# Patient Record
Sex: FEMALE | Race: WHITE | Employment: UNEMPLOYED | URBAN - METROPOLITAN AREA
[De-identification: names, ages, dates, MRNs, and addresses within clinical notes are randomized per-mention and may not be internally consistent; named-entity substitution may affect disease eponyms.]

---

## 2017-02-11 DIAGNOSIS — G47.00 INSOMNIA, UNSPECIFIED TYPE: ICD-10-CM

## 2017-02-13 RX ORDER — METOCLOPRAMIDE 10 MG/1
TABLET ORAL
Qty: 120 TAB | Refills: 3 | Status: SHIPPED | OUTPATIENT
Start: 2017-02-13 | End: 2017-03-28 | Stop reason: ALTCHOICE

## 2017-02-13 RX ORDER — BUPROPION HYDROCHLORIDE 150 MG/1
TABLET ORAL
Qty: 30 TAB | Refills: 0 | OUTPATIENT
Start: 2017-02-13

## 2017-02-13 RX ORDER — TRAZODONE HYDROCHLORIDE 100 MG/1
TABLET ORAL
Qty: 30 TAB | Refills: 3 | Status: SHIPPED | OUTPATIENT
Start: 2017-02-13 | End: 2017-06-30 | Stop reason: SDUPTHER

## 2017-03-02 ENCOUNTER — TELEPHONE (OUTPATIENT)
Dept: FAMILY MEDICINE CLINIC | Age: 32
End: 2017-03-02

## 2017-03-02 NOTE — TELEPHONE ENCOUNTER
Spoke with Liborio Rogers NP, [facility]. Was seeing patient for elevated prolactin level. States they repeated in office and level was high at 78. MRI negative. Ainsley Marcus believes elevated prolactin is a result of Reglan use. Recommend d/c Reglan, refer back to GI for symptom management, repeat Prolactin 4 weeks after discontinuing reglan. Call Ainsley Marcus if prolactin remains elevated.      Liborio Rogers NP ([facility]) office: [phone]  Work cell: [phone]

## 2017-03-02 NOTE — TELEPHONE ENCOUNTER
Teodora Martinez calling from [facility] needing to speak with Andreia Lewis regarding the pt.     Shasha# [phone]

## 2017-03-03 NOTE — TELEPHONE ENCOUNTER
Please call patient and have her to stop taking Reglan, that [facility] endocrinology believes the medication is causing her elevated prolactin levels. She will need to follow up with GI to see if any alternative medication she can take. Call pharmacy and let them know medication has been discontinued and they should not authorize any additional refills from patient.

## 2017-03-08 RX ORDER — BUPROPION HYDROCHLORIDE 150 MG/1
TABLET ORAL
Qty: 30 TAB | Refills: 0 | OUTPATIENT
Start: 2017-03-08

## 2017-03-09 RX ORDER — DULOXETIN HYDROCHLORIDE 60 MG/1
CAPSULE, DELAYED RELEASE ORAL
Qty: 30 CAP | Refills: 3 | Status: SHIPPED | OUTPATIENT
Start: 2017-03-09 | End: 2017-08-29 | Stop reason: SDUPTHER

## 2017-03-28 ENCOUNTER — OFFICE VISIT (OUTPATIENT)
Dept: FAMILY MEDICINE CLINIC | Age: 32
End: 2017-03-28

## 2017-03-28 VITALS
BODY MASS INDEX: 42.35 KG/M2 | DIASTOLIC BLOOD PRESSURE: 86 MMHG | SYSTOLIC BLOOD PRESSURE: 120 MMHG | HEART RATE: 82 BPM | HEIGHT: 63 IN | WEIGHT: 239 LBS | OXYGEN SATURATION: 98 % | RESPIRATION RATE: 20 BRPM | TEMPERATURE: 98.4 F

## 2017-03-28 DIAGNOSIS — E55.9 VITAMIN D DEFICIENCY: ICD-10-CM

## 2017-03-28 DIAGNOSIS — R79.89 ELEVATED PROLACTIN LEVEL: ICD-10-CM

## 2017-03-28 DIAGNOSIS — S29.019A THORACIC MYOFASCIAL STRAIN, INITIAL ENCOUNTER: Primary | ICD-10-CM

## 2017-03-28 RX ORDER — CYCLOBENZAPRINE HCL 10 MG
10 TABLET ORAL
Qty: 30 TAB | Refills: 0 | Status: SHIPPED | OUTPATIENT
Start: 2017-03-28 | End: 2017-05-09 | Stop reason: SDUPTHER

## 2017-03-28 RX ORDER — NAPROXEN 500 MG/1
500 TABLET ORAL
Qty: 30 TAB | Refills: 1 | Status: SHIPPED | OUTPATIENT
Start: 2017-03-28 | End: 2018-06-08 | Stop reason: ALTCHOICE

## 2017-03-28 NOTE — PATIENT INSTRUCTIONS
Healthy Upper Back: Exercises  Your Care Instructions  Here are some examples of exercises for your upper back. Start each exercise slowly. Ease off the exercise if you start to have pain. Your doctor or physical therapist will tell you when you can start these exercises and which ones will work best for you. How to do the exercises  Lower neck and upper back stretch    1. Stretch your arms out in front of your body. Clasp one hand on top of your other hand. 2. Gently reach out so that you feel your shoulder blades stretching away from each other. 3. Gently bend your head forward. 4. Hold for 15 to 30 seconds. 5. Repeat 2 to 4 times. Midback stretch    Note: If you have knee pain, do not do this exercise. 1. Kneel on the floor, and sit back on your ankles. 2. Lean forward, place your hands on the floor, and stretch your arms out in front of you. Rest your head between your arms. 3. Gently push your chest toward the floor, reaching as far in front of you as possible. 4. Hold for 15 to 30 seconds. 5. Repeat 2 to 4 times. Shoulder rolls    1. Sit comfortably with your feet shoulder-width apart. You can also do this exercise while standing. 2. Roll your shoulders up, then back, and then down in a smooth, circular motion. 3. Repeat 2 to 4 times. Wall push-up    1. Stand against a wall with your feet about 12 to 24 inches back from the wall. If you feel any pain when you do this exercise, stand closer to the wall. 2. Place your hands on the wall slightly wider apart than your shoulders, and lean forward. 3. Gently lean your body toward the wall. Then push back to your starting position. Keep the motion smooth and controlled. 4. Repeat 8 to 12 times. Resisted shoulder blade squeeze    Note: For this exercise, you will need elastic exercise material, such as surgical tubing or Thera-Band. 1. Sit or stand, holding the band in both hands in front of you.  Keep your elbows close to your sides, bent at a 90-degree angle. Your palms should face up. 2. Squeeze your shoulder blades together, and move your arms to the outside, stretching the band. Be sure to keep your elbows at your sides while you do this. 3. Relax. 4. Repeat 8 to 12 times. Resisted rows    Note: For this exercise, you will need elastic exercise material, such as surgical tubing or Thera-Band. 1. Put the band around a solid object, such as a bedpost, at about waist level. Hold one end of the band in each hand. 2. With your elbows at your sides and bent to 90 degrees, pull the band back to move your shoulder blades toward each other. Return to the starting position. 3. Repeat 8 to 12 times. Follow-up care is a key part of your treatment and safety. Be sure to make and go to all appointments, and call your doctor if you are having problems. It's also a good idea to know your test results and keep a list of the medicines you take. Where can you learn more? Go to http://orin-cris.info/. Enter U352 in the search box to learn more about \"Healthy Upper Back: Exercises. \"  Current as of: May 23, 2016  Content Version: 11.1  © 3441-0737 [employer]. Care instructions adapted under license by [employer] (which disclaims liability or warranty for this information). If you have questions about a medical condition or this instruction, always ask your healthcare professional. [place] [address] any warranty or liability for your use of this information.

## 2017-03-28 NOTE — PROGRESS NOTES
HISTORY OF PRESENT ILLNESS  Oneida Angel is a 29 y.o. female. HPI  Patient comes in today for back pain. Mid-upper back pain for 2 days. States she had something pop this morning with relief in pain. Does not recall injury. Denies numbness/tingling or weakness in extremities. Denies fever, chills, cough, dyspnea, palpitations, N/V. Has been off Reglan per endocrinology for elevated Prolactin levels. States she has been having more bloating. Allergies   Allergen Reactions    Latex Rash, Itching and Swelling    Pcn [Penicillins] Other (comments)     Pt states as a child she had a reaction but does not remember specific reaction. Pt states\"mother say I stopped breathing\". Past Medical History:   Diagnosis Date    Anxiety     Arthritis     unsure - knees, lower back, fingers and toes    Asthma     well controlled    Chronic pain     right abdomen,shooting pain groins    Depression     Diabetes ([address]     pre diabetes     GERD (gastroesophageal reflux disease)     Headache     Ill-defined condition     prolapsed cervix,rectum,bladder    Prolapse of anterior lip of cervix     Psychiatric disorder     bipolar, anxiety, depression    Rectal prolapse     Stool color black     White stool       Past Surgical History:   Procedure Laterality Date    HX GYN      vaginal birth x2    HX HEENT      bilat tubes in ears age 3    HX HERNIA REPAIR  07/09/2015    Laparoscopic Incisional hernia repair with mesh.  HX LAP CHOLECYSTECTOMY  7/7/2014    HX OTHER SURGICAL      colonoscopy       Social History     Social History    Marital status: SINGLE     Spouse name: N/A    Number of children: 2    Years of education: N/A     Occupational History     Not Employed     work at home      Social History Main Topics    Smoking status: Current Every Day Smoker     Packs/day: 0.50     Years: 10.00     Start date: 3/1/2004    Smokeless tobacco: Never Used    Alcohol use No    Drug use:  Yes Special: Marijuana      Comment: few months ago    Sexual activity: Yes     Partners: Male     Birth control/ protection: None      Comment: single      Other Topics Concern    Not on file     Social History Narrative    Unemployed currently       Family History   Problem Relation Age of Onset    Other Mother      fibromyalgia, IBS    Diabetes Mother      Pre-DM    High Cholesterol Mother     Hypertension Mother     Liver Disease Mother      fatty liver    Cancer Mother      uterine     Heart Disease Mother      CHF    Arthritis-osteo Mother     Diabetes Father     Hypertension Father     Asthma Brother     Diabetes Paternal Aunt     Hypertension Paternal Aunt     Diabetes Paternal Uncle     Hypertension Paternal Uncle     Cancer Maternal Grandfather      esoph    Hypertension Maternal Grandfather     Stroke Maternal Grandfather     Heart Disease Paternal Grandmother     Diabetes Paternal Grandfather     Heart Attack Paternal Grandfather     Stroke Paternal Grandfather     Hypertension Paternal Grandfather     Heart Disease Maternal Grandmother     Other Sister      MTHFR (clotting D/O)       Current Outpatient Prescriptions   Medication Sig    DULoxetine (CYMBALTA) 60 mg capsule take 1 capsule by mouth once daily    traZODone (DESYREL) 100 mg tablet take 1 tablet by mouth NIGHTLY if needed for sleep    metoclopramide HCl (REGLAN) 10 mg tablet take 1 tablet by mouth BEFORE BREAKFAST, LUNCH AND DINNER AND AT BEDTIME    omeprazole (PRILOSEC) 20 mg capsule take 1 capsule by mouth once daily    ergocalciferol (ERGOCALCIFEROL) 50,000 unit capsule take 1 capsule by mouth every 7 days    buPROPion XL (WELLBUTRIN XL) 150 mg tablet take 1 tablet by mouth once daily    clindamycin (CLEOCIN T) 1 % lotion Apply  to affected area two (2) times a day.  use thin film on affected area    traMADol (ULTRAM) 50 mg tablet take 1 tablet by mouth every 8 hours if needed for severe pain maximum daily dose of 3 tablets    cetirizine (ZYRTEC) 10 mg tablet take 1 tablet by mouth NIGHTLY     No current facility-administered medications for this visit. Review of Systems   Constitutional: Negative for chills and fever. Respiratory: Negative for cough, sputum production, shortness of breath and wheezing. Cardiovascular: Negative for chest pain and palpitations. Gastrointestinal: Negative for abdominal pain (abdominal bloating), blood in stool, constipation, melena, nausea and vomiting. Genitourinary: Negative for dysuria, flank pain, frequency, hematuria and urgency. Musculoskeletal: Positive for back pain (mid-upper back). Neurological: Negative for dizziness, tingling, sensory change and focal weakness. Vitals:    03/28/17 1205   BP: 120/86   Pulse: 82   Resp: 20   Temp: 98.4 °F (36.9 °C)   TempSrc: Oral   SpO2: 98%   Weight: 239 lb (108.4 kg)   Height: 5' 3\" (1.6 m)     Physical Exam   Constitutional: She is oriented to person, place, and time. Vital signs are normal. She appears well-developed and well-nourished. She is cooperative. Cardiovascular: Normal rate, regular rhythm and normal heart sounds. Pulses:       Radial pulses are 2+ on the right side, and 2+ on the left side. Trace BLE edema   Pulmonary/Chest: Effort normal and breath sounds normal.   Musculoskeletal:        Thoracic back: She exhibits tenderness (right paraspinal), bony tenderness, pain and spasm. She exhibits normal range of motion, no swelling, no edema, no deformity (no stepoffs), no laceration and normal pulse. Neurological: She is alert and oriented to person, place, and time. Skin: Skin is warm and dry. Psychiatric: She has a normal mood and affect. Her behavior is normal. Judgment and thought content normal.   Vitals reviewed. ASSESSMENT and PLAN    ICD-10-CM ICD-9-CM    1.  Thoracic myofascial strain, initial encounter S29.019A 847.1 naproxen (NAPROSYN) 500 mg tablet      cyclobenzaprine (FLEXERIL) 10 mg tablet   2. Elevated prolactin level (HCC) E22.9 253.1 PROLACTIN   3. Vitamin D deficiency E55.9 268.9 VITAMIN D, 25 HYDROXY     Encounter Diagnoses   Name Primary?  Thoracic myofascial strain, initial encounter Yes    Elevated prolactin level (HCC)     Vitamin D deficiency      Orders Placed This Encounter    PROLACTIN    VITAMIN D, 25 HYDROXY    naproxen (NAPROSYN) 500 mg tablet    cyclobenzaprine (FLEXERIL) 10 mg tablet     Erin was seen today for spasms. Diagnoses and all orders for this visit:    Thoracic myofascial strain, initial encounter - NSAIDs, flexeril, HEP, heat. RTC if no improvement in 1-2 weeks. -     naproxen (NAPROSYN) 500 mg tablet; Take 1 Tab by mouth two (2) times daily as needed. For back pain. Take with food  -     cyclobenzaprine (FLEXERIL) 10 mg tablet; Take 1 Tab by mouth three (3) times daily as needed for Muscle Spasm(s). Elevated prolactin level ([facility] [address] - patient has been off Reglan for ~1 month, will recheck Prolactin level. If still elevated, will notify [place]ocr[place]ology at [facility]  -     PROLACTIN    Vitamin D deficiency  -     VITAMIN D, 25 HYDROXY      Follow-up Disposition:  Return if symptoms worsen or fail to improve.  lab results and schedule of future lab studies reviewed with patient    I have reviewed the patient's allergies and made any necessary changes. Medical, procedural, social and family histories have been reviewed and updated as medically indicated. I have reconciled and/or revised patient medications in the EMR. I have discussed each diagnosis listed in this note with Anne Luis and/or their family. I have discussed treatment options and the risk/benefit analysis of those options, including safe use of medications and possible medication side effects. Through the use of shared decision making we have agreed to the above plan. The patient has received an after-visit summary and questions were answered concerning future plans.      Carito Abel Jeneane Cowden, LAWANDA-C    This note will not be viewable in MyChart.

## 2017-03-28 NOTE — MR AVS SNAPSHOT
Visit Information Date & Time Provider Department Dept. Phone Encounter #  
 3/28/2017 11:30 AM Kike Galeas [facility] [phone] [id number] Follow-up Instructions Return if symptoms worsen or fail to improve. Upcoming Health Maintenance Date Due  
 PAP AKA CERVICAL CYTOLOGY 8/5/2019 DTaP/Tdap/Td series (2 - Td) 1/26/2022 Allergies as of 3/28/2017  Review Complete On: 3/28/2017 By: Zhane Allen LPN Severity Noted Reaction Type Reactions Latex Medium 03/14/2014   Side Effect Rash, Itching, Swelling Pcn [Penicillins] High 12/12/2011   Systemic Other (comments) Pt states as a child she had a reaction but does not remember specific reaction. Pt states\"mother say I stopped breathing\". Current Immunizations  Reviewed on 7/9/2015 Name Date Influenza Vaccine 2/5/2013 Influenza Vaccine Split 1/26/2012 TDAP Vaccine 1/26/2012 Not reviewed this visit You Were Diagnosed With   
  
 Codes Comments Thoracic myofascial strain, initial encounter    -  Primary ICD-10-CM: S29.019A 
ICD-9-CM: 847.1 Vitamin D deficiency     ICD-10-CM: E55.9 ICD-9-CM: 268.9 Elevated prolactin level (HCC)     ICD-10-CM: E22.9 ICD-9-CM: 253.1 Vitals BP Pulse Temp Resp Height(growth percentile) Weight(growth percentile) 120/86 82 98.4 °F (36.9 °C) (Oral) 20 5' 3\" (1.6 m) 239 lb (108.4 kg) LMP SpO2 BMI OB Status Smoking Status 02/28/2017 98% 42.34 kg/m2 Having regular periods Current Every Day Smoker Vitals History BMI and BSA Data Body Mass Index Body Surface Area  
 42.34 kg/m 2 2.2 m 2 Preferred Pharmacy Pharmacy Name Phone [id number] [address] Tomas[address] Zapata [phone] Your Updated Medication List  
  
   
This list is accurate as of: 3/28/17 12:46 PM.  Always use your most recent med list.  
  
  
  
  
 cetirizine 10 mg tablet Commonly known as:  ZYRTEC  
take 1 tablet by mouth NIGHTLY  
  
 clindamycin 1 % lotion Commonly known as:  CLEOCIN T Apply  to affected area two (2) times a day. use thin film on affected area  
  
 cyclobenzaprine 10 mg tablet Commonly known as:  FLEXERIL Take 1 Tab by mouth three (3) times daily as needed for Muscle Spasm(s). DULoxetine 60 mg capsule Commonly known as:  CYMBALTA  
take 1 capsule by mouth once daily  
  
 ergocalciferol 50,000 unit capsule Commonly known as:  ERGOCALCIFEROL  
take 1 capsule by mouth every 7 days  
  
 naproxen 500 mg tablet Commonly known as:  NAPROSYN Take 1 Tab by mouth two (2) times daily as needed. For back pain. Take with food  
  
 omeprazole 20 mg capsule Commonly known as:  PRILOSEC  
take 1 capsule by mouth once daily  
  
 traZODone 100 mg tablet Commonly known as:  DESYREL  
take 1 tablet by mouth NIGHTLY if needed for sleep Prescriptions Sent to Pharmacy Refills  
 naproxen (NAPROSYN) 500 mg tablet 1 Sig: Take 1 Tab by mouth two (2) times daily as needed. For back pain. Take with food Class: Normal  
 Pharmacy: [id number] Ph #: [phone] Route: Oral  
 cyclobenzaprine (FLEXERIL) 10 mg tablet 0 Sig: Take 1 Tab by mouth three (3) times daily as needed for Muscle Spasm(s). Class: Normal  
 Pharmacy: [id number] [address] Ph #: [phone] Route: Oral  
  
We Performed the Following PROLACTIN [07539 CPT(R)] VITAMIN D, 25 HYDROXY [id number] CPT(R)] Follow-up Instructions Return if symptoms worsen or fail to improve. Patient Instructions Healthy Upper Back: Exercises Your Care Instructions Here are some examples of exercises for your upper back. Start each exercise slowly. Ease off the exercise if you start to have pain. Your doctor or physical therapist will tell you when you can start these exercises and which ones will work best for you. How to do the exercises Lower neck and upper back stretch 1. Stretch your arms out in front of your body. Clasp one hand on top of your other hand. 2. Gently reach out so that you feel your shoulder blades stretching away from each other. 3. Gently bend your head forward. 4. Hold for 15 to 30 seconds. 5. Repeat 2 to 4 times. Midback stretch Note: If you have knee pain, do not do this exercise. 1. Kneel on the floor, and sit back on your ankles. 2. Lean forward, place your hands on the floor, and stretch your arms out in front of you. Rest your head between your arms. 3. Gently push your chest toward the floor, reaching as far in front of you as possible. 4. Hold for 15 to 30 seconds. 5. Repeat 2 to 4 times. Shoulder rolls 1. Sit comfortably with your feet shoulder-width apart. You can also do this exercise while standing. 2. Roll your shoulders up, then back, and then down in a smooth, circular motion. 3. Repeat 2 to 4 times. Wall push-up 1. Stand against a wall with your feet about 12 to 24 inches back from the wall. If you feel any pain when you do this exercise, stand closer to the wall. 2. Place your hands on the wall slightly wider apart than your shoulders, and lean forward. 3. Gently lean your body toward the wall. Then push back to your starting position. Keep the motion smooth and controlled. 4. Repeat 8 to 12 times. Resisted shoulder blade squeeze Note: For this exercise, you will need elastic exercise material, such as surgical tubing or Thera-Band. 1. Sit or stand, holding the band in both hands in front of you. Keep your elbows close to your sides, bent at a 90-degree angle. Your palms should face up. 2. Squeeze your shoulder blades together, and move your arms to the outside, stretching the band.  Be sure to keep your elbows at your sides while you do this. 3. Relax. 4. Repeat 8 to 12 times. Resisted rows Note: For this exercise, you will need elastic exercise material, such as surgical tubing or Thera-Band. 1. Put the band around a solid object, such as a bedpost, at about waist level. Hold one end of the band in each hand. 2. With your elbows at your sides and bent to 90 degrees, pull the band back to move your shoulder blades toward each other. Return to the starting position. 3. Repeat 8 to 12 times. Follow-up care is a key part of your treatment and safety. Be sure to make and go to all appointments, and call your doctor if you are having problems. It's also a good idea to know your test results and keep a list of the medicines you take. Where can you learn more? Go to http://orin-cris.info/. Enter S611 in the search box to learn more about \"Healthy Upper Back: Exercises. \" Current as of: May 23, 2016 Content Version: 11.1 © 3992-2587 [employer] Care instructions adapted under license by [employer] (which disclaims liability or warranty for this information). If you have questions about a medical condition or this instruction, always ask your healthcare professional. [address] any warranty or liability for your use of this information. Introducing [facility]! Yonatan Flores introduces IntoOutdoors patient portal. Now you can access parts of your medical record, email your doctor's office, and request medication refills online. 1. In your internet browser, go to [email or web address] 2. Click on the First Time User? Click Here link in the Sign In box. You will see the New Member Sign Up page. 3. Enter your IntoOutdoors Access Code exactly as it appears below. You will not need to use this code after youve completed the sign-up process. If you do not sign up before the expiration date, you must request a new code. · weartolook Access Code: [email or web address] Expires: 6/26/2017 12:46 PM 
 
4. Enter the last four digits of your Social Security Number (xxxx) and Date of Birth (mm/dd/yyyy) as indicated and click Submit. You will be taken to the next sign-up page. 5. Create a weartolook ID. This will be your weartolook login ID and cannot be changed, so think of one that is secure and easy to remember. 6. Create a weartolook password. You can change your password at any time. 7. Enter your Password Reset Question and Answer. This can be used at a later time if you forget your password. 8. Enter your e-mail address. You will receive e-mail notification when new information is available in [address] 9. Click Sign Up. You can now view and download portions of your medical record. 10. Click the Download Summary menu link to download a portable copy of your medical information. If you have questions, please visit the Frequently Asked Questions section of the weartolook website. Remember, weartolook is NOT to be used for urgent needs. For medical emergencies, dial 911. Now available from your iPhone and Android! Please provide this summary of care documentation to your next provider. Your primary care clinician is listed as [address]. If you have any questions after today's visit, please call [phone].

## 2017-03-29 LAB
25(OH)D3+25(OH)D2 SERPL-MCNC: 29.2 NG/ML (ref 30–100)
PROLACTIN SERPL-MCNC: 5.6 NG/ML (ref 4.8–23.3)

## 2017-05-09 DIAGNOSIS — S29.019A THORACIC MYOFASCIAL STRAIN, INITIAL ENCOUNTER: ICD-10-CM

## 2017-05-09 RX ORDER — ERGOCALCIFEROL 1.25 MG/1
CAPSULE ORAL
Qty: 4 CAP | Refills: 3 | Status: SHIPPED | OUTPATIENT
Start: 2017-05-09 | End: 2018-06-08 | Stop reason: ALTCHOICE

## 2017-05-09 RX ORDER — CYCLOBENZAPRINE HCL 10 MG
TABLET ORAL
Qty: 30 TAB | Refills: 0 | Status: SHIPPED | OUTPATIENT
Start: 2017-05-09 | End: 2018-06-08 | Stop reason: ALTCHOICE

## 2017-06-04 RX ORDER — OMEPRAZOLE 20 MG/1
CAPSULE, DELAYED RELEASE ORAL
Qty: 30 CAP | Refills: 3 | Status: SHIPPED | OUTPATIENT
Start: 2017-06-04 | End: 2017-10-01 | Stop reason: SDUPTHER

## 2017-06-30 DIAGNOSIS — G47.00 INSOMNIA, UNSPECIFIED TYPE: ICD-10-CM

## 2017-06-30 RX ORDER — CETIRIZINE HCL 10 MG
TABLET ORAL
Qty: 30 TAB | Refills: 11 | Status: SHIPPED | OUTPATIENT
Start: 2017-06-30 | End: 2018-06-15

## 2017-06-30 RX ORDER — TRAZODONE HYDROCHLORIDE 100 MG/1
TABLET ORAL
Qty: 30 TAB | Refills: 3 | Status: SHIPPED | OUTPATIENT
Start: 2017-06-30 | End: 2017-10-29 | Stop reason: SDUPTHER

## 2017-10-01 RX ORDER — OMEPRAZOLE 20 MG/1
CAPSULE, DELAYED RELEASE ORAL
Qty: 30 CAP | Refills: 3 | Status: SHIPPED | OUTPATIENT
Start: 2017-10-01 | End: 2018-06-08 | Stop reason: ALTCHOICE

## 2018-06-08 ENCOUNTER — OFFICE VISIT (OUTPATIENT)
Dept: FAMILY MEDICINE CLINIC | Age: 33
End: 2018-06-08

## 2018-06-08 VITALS
SYSTOLIC BLOOD PRESSURE: 131 MMHG | RESPIRATION RATE: 18 BRPM | DIASTOLIC BLOOD PRESSURE: 90 MMHG | HEIGHT: 63 IN | OXYGEN SATURATION: 97 % | BODY MASS INDEX: 34.05 KG/M2 | TEMPERATURE: 97.9 F | WEIGHT: 192.2 LBS | HEART RATE: 63 BPM

## 2018-06-08 DIAGNOSIS — R06.00 DYSPNEA, UNSPECIFIED TYPE: ICD-10-CM

## 2018-06-08 DIAGNOSIS — E55.9 VITAMIN D DEFICIENCY: ICD-10-CM

## 2018-06-08 DIAGNOSIS — F43.10 PTSD (POST-TRAUMATIC STRESS DISORDER): ICD-10-CM

## 2018-06-08 DIAGNOSIS — F33.2 SEVERE EPISODE OF RECURRENT MAJOR DEPRESSIVE DISORDER, WITHOUT PSYCHOTIC FEATURES (HCC): Primary | ICD-10-CM

## 2018-06-08 DIAGNOSIS — N64.4 BREAST PAIN, RIGHT: ICD-10-CM

## 2018-06-08 DIAGNOSIS — N89.8 VAGINAL DISCHARGE: ICD-10-CM

## 2018-06-08 DIAGNOSIS — N63.10 LUMP OF BREAST, RIGHT: ICD-10-CM

## 2018-06-08 DIAGNOSIS — L60.5 YELLOW NAILS: ICD-10-CM

## 2018-06-08 DIAGNOSIS — R22.31 LUMP OF AXILLA, RIGHT: ICD-10-CM

## 2018-06-08 DIAGNOSIS — R07.89 OTHER CHEST PAIN: ICD-10-CM

## 2018-06-08 DIAGNOSIS — Z72.51 UNPROTECTED SEXUAL INTERCOURSE: ICD-10-CM

## 2018-06-08 DIAGNOSIS — R73.02 IGT (IMPAIRED GLUCOSE TOLERANCE): ICD-10-CM

## 2018-06-08 LAB
BILIRUB UR QL STRIP: NEGATIVE
GLUCOSE UR-MCNC: NEGATIVE MG/DL
HBA1C MFR BLD HPLC: 5.4 %
HCG URINE, QL. (POC): NEGATIVE
KETONES P FAST UR STRIP-MCNC: NEGATIVE MG/DL
PH UR STRIP: 6.5 [PH] (ref 4.6–8)
PROT UR QL STRIP: NEGATIVE
SP GR UR STRIP: 1.01 (ref 1–1.03)
UA UROBILINOGEN AMB POC: NORMAL (ref 0.2–1)
URINALYSIS CLARITY POC: CLEAR
URINALYSIS COLOR POC: YELLOW
URINE BLOOD POC: NEGATIVE
URINE LEUKOCYTES POC: NEGATIVE
URINE NITRITES POC: NEGATIVE
VALID INTERNAL CONTROL?: YES

## 2018-06-08 RX ORDER — DULOXETIN HYDROCHLORIDE 60 MG/1
CAPSULE, DELAYED RELEASE ORAL
Qty: 30 CAP | Refills: 2 | Status: SHIPPED | OUTPATIENT
Start: 2018-06-08 | End: 2018-07-01

## 2018-06-08 RX ORDER — BUPROPION HYDROCHLORIDE 150 MG/1
150 TABLET ORAL
Qty: 30 TAB | Refills: 2 | Status: SHIPPED | OUTPATIENT
Start: 2018-06-08 | End: 2018-07-01
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
## 2018-06-08 NOTE — PROGRESS NOTES
Chief Complaint   Patient presents with    Chest Pain     Pt stated that this has been ongoing for months.  Nausea    Joint Pain    Generalized Body Aches    Cough    Nail Problem     yellow discoloration on nails and toenails     Pt's mother stated that pt is having memory issues.

## 2018-06-08 NOTE — PATIENT INSTRUCTIONS
Breast Pain: Care Instructions  Your Care Instructions    Breast tenderness and pain may come and go with your monthly periods (cyclic), or it may not follow any pattern (noncyclic). Breast pain is rarely caused by a serious health problem. You may need tests to find the cause. Follow-up care is a key part of your treatment and safety. Be sure to make and go to all appointments, and call your doctor if you are having problems. It's also a good idea to know your test results and keep a list of the medicines you take. How can you care for yourself at home? · If your doctor gave you medicine, take it exactly as prescribed. Call your doctor if you think you are having a problem with your medicine. · Take an over-the-counter pain medicine, such as acetaminophen (Tylenol), ibuprofen (Advil, Motrin), or naproxen (Aleve), to relieve pain and swelling. Read and follow all instructions on the label. · Do not take two or more pain medicines at the same time unless the doctor told you to. Many pain medicines have acetaminophen, which is Tylenol. Too much acetaminophen (Tylenol) can be harmful. · Wear a supportive bra, such as a sports bra or a jog bra. · Cut down on the amount of fat in your diet. If you need help planning healthy meals, see a dietitian. · Get at least 30 minutes of exercise on most days of the week. Walking is a good choice. You also may want to do other activities, such as running, swimming, cycling, or playing tennis or team sports. · Keep a healthy sleep pattern. Go to bed at the same time every night, and get up at the same time every day. When should you call for help? Call your doctor now or seek immediate medical care if:  ? · You have new changes in a breast, such as:  ¨ A lump or thickening in your breast or armpit. ¨ A change in the breast's size or shape. ¨ Skin changes, such as dimples or puckers. ¨ Nipple discharge.   ¨ A change in the color or feel of the skin of your breast or the darker area around the nipple (areola). ¨ A change in the shape of the nipple (it may look like it's being pulled into the breast). ? · You have symptoms of a breast infection, such as:  ¨ Increased pain, swelling, redness, or warmth around a breast.  ¨ Red streaks extending from the breast.  ¨ Pus draining from a breast.  ¨ A fever. ? Watch closely for changes in your health, and be sure to contact your doctor if:  ? · Your breast pain does not get better after 1 week. ? · You have a lump or thickening in your breast or armpit. Where can you learn more? Go to http://orin-cris.info/. Enter N864 in the search box to learn more about \"Breast Pain: Care Instructions. \"  Current as of: March 20, 2017  Content Version: 11.4  © 2784-8635 [employer]. Care instructions adapted under license by [employer] (which disclaims liability or warranty for this information). If you have questions about a medical condition or this instruction, always ask your healthcare professional. [address] any warranty or liability for your use of this information. Chest Pain: Care Instructions  Your Care Instructions    There are many things that can cause chest pain. Some are not serious and will get better on their own in a few days. But some kinds of chest pain need more testing and treatment. Your doctor may have recommended a follow-up visit in the next 8 to 12 hours. If you are not getting better, you may need more tests or treatment. Even though your doctor has released you, you still need to watch for any problems. The doctor carefully checked you, but sometimes problems can develop later. If you have new symptoms or if your symptoms do not get better, get medical care right away.   If you have worse or different chest pain or pressure that lasts more than 5 minutes or you passed out (lost consciousness), call 911 or seek other emergency help right away.   A medical visit is only one step in your treatment. Even if you feel better, you still need to do what your doctor recommends, such as going to all suggested follow-up appointments and taking medicines exactly as directed. This will help you recover and help prevent future problems. How can you care for yourself at home? · Rest until you feel better. · Take your medicine exactly as prescribed. Call your doctor if you think you are having a problem with your medicine. · Do not drive after taking a prescription pain medicine. When should you call for help? Call 911 if:  ? · You passed out (lost consciousness). ? · You have severe difficulty breathing. ? · You have symptoms of a heart attack. These may include:  ¨ Chest pain or pressure, or a strange feeling in your chest.  ¨ Sweating. ¨ Shortness of breath. ¨ Nausea or vomiting. ¨ Pain, pressure, or a strange feeling in your back, neck, jaw, or upper belly or in one or both shoulders or arms. ¨ Lightheadedness or sudden weakness. ¨ A fast or irregular heartbeat. After you call 911, the  may tell you to chew 1 adult-strength or 2 to 4 low-dose aspirin. Wait for an ambulance. Do not try to drive yourself. ?Call your doctor today if:  ? · You have any trouble breathing. ? · Your chest pain gets worse. ? · You are dizzy or lightheaded, or you feel like you may faint. ? · You are not getting better as expected. ? · You are having new or different chest pain. Where can you learn more? Go to http://orin-cris.info/. Enter A120 in the search box to learn more about \"Chest Pain: Care Instructions. \"  Current as of: March 20, 2017  Content Version: 11.4  © 6745-0158 [employer]. Care instructions adapted under license by [employer] (which disclaims liability or warranty for this information).  If you have questions about a medical condition or this instruction, always ask your healthcare professional. [address] any warranty or liability for your use of this information.

## 2018-06-08 NOTE — MR AVS SNAPSHOT
[address] [facility] 
 
 
 [address] [facility] [address] [address] 
[phone] Patient: Eduard Doty MRN: [id number] TMS:9[birth year]48/[birth year] Visit Information Date & Time Provider Department Dept. Phone Encounter #  
 6/8/2018 10:00 AM Kike Gaines [phone] [id number] Follow-up Instructions Return in about 4 weeks (around 7/6/2018). Upcoming Health Maintenance Date Due Influenza Age 5 to Adult 8/1/2018 PAP AKA CERVICAL CYTOLOGY 8/5/2019 DTaP/Tdap/Td series (2 - Td) 1/26/2022 Allergies as of 6/8/2018  Review Complete On: 6/8/2018 By: Betzaida Paulino LPN Severity Noted Reaction Type Reactions Latex Medium 03/14/2014   Side Effect Rash, Itching, Swelling Pcn [Penicillins] High 12/12/2011   Systemic Other (comments) Pt states as a child she had a reaction but does not remember specific reaction. Pt states\"mother say I stopped breathing\". Current Immunizations  Reviewed on 7/9/2015 Name Date Influenza Vaccine 2/5/2013 Influenza Vaccine Split 1/26/2012 TDAP Vaccine 1/26/2012 Not reviewed this visit You Were Diagnosed With   
  
 Codes Comments Other chest pain    -  Primary ICD-10-CM: R07.89 ICD-9-CM: 786.59 Dyspnea, unspecified type     ICD-10-CM: R06.00 
ICD-9-CM: 786.09 Severe episode of recurrent major depressive disorder, without psychotic features ([facility]     ICD-10-CM: F33.2 ICD-9-CM: 296.33 IGT (impaired glucose tolerance)     ICD-10-CM: R73.02 
ICD-9-CM: 790.22 PTSD (post-traumatic stress disorder)     ICD-10-CM: F43.10 ICD-9-CM: 309.81 Vitamin D deficiency     ICD-10-CM: E55.9 ICD-9-CM: 268.9 Yellow nails     ICD-10-CM: L60.5 ICD-9-CM: 703.8 Vaginal discharge     ICD-10-CM: N89.8 ICD-9-CM: 623.5 Unprotected sexual intercourse     ICD-10-CM: Z72.51 
ICD-9-CM: V69.2 Breast pain, right     ICD-10-CM: N64.4 ICD-9-CM: 611.71 Lump of breast, right     ICD-10-CM: N63.10 ICD-9-CM: 611.72 Lump of axilla, right     ICD-10-CM: R22.31 
ICD-9-CM: 637. 2 Vitals BP Pulse Temp Resp Height(growth percentile) Weight(growth percentile) 131/90 (BP 1 Location: Right arm, BP Patient Position: Sitting) 63 97.9 °F (36.6 °C) (Oral) 18 5' 3\" (1.6 m) 192 lb 3.2 oz (87.2 kg) SpO2 BMI OB Status Smoking Status 97% 34.05 kg/m2 Having regular periods Current Every Day Smoker Vitals History BMI and BSA Data Body Mass Index Body Surface Area 34.05 kg/m 2 1.97 m 2 Your Updated Medication List  
  
   
This list is accurate as of 6/8/18 11:05 AM.  Always use your most recent med list.  
  
  
  
  
 buPROPion  mg tablet Commonly known as:  Mary Marchi Take 1 Tab by mouth every morning. cetirizine 10 mg tablet Commonly known as:  ZYRTEC  
take 1 tablet by mouth at bedtime DULoxetine 60 mg capsule Commonly known as:  CYMBALTA  
take 1 capsule by mouth once daily Prescriptions Printed Refills DULoxetine (CYMBALTA) 60 mg capsule 2 Sig: take 1 capsule by mouth once daily Class: Print buPROPion XL (WELLBUTRIN XL) 150 mg tablet 2 Sig: Take 1 Tab by mouth every morning. Class: Print Route: Oral  
  
We Performed the Following AMB POC EKG ROUTINE W/ 12 LEADS, INTER & REP [96800 CPT(R)] AMB POC HEMOGLOBIN A1C [30897 CPT(R)] AMB POC URINALYSIS DIP STICK AUTO W/O MICRO [06799 CPT(R)] AMB POC URINE PREGNANCY TEST, VISUAL COLOR COMPARISON [75915 CPT(R)] CBC WITH AUTOMATED DIFF [66530 CPT(R)] IRON PROFILE [id number] CPT(R)] METABOLIC PANEL, COMPREHENSIVE [99147 CPT(R)] [address] [id number] Custom] REFERRAL TO CARDIOLOGY [FBO74 Custom] Comments:  
 Chest pains, dyspnea TSH RFX ON ABNORMAL TO FREE T4 [[id number] Custom] VITAMIN B12 & FOLATE [27380 CPT(R)] VITAMIN D, 25 HYDROXY [id number] CPT(R)] Follow-up Instructions Return in about 4 weeks (around 7/6/2018). To-Do List   
 06/08/2018 Imaging:  US BREAST AXILLA RT   
  
 06/08/2018 Imaging:  US BREAST RT COMPLETE 4 QUAD   
  
 06/08/2018 Imaging:  XR CHEST PA LAT Referral Information Referral ID Referred By Referred To  
  
 [id number] Obie SHERWOOD Not Available Visits Status Start Date End Date 1 New Request 6/8/18 6/8/19 If your referral has a status of pending review or denied, additional information will be sent to support the outcome of this decision. Patient Instructions Breast Pain: Care Instructions Your Care Instructions Breast tenderness and pain may come and go with your monthly periods (cyclic), or it may not follow any pattern (noncyclic). Breast pain is rarely caused by a serious health problem. You may need tests to find the cause. Follow-up care is a key part of your treatment and safety. Be sure to make and go to all appointments, and call your doctor if you are having problems. It's also a good idea to know your test results and keep a list of the medicines you take. How can you care for yourself at home? · If your doctor gave you medicine, take it exactly as prescribed. Call your doctor if you think you are having a problem with your medicine. · Take an over-the-counter pain medicine, such as acetaminophen (Tylenol), ibuprofen (Advil, Motrin), or naproxen (Aleve), to relieve pain and swelling. Read and follow all instructions on the label. · Do not take two or more pain medicines at the same time unless the doctor told you to. Many pain medicines have acetaminophen, which is Tylenol. Too much acetaminophen (Tylenol) can be harmful. · Wear a supportive bra, such as a sports bra or a jog bra. · Cut down on the amount of fat in your diet. If you need help planning healthy meals, see a dietitian. · Get at least 30 minutes of exercise on most days of the week. Walking is a good choice. You also may want to do other activities, such as running, swimming, cycling, or playing tennis or team sports. · Keep a healthy sleep pattern. Go to bed at the same time every night, and get up at the same time every day. When should you call for help? Call your doctor now or seek immediate medical care if: 
? · You have new changes in a breast, such as: ¨ A lump or thickening in your breast or armpit. ¨ A change in the breast's size or shape. ¨ Skin changes, such as dimples or puckers. ¨ Nipple discharge. ¨ A change in the color or feel of the skin of your breast or the darker area around the nipple (areola). ¨ A change in the shape of the nipple (it may look like it's being pulled into the breast). ? · You have symptoms of a breast infection, such as: 
¨ Increased pain, swelling, redness, or warmth around a breast. 
¨ Red streaks extending from the breast. 
¨ Pus draining from a breast. 
¨ A fever. ? Watch closely for changes in your health, and be sure to contact your doctor if: 
? · Your breast pain does not get better after 1 week. ? · You have a lump or thickening in your breast or armpit. Where can you learn more? Go to http://orin-cris.info/. Enter Q374 in the search box to learn more about \"Breast Pain: Care Instructions. \" Current as of: March 20, 2017 Content Version: 11.4 © 2317-4596 [employer]. Care instructions adapted under license by [employer] (which disclaims liability or warranty for this information). If you have questions about a medical condition or this instruction, always ask your healthcare professional. [address] any warranty or liability for your use of this information. Chest Pain: Care Instructions Your Care Instructions There are many things that can cause chest pain.  Some are not serious and will get better on their own in a few days. But some kinds of chest pain need more testing and treatment. Your doctor may have recommended a follow-up visit in the next 8 to 12 hours. If you are not getting better, you may need more tests or treatment. Even though your doctor has released you, you still need to watch for any problems. The doctor carefully checked you, but sometimes problems can develop later. If you have new symptoms or if your symptoms do not get better, get medical care right away. If you have worse or different chest pain or pressure that lasts more than 5 minutes or you passed out (lost consciousness), call 911 or seek other emergency help right away. A medical visit is only one step in your treatment. Even if you feel better, you still need to do what your doctor recommends, such as going to all suggested follow-up appointments and taking medicines exactly as directed. This will help you recover and help prevent future problems. How can you care for yourself at home? · Rest until you feel better. · Take your medicine exactly as prescribed. Call your doctor if you think you are having a problem with your medicine. · Do not drive after taking a prescription pain medicine. When should you call for help? Call 911 if: 
? · You passed out (lost consciousness). ? · You have severe difficulty breathing. ? · You have symptoms of a heart attack. These may include: ¨ Chest pain or pressure, or a strange feeling in your chest. 
¨ Sweating. ¨ Shortness of breath. ¨ Nausea or vomiting. ¨ Pain, pressure, or a strange feeling in your back, neck, jaw, or upper belly or in one or both shoulders or arms. ¨ Lightheadedness or sudden weakness. ¨ A fast or irregular heartbeat. After you call 911, the  may tell you to chew 1 adult-strength or 2 to 4 low-dose aspirin. Wait for an ambulance. Do not try to drive yourself. ?Call your doctor today if: 
? · You have any trouble breathing. ? · Your chest pain gets worse. ? · You are dizzy or lightheaded, or you feel like you may faint. ? · You are not getting better as expected. ? · You are having new or different chest pain. Where can you learn more? Go to http://orin-cris.info/. Enter A120 in the search box to learn more about \"Chest Pain: Care Instructions. \" Current as of: March 20, 2017 Content Version: 11.4 © 8886-5351 [email or web address].com. Care instructions adapted under license by [employer] (which disclaims liability or warranty for this information). If you have questions about a medical condition or this instruction, always ask your healthcare professional. [address] any warranty or liability for your use of this information. Introducing \[facility] [place] [facility]\"" & HEALTH SERVICES! Cirilo Holt introduces [employer] patient portal. Now you can access parts of your medical record, email your doctor's office, and request medication refills online. 1. In your internet browser, go to [email or web address] 2. Click on the First Time User? Click Here link in the Sign In box. You will see the New Member Sign Up page. 3. Enter your [employer] Access Code exactly as it appears below. You will not need to use this code after youve completed the sign-up process. If you do not sign up before the expiration date, you must request a new code. · [employer] Access Code: [email or web address] Expires: 9/6/2018 11:04 AM 
 
4. Enter the last four digits of your Social Security Number (xxxx) and Date of Birth (mm/dd/yyyy) as indicated and click Submit. You will be taken to the next sign-up page. 5. Create a [employer] ID. This will be your [employer] login ID and cannot be changed, so think of one that is secure and easy to remember. 6. Create a [employer] password. You can change your password at any time. 7. Enter your Password Reset Question and Answer.  This can be used at a later time if you forget your password. 8. Enter your e-mail address. You will receive e-mail notification when new information is available in [address] 9. Click Sign Up. You can now view and download portions of your medical record. 10. Click the Download Summary menu link to download a portable copy of your medical information. If you have questions, please visit the Frequently Asked Questions section of the [employer] website. Remember, [employer] is NOT to be used for urgent needs. For medical emergencies, dial 911. Now available from your iPhone and Android! Please provide this summary of care documentation to your next provider. Your primary care clinician is listed as [address] And[facility] Tobias. If you have any questions after today's visit, please call [phone].

## 2018-06-09 LAB
25(OH)D3+25(OH)D2 SERPL-MCNC: 22.7 NG/ML (ref 30–100)
ALBUMIN SERPL-MCNC: 4.6 G/DL (ref 3.5–5.5)
ALBUMIN/GLOB SERPL: 1.8 {RATIO} (ref 1.2–2.2)
ALP SERPL-CCNC: 87 IU/L (ref 39–117)
ALT SERPL-CCNC: 9 IU/L (ref 0–32)
AST SERPL-CCNC: 11 IU/L (ref 0–40)
BASOPHILS # BLD AUTO: 0 X10E3/UL (ref 0–0.2)
BASOPHILS NFR BLD AUTO: 0 %
BILIRUB SERPL-MCNC: 0.3 MG/DL (ref 0–1.2)
BUN SERPL-MCNC: 6 MG/DL (ref 6–20)
BUN/CREAT SERPL: 8 (ref 9–23)
CALCIUM SERPL-MCNC: 9.7 MG/DL (ref 8.7–10.2)
CHLORIDE SERPL-SCNC: 104 MMOL/L (ref 96–106)
CO2 SERPL-SCNC: 20 MMOL/L (ref 18–29)
CREAT SERPL-MCNC: 0.71 MG/DL (ref 0.57–1)
EOSINOPHIL # BLD AUTO: 0.1 X10E3/UL (ref 0–0.4)
EOSINOPHIL NFR BLD AUTO: 2 %
ERYTHROCYTE [DISTWIDTH] IN BLOOD BY AUTOMATED COUNT: 14.9 % (ref 12.3–15.4)
FOLATE SERPL-MCNC: 5.3 NG/ML
GFR SERPLBLD CREATININE-BSD FMLA CKD-EPI: 114 ML/MIN/1.73
GFR SERPLBLD CREATININE-BSD FMLA CKD-EPI: 131 ML/MIN/1.73
GLOBULIN SER CALC-MCNC: 2.5 G/DL (ref 1.5–4.5)
GLUCOSE SERPL-MCNC: 90 MG/DL (ref 65–99)
HCT VFR BLD AUTO: 47 % (ref 34–46.6)
HGB BLD-MCNC: 15.9 G/DL (ref 11.1–15.9)
IMM GRANULOCYTES # BLD: 0 X10E3/UL (ref 0–0.1)
IMM GRANULOCYTES NFR BLD: 0 %
IRON SATN MFR SERPL: 16 % (ref 15–55)
IRON SERPL-MCNC: 58 UG/DL (ref 27–159)
LYMPHOCYTES # BLD AUTO: 3 X10E3/UL (ref 0.7–3.1)
LYMPHOCYTES NFR BLD AUTO: 32 %
MCH RBC QN AUTO: 29.4 PG (ref 26.6–33)
MCHC RBC AUTO-ENTMCNC: 33.8 G/DL (ref 31.5–35.7)
MCV RBC AUTO: 87 FL (ref 79–97)
MONOCYTES # BLD AUTO: 0.5 X10E3/UL (ref 0.1–0.9)
MONOCYTES NFR BLD AUTO: 6 %
NEUTROPHILS # BLD AUTO: 5.6 X10E3/UL (ref 1.4–7)
NEUTROPHILS NFR BLD AUTO: 60 %
PLATELET # BLD AUTO: 282 X10E3/UL (ref 150–379)
POTASSIUM SERPL-SCNC: 4.3 MMOL/L (ref 3.5–5.2)
PROT SERPL-MCNC: 7.1 G/DL (ref 6–8.5)
RBC # BLD AUTO: 5.41 X10E6/UL (ref 3.77–5.28)
SODIUM SERPL-SCNC: 142 MMOL/L (ref 134–144)
TIBC SERPL-MCNC: 353 UG/DL (ref 250–450)
TSH SERPL DL<=0.005 MIU/L-ACNC: 1.84 UIU/ML (ref 0.45–4.5)
UIBC SERPL-MCNC: 295 UG/DL (ref 131–425)
VIT B12 SERPL-MCNC: 262 PG/ML (ref 232–1245)
WBC # BLD AUTO: 9.3 X10E3/UL (ref 3.4–10.8)

## 2018-06-13 LAB
A VAGINAE DNA VAG QL NAA+PROBE: NORMAL SCORE
BVAB2 DNA VAG QL NAA+PROBE: NORMAL SCORE
C ALBICANS DNA VAG QL NAA+PROBE: NEGATIVE
C GLABRATA DNA VAG QL NAA+PROBE: NEGATIVE
C TRACH RRNA SPEC QL NAA+PROBE: NEGATIVE
MEGA1 DNA VAG QL NAA+PROBE: NORMAL SCORE
N GONORRHOEA RRNA SPEC QL NAA+PROBE: NEGATIVE
T VAGINALIS RRNA SPEC QL NAA+PROBE: NEGATIVE

## 2018-06-14 ENCOUNTER — DOCUMENTATION ONLY (OUTPATIENT)
Dept: FAMILY MEDICINE CLINIC | Age: 33
End: 2018-06-14

## 2018-06-15 ENCOUNTER — HOSPITAL ENCOUNTER (OUTPATIENT)
Age: 33
Discharge: HOME | End: 2018-06-16
Attending: EMERGENCY MEDICINE | Admitting: EMERGENCY MEDICINE
Payer: MEDICAID

## 2018-06-15 ENCOUNTER — APPOINTMENT (OUTPATIENT)
Dept: CT IMAGING | Age: 33
End: 2018-06-15
Attending: EMERGENCY MEDICINE
Payer: MEDICAID

## 2018-06-15 ENCOUNTER — APPOINTMENT (OUTPATIENT)
Dept: ULTRASOUND IMAGING | Age: 33
End: 2018-06-15
Attending: EMERGENCY MEDICINE
Payer: MEDICAID

## 2018-06-15 DIAGNOSIS — N83.201 RIGHT OVARIAN CYST: Primary | ICD-10-CM

## 2018-06-15 DIAGNOSIS — N17.9 AKI (ACUTE KIDNEY INJURY) (HCC): ICD-10-CM

## 2018-06-15 DIAGNOSIS — R11.2 INTRACTABLE VOMITING WITH NAUSEA, UNSPECIFIED VOMITING TYPE: ICD-10-CM

## 2018-06-15 DIAGNOSIS — R10.9 ACUTE ABDOMINAL PAIN: ICD-10-CM

## 2018-06-15 LAB
ALBUMIN SERPL-MCNC: 4.5 G/DL (ref 3.5–5)
ALBUMIN/GLOB SERPL: 1 {RATIO} (ref 1.1–2.2)
ALP SERPL-CCNC: 109 U/L (ref 45–117)
ALT SERPL-CCNC: 18 U/L (ref 12–78)
ANION GAP BLD CALC-SCNC: 20 MMOL/L (ref 10–20)
ANION GAP SERPL CALC-SCNC: 13 MMOL/L (ref 5–15)
APPEARANCE UR: ABNORMAL
AST SERPL-CCNC: 5 U/L (ref 15–37)
BACTERIA URNS QL MICRO: ABNORMAL /HPF
BASOPHILS # BLD: 0.1 K/UL (ref 0–0.1)
BASOPHILS NFR BLD: 0 % (ref 0–1)
BILIRUB SERPL-MCNC: 0.9 MG/DL (ref 0.2–1)
BILIRUB UR QL CFM: ABNORMAL
BUN BLD-MCNC: 16 MG/DL (ref 9–20)
BUN SERPL-MCNC: 16 MG/DL (ref 6–20)
BUN/CREAT SERPL: 16 (ref 12–20)
CA-I BLD-MCNC: 1.04 MMOL/L (ref 1.12–1.32)
CALCIUM SERPL-MCNC: 9.2 MG/DL (ref 8.5–10.1)
CHLORIDE BLD-SCNC: 107 MMOL/L (ref 98–107)
CHLORIDE SERPL-SCNC: 103 MMOL/L (ref 97–108)
CLUE CELLS VAG QL WET PREP: NORMAL
CO2 BLD-SCNC: 16 MMOL/L (ref 21–32)
CO2 SERPL-SCNC: 18 MMOL/L (ref 21–32)
COLOR UR: ABNORMAL
CREAT BLD-MCNC: 0.6 MG/DL (ref 0.6–1.3)
CREAT SERPL-MCNC: 1 MG/DL (ref 0.55–1.02)
DIFFERENTIAL METHOD BLD: ABNORMAL
EOSINOPHIL # BLD: 0 K/UL (ref 0–0.4)
EOSINOPHIL NFR BLD: 0 % (ref 0–7)
EPITH CASTS URNS QL MICRO: ABNORMAL /LPF
ERYTHROCYTE [DISTWIDTH] IN BLOOD BY AUTOMATED COUNT: 14.2 % (ref 11.5–14.5)
GLOBULIN SER CALC-MCNC: 4.3 G/DL (ref 2–4)
GLUCOSE BLD-MCNC: 152 MG/DL (ref 65–100)
GLUCOSE SERPL-MCNC: 149 MG/DL (ref 65–100)
GLUCOSE UR STRIP.AUTO-MCNC: NEGATIVE MG/DL
HCG SERPL-ACNC: <1 MIU/ML (ref 0–6)
HCT VFR BLD AUTO: 47.5 % (ref 35–47)
HCT VFR BLD CALC: 46 % (ref 35–47)
HGB BLD-MCNC: 16.4 G/DL (ref 11.5–16)
HGB UR QL STRIP: ABNORMAL
HYALINE CASTS URNS QL MICRO: ABNORMAL /LPF (ref 0–5)
IMM GRANULOCYTES # BLD: 0.1 K/UL (ref 0–0.04)
IMM GRANULOCYTES NFR BLD AUTO: 0 % (ref 0–0.5)
KETONES UR QL STRIP.AUTO: >80 MG/DL
KOH PREP SPEC: NORMAL
LACTATE SERPL-SCNC: 1.3 MMOL/L (ref 0.4–2)
LEUKOCYTE ESTERASE UR QL STRIP.AUTO: NEGATIVE
LIPASE SERPL-CCNC: 93 U/L (ref 73–393)
LYMPHOCYTES # BLD: 1.7 K/UL (ref 0.8–3.5)
LYMPHOCYTES NFR BLD: 11 % (ref 12–49)
MCH RBC QN AUTO: 29.4 PG (ref 26–34)
MCHC RBC AUTO-ENTMCNC: 34.5 G/DL (ref 30–36.5)
MCV RBC AUTO: 85.1 FL (ref 80–99)
MONOCYTES # BLD: 0.4 K/UL (ref 0–1)
MONOCYTES NFR BLD: 2 % (ref 5–13)
MUCOUS THREADS URNS QL MICRO: ABNORMAL /LPF
NEUTS SEG # BLD: 13.1 K/UL (ref 1.8–8)
NEUTS SEG NFR BLD: 86 % (ref 32–75)
NITRITE UR QL STRIP.AUTO: NEGATIVE
NRBC # BLD: 0 K/UL (ref 0–0.01)
NRBC BLD-RTO: 0 PER 100 WBC
PH UR STRIP: 5.5 [PH] (ref 5–8)
PLATELET # BLD AUTO: 301 K/UL (ref 150–400)
PMV BLD AUTO: 10.6 FL (ref 8.9–12.9)
POTASSIUM BLD-SCNC: 3.8 MMOL/L (ref 3.5–5.1)
POTASSIUM SERPL-SCNC: 3.9 MMOL/L (ref 3.5–5.1)
PROT SERPL-MCNC: 8.8 G/DL (ref 6.4–8.2)
PROT UR STRIP-MCNC: 300 MG/DL
RBC # BLD AUTO: 5.58 M/UL (ref 3.8–5.2)
RBC #/AREA URNS HPF: ABNORMAL /HPF (ref 0–5)
SERVICE CMNT-IMP: ABNORMAL
SERVICE CMNT-IMP: NORMAL
SODIUM BLD-SCNC: 137 MMOL/L (ref 136–145)
SODIUM SERPL-SCNC: 134 MMOL/L (ref 136–145)
SP GR UR REFRACTOMETRY: 1.03 (ref 1–1.03)
T VAGINALIS VAG QL WET PREP: NORMAL
UA: UC IF INDICATED,UAUC: ABNORMAL
UROBILINOGEN UR QL STRIP.AUTO: 0.2 EU/DL (ref 0.2–1)
WBC # BLD AUTO: 15.3 K/UL (ref 3.6–11)
WBC URNS QL MICRO: ABNORMAL /HPF (ref 0–4)

## 2018-06-15 PROCEDURE — 80047 BASIC METABLC PNL IONIZED CA: CPT

## 2018-06-15 PROCEDURE — 83605 ASSAY OF LACTIC ACID: CPT | Performed by: EMERGENCY MEDICINE

## 2018-06-15 PROCEDURE — 76830 TRANSVAGINAL US NON-OB: CPT

## 2018-06-15 PROCEDURE — 87086 URINE CULTURE/COLONY COUNT: CPT | Performed by: EMERGENCY MEDICINE

## 2018-06-15 PROCEDURE — 84702 CHORIONIC GONADOTROPIN TEST: CPT | Performed by: EMERGENCY MEDICINE

## 2018-06-15 PROCEDURE — 83690 ASSAY OF LIPASE: CPT | Performed by: EMERGENCY MEDICINE

## 2018-06-15 PROCEDURE — 96375 TX/PRO/DX INJ NEW DRUG ADDON: CPT

## 2018-06-15 PROCEDURE — 87210 SMEAR WET MOUNT SALINE/INK: CPT | Performed by: EMERGENCY MEDICINE

## 2018-06-15 PROCEDURE — 96361 HYDRATE IV INFUSION ADD-ON: CPT

## 2018-06-15 PROCEDURE — 85025 COMPLETE CBC W/AUTO DIFF WBC: CPT | Performed by: EMERGENCY MEDICINE

## 2018-06-15 PROCEDURE — 74177 CT ABD & PELVIS W/CONTRAST: CPT

## 2018-06-15 PROCEDURE — 96376 TX/PRO/DX INJ SAME DRUG ADON: CPT

## 2018-06-15 PROCEDURE — 74011250636 HC RX REV CODE- 250/636: Performed by: EMERGENCY MEDICINE

## 2018-06-15 PROCEDURE — 74011000258 HC RX REV CODE- 258: Performed by: OBSTETRICS & GYNECOLOGY

## 2018-06-15 PROCEDURE — 74011250636 HC RX REV CODE- 250/636: Performed by: OBSTETRICS & GYNECOLOGY

## 2018-06-15 PROCEDURE — 87040 BLOOD CULTURE FOR BACTERIA: CPT | Performed by: OBSTETRICS & GYNECOLOGY

## 2018-06-15 PROCEDURE — 36415 COLL VENOUS BLD VENIPUNCTURE: CPT | Performed by: EMERGENCY MEDICINE

## 2018-06-15 PROCEDURE — 80053 COMPREHEN METABOLIC PANEL: CPT | Performed by: EMERGENCY MEDICINE

## 2018-06-15 PROCEDURE — 81001 URINALYSIS AUTO W/SCOPE: CPT | Performed by: EMERGENCY MEDICINE

## 2018-06-15 PROCEDURE — 96374 THER/PROPH/DIAG INJ IV PUSH: CPT

## 2018-06-15 PROCEDURE — 87491 CHLMYD TRACH DNA AMP PROBE: CPT | Performed by: EMERGENCY MEDICINE

## 2018-06-15 PROCEDURE — 99285 EMERGENCY DEPT VISIT HI MDM: CPT

## 2018-06-15 PROCEDURE — 74011636320 HC RX REV CODE- 636/320: Performed by: EMERGENCY MEDICINE

## 2018-06-15 PROCEDURE — 74011000250 HC RX REV CODE- 250: Performed by: EMERGENCY MEDICINE

## 2018-06-15 PROCEDURE — 74011250636 HC RX REV CODE- 250/636

## 2018-06-15 RX ORDER — ONDANSETRON 2 MG/ML
4 INJECTION INTRAMUSCULAR; INTRAVENOUS
Status: DISCONTINUED | OUTPATIENT
Start: 2018-06-15 | End: 2018-06-16 | Stop reason: HOSPADM

## 2018-06-15 RX ORDER — ACETAMINOPHEN 500 MG
1000 TABLET ORAL
COMMUNITY
End: 2018-10-01

## 2018-06-15 RX ORDER — SODIUM CHLORIDE 9 MG/ML
50 INJECTION, SOLUTION INTRAVENOUS
Status: COMPLETED | OUTPATIENT
Start: 2018-06-15 | End: 2018-06-15

## 2018-06-15 RX ORDER — SODIUM CHLORIDE 0.9 % (FLUSH) 0.9 %
10 SYRINGE (ML) INJECTION
Status: COMPLETED | OUTPATIENT
Start: 2018-06-15 | End: 2018-06-15

## 2018-06-15 RX ORDER — MORPHINE SULFATE 2 MG/ML
4 INJECTION, SOLUTION INTRAMUSCULAR; INTRAVENOUS
Status: DISCONTINUED | OUTPATIENT
Start: 2018-06-15 | End: 2018-06-15

## 2018-06-15 RX ORDER — PROCHLORPERAZINE EDISYLATE 5 MG/ML
10 INJECTION INTRAMUSCULAR; INTRAVENOUS
Status: DISCONTINUED | OUTPATIENT
Start: 2018-06-15 | End: 2018-06-15

## 2018-06-15 RX ORDER — MORPHINE SULFATE 4 MG/ML
4 INJECTION INTRAVENOUS
Status: COMPLETED | OUTPATIENT
Start: 2018-06-15 | End: 2018-06-15

## 2018-06-15 RX ORDER — SODIUM CHLORIDE 9 MG/ML
125 INJECTION, SOLUTION INTRAVENOUS CONTINUOUS
Status: DISCONTINUED | OUTPATIENT
Start: 2018-06-15 | End: 2018-06-16 | Stop reason: HOSPADM

## 2018-06-15 RX ORDER — PROCHLORPERAZINE EDISYLATE 5 MG/ML
10 INJECTION INTRAMUSCULAR; INTRAVENOUS
Status: DISCONTINUED | OUTPATIENT
Start: 2018-06-15 | End: 2018-06-16 | Stop reason: HOSPADM

## 2018-06-15 RX ORDER — MORPHINE SULFATE 4 MG/ML
4 INJECTION INTRAVENOUS ONCE
Status: COMPLETED | OUTPATIENT
Start: 2018-06-15 | End: 2018-06-15

## 2018-06-15 RX ORDER — ONDANSETRON 2 MG/ML
4 INJECTION INTRAMUSCULAR; INTRAVENOUS ONCE
Status: COMPLETED | OUTPATIENT
Start: 2018-06-15 | End: 2018-06-15

## 2018-06-15 RX ORDER — ONDANSETRON 2 MG/ML
4 INJECTION INTRAMUSCULAR; INTRAVENOUS
Status: DISCONTINUED | OUTPATIENT
Start: 2018-06-15 | End: 2018-06-15

## 2018-06-15 RX ORDER — ONDANSETRON 2 MG/ML
INJECTION INTRAMUSCULAR; INTRAVENOUS
Status: COMPLETED
Start: 2018-06-15 | End: 2018-06-15

## 2018-06-15 RX ORDER — ONDANSETRON 2 MG/ML
4 INJECTION INTRAMUSCULAR; INTRAVENOUS
Status: COMPLETED | OUTPATIENT
Start: 2018-06-15 | End: 2018-06-15

## 2018-06-15 RX ADMIN — ONDANSETRON 4 MG: 2 INJECTION, SOLUTION INTRAMUSCULAR; INTRAVENOUS at 11:38

## 2018-06-15 RX ADMIN — MORPHINE SULFATE 4 MG: 4 INJECTION INTRAVENOUS at 10:36

## 2018-06-15 RX ADMIN — IOPAMIDOL 100 ML: 755 INJECTION, SOLUTION INTRAVENOUS at 11:54

## 2018-06-15 RX ADMIN — SODIUM CHLORIDE 1000 ML: 900 INJECTION, SOLUTION INTRAVENOUS at 10:36

## 2018-06-15 RX ADMIN — CEFTRIAXONE SODIUM 2 G: 2 INJECTION, POWDER, FOR SOLUTION INTRAMUSCULAR; INTRAVENOUS at 16:49

## 2018-06-15 RX ADMIN — ONDANSETRON 4 MG: 2 INJECTION INTRAMUSCULAR; INTRAVENOUS at 14:33

## 2018-06-15 RX ADMIN — SODIUM CHLORIDE 10 MG: 9 INJECTION INTRAMUSCULAR; INTRAVENOUS; SUBCUTANEOUS at 12:30

## 2018-06-15 RX ADMIN — ONDANSETRON 4 MG: 2 INJECTION, SOLUTION INTRAMUSCULAR; INTRAVENOUS at 10:36

## 2018-06-15 RX ADMIN — SODIUM CHLORIDE 125 ML/HR: 900 INJECTION, SOLUTION INTRAVENOUS at 15:45

## 2018-06-15 RX ADMIN — SODIUM CHLORIDE 50 ML/HR: 900 INJECTION, SOLUTION INTRAVENOUS at 11:54

## 2018-06-15 RX ADMIN — PROMETHAZINE HYDROCHLORIDE 25 MG: 25 INJECTION INTRAMUSCULAR; INTRAVENOUS at 21:05

## 2018-06-15 RX ADMIN — SODIUM CHLORIDE 125 ML/HR: 900 INJECTION, SOLUTION INTRAVENOUS at 23:18

## 2018-06-15 RX ADMIN — Medication 10 ML: at 11:54

## 2018-06-15 RX ADMIN — MORPHINE SULFATE 4 MG: 4 INJECTION INTRAVENOUS at 12:51

## 2018-06-15 RX ADMIN — PROCHLORPERAZINE EDISYLATE 5 MG: 5 INJECTION INTRAMUSCULAR; INTRAVENOUS at 15:42

## 2018-06-15 RX ADMIN — ONDANSETRON 4 MG: 2 INJECTION INTRAMUSCULAR; INTRAVENOUS at 18:08

## 2018-06-15 NOTE — ED NOTES
Pt reporting that pain is better but still has significant nausea, will medicate for nausea prior to CT.

## 2018-06-15 NOTE — ED PROVIDER NOTES
EMERGENCY DEPARTMENT HISTORY AND PHYSICAL EXAM      Date: 6/15/2018  Patient Name: Darrell Casas    History of Presenting Illness     Chief Complaint   Patient presents with    Abdominal Pain     Ambulatory into the ED with c/o vomiting, lower abdominal pain and vaginal pain x last night. Prolapsed uterus, bladder and rectum.  Vomiting    Voiding Dysfunction     Difficulty urinating this morning. Frequent urination, but she is unable to empty her bladder. History Provided By: Patient    HPI: Darrell Casas, 34 y.o. female with PMHx significant for DM, GERD, and anxiety, presents ambulatory to the ED with cc of nausea, vomiting, lower ABD pain, and vaginal pain since x ~2 days. Pt states her vomit has been bilious, and reports she has been unable to tolerate PO since onset. She also reports diarrhea since yesterday (6/14/18), and also notes mild vaginal discharge since onset. Pt reports hx uterine, bladder, and rectal prolapse following most recent vaginal delivery ~2007; she notes hx of vaginal delivery x2. She denies hx of bowel obstruction. Pt states she is sexually active, and reports chance of pregnancy; she notes LMP ended (5/28/18). She denies concern for STD. Pt specifically denies fever, CP, or diarrhea. There are no other complaints, changes, or physical findings at this time.     PCP: Dean Garcia NP    Current Facility-Administered Medications   Medication Dose Route Frequency Provider Last Rate Last Dose    0.9% sodium chloride infusion  125 mL/hr IntraVENous CONTINUOUS Abbe Ugalde  mL/hr at 06/15/18 1545 125 mL/hr at 06/15/18 1545    ondansetron (ZOFRAN) injection 4 mg  4 mg IntraVENous Q6H PRN Cuca Wang MD   4 mg at 06/15/18 1808    cefTRIAXone (ROCEPHIN) 2 g in 0.9% sodium chloride (MBP/ADV) 50 mL  2 g IntraVENous Q24H Cuca Wang  mL/hr at 06/15/18 1649 2 g at 06/15/18 1649    prochlorperazine (COMPAZINE) injection 10 mg  10 mg IntraVENous Q6H PRN Quoc Gaston MD        promethazine (PHENERGAN) 25 mg in NS IVPB  25 mg IntraVENous Q6H PRN Quoc Gaston  mL/hr at 06/15/18 2105 25 mg at 06/15/18 2105       Past History     Past Medical History:  Past Medical History:   Diagnosis Date    Anxiety     Arthritis     unsure - knees, lower back, fingers and toes    Asthma     well controlled    Chronic pain     right abdomen,shooting pain groins    Depression     Diabetes ([address]     pre diabetes     GERD (gastroesophageal reflux disease)     Headache     Ill-defined condition     prolapsed cervix,rectum,bladder    Intractable nausea and vomiting 6/15/2018    Prolapse of anterior lip of cervix     Psychiatric disorder     bipolar, anxiety, depression    Rectal prolapse     Stool color black     White stool       Past Surgical History:  Past Surgical History:   Procedure Laterality Date    HX GYN      vaginal birth x2    HX HEENT      bilat tubes in ears age 3    HX HERNIA REPAIR  07/09/2015    Laparoscopic Incisional hernia repair with mesh.     HX LAP CHOLECYSTECTOMY  7/7/2014    HX OTHER SURGICAL      colonoscopy       Family History:  Family History   Problem Relation Age of Onset    Other Mother      fibromyalgia, IBS    Diabetes Mother      Pre-DM    High Cholesterol Mother     Hypertension Mother     Liver Disease Mother      fatty liver    Cancer Mother      uterine     Heart Disease Mother      CHF    Arthritis-osteo Mother     Diabetes Father     Hypertension Father     Asthma Brother     Diabetes Paternal Aunt     Hypertension Paternal Aunt     Diabetes Paternal Uncle     Hypertension Paternal Uncle     Cancer Maternal Grandfather      esoph    Hypertension Maternal Grandfather     Stroke Maternal Grandfather     Heart Disease Paternal Grandmother     Diabetes Paternal Grandfather     Heart Attack Paternal Grandfather     Stroke Paternal Grandfather     Hypertension Paternal Grandfather     Heart Disease Maternal Grandmother     Other Sister      MTHFR (clotting D/O)       Social History:  Social History   Substance Use Topics    Smoking status: Current Every Day Smoker     Packs/day: 0.50     Years: 10.00     Start date: 3/1/2004    Smokeless tobacco: Never Used    Alcohol use No       Allergies: Allergies   Allergen Reactions    Latex Rash, Itching and Swelling    Pcn [Penicillins] Other (comments)     Pt states as a child she had a reaction but does not remember specific reaction. Pt states\"mother say I stopped breathing\". Review of Systems   Review of Systems   Constitutional: Negative. Negative for chills and fever. HENT: Negative. Negative for congestion, facial swelling, rhinorrhea, sore throat, trouble swallowing and voice change. Eyes: Negative. Respiratory: Negative. Negative for apnea, cough, chest tightness, shortness of breath and wheezing. Cardiovascular: Negative. Negative for chest pain, palpitations and leg swelling. Gastrointestinal: Positive for abdominal pain (lower), diarrhea, nausea and vomiting. Negative for abdominal distention, blood in stool and constipation. Endocrine: Negative. Negative for cold intolerance, heat intolerance and polyuria. Genitourinary: Positive for vaginal discharge and vaginal pain. Negative for difficulty urinating, dysuria, flank pain, frequency, hematuria, urgency and vaginal bleeding. Musculoskeletal: Negative. Negative for arthralgias, back pain, myalgias, neck pain and neck stiffness. Skin: Negative. Negative for color change and rash. Neurological: Negative. Negative for dizziness, syncope, facial asymmetry, speech difficulty, weakness, light-headedness, numbness and headaches. Hematological: Negative. Does not bruise/bleed easily. Psychiatric/Behavioral: Negative. Negative for confusion and self-injury. The patient is not nervous/anxious.         Physical Exam   Physical Exam   Constitutional: She is oriented to person, place, and time. She appears distressed (mild). Ill-appearing  Pale, diaphoretic, distressed 2/2 pain   HENT:   Head: Normocephalic and atraumatic. Mouth/Throat: Oropharynx is clear and moist. Mucous membranes are dry. No oropharyngeal exudate. Eyes: Conjunctivae and EOM are normal. Pupils are equal, round, and reactive to light. Neck: Normal range of motion. Cardiovascular: Regular rhythm and normal heart sounds. Tachycardia present. Exam reveals no gallop and no friction rub. No murmur heard. Pulmonary/Chest: Effort normal and breath sounds normal. No respiratory distress. She has no wheezes. She has no rales. She exhibits no tenderness. Abdominal: Soft. Bowel sounds are normal. She exhibits no distension and no mass. There is tenderness (lower). There is no rebound and no guarding. Genitourinary:   Genitourinary Comments: Scant clear discharge, cervical os closed; no evidence of prolapsed uterus   Musculoskeletal: Normal range of motion. She exhibits no edema, tenderness or deformity. Neurological: She is alert and oriented to person, place, and time. She displays normal reflexes. No cranial nerve deficit. She exhibits normal muscle tone. Coordination normal.   Skin: Skin is warm. No rash noted. She is diaphoretic. There is pallor. Psychiatric: She has a normal mood and affect. Nursing note and vitals reviewed.       Diagnostic Study Results     Labs -     Recent Results (from the past 12 hour(s))   CBC WITH AUTOMATED DIFF    Collection Time: 06/15/18 10:30 AM   Result Value Ref Range    WBC 15.3 (H) 3.6 - 11.0 K/uL    RBC 5.58 (H) 3.80 - 5.20 M/uL    HGB 16.4 (H) 11.5 - 16.0 g/dL    HCT 47.5 (H) 35.0 - 47.0 %    MCV 85.1 80.0 - 99.0 FL    MCH 29.4 26.0 - 34.0 PG    MCHC 34.5 30.0 - 36.5 g/dL    RDW 14.2 11.5 - 14.5 %    PLATELET 500 607 - 649 K/uL    MPV 10.6 8.9 - 12.9 FL    NRBC 0.0 0  WBC    ABSOLUTE NRBC 0.00 0.00 - 0.01 K/uL    NEUTROPHILS 86 (H) 32 - 75 % LYMPHOCYTES 11 (L) 12 - 49 %    MONOCYTES 2 (L) 5 - 13 %    EOSINOPHILS 0 0 - 7 %    BASOPHILS 0 0 - 1 %    IMMATURE GRANULOCYTES 0 0.0 - 0.5 %    ABS. NEUTROPHILS 13.1 (H) 1.8 - 8.0 K/UL    ABS. LYMPHOCYTES 1.7 0.8 - 3.5 K/UL    ABS. MONOCYTES 0.4 0.0 - 1.0 K/UL    ABS. EOSINOPHILS 0.0 0.0 - 0.4 K/UL    ABS. BASOPHILS 0.1 0.0 - 0.1 K/UL    ABS. IMM. GRANS. 0.1 (H) 0.00 - 0.04 K/UL    DF AUTOMATED     METABOLIC PANEL, COMPREHENSIVE    Collection Time: 06/15/18 10:30 AM   Result Value Ref Range    Sodium 134 (L) 136 - 145 mmol/L    Potassium 3.9 3.5 - 5.1 mmol/L    Chloride 103 97 - 108 mmol/L    CO2 18 (L) 21 - 32 mmol/L    Anion gap 13 5 - 15 mmol/L    Glucose 149 (H) 65 - 100 mg/dL    BUN 16 6 - 20 MG/DL    Creatinine 1.00 0.55 - 1.02 MG/DL    BUN/Creatinine ratio 16 12 - 20      GFR est AA >60 >60 ml/min/1.73m2    GFR est non-AA >60 >60 ml/min/1.73m2    Calcium 9.2 8.5 - 10.1 MG/DL    Bilirubin, total 0.9 0.2 - 1.0 MG/DL    ALT (SGPT) 18 12 - 78 U/L    AST (SGOT) 5 (L) 15 - 37 U/L    Alk. phosphatase 109 45 - 117 U/L    Protein, total 8.8 (H) 6.4 - 8.2 g/dL    Albumin 4.5 3.5 - 5.0 g/dL    Globulin 4.3 (H) 2.0 - 4.0 g/dL    A-G Ratio 1.0 (L) 1.1 - 2.2     LIPASE    Collection Time: 06/15/18 10:30 AM   Result Value Ref Range    Lipase 93 73 - 393 U/L   TOTAL HCG, QT.     Collection Time: 06/15/18 10:30 AM   Result Value Ref Range    Beta HCG, QT <1 0 - 6 MIU/ML   URINALYSIS W/ REFLEX CULTURE    Collection Time: 06/15/18 10:38 AM   Result Value Ref Range    Color DARK YELLOW      Appearance CLOUDY (A) CLEAR      Specific gravity 1.030 1.003 - 1.030      pH (UA) 5.5 5.0 - 8.0      Protein 300 (A) NEG mg/dL    Glucose NEGATIVE  NEG mg/dL    Ketone >80 (A) NEG mg/dL    Blood SMALL (A) NEG      Urobilinogen 0.2 0.2 - 1.0 EU/dL    Nitrites NEGATIVE  NEG      Leukocyte Esterase NEGATIVE  NEG      WBC 0-4 0 - 4 /hpf    RBC 0-5 0 - 5 /hpf    Epithelial cells MODERATE (A) FEW /lpf    Bacteria 1+ (A) NEG /hpf    UA:UC IF INDICATED URINE CULTURE ORDERED (A) CNI      Mucus 2+ (A) NEG /lpf    Hyaline cast 2-5 0 - 5 /lpf   SHAKEEL, OTHER SOURCES    Collection Time: 06/15/18 10:38 AM   Result Value Ref Range    Special Requests: NO SPECIAL REQUESTS      KOH NO YEAST SEEN     WET PREP    Collection Time: 06/15/18 10:38 AM   Result Value Ref Range    Clue cells CLUE CELLS ABSENT      Wet prep NO TRICHOMONAS SEEN     LACTIC ACID    Collection Time: 06/15/18 10:38 AM   Result Value Ref Range    Lactic acid 1.3 0.4 - 2.0 MMOL/L   BILIRUBIN, CONFIRM    Collection Time: 06/15/18 10:38 AM   Result Value Ref Range    Bilirubin UA, confirm QUANTITY NOT SUFFICIENT TO CONFIRM (A) NEG     POC CHEM8    Collection Time: 06/15/18 10:54 AM   Result Value Ref Range    Calcium, ionized (POC) 1.04 (L) 1.12 - 1.32 mmol/L    Sodium (POC) 137 136 - 145 mmol/L    Potassium (POC) 3.8 3.5 - 5.1 mmol/L    Chloride (POC) 107 98 - 107 mmol/L    CO2 (POC) 16 (L) 21 - 32 mmol/L    Anion gap (POC) 20 10 - 20 mmol/L    Glucose (POC) 152 (H) 65 - 100 mg/dL    BUN (POC) 16 9 - 20 mg/dL    Creatinine (POC) 0.6 0.6 - 1.3 mg/dL    GFRAA, POC >60 >60 ml/min/1.73m2    GFRNA, POC >60 >60 ml/min/1.73m2    Hematocrit (POC) 46 35.0 - 47.0 %    Comment Notified RN or MD immediately by          Radiologic Studies -   US TRANSVAGINAL   Final Result      CT ABD PELV W CONT   Final Result        CT Results  (Last 48 hours)               06/15/18 1154  CT ABD PELV W CONT Final result    Impression:  IMPRESSION:   4.6 cm right ovarian cyst. No acute abnormality. Narrative:  EXAM:  CT ABD PELV W CONT       INDICATION: Lower abdominal pain and vomiting since last night       COMPARISON: 5/15/2015       CONTRAST:  100 mL of Isovue-370. TECHNIQUE:    Following the uneventful intravenous administration of contrast, thin axial   images were obtained through the abdomen and pelvis. Coronal and sagittal   reconstructions were generated. Oral contrast was not administered.  CT dose   reduction was achieved through use of a standardized protocol tailored for this   examination and automatic exposure control for dose modulation. FINDINGS:    LUNG BASES: Clear. INCIDENTALLY IMAGED HEART AND MEDIASTINUM: Unremarkable. LIVER: No mass or biliary dilatation. GALLBLADDER: Status post cholecystectomy. SPLEEN: No mass. PANCREAS: No mass or ductal dilatation. ADRENALS: Unremarkable. KIDNEYS: No mass, calculus, or hydronephrosis. STOMACH: There is a small hiatal hernia. SMALL BOWEL: No dilatation or wall thickening. COLON: No dilatation or wall thickening. APPENDIX: Unremarkable. PERITONEUM: No ascites or pneumoperitoneum. RETROPERITONEUM: No lymphadenopathy or aortic aneurysm. REPRODUCTIVE ORGANS: There is a 4.6 cm right ovarian cyst. Trace free fluid is   noted in the cul-de-sac. URINARY BLADDER: No mass or calculus. BONES: No destructive bone lesion. ADDITIONAL COMMENTS: N/A               CXR Results  (Last 48 hours)    None            Medical Decision Making   I am the first provider for this patient. I reviewed the vital signs, available nursing notes, past medical history, past surgical history, family history and social history. Vital Signs-Reviewed the patient's vital signs.   Patient Vitals for the past 12 hrs:   Temp Pulse Resp BP SpO2   06/15/18 2026 98.6 °F (37 °C) 88 18 145/87 100 %   06/15/18 1727 97.9 °F (36.6 °C) 84 15 145/86 99 %   06/15/18 1630 - - - (!) 108/94 95 %   06/15/18 1600 - - - 112/61 95 %   06/15/18 1530 - - - 123/83 97 %   06/15/18 1430 - 79 16 (!) 145/91 97 %   06/15/18 1400 - 65 16 118/70 96 %   06/15/18 1300 - 94 16 119/83 96 %   06/15/18 1230 - 85 16 112/72 96 %   06/15/18 1215 - 76 16 119/73 -   06/15/18 1157 97.7 °F (36.5 °C) 87 18 137/85 100 %   06/15/18 1130 - 92 20 (!) 161/110 100 %   06/15/18 1100 - 76 20 (!) 146/108 98 %   06/15/18 1022 97.7 °F (36.5 °C) 83 20 (!) 153/118 97 %   06/15/18 1010 - 87 18 (!) 165/112 99 %       Pulse Oximetry Analysis - 97% on RA    Cardiac Monitor:   Rate: 88 bpm  Rhythm: Normal Sinus Rhythm      Records Reviewed: Nursing Notes, Old Medical Records, Previous Radiology Studies and Previous Laboratory Studies    Provider Notes (Medical Decision Making):     DDx: UTI, obstruction, pregnancy, ovarian torsion, ovarian cyst    33 yo Female presenting with nausea, vomiting, acute lower ABD pain, in mild distress. Exam non-peritoneal; will give IVF, pain control, antiemetic. Plan to check labs, perform pelvic exam. Will likely need CT. ED Course:   Initial assessment performed. The patients presenting problems have been discussed, and they are in agreement with the care plan formulated and outlined with them. I have encouraged them to ask questions as they arise throughout their visit. TOBACCO COUNSELING:  Upon evaluation, pt expressed that they are a current tobacco user. For approximately 10 minutes, pt has been counseled on the dangers of smoking and was encouraged to quit as soon as possible in order to decrease further risks to their health. Pt has conveyed their understanding of the risks involved should they continue to use tobacco products.       Medications   0.9% sodium chloride infusion (125 mL/hr IntraVENous New Bag 6/15/18 1545)   ondansetron (ZOFRAN) injection 4 mg (4 mg IntraVENous Given 6/15/18 1808)   cefTRIAXone (ROCEPHIN) 2 g in 0.9% sodium chloride (MBP/ADV) 50 mL (2 g IntraVENous New Bag 6/15/18 1649)   prochlorperazine (COMPAZINE) injection 10 mg (not administered)   promethazine (PHENERGAN) 25 mg in NS IVPB (25 mg IntraVENous New Bag 6/15/18 2105)   ondansetron (ZOFRAN) injection 4 mg (4 mg IntraVENous Given 6/15/18 1138)   morphine injection 4 mg (4 mg IntraVENous Given 6/15/18 1036)   sodium chloride 0.9 % bolus infusion 1,000 mL (0 mL IntraVENous IV Completed 6/15/18 1139)   0.9% sodium chloride infusion (0 mL/hr IntraVENous IV Completed 6/15/18 1201)   iopamidol (ISOVUE-370) 76 % injection 100 mL (100 mL IntraVENous Given 6/15/18 1154)   sodium chloride (NS) flush 10 mL (10 mL IntraVENous Given 6/15/18 1154)   prochlorperazine (COMPAZINE) with saline injection 10 mg (10 mg IntraVENous Given 6/15/18 1230)   morphine injection 4 mg (4 mg IntraVENous Given 6/15/18 1251)   ondansetron (ZOFRAN) injection 4 mg (4 mg IntraVENous Given 6/15/18 1433)   prochlorperazine (COMPAZINE) with saline injection 5 mg (5 mg IntraVENous Given 6/15/18 1542)       Procedure Note - Pelvic Exam:    10:39 AM  Performed by: Kyle Amin MD  Chaperoned by: Mason Carmichael  Pelvic exam was performed using bimanual and speculum. Further findings noted in physical exam.   The procedure took 1-15 minutes, and pt tolerated well. Progress Note:  Ct shows large R ovarian cyst, concerned for torsion, will update U/S, give additional pain and antiemetic control, and reassess. 1:07 PM  Pt re-evaluated, feeling much better. Updated on imaging results and plan of care. 2:50 PM  Pt re-evaluated, still nauseous, having continuing pain. Will order additional medication, consult OB for admission. Consult Note:  2:58 PM  Elvie Michael MD spoke with Janki Henriquez MD  Specialty: OB/Gyn  Discussed pts hx, disposition, and available diagnostic and imaging results. Reviewed care plans. Consultant agrees with plans as outlined. He will see pt in ED. Disposition:  ADMIT NOTE:  2:58 PM  The patient is being admitted to the hospital.  The results of their tests and reasons for their admission have been discussed with the patient and/or available family. They convey agreement and understanding for the need to be admitted and for their admission diagnosis. PLAN:  1. Admit to OB/Gyn    Diagnosis     Clinical Impression:   1. Right ovarian cyst    2. Acute abdominal pain    3. Intractable vomiting with nausea, unspecified vomiting type    4. NAA (acute kidney injury) ([facility] [address]        Attestations:     This note is prepared by Eileen Allan, acting as Scribe for MD Tish Becerra MD: The scribe's documentation has been prepared under my direction and personally reviewed by me in its entirety. I confirm that the note above accurately reflects all work, treatment, procedures, and medical decision making performed by me. This note will not be viewable in [address]

## 2018-06-15 NOTE — CONSULTS
H&P to follow, briefly multiple long term somatic complaints, occasional N./V, abdominal and chest pain. Recently saw PCP for same. Also hx of pelvic organ prolapse with urinary incontinence but has avoided surgery; seen a number of times by Dr. Praveen Bowers. Remore hx of gastroparesis which responded to lap talon and to reglan, but she had to stop the reglan because it was interfering with her pituitary (by her report) She does not recall her LMP  Last night, began having \"constant\" nausea and vomiting along with diarrhea. States this followed a day of moving, where she did not keep up with her hydration. She developed worsening abdominal pain, and came to the ED for management. After a liter bolus of IVF, zofran, and compazine, she is still unable to tolerate po. Imaging in the ED was negative except for a 4.5 cm simple ovarian cyst on the right. She has a WBC of 15k with a left shift. HCG is negative. UA is remarkable for small bklod and 1+ bacteria. GYN was consulted because of the cyst's possible contribution to her symptoms. On exam the cyst is nontender, and perfusion is preserved on US. Suspect the cyst is incidental to her other symptoms, but will go ahead and admit for hydration and antiemetics and have a low threshold for medicine consult if she fails to improve. Differential includes cyst-related, viral vs. bacterial gastoenteritis, UTI, recurrence of gastroparesis, dehydration related emesis. Will check blood and stool cultures along with the urine culture already sent in light of her elevated WBCs. Will begin ceftriaxone--her PCN allergy was as a child and she thinks she has taken cephalosporins as an adult.

## 2018-06-15 NOTE — ED NOTES
Attempted to straight cath patient. This caused increased discomfort and only obtained 75 ml of urine.

## 2018-06-15 NOTE — H&P
Referring Provider:  Yassine Abad NP  Primary Provider:  Jed Chrisitan MD      History of Present Illness:  Last night, began having \"constant\" nausea and vomiting along with diarrhea. States this followed a day of moving, where she did not keep up with her hydration. She developed worsening abdominal pain, and came to the ED for management. After a liter bolus of IVF, zofran, and compazine, she is still unable to tolerate po. Imaging in the ED was negative except for a 4.5 cm simple ovarian cyst on the right. She has a WBC of 15k with a left shift. HCG is negative. UA is remarkable for small blood and 1+ bacteria. GYN was consulted because of the cyst's possible contribution to her symptoms. multiple long term somatic complaints, occasional N./V, abdominal and chest pain. Recently saw PCP for same. Also hx of pelvic organ prolapse with urinary incontinence but has avoided surgery; seen a number of times by Dr. Esperanza Navarrete. Remote hx of gastroparesis which responded to lap talon and to reglan, but she had to stop the reglan because it was interfering with her pituitary (by her report) She does not recall her LMP      Vital Signs   34Years Old Female  BP:       108/94    Past Pregnancy History   : 2  Para:     2  Aborta:  0  Term: 2, Premature: 0, Living Children: 2, Vaginal Deliveries: 2, C-Sections: 0, Elect. Ab: 0, Ectopics: 0    Gynecologic History   History of abnormal pap: yes  Gardasil Injection History: Not Applicable  Pt currently sexually active: yes  Current Contraception: none  History of STD: yes   STD Type: Chlamydia.         Allergies    PCN (Critical)  * LATEX (Moderate)      Medications Removed from Medication List          Past Medical History:     Reviewed history from 2014 and no changes required:        Bipolar Disease        Depression        Asthma        Diabetes        Gastropoesis        Abnormal Pap Smear    Past Surgical History:     Reviewed history from 2015 and no changes required:        Tubes in ears-as infant        Vaginal Delivery x 2- F 9lbs 2.2oz, 10/07 9lbs 10.oz        Hernia repair 7/9/15        Lap talon        Previous Tobacco Use: Signed On - 2017  Smoked Tobacco Use:  Current every day smoker     Cigarettes:  Yes -- 1/2 pack(s) per day,   Counseled to quit/cut down:  yes  Passive smoke exposure:  yes  Drug use:  no  HIV high-risk behavior:  No  Caffeine use:  1-2 drinks per day    Previous Alcohol Use: Signed On - 2017  Alcohol use:  yes     Type:  rare  Exercise:  no  Seatbelt use:  50 %  Sun Exposure:  occasionally     PAP Smear History:     Date of Last PAP Smear:  2017      Past Pregnancy History      :  2     Term Births:  2     Premature Births: 0     Living Children: 2     Para:   2     Mult. Births:  0     Prev : 0     Prev.  attempt? 0     Aborta:  0     Elect. Ab:  0     Spont. Ab:  0     Ectopics:  0    Pregnancy # 1     Delivery date:   2005     Weeks Gestation: 41wks      labor:   no     Delivery type:        Hours of labor:   17hrs     Anesthesia type:   epidural     Delivery location:   [facility]     Infant Sex:  Female     Birth weight:  9-2     Name:  Matt Raman    Pregnancy # 2     Delivery date:   10/24/2007     Delivery type:        Delivery location:   [facility]     Infant Sex:  Male     Birth weight:  9lbs 4ozs     Name:  Curt Ketan \"TJ\"     Comments:  Del by Radha Angel, Dr. Daisy Baumann on call. Review of Systems        See HPI      General Medical Physical Exam:     General Appearance:       well developed, well nourished, in no acute distress    Head:        Inspection:  normocephalic without obvious abnormalities    Eyes:        External:  EOM intact    Ears, Nose, Throat:        External:  normocephalic and atraumatic       Hearing:  grossly intact    Neck:        Neck:  supple; no masses; trachea midline       Thyroid:  no nodules, masses, tenderness, or enlargement    Respiratory:        Resp. effort:  no use of accessory muscles       Auscultation:   no rales, rhonchi, or wheezes    Cardiovascular: Auscultation:   normal S1 and  S2; no murmur, rub, or gallop       Peripheral circ: no cyanosis, clubbing, or edema    Gastrointestinal:        Abdomen:  soft, ND, no masses. Diffuse mild TTP. no fluid wave or peritoneal signs. Liver/spleen:   no enlargement or nodularity       Hernia:  no hernias    Genitourinary:        Ext. genitalia: normal appearance; no lesions or discharge       Urethra:  no discharge       Bladder:  no cystocele       Uterus:  normal size and position; no masses       Adnexa:  right adnexal mass palpated, smooth, mobile, nontender       Other:  spec exam not performed, BME only    Musculoskeletal:        Gait/station:  normal gait    Lymphatic:        Inguinal:  no inguinal adenopathy    Skin:        Inspection:  warm and dry without rash or pallor noted    Neurological:        Other:  pleasant and oriented    Psychiatric:       Orientation:  oriented to time, place, and person              Impression & Recommendations:    Problem # 1:  Nausea with vomiting (ICD-787.02) (ZWH59-L20.2)  Differential includes viral or bacterial GI syndrome (WBC 15k with left shift), gastroparesis, dehydration related, cyst related, UTI related. WIll admit for IV hydration and antiemetics  Blood/urine/stool cultures in light of elevated WBC  If persists >24 hours would have low threshold for involving medicine; do not suspect primary GYN cause. Orders:  Patient Sent to Hospital (CPT-HOSPGYN)  ER Outpatient Obs - High (CPT-AdmitF)      Problem # 2:  Urinary tract infection (ICD-599.0) (TLY53-I32.0)  Possible based on hematuria and bacteriuria. Will begin ceftriaxone. Reports a remote hx of PCN allergy, but states she has taken keflex before.     Orders:  Patient Sent to Hospital (CPT-HOSPGYN)  ER Outpatient Obs - High (CPT-AdmitF)      Problem # 3:  Cyst simple ovarian RIGHT (ICD-620.2) (LGF00-E73.291)  4.5 cm in greatest dimension, simple in appearance, no free fluid, no septations or papillary excrescences. Would plan outpt follow up US in 6 weeks. Given the benign appearance along with her benign pelvic exam, do not believe this is related to her current symptoms.     Orders:  Patient Sent to Hospital (CPT-HOSPGYN)  ER Outpatient Obs - High (CPT-AdmitF)      Medications (at conclusion of this visit)    11/21/2017 NYSTATIN-TRIAMCINOLONE 473171-2.1 UNIT/GM-% EXTERNAL OINTMENT (NYSTATIN-TRIAMCINOLONE) apply to vulva daily to 2 x per day as needed  11/21/2017 FLUCONAZOLE 150 MG ORAL TABLET (FLUCONAZOLE) 1 by mouth now and repeat in 1 week as needed  06/09/2014 ZYRTEC ALLERGY 10 MG ORAL TABLET (CETIRIZINE HCL) Prescribed by non PRESLEY GRAHAM          Electronically signed by Lincoln Calix MD on 06/15/2018 at 5:16 PM    ________________________________________________________________________

## 2018-06-15 NOTE — PROGRESS NOTES
Pharmacy Clarification of Prior to Admission Medication Regimen     The patient was interviewed regarding clarification of the prior to admission medication regimen. Mom was present in room and obtained permission from patient to discuss drug regimen with visitor(s) present. Patient was questioned regarding use of any other inhalers, topical products, over the counter medications, herbal medications, vitamin products or ophthalmic/nasal/otic medication use. Information Obtained From: Patient, RX Query    Pertinent Pharmacy Findings:   Patient stated she has not had her Cymbalta and Wellbutrin in about 6-7 months, and just restarted these agents yesterday (6/14/18)    PTA medication list was corrected to the following:     Prior to Admission Medications   Prescriptions Last Dose Informant Patient Reported? Taking? DULoxetine (CYMBALTA) 60 mg capsule 6/14/2018 at Unknown time Self No Yes   Sig: take 1 capsule by mouth once daily   acetaminophen (TYLENOL) 500 mg tablet 6/14/2018 at Unknown time Self Yes Yes   Sig: Take 1,000 mg by mouth every six (6) hours as needed for Pain. buPROPion XL (WELLBUTRIN XL) 150 mg tablet 6/14/2018 at Unknown time Self No Yes   Sig: Take 1 Tab by mouth every morning.       Facility-Administered Medications: None          Thank you,  Lennox Cortes CPhT  Medication History Pharmacy Technician

## 2018-06-15 NOTE — IP AVS SNAPSHOT
[address] [facility] 
[phone] Patient: Hector Henriquez MRN: [id number] EK:[id number] A check trudy indicates which time of day the medication should be taken. My Medications START taking these medications Instructions Each Dose to Equal  
 Morning Noon Evening Bedtime  
 nitrofurantoin (macrocrystal-monohydrate) 100 mg capsule Commonly known as:  MACROBID Your next dose is: Tonight with supper , take with food! Notes to Patient:  Not taking in hospital  
   
 Take 1 Cap by mouth two (2) times a day for 3 days. 100 mg With supper  
   
  
 promethazine 25 mg tablet Commonly known as:  PHENERGAN Notes to Patient:  Not taking in hospital  
   
 Take 1 Tab by mouth every six (6) hours as needed for Nausea. 25 mg CONTINUE taking these medications Instructions Each Dose to Equal  
 Morning Noon Evening Bedtime  
 acetaminophen 500 mg tablet Commonly known as:  TYLENOL Notes to Patient:  Not taking in hospital  
   
 Take 1,000 mg by mouth every six (6) hours as needed for Pain. 1000 mg  
    
   
   
   
  
 buPROPion  mg tablet Commonly known as:  Kwaku Butt Notes to Patient:  Not taking in hospital  
   
 Take 1 Tab by mouth every morning. 150 mg DULoxetine 60 mg capsule Commonly known as:  CYMBALTA Notes to Patient:  Not taking in hospital  
   
 take 1 capsule by mouth once daily Where to Get Your Medications Information on where to get these meds will be given to you by the nurse or doctor. ! Ask your nurse or doctor about these medications  
  nitrofurantoin (macrocrystal-monohydrate) 100 mg capsule  
 promethazine 25 mg tablet

## 2018-06-15 NOTE — IP AVS SNAPSHOT
[address] [facility] 
[phone] Patient: Kevin Randolph MRN: [id number] ZUT:5[birth year]38[birth year][id number] About your hospitalization You were admitted on:  Vanessa 15, 2018 You last received care in the:  hospitals 2 GENERAL SURGERY You were discharged on:  June 16, 2018 Why you were hospitalized Your primary diagnosis was:  Not on File Your diagnoses also included:  Intractable Nausea And Vomiting Follow-up Information Follow up With Details Comments Contact Info Keeley Butler NP   [address] [address] 
[phone] Jed Christian MD In 6 weeks  [address] [facility] 
[phone] Your Scheduled Appointments Friday June 22, 2018  3:30 PM EDT Any with Keeley Butler NP [address]) [address] [facility] 7 [phone] Thursday July 05, 2018  2:15 PM EDT New Patient with Sherri Garcia MD  
[facility] [address] [place] [address]) [address] [facility]  
[phone] Discharge Orders None A check trudy indicates which time of day the medication should be taken. My Medications START taking these medications Instructions Each Dose to Equal  
 Morning Noon Evening Bedtime  
 nitrofurantoin (macrocrystal-monohydrate) 100 mg capsule Commonly known as:  MACROBID Your next dose is: Tonight with supper , take with food! Notes to Patient:  Not taking in hospital  
   
 Take 1 Cap by mouth two (2) times a day for 3 days. 100 mg With supper  
   
  
 promethazine 25 mg tablet Commonly known as:  PHENERGAN Notes to Patient:  Not taking in hospital  
   
 Take 1 Tab by mouth every six (6) hours as needed for Nausea.   
 25 mg  
    
   
   
   
  
  
 CONTINUE taking these medications Instructions Each Dose to Equal  
 Morning Noon Evening Bedtime  
 acetaminophen 500 mg tablet Commonly known as:  TYLENOL Notes to Patient:  Not taking in hospital  
   
 Take 1,000 mg by mouth every six (6) hours as needed for Pain. 1000 mg  
    
   
   
   
  
 buPROPion  mg tablet Commonly known as:  [place] Blazing Notes to Patient:  Not taking in hospital  
   
 Take 1 Tab by mouth every morning. 150 mg DULoxetine 60 mg capsule Commonly known as:  CYMBALTA Notes to Patient:  Not taking in hospital  
   
 take 1 capsule by mouth once daily Where to Get Your Medications Information on where to get these meds will be given to you by the nurse or doctor. ! Ask your nurse or doctor about these medications  
  nitrofurantoin (macrocrystal-monohydrate) 100 mg capsule  
 promethazine 25 mg tablet Discharge Instructions Nausea and Vomiting: Care Instructions Your Care Instructions When you are nauseated, you may feel weak and sweaty and notice a lot of saliva in your mouth. Nausea often leads to vomiting. Most of the time you do not need to worry about nausea and vomiting, but they can be signs of other illnesses. Two common causes of nausea and vomiting are stomach flu and food poisoning. Nausea and vomiting from viral stomach flu will usually start to improve within 24 hours. Nausea and vomiting from food poisoning may last from 12 to 48 hours. The doctor has checked you carefully, but problems can develop later. If you notice any problems or new symptoms, get medical treatment right away. Follow-up care is a key part of your treatment and safety. Be sure to make and go to all appointments, and call your doctor if you are having problems. It's also a good idea to know your test results and keep a list of the medicines you take. How can you care for yourself at home? · To prevent dehydration, drink plenty of fluids, enough so that your urine is light yellow or clear like water. Choose water and other caffeine-free clear liquids until you feel better. If you have kidney, heart, or liver disease and have to limit fluids, talk with your doctor before you increase the amount of fluids you drink. · Rest in bed until you feel better. · When you are able to eat, try clear soups, mild foods, and liquids until all symptoms are gone for 12 to 48 hours. Other good choices include dry toast, crackers, cooked cereal, and gelatin dessert, such as Jell-O. When should you call for help? Call 911 anytime you think you may need emergency care. For example, call if: 
? · You passed out (lost consciousness). ?Call your doctor now or seek immediate medical care if: 
? · You have symptoms of dehydration, such as: ¨ Dry eyes and a dry mouth. ¨ Passing only a little dark urine. ¨ Feeling thirstier than usual.  
? · You have new or worsening belly pain. ? · You have a new or higher fever. ? · You vomit blood or what looks like coffee grounds. ? Watch closely for changes in your health, and be sure to contact your doctor if: 
? · You have ongoing nausea and vomiting. ? · Your vomiting is getting worse. ? · Your vomiting lasts longer than 2 days. ? · You are not getting better as expected. Where can you learn more? Go to http://orin-cris.info/. Enter 25 229215 in the search box to learn more about \"Nausea and Vomiting: Care Instructions. \" Current as of: March 20, 2017 Content Version: 11.4 © 2318-0428 [employer]. Care instructions adapted under license by [employer] (which disclaims liability or warranty for this information).  If you have questions about a medical condition or this instruction, always ask your healthcare professional. [employer] disclaims any warranty or liability for your use of this information. DISCHARGE SUMMARY from Nurse The following personal items are in your possession at time of discharge: 
 
Dental Appliances: None Visual Aid: None Home Medications: None Jewelry: None Clothing: None (left in in pt. room) Other Valuables: None PATIENT INSTRUCTIONS: 
 
 
 
To prevent infection remember to use good handwashing,. What to do at Home: 
Recommended activity: Activity as tolerated Recommended diet: Regular If you experience any of the following symptoms nausea and/or vomiting returns and you cannot keep fluids down for over 4 hours, please follow up with Dr. Farooq Young. Call  and let him know if you think cyst ruptured, he said to expect a lot of discomfort if that does happen but it should only last a few days. *  Please give a list of your current medications to your Primary Care Provider. *  Please update this list whenever your medications are discontinued, doses are 
    changed, or new medications (including over-the-counter products) are added. *  Please carry medication information at all times in case of emergency situations. These are general instructions for a healthy lifestyle: No smoking/ No tobacco products/ Avoid exposure to second hand smoke Surgeon General's Warning:  Quitting smoking now greatly reduces serious risk to your health. Obesity, smoking, and sedentary lifestyle greatly increases your risk for illness A healthy diet, regular physical exercise & weight monitoring are important for maintaining a healthy lifestyle You may be retaining fluid if you have a history of heart failure or if you experience any of the following symptoms:  Weight gain of 3 pounds or more overnight or 5 pounds in a week, increased swelling in our hands or feet or shortness of breath while lying flat in bed.   Please call your doctor as soon as you notice any of these symptoms; do not wait until your next office visit. Recognize signs and symptoms of STROKE: 
 
F-face looks uneven A-arms unable to move or move unevenly S-speech slurred or non-existent T-time-call 911 as soon as signs and symptoms begin-DO NOT go Back to bed or wait to see if you get better-TIME IS BRAIN. The discharge information has been reviewed with the patient. The patient verbalized understanding. [employer] Announcement We are excited to announce that we are making your provider's discharge notes available to you in [employer]. You will see these notes when they are completed and signed by the physician that discharged you from your recent hospital stay. If you have any questions or concerns about any information you see in [employer], please call the Health Information Department where you were seen or reach out to your Primary Care Provider for more information about your plan of care. Introducing [facility]! [employer] introduces [employer] patient portal. Now you can access parts of your medical record, email your doctor's office, and request medication refills online. 1. In your internet browser, go to [email or web address] 2. Click on the First Time User? Click Here link in the Sign In box. You will see the New Member Sign Up page. 3. Enter your [employer] Access Code exactly as it appears below. You will not need to use this code after youve completed the sign-up process. If you do not sign up before the expiration date, you must request a new code. · [employer] Access Code: [email or web address] Expires: 9/6/2018 11:04 AM 
 
4. Enter the last four digits of your Social Security Number (xxxx) and Date of Birth (mm/dd/yyyy) as indicated and click Submit. You will be taken to the next sign-up page. 5. Create a [employer] ID.  This will be your [employer] login ID and cannot be changed, so think of one that is secure and easy to remember. 6. Create a Wonderflow password. You can change your password at any time. 7. Enter your Password Reset Question and Answer. This can be used at a later time if you forget your password. 8. Enter your e-mail address. You will receive e-mail notification when new information is available in [address] 9. Click Sign Up. You can now view and download portions of your medical record. 10. Click the Download Summary menu link to download a portable copy of your medical information. If you have questions, please visit the Frequently Asked Questions section of the [employer] website. Remember, Wonder[employer] is NOT to be used for urgent needs. For medical emergencies, dial 911. Now available from your iPhone and Android! Introducing Kerwin Andersen As a [facility] patient, I wanted to make you aware of our electronic visit tool called Kerwin Andersen. [employer] allows you to connect within minutes with a medical provider 24 hours a day, seven days a week via a mobile device or tablet or logging into a secure website from your computer. You can access Kerwin Andersen from anywhere in the [place]. A virtual visit might be right for you when you have a simple condition and feel like you just dont want to get out of bed, or cant get away from work for an appointment, when your regular [facility] provider is not available (evenings, weekends or holidays), or when youre out of town and need minor care. Electronic visits cost only $49 and if the [employer] provider determines a prescription is needed to treat your condition, one can be electronically transmitted to a nearby pharmacy*. Please take a moment to enroll today if you have not already done so. The enrollment process is free and takes just a few minutes.   To enroll, please download the Oscar tasha to your tablet or phone, or visit [email or web address] to enroll on your computer. And, as an [address] [place] [address] patient with a [employer] account, the results of your visits will be scanned into your electronic medical record and your primary care provider will be able to view the scanned results. We urge you to continue to see your regular [facility] provider for your ongoing medical care. And while your primary care provider may not be the one available when you seek a Kerwin Laboynunufin virtual visit, the peace of mind you get from getting a real diagnosis real time can be priceless. For more information on [employer], view our Frequently Asked Questions (FAQs) at [email or web address]. Sincerely, 
 
Juan Carlos Cline MD 
Chief Medical Officer [facility] Shellie Lui *:  certain medications cannot be prescribed via [employer] Unresulted tests-please follow up with your PCP on these results Procedure/Test Authorizing Provider Holly Cobos MD  
 CBC WITH AUTOMATED DIFF Arian Sherman MD  
 CBC WITH AUTOMATED DIFF Nataliia Guillaume MD  
 CHLAMYDIA/GC PCR MD Kellie Bernard MD  
 CULTURE, BLOOD Arian Sherman MD  
 CULTURE, STOOL Arian Sherman MD  
 CULTURE, STOOL Arian Sherman MD  
 CULTURE, Francie Fuentes MD  
 [facility], OTHER SOURCES Nataliia Guillaume MD  
 LACTIC ACID Nataliia Guillaume MD  
 [facility] [place] MD Rosalba  
 METABOLIC PANEL, [place] [address] MD Conrado  
 METABOLIC PANEL, COMPREHENSIVE Nataliia Guillaume MD  
 TOTAL HCG, QT. Nataliia Guillaume MD  
 URINALYSIS W/ REFLEX CULTURE MD Mabel Bernard MD Ashby Calix, MD  
  
Providers Seen During Your Hospitalization Provider Specialty Primary office phone Nataliia Guillaume MD Emergency Medicine [phone] Yelena Quiles 7 Gynecology [phone] Your Primary Care Physician (PCP) Primary Care Physician Office Phone Office Fax Dagoberto Florence [phone] You are allergic to the following Allergen Reactions Latex Rash Itching Swelling Pcn (Penicillins) Other (comments) Pt states as a child she had a reaction but does not remember specific reaction. Pt states\"mother say I stopped breathing\". Recent Documentation Height Weight Breastfeeding? BMI OB Status Smoking Status 1.6 m 82.6 kg No 32.26 kg/m2 Unknown Current Every Day Smoker Emergency Contacts Name Discharge Info Relation Home Work Mobile Aakash Nicole DISCHARGE CAREGIVER [3] Mother [14] [phone] Aakash Nicole DISCHARGE CAREGIVER [3] Parent [1] [phone] Patient Belongings The following personal items are in your possession at time of discharge: 
  Dental Appliances: None  Visual Aid: None      Home Medications: None   Jewelry: None  Clothing: At bedside    Other Valuables: Cell Phone, At bedside Please provide this summary of care documentation to your next provider. Signatures-by signing, you are acknowledging that this After Visit Summary has been reviewed with you and you have received a copy. Patient Signature:  ____________________________________________________________ Date:  ____________________________________________________________  
  
[facility] Provider Signature:  ____________________________________________________________ Date:  ____________________________________________________________

## 2018-06-15 NOTE — ROUTINE PROCESS
TRANSFER - OUT REPORT:    Verbal report given to Zuly RN(name) on Karen Bazan  being transferred to [facility]) for routine progression of care       Report consisted of patients Situation, Background, Assessment and   Recommendations(SBAR). Information from the following report(s) SBAR was reviewed with the receiving nurse. Lines:   Peripheral IV 06/15/18 Left Antecubital (Active)   Site Assessment Clean, dry, & intact 6/15/2018 10:48 AM   Phlebitis Assessment 0 6/15/2018 10:48 AM   Infiltration Assessment 0 6/15/2018 10:48 AM   Dressing Status Clean, dry, & intact 6/15/2018 10:48 AM   Dressing Type Tape;Transparent 6/15/2018 10:48 AM   Hub Color/Line Status Pink;Flushed;Patent 6/15/2018 10:48 AM   Action Taken Blood drawn 6/15/2018 10:48 AM        Opportunity for questions and clarification was provided.       Patient transported with:   [employer]

## 2018-06-15 NOTE — ED NOTES
Bedside and Verbal shift change report given to [address], RN (oncoming nurse) by Keny Castellon RN (offgoing nurse). Report included the following information SBAR and ED Summary.

## 2018-06-16 VITALS
OXYGEN SATURATION: 99 % | HEIGHT: 63 IN | BODY MASS INDEX: 32.27 KG/M2 | RESPIRATION RATE: 17 BRPM | WEIGHT: 182.1 LBS | HEART RATE: 71 BPM | DIASTOLIC BLOOD PRESSURE: 70 MMHG | SYSTOLIC BLOOD PRESSURE: 122 MMHG | TEMPERATURE: 98 F

## 2018-06-16 LAB
ALBUMIN SERPL-MCNC: 3.2 G/DL (ref 3.5–5)
ALBUMIN/GLOB SERPL: 1 {RATIO} (ref 1.1–2.2)
ALP SERPL-CCNC: 73 U/L (ref 45–117)
ALT SERPL-CCNC: 14 U/L (ref 12–78)
ANION GAP SERPL CALC-SCNC: 11 MMOL/L (ref 5–15)
AST SERPL-CCNC: 9 U/L (ref 15–37)
BASOPHILS # BLD: 0 K/UL (ref 0–0.1)
BASOPHILS NFR BLD: 0 % (ref 0–1)
BILIRUB SERPL-MCNC: 0.7 MG/DL (ref 0.2–1)
BUN SERPL-MCNC: 10 MG/DL (ref 6–20)
BUN/CREAT SERPL: 13 (ref 12–20)
CALCIUM SERPL-MCNC: 8 MG/DL (ref 8.5–10.1)
CHLORIDE SERPL-SCNC: 110 MMOL/L (ref 97–108)
CO2 SERPL-SCNC: 20 MMOL/L (ref 21–32)
CREAT SERPL-MCNC: 0.75 MG/DL (ref 0.55–1.02)
DIFFERENTIAL METHOD BLD: NORMAL
EOSINOPHIL # BLD: 0.1 K/UL (ref 0–0.4)
EOSINOPHIL NFR BLD: 1 % (ref 0–7)
ERYTHROCYTE [DISTWIDTH] IN BLOOD BY AUTOMATED COUNT: 14.4 % (ref 11.5–14.5)
GLOBULIN SER CALC-MCNC: 3.2 G/DL (ref 2–4)
GLUCOSE SERPL-MCNC: 83 MG/DL (ref 65–100)
HCT VFR BLD AUTO: 38.9 % (ref 35–47)
HGB BLD-MCNC: 13 G/DL (ref 11.5–16)
IMM GRANULOCYTES # BLD: 0 K/UL (ref 0–0.04)
IMM GRANULOCYTES NFR BLD AUTO: 0 % (ref 0–0.5)
LYMPHOCYTES # BLD: 2.6 K/UL (ref 0.8–3.5)
LYMPHOCYTES NFR BLD: 26 % (ref 12–49)
MCH RBC QN AUTO: 29.3 PG (ref 26–34)
MCHC RBC AUTO-ENTMCNC: 33.4 G/DL (ref 30–36.5)
MCV RBC AUTO: 87.6 FL (ref 80–99)
MONOCYTES # BLD: 0.9 K/UL (ref 0–1)
MONOCYTES NFR BLD: 9 % (ref 5–13)
NEUTS SEG # BLD: 6.3 K/UL (ref 1.8–8)
NEUTS SEG NFR BLD: 64 % (ref 32–75)
NRBC # BLD: 0 K/UL (ref 0–0.01)
NRBC BLD-RTO: 0 PER 100 WBC
PLATELET # BLD AUTO: 220 K/UL (ref 150–400)
PMV BLD AUTO: 10.8 FL (ref 8.9–12.9)
POTASSIUM SERPL-SCNC: 3.7 MMOL/L (ref 3.5–5.1)
PROT SERPL-MCNC: 6.4 G/DL (ref 6.4–8.2)
RBC # BLD AUTO: 4.44 M/UL (ref 3.8–5.2)
SODIUM SERPL-SCNC: 141 MMOL/L (ref 136–145)
WBC # BLD AUTO: 9.9 K/UL (ref 3.6–11)

## 2018-06-16 PROCEDURE — 36415 COLL VENOUS BLD VENIPUNCTURE: CPT | Performed by: OBSTETRICS & GYNECOLOGY

## 2018-06-16 PROCEDURE — 80053 COMPREHEN METABOLIC PANEL: CPT | Performed by: OBSTETRICS & GYNECOLOGY

## 2018-06-16 PROCEDURE — 85025 COMPLETE CBC W/AUTO DIFF WBC: CPT | Performed by: OBSTETRICS & GYNECOLOGY

## 2018-06-16 PROCEDURE — 87045 FECES CULTURE AEROBIC BACT: CPT | Performed by: OBSTETRICS & GYNECOLOGY

## 2018-06-16 RX ORDER — NITROFURANTOIN 25; 75 MG/1; MG/1
100 CAPSULE ORAL 2 TIMES DAILY
Qty: 6 CAP | Refills: 0 | Status: SHIPPED | OUTPATIENT
Start: 2018-06-16 | End: 2018-06-19

## 2018-06-16 RX ORDER — PROMETHAZINE HYDROCHLORIDE 25 MG/1
25 TABLET ORAL
Qty: 20 TAB | Refills: 1 | Status: SHIPPED | OUTPATIENT
Start: 2018-06-16 | End: 2018-07-01

## 2018-06-16 NOTE — PROGRESS NOTES
Spiritual Care Partner Volunteer visited patient in [place] on 6/16/18. Documented by:  KLARISSA Herman

## 2018-06-16 NOTE — PROGRESS NOTES
D/C instructions reviewed with patient with verbalized understanding. Prescriptions given to patient.

## 2018-06-16 NOTE — PROGRESS NOTES
Bedside shift change report given to Maggie Hernandez (oncoming nurse) by Jaqueline Butler RN (offgoing nurse). Report included the following information SBAR, Kardex, Procedure Summary, Intake/Output, MAR, Accordion and Recent Results.

## 2018-06-16 NOTE — DISCHARGE SUMMARY
Gynecology Discharge Summary     Patient ID:  Mann Allen  [id number]  45 y.o.  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 A0    Incisional hernia K43.2    Major depressive disorder, recurrent episode, severe (HCC) F33.2    PTSD (post-traumatic stress disorder) F43.10    Routine medical exam Z00.00    Intractable nausea and vomiting R11.2       Discharge Diagnoses: There are no discharge diagnoses documented for the most recent discharge. 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 A0    Incisional hernia K43.2    Major depressive disorder, recurrent episode, severe (HCC) F33.2    PTSD (post-traumatic stress disorder) F43.10    Routine medical exam Z00.00    Intractable nausea and vomiting R11.2       Procedures for this admission:     Hospital Course:  Admitted for N/V, found to have 4.5 cm ovarianc cyst, simple in character and thought to be incidental to her presenting symptoms. Her nausea and vomiting improved overnight with phenergan and GI rest.  Pending po challenge this am she is ready for d/c. WIll plan to follow up the cyst with US in the office in 6 weeks, and she has follow up with her PCP scheduled this week.       Disposition: Home or self care    Discharged Condition: stable            Patient Instructions:   Current Discharge Medication List      START taking these medications    Details   nitrofurantoin, macrocrystal-monohydrate, (MACROBID) 100 mg capsule Take 1 Cap by mouth two (2) times a day for 3 days. Qty: 6 Cap, Refills: 0      promethazine (PHENERGAN) 25 mg tablet Take 1 Tab by mouth every six (6) hours as needed for Nausea. Qty: 20 Tab, Refills: 1         CONTINUE these medications which have NOT CHANGED    Details   acetaminophen (TYLENOL) 500 mg tablet Take 1,000 mg by mouth every six (6) hours as needed for Pain. DULoxetine (CYMBALTA) 60 mg capsule take 1 capsule by mouth once daily  Qty: 30 Cap, Refills: 2    Associated Diagnoses: Severe episode of recurrent major depressive disorder, without psychotic features ([address] PTSD (post-traumatic stress disorder)      buPROPion XL (WELLBUTRIN XL) 150 mg tablet Take 1 Tab by mouth every morning. Qty: 30 Tab, Refills: 2    Associated Diagnoses: Severe episode of recurrent major depressive disorder, without psychotic features ([address] PTSD (post-traumatic stress disorder)           Activity: Activity as tolerated  Diet: Regular Diet   None needed    Follow-up with Caden Parikh in 6 weeks.     Signed:  Elvie Michael MD  6/16/2018  7:06 AM

## 2018-06-16 NOTE — DISCHARGE INSTRUCTIONS
Nausea and Vomiting: Care Instructions  Your Care Instructions    When you are nauseated, you may feel weak and sweaty and notice a lot of saliva in your mouth. Nausea often leads to vomiting. Most of the time you do not need to worry about nausea and vomiting, but they can be signs of other illnesses. Two common causes of nausea and vomiting are stomach flu and food poisoning. Nausea and vomiting from viral stomach flu will usually start to improve within 24 hours. Nausea and vomiting from food poisoning may last from 12 to 48 hours. The doctor has checked you carefully, but problems can develop later. If you notice any problems or new symptoms, get medical treatment right away. Follow-up care is a key part of your treatment and safety. Be sure to make and go to all appointments, and call your doctor if you are having problems. It's also a good idea to know your test results and keep a list of the medicines you take. How can you care for yourself at home? · To prevent dehydration, drink plenty of fluids, enough so that your urine is light yellow or clear like water. Choose water and other caffeine-free clear liquids until you feel better. If you have kidney, heart, or liver disease and have to limit fluids, talk with your doctor before you increase the amount of fluids you drink. · Rest in bed until you feel better. · When you are able to eat, try clear soups, mild foods, and liquids until all symptoms are gone for 12 to 48 hours. Other good choices include dry toast, crackers, cooked cereal, and gelatin dessert, such as Jell-O. When should you call for help? Call 911 anytime you think you may need emergency care. For example, call if:  ? · You passed out (lost consciousness). ?Call your doctor now or seek immediate medical care if:  ? · You have symptoms of dehydration, such as:  ¨ Dry eyes and a dry mouth. ¨ Passing only a little dark urine.   ¨ Feeling thirstier than usual.   ? · You have new or worsening belly pain. ? · You have a new or higher fever. ? · You vomit blood or what looks like coffee grounds. ? Watch closely for changes in your health, and be sure to contact your doctor if:  ? · You have ongoing nausea and vomiting. ? · Your vomiting is getting worse. ? · Your vomiting lasts longer than 2 days. ? · You are not getting better as expected. Where can you learn more? Go to http://orin-cris.info/. Enter 25 [id number] in the search box to learn more about \"Nausea and Vomiting: Care Instructions. \"  Current as of: March 20, 2017  Content Version: 11.4  © 9957-8716 [employer]. Care instructions adapted under license by [employer] (which disclaims liability or warranty for this information). If you have questions about a medical condition or this instruction, always ask your healthcare professional. [place] [address] any warranty or liability for your use of this information. DISCHARGE SUMMARY from Nurse    The following personal items are in your possession at time of discharge:    Dental Appliances: None  Visual Aid: None  Home Medications: None  Jewelry: None  Clothing: None (left in in pt. room)  Other Valuables: None             PATIENT INSTRUCTIONS:        To prevent infection remember to use good handwashing,. What to do at Home:  Recommended activity: Activity as tolerated    Recommended diet: Regular        If you experience any of the following symptoms nausea and/or vomiting returns and you cannot keep fluids down for over 4 hours, please follow up with Dr. Nancy Dougherty. Call  and let him know if you think cyst ruptured, he said to expect a lot of discomfort if that does happen but it should only last a few days. *  Please give a list of your current medications to your Primary Care Provider.     *  Please update this list whenever your medications are discontinued, doses are      changed, or new medications (including over-the-counter products) are added. *  Please carry medication information at all times in case of emergency situations. These are general instructions for a healthy lifestyle:    No smoking/ No tobacco products/ Avoid exposure to second hand smoke    Surgeon General's Warning:  Quitting smoking now greatly reduces serious risk to your health. Obesity, smoking, and sedentary lifestyle greatly increases your risk for illness    A healthy diet, regular physical exercise & weight monitoring are important for maintaining a healthy lifestyle    You may be retaining fluid if you have a history of heart failure or if you experience any of the following symptoms:  Weight gain of 3 pounds or more overnight or 5 pounds in a week, increased swelling in our hands or feet or shortness of breath while lying flat in bed. Please call your doctor as soon as you notice any of these symptoms; do not wait until your next office visit. Recognize signs and symptoms of STROKE:    F-face looks uneven    A-arms unable to move or move unevenly    S-speech slurred or non-existent    T-time-call 911 as soon as signs and symptoms begin-DO NOT go       Back to bed or wait to see if you get better-TIME IS BRAIN. The discharge information has been reviewed with the patient. The patient verbalized understanding.

## 2018-06-16 NOTE — PROGRESS NOTES
Gynecology Progress Note    Yina Gillespie    Assesment: HD 2 for   1. N/V  No emesis since last night, nausea resolved with phenergan. Has been NPO overnight. 2. UTI  S/p one dose of ctx, culture still pending, elevated WBC last night, repeat pending this morning. 3. Ovarian cyst  Incidental finding, denies pain this morning now that emesis has resolved. Plan: Advance diet  Ambulate  Pending po challenge and improvement in WBC today, will plan to discharge home with: Medications: phenergan, macrobid. Follow up: 6 weeks with [facility] for US and this week as scheduled with PCP. Diet: as tolerated, Activity: as tolerated    She is without significant complaints. Nausea controlled on current medication. Voiding without difficulty. Patient is passing flatus. She is NPO. She feels much better this morning.   .    Orders/Charges: High    Vitals:  Visit Vitals    /52    Pulse 71    Temp 98.3 °F (36.8 °C)    Resp 18    Ht 5' 3\" (1.6 m)    Wt 82.6 kg (182 lb 1.6 oz)    LMP 2018    SpO2 92%    Breastfeeding No    BMI 32.26 kg/m2     Temp (24hrs), Av.3 °F (36.8 °C), Min:97.7 °F (36.5 °C), Max:99.5 °F (37.5 °C)      Last 24hr Input/Output:    Intake/Output Summary (Last 24 hours) at 18 0657  Last data filed at 18 0554   Gross per 24 hour   Intake             1900 ml   Output                0 ml   Net             1900 ml          Exam:  General: alert, cooperative, no distress, appears stated age     Lung: clear to auscultation bilaterally     Heart: regular rate and rhythm, S1, S2 normal, no murmur, click, rub or gallop     Abdomen: abdomen is soft without significant tenderness, masses, organomegaly or guarding     Extremities: extremities normal, atraumatic, no cyanosis or edema      Labs: Lab Results   Component Value Date/Time    WBC 15.3 (H) 06/15/2018 10:30 AM    WBC 9.3 2018 11:23 AM    WBC 9.3 2016 03:22 PM    WBC 10.0 2016 11:46 AM    WBC 9.1 07/15/2015 12:35 AM    WBC 8.7 06/30/2015 11:13 AM    WBC 9.1 05/17/2015 05:05 AM    WBC 11.8 (H) 05/15/2015 04:23 AM    WBC 16.5 (H) 05/14/2015 11:30 AM    WBC 12.2 (H) 11/05/2014 06:00 PM    WBC 10.8 07/07/2014 06:45 AM    WBC 9.4 05/13/2014 12:57 PM    WBC 8.8 05/08/2014 03:05 PM    WBC 13.3 (H) 05/06/2014 08:40 AM    WBC 20.4 (H) 05/04/2014 08:48 AM    WBC 8.3 03/14/2014 12:49 PM    WBC 7.9 09/19/2012 10:08 AM    WBC 10.6 (H) 01/19/2012 10:50 AM    WBC 9.3 10/25/2009 11:06 PM    HGB 16.4 (H) 06/15/2018 10:30 AM    HGB 15.9 06/08/2018 11:23 AM    HGB 14.5 08/05/2016 03:22 PM    HGB 16.2 (H) 02/02/2016 11:46 AM    HGB 13.9 07/15/2015 12:35 AM    HGB 14.6 06/30/2015 11:13 AM    HGB 12.4 05/17/2015 05:05 AM    HGB 14.2 05/15/2015 04:23 AM    HGB 15.7 05/14/2015 11:30 AM    HGB 15.0 11/05/2014 06:00 PM    HGB 13.2 07/07/2014 06:45 AM    HGB 15.6 05/13/2014 12:57 PM    HGB 15.4 05/08/2014 03:05 PM    HGB 16.5 (H) 05/06/2014 08:40 AM    HGB 14.6 05/04/2014 08:48 AM    HGB 15.3 03/14/2014 12:49 PM    HGB 14.0 09/19/2012 10:08 AM    HGB 14.2 01/19/2012 10:50 AM    HGB 13.7 10/25/2009 11:06 PM    HCT 47.5 (H) 06/15/2018 10:30 AM    HCT 47.0 (H) 06/08/2018 11:23 AM    HCT 42.4 08/05/2016 03:22 PM    HCT 47.1 (H) 02/02/2016 11:46 AM    HCT 40.6 07/15/2015 12:35 AM    HCT 44.0 06/30/2015 11:13 AM    HCT 37.2 05/17/2015 05:05 AM    HCT 42.9 05/15/2015 04:23 AM    HCT 46.1 05/14/2015 11:30 AM    HCT 45.5 11/05/2014 06:00 PM    HCT 39.4 07/07/2014 06:45 AM    HCT 44.8 05/13/2014 12:57 PM    HCT 44.8 05/08/2014 03:05 PM    HCT 47.8 (H) 05/06/2014 08:40 AM    HCT 42.5 05/04/2014 08:48 AM    HCT 45.9 03/14/2014 12:49 PM    HCT 41.7 09/19/2012 10:08 AM    HCT 42.4 01/19/2012 10:50 AM    HCT 39.4 10/25/2009 11:06 PM    PLATELET 466 84/96/5015 10:30 AM    PLATELET 698 63/32/9923 11:23 AM    PLATELET 968 76/34/7446 03:22 PM    PLATELET 300 80/55/7645 11:46 AM    PLATELET 641 25/62/1356 12:35 AM    PLATELET 566 31/84/4978 11:13 AM PLATELET 143 85/73/4275 05:05 AM    PLATELET 635 79/88/7569 04:23 AM    PLATELET 985 28/47/2875 11:30 AM    PLATELET 084 38/71/2544 06:00 PM    PLATELET 159 56/31/3737 06:45 AM    PLATELET 052 63/12/2576 12:57 PM    PLATELET 844 94/88/9419 03:05 PM    PLATELET 070 16/30/4390 08:40 AM    PLATELET 633 74/28/9049 08:48 AM    PLATELET 326 83/43/9657 12:49 PM    PLATELET 348 70/16/4068 10:08 AM    PLATELET 310 82/00/6076 10:50 AM    PLATELET 031 16/01/2011 11:06 PM       Recent Results (from the past 24 hour(s))   CBC WITH AUTOMATED DIFF    Collection Time: 06/15/18 10:30 AM   Result Value Ref Range    WBC 15.3 (H) 3.6 - 11.0 K/uL    RBC 5.58 (H) 3.80 - 5.20 M/uL    HGB 16.4 (H) 11.5 - 16.0 g/dL    HCT 47.5 (H) 35.0 - 47.0 %    MCV 85.1 80.0 - 99.0 FL    MCH 29.4 26.0 - 34.0 PG    MCHC 34.5 30.0 - 36.5 g/dL    RDW 14.2 11.5 - 14.5 %    PLATELET 559 359 - 333 K/uL    MPV 10.6 8.9 - 12.9 FL    NRBC 0.0 0  WBC    ABSOLUTE NRBC 0.00 0.00 - 0.01 K/uL    NEUTROPHILS 86 (H) 32 - 75 %    LYMPHOCYTES 11 (L) 12 - 49 %    MONOCYTES 2 (L) 5 - 13 %    EOSINOPHILS 0 0 - 7 %    BASOPHILS 0 0 - 1 %    IMMATURE GRANULOCYTES 0 0.0 - 0.5 %    ABS. NEUTROPHILS 13.1 (H) 1.8 - 8.0 K/UL    ABS. LYMPHOCYTES 1.7 0.8 - 3.5 K/UL    ABS. MONOCYTES 0.4 0.0 - 1.0 K/UL    ABS. EOSINOPHILS 0.0 0.0 - 0.4 K/UL    ABS. BASOPHILS 0.1 0.0 - 0.1 K/UL    ABS. IMM.  GRANS. 0.1 (H) 0.00 - 0.04 K/UL    DF AUTOMATED     METABOLIC PANEL, COMPREHENSIVE    Collection Time: 06/15/18 10:30 AM   Result Value Ref Range    Sodium 134 (L) 136 - 145 mmol/L    Potassium 3.9 3.5 - 5.1 mmol/L    Chloride 103 97 - 108 mmol/L    CO2 18 (L) 21 - 32 mmol/L    Anion gap 13 5 - 15 mmol/L    Glucose 149 (H) 65 - 100 mg/dL    BUN 16 6 - 20 MG/DL    Creatinine 1.00 0.55 - 1.02 MG/DL    BUN/Creatinine ratio 16 12 - 20      GFR est AA >60 >60 ml/min/1.73m2    GFR est non-AA >60 >60 ml/min/1.73m2    Calcium 9.2 8.5 - 10.1 MG/DL    Bilirubin, total 0.9 0.2 - 1.0 MG/DL    ALT (SGPT) 18 12 - 78 U/L    AST (SGOT) 5 (L) 15 - 37 U/L    Alk. phosphatase 109 45 - 117 U/L    Protein, total 8.8 (H) 6.4 - 8.2 g/dL    Albumin 4.5 3.5 - 5.0 g/dL    Globulin 4.3 (H) 2.0 - 4.0 g/dL    A-G Ratio 1.0 (L) 1.1 - 2.2     LIPASE    Collection Time: 06/15/18 10:30 AM   Result Value Ref Range    Lipase 93 73 - 393 U/L   TOTAL HCG, QT.     Collection Time: 06/15/18 10:30 AM   Result Value Ref Range    Beta HCG, QT <1 0 - 6 MIU/ML   URINALYSIS W/ REFLEX CULTURE    Collection Time: 06/15/18 10:38 AM   Result Value Ref Range    Color DARK YELLOW      Appearance CLOUDY (A) CLEAR      Specific gravity 1.030 1.003 - 1.030      pH (UA) 5.5 5.0 - 8.0      Protein 300 (A) NEG mg/dL    Glucose NEGATIVE  NEG mg/dL    Ketone >80 (A) NEG mg/dL    Blood SMALL (A) NEG      Urobilinogen 0.2 0.2 - 1.0 EU/dL    Nitrites NEGATIVE  NEG      Leukocyte Esterase NEGATIVE  NEG      WBC 0-4 0 - 4 /hpf    RBC 0-5 0 - 5 /hpf    Epithelial cells MODERATE (A) FEW /lpf    Bacteria 1+ (A) NEG /hpf    UA:UC IF INDICATED URINE CULTURE ORDERED (A) CNI      Mucus 2+ (A) NEG /lpf    Hyaline cast 2-5 0 - 5 /lpf   SHAKEEL, OTHER SOURCES    Collection Time: 06/15/18 10:38 AM   Result Value Ref Range    Special Requests: NO SPECIAL REQUESTS      KOH NO YEAST SEEN     WET PREP    Collection Time: 06/15/18 10:38 AM   Result Value Ref Range    Clue cells CLUE CELLS ABSENT      Wet prep NO TRICHOMONAS SEEN     LACTIC ACID    Collection Time: 06/15/18 10:38 AM   Result Value Ref Range    Lactic acid 1.3 0.4 - 2.0 MMOL/L   BILIRUBIN, CONFIRM    Collection Time: 06/15/18 10:38 AM   Result Value Ref Range    Bilirubin UA, confirm QUANTITY NOT SUFFICIENT TO CONFIRM (A) NEG     POC CHEM8    Collection Time: 06/15/18 10:54 AM   Result Value Ref Range    Calcium, ionized (POC) 1.04 (L) 1.12 - 1.32 mmol/L    Sodium (POC) 137 136 - 145 mmol/L    Potassium (POC) 3.8 3.5 - 5.1 mmol/L    Chloride (POC) 107 98 - 107 mmol/L    CO2 (POC) 16 (L) 21 - 32 mmol/L    Anion gap (POC) 20 10 - 20 mmol/L    Glucose (POC) 152 (H) 65 - 100 mg/dL    BUN (POC) 16 9 - 20 mg/dL    Creatinine (POC) 0.6 0.6 - 1.3 mg/dL    GFRAA, POC >60 >60 ml/min/1.73m2    GFRNA, POC >60 >60 ml/min/1.73m2    Hematocrit (POC) 46 35.0 - 47.0 %    Comment Notified RN or MD immediately by

## 2018-06-17 LAB
BACTERIA SPEC CULT: NORMAL
CC UR VC: NORMAL
SERVICE CMNT-IMP: NORMAL

## 2018-06-18 LAB
BACTERIA SPEC CULT: NORMAL
BACTERIA SPEC CULT: NORMAL
C JEJUNI+C COLI AG STL QL: NEGATIVE
C TRACH DNA SPEC QL NAA+PROBE: NEGATIVE
E COLI SXT1+2 STL IA: NEGATIVE
N GONORRHOEA DNA SPEC QL NAA+PROBE: NEGATIVE
SAMPLE TYPE: NORMAL
SERVICE CMNT-IMP: NORMAL
SERVICE CMNT-IMP: NORMAL
SPECIMEN SOURCE: NORMAL

## 2018-06-21 LAB
BACTERIA SPEC CULT: NORMAL
SERVICE CMNT-IMP: NORMAL

## 2018-06-22 ENCOUNTER — OFFICE VISIT (OUTPATIENT)
Dept: FAMILY MEDICINE CLINIC | Age: 33
End: 2018-06-22

## 2018-06-22 VITALS
BODY MASS INDEX: 33.13 KG/M2 | HEIGHT: 63 IN | OXYGEN SATURATION: 98 % | TEMPERATURE: 98.5 F | SYSTOLIC BLOOD PRESSURE: 118 MMHG | HEART RATE: 77 BPM | WEIGHT: 187 LBS | RESPIRATION RATE: 18 BRPM | DIASTOLIC BLOOD PRESSURE: 87 MMHG

## 2018-06-22 DIAGNOSIS — F43.10 PTSD (POST-TRAUMATIC STRESS DISORDER): ICD-10-CM

## 2018-06-22 DIAGNOSIS — F33.2 SEVERE EPISODE OF RECURRENT MAJOR DEPRESSIVE DISORDER, WITHOUT PSYCHOTIC FEATURES (HCC): ICD-10-CM

## 2018-06-22 DIAGNOSIS — N64.4 BREAST PAIN, RIGHT: ICD-10-CM

## 2018-06-22 DIAGNOSIS — K21.9 GASTROESOPHAGEAL REFLUX DISEASE, ESOPHAGITIS PRESENCE NOT SPECIFIED: ICD-10-CM

## 2018-06-22 DIAGNOSIS — R10.12 LUQ ABDOMINAL PAIN: Primary | ICD-10-CM

## 2018-06-22 DIAGNOSIS — E55.9 VITAMIN D INSUFFICIENCY: ICD-10-CM

## 2018-06-22 DIAGNOSIS — L60.5 YELLOW NAILS: ICD-10-CM

## 2018-06-22 RX ORDER — OMEPRAZOLE 20 MG/1
20 CAPSULE, DELAYED RELEASE ORAL DAILY
Qty: 30 CAP | Refills: 2 | Status: SHIPPED | OUTPATIENT
Start: 2018-06-22 | End: 2018-07-14

## 2018-06-22 NOTE — PATIENT INSTRUCTIONS
Abdominal Pain: Care Instructions  Your Care Instructions    Abdominal pain has many possible causes. Some aren't serious and get better on their own in a few days. Others need more testing and treatment. If your pain continues or gets worse, you need to be rechecked and may need more tests to find out what is wrong. You may need surgery to correct the problem. Don't ignore new symptoms, such as fever, nausea and vomiting, urination problems, pain that gets worse, and dizziness. These may be signs of a more serious problem. Your doctor may have recommended a follow-up visit in the next 8 to 12 hours. If you are not getting better, you may need more tests or treatment. The doctor has checked you carefully, but problems can develop later. If you notice any problems or new symptoms, get medical treatment right away. Follow-up care is a key part of your treatment and safety. Be sure to make and go to all appointments, and call your doctor if you are having problems. It's also a good idea to know your test results and keep a list of the medicines you take. How can you care for yourself at home? · Rest until you feel better. · To prevent dehydration, drink plenty of fluids, enough so that your urine is light yellow or clear like water. Choose water and other caffeine-free clear liquids until you feel better. If you have kidney, heart, or liver disease and have to limit fluids, talk with your doctor before you increase the amount of fluids you drink. · If your stomach is upset, eat mild foods, such as rice, dry toast or crackers, bananas, and applesauce. Try eating several small meals instead of two or three large ones. · Wait until 48 hours after all symptoms have gone away before you have spicy foods, alcohol, and drinks that contain caffeine. · Do not eat foods that are high in fat. · Avoid anti-inflammatory medicines such as aspirin, ibuprofen (Advil, Motrin), and naproxen (Aleve).  These can cause stomach upset. Talk to your doctor if you take daily aspirin for another health problem. When should you call for help? Call 911 anytime you think you may need emergency care. For example, call if:  ? · You passed out (lost consciousness). ? · You pass maroon or very bloody stools. ? · You vomit blood or what looks like coffee grounds. ? · You have new, severe belly pain. ?Call your doctor now or seek immediate medical care if:  ? · Your pain gets worse, especially if it becomes focused in one area of your belly. ? · You have a new or higher fever. ? · Your stools are black and look like tar, or they have streaks of blood. ? · You have unexpected vaginal bleeding. ? · You have symptoms of a urinary tract infection. These may include:  ¨ Pain when you urinate. ¨ Urinating more often than usual.  ¨ Blood in your urine. ? · You are dizzy or lightheaded, or you feel like you may faint. ? Watch closely for changes in your health, and be sure to contact your doctor if:  ? · You are not getting better after 1 day (24 hours). Where can you learn more? Go to http://orin-cris.info/. Enter S915 in the search box to learn more about \"Abdominal Pain: Care Instructions. \"  Current as of: March 20, 2017  Content Version: 11.4  © 8774-4976 "[employer] Care instructions adapted under license by [employer] (which disclaims liability or warranty for this information). If you have questions about a medical condition or this instruction, always ask your healthcare professional. [place] [address] any warranty or liability for your use of this information. Gastroesophageal Reflux Disease (GERD): Care Instructions  Your Care Instructions    Gastroesophageal reflux disease (GERD) is the backward flow of stomach acid into the esophagus. The esophagus is the tube that leads from your throat to your stomach.  A one-way valve prevents the stomach acid from moving up into this tube. When you have GERD, this valve does not close tightly enough. If you have mild GERD symptoms including heartburn, you may be able to control the problem with antacids or over-the-counter medicine. Changing your diet, losing weight, and making other lifestyle changes can also help reduce symptoms. Follow-up care is a key part of your treatment and safety. Be sure to make and go to all appointments, and call your doctor if you are having problems. It's also a good idea to know your test results and keep a list of the medicines you take. How can you care for yourself at home? · Take your medicines exactly as prescribed. Call your doctor if you think you are having a problem with your medicine. · Your doctor may recommend over-the-counter medicine. For mild or occasional indigestion, antacids, such as Tums, Gaviscon, Mylanta, or Maalox, may help. Your doctor also may recommend over-the-counter acid reducers, such as Pepcid AC, Tagamet HB, Zantac 75, or Prilosec. Read and follow all instructions on the label. If you use these medicines often, talk with your doctor. · Change your eating habits. ¨ It's best to eat several small meals instead of two or three large meals. ¨ After you eat, wait 2 to 3 hours before you lie down. ¨ Chocolate, mint, and alcohol can make GERD worse. ¨ Spicy foods, foods that have a lot of acid (like tomatoes and oranges), and coffee can make GERD symptoms worse in some people. If your symptoms are worse after you eat a certain food, you may want to stop eating that food to see if your symptoms get better. · Do not smoke or chew tobacco. Smoking can make GERD worse. If you need help quitting, talk to your doctor about stop-smoking programs and medicines. These can increase your chances of quitting for good.   · If you have GERD symptoms at night, raise the head of your bed 6 to 8 inches by putting the frame on blocks or placing a foam wedge under the head of your mattress. (Adding extra pillows does not work.)  · Do not wear tight clothing around your middle. · Lose weight if you need to. Losing just 5 to 10 pounds can help. When should you call for help? Call your doctor now or seek immediate medical care if:  ? · You have new or different belly pain. ? · Your stools are black and tarlike or have streaks of blood. ? Watch closely for changes in your health, and be sure to contact your doctor if:  ? · Your symptoms have not improved after 2 days. ? · Food seems to catch in your throat or chest.   Where can you learn more? Go to http://orin-cris.info/. Enter Q497 in the search box to learn more about \"Gastroesophageal Reflux Disease (GERD): Care Instructions. \"  Current as of: May 12, 2017  Content Version: 11.4  © 9360-2323 [employer]. Care instructions adapted under license by [employer] (which disclaims liability or warranty for this information). If you have questions about a medical condition or this instruction, always ask your healthcare professional. [place] [address] any warranty or liability for your use of this information. Hiatal Hernia: Care Instructions  Your Care Instructions  A hiatal hernia occurs when part of the stomach bulges into the chest cavity. A hiatal hernia may allow stomach acid and juices to back up into the esophagus (acid reflux). This can cause a feeling of burning, warmth, heat, or pain behind the breastbone. This feeling may often occur after you eat, soon after you lie down, or when you bend forward, and it may come and go. You also may have a sour taste in your mouth. These symptoms are commonly known as heartburn or reflux. But not all hiatal hernias cause symptoms. Follow-up care is a key part of your treatment and safety. Be sure to make and go to all appointments, and call your doctor if you are having problems.  It's also a good idea to know your test results and keep a list of the medicines you take. How can you care for yourself at home? · Take your medicines exactly as prescribed. Call your doctor if you think you are having a problem with your medicine. · Do not take aspirin or other nonsteroidal anti-inflammatory drugs (NSAIDs), such as ibuprofen (Advil, Motrin) or naproxen (Aleve), unless your doctor says it is okay. Ask your doctor what you can take for pain. · Your doctor may recommend over-the-counter medicine. For mild or occasional indigestion, antacids such as Tums, Gaviscon, Maalox, or Mylanta may help. Your doctor also may recommend over-the-counter acid reducers, such as famotidine (Pepcid AC), cimetidine (Tagamet HB), ranitidine (Zantac 75 and Zantac 150), or omeprazole (Prilosec). Read and follow all instructions on the label. If you use these medicines often, talk with your doctor. · Change your eating habits. ¨ It's best to eat several small meals instead of two or three large meals. ¨ After you eat, wait 2 to 3 hours before you lie down. Late-night snacks aren't a good idea. ¨ Chocolate, mint, and alcohol can make heartburn worse. They relax the valve between the esophagus and the stomach. ¨ Spicy foods, foods that have a lot of acid (like tomatoes and oranges), and coffee can make heartburn symptoms worse in some people. If your symptoms are worse after you eat a certain food, you may want to stop eating that food to see if your symptoms get better. · Do not smoke or chew tobacco.  · If you get heartburn at night, raise the head of your bed 6 to 8 inches by putting the frame on blocks or placing a foam wedge under the head of your mattress. (Adding extra pillows does not work.)  · Do not wear tight clothing around your middle. · Lose weight if you need to. Losing just 5 to 10 pounds can help. When should you call for help? Call your doctor now or seek immediate medical care if:  ? · You have new or worse belly pain. ? · You are vomiting. ? Watch closely for changes in your health, and be sure to contact your doctor if:  ? · You have new or worse symptoms of indigestion. ? · You have trouble or pain swallowing. ? · You are losing weight. ? · You do not get better as expected. Where can you learn more? Go to http://orin-cris.info/. Enter N236 in the search box to learn more about \"Hiatal Hernia: Care Instructions. \"  Current as of: May 12, 2017  Content Version: 11.4  © 0877-0924 [employer]. Care instructions adapted under license by [employer] (which disclaims liability or warranty for this information). If you have questions about a medical condition or this instruction, always ask your healthcare professional. [address] any warranty or liability for your use of this information.

## 2018-06-22 NOTE — PROGRESS NOTES
HISTORY OF PRESENT ILLNESS  Lathan Kayser is a 34 y.o. female. HPI  Patient comes in today for follow up  Was seen for multiple somatic complaints on 6/8/18. Vit D little low 22.7 and Vit B12 on low end of normal.  ALL OTHER LABS NORMAL  Was in ED [facility] 6/15/18-6/16/18. Admitted for N/V, found to have 4.5 cm ovarianc cyst, simple in character and thought to be incidental to her presenting symptoms. Her nausea and vomiting improved overnight with phenergan and GI rest.  WIll plan to follow up the cyst with US in the office in 6 weeks, and she has follow up with her PCP scheduled this week. Abd CT negative except for ovarian cyst  States she took cymbalta and wellbutrin once, but got sick and ended up in hospital.  States she has been hesitant to take again. Hurts to swallow, feels like a swelling in throat. Has pain and \"swelling\" in LUQ. Still concerned about yellowing of nails. Cardiology on 7/5/18. GI - Dr. Maura Santiago on 7/12/18  GYN - in 6 weeks. Dr. Driss Dueñas. Allergies   Allergen Reactions    Latex Rash, Itching and Swelling    Pcn [Penicillins] Other (comments)     Pt states as a child she had a reaction but does not remember specific reaction. Pt states\"mother say I stopped breathing\".        Past Medical History:   Diagnosis Date    Anxiety     Arthritis     unsure - knees, lower back, fingers and toes    Asthma     well controlled    Chronic pain     right abdomen,shooting pain groins    Depression     Diabetes ([address]     pre diabetes     GERD (gastroesophageal reflux disease)     Headache     Ill-defined condition     prolapsed cervix,rectum,bladder    Intractable nausea and vomiting 6/15/2018    Prolapse of anterior lip of cervix     Psychiatric disorder     bipolar, anxiety, depression    Rectal prolapse     Stool color black     White stool       Past Surgical History:   Procedure Laterality Date    HX GYN      vaginal birth x2    HX HEENT      bilat tubes in ears age 3   [facility] HERNIA REPAIR  07/09/2015    Laparoscopic Incisional hernia repair with mesh.  HX LAP CHOLECYSTECTOMY  7/7/2014    HX OTHER SURGICAL      colonoscopy       Social History     Social History    Marital status: SINGLE     Spouse name: N/A    Number of children: 2    Years of education: N/A     Occupational History     Not Employed     work at home      Social History Main Topics    Smoking status: Current Every Day Smoker     Packs/day: 0.50     Years: 10.00     Start date: 3/1/2004    Smokeless tobacco: Never Used    Alcohol use No    Drug use: Yes     Special: Marijuana      Comment: few months ago    Sexual activity: Yes     Partners: Male     Birth control/ protection: None      Comment: single      Other Topics Concern    Not on file     Social History Narrative    Unemployed currently       Family History   Problem Relation Age of Onset    Other Mother      fibromyalgia, IBS    Diabetes Mother      Pre-DM    High Cholesterol Mother     Hypertension Mother     Liver Disease Mother      fatty liver    Cancer Mother      uterine     Heart Disease Mother      CHF    Arthritis-osteo Mother     Diabetes Father     Hypertension Father     Asthma Brother     Diabetes Paternal Aunt     Hypertension Paternal Aunt     Diabetes Paternal Uncle     Hypertension Paternal Uncle     Cancer Maternal Grandfather      esoph    Hypertension Maternal Grandfather     Stroke Maternal Grandfather     Heart Disease Paternal Grandmother     Diabetes Paternal Grandfather     Heart Attack Paternal Grandfather     Stroke Paternal Grandfather     Hypertension Paternal Grandfather     Heart Disease Maternal Grandmother     Other Sister      MTHFR (clotting D/O)       Current Outpatient Prescriptions   Medication Sig    promethazine (PHENERGAN) 25 mg tablet Take 1 Tab by mouth every six (6) hours as needed for Nausea.     acetaminophen (TYLENOL) 500 mg tablet Take 1,000 mg by mouth every six (6) hours as needed for Pain.  DULoxetine (CYMBALTA) 60 mg capsule take 1 capsule by mouth once daily    buPROPion XL (WELLBUTRIN XL) 150 mg tablet Take 1 Tab by mouth every morning. No current facility-administered medications for this visit. Review of Systems   Constitutional: Positive for diaphoresis, malaise/fatigue and weight loss. Negative for chills and fever. HENT: Negative for congestion, ear pain, sore throat and tinnitus. Complains of hair thinning right forehead   Eyes: Negative for blurred vision. Respiratory: Positive for shortness of breath. Negative for cough, sputum production and wheezing. Cardiovascular: Positive for chest pain. Negative for palpitations and leg swelling. Gastrointestinal: Positive for nausea. Negative for abdominal pain, blood in stool, constipation, diarrhea and vomiting. Genitourinary: Negative for dysuria, flank pain, frequency, hematuria and urgency. Vaginal discharge, pain in right breast/axilla   Musculoskeletal: Positive for joint pain (multiple joints) and myalgias. Negative for back pain. Skin: Negative. Yellow fingernails   Neurological: Negative for dizziness, tingling, sensory change, speech change, focal weakness and headaches. Psychiatric/Behavioral: Positive for depression. Negative for suicidal ideas. The patient is nervous/anxious and has insomnia. Vitals:    06/22/18 1014   BP: 118/87   Pulse: 77   Resp: 18   Temp: 98.5 °F (36.9 °C)   TempSrc: Oral   SpO2: 98%   Weight: 187 lb (84.8 kg)   Height: 5' 3\" (1.6 m)     Physical Exam   Constitutional: She is oriented to person, place, and time. Vital signs are normal. She appears well-developed and well-nourished. She is cooperative. HENT:   Head: Normocephalic.    Right Ear: Hearing, tympanic membrane, external ear and ear canal normal.   Left Ear: Hearing, tympanic membrane, external ear and ear canal normal.   Nose: Nose normal. Right sinus exhibits no maxillary sinus tenderness and no frontal sinus tenderness. Left sinus exhibits no maxillary sinus tenderness and no frontal sinus tenderness. Mouth/Throat: Uvula is midline, oropharynx is clear and moist and mucous membranes are normal. Mucous membranes are not pale and not dry. No oropharyngeal exudate, posterior oropharyngeal edema or posterior oropharyngeal erythema. No patches of hairloss noted   Eyes: No scleral icterus. Neck: No thyroid mass and no thyromegaly present. Cardiovascular: Normal rate, regular rhythm, S1 normal, S2 normal and normal heart sounds. No murmur heard. Pulmonary/Chest: Effort normal and breath sounds normal. She has no decreased breath sounds. She has no wheezes. She has no rhonchi. She has no rales. Abdominal: Soft. Normal appearance and bowel sounds are normal. There is no hepatosplenomegaly. There is no tenderness. There is no CVA tenderness. Neurological: She is alert and oriented to person, place, and time. Skin: Skin is warm, dry and intact. Yellowing pigment of fingernails, now lighter than 2 weeks ago, new nail proximally normal color   Psychiatric: Her speech is normal and behavior is normal. Thought content normal. She exhibits a depressed mood. Vitals reviewed. ASSESSMENT and PLAN    ICD-10-CM ICD-9-CM    1. LUQ abdominal pain R10.12 789.02 omeprazole (PRILOSEC) 20 mg capsule   2. Gastroesophageal reflux disease, esophagitis presence not specified K21.9 530.81    3. Severe episode of recurrent major depressive disorder, without psychotic features ([facility] [address] F33.2 296.33    4. PTSD (post-traumatic stress disorder) F43.10 309.81    5. Vitamin D insufficiency E55.9 268.9    6. Yellow nails L60.5 703.8    7. Breast pain, right N64.4 611.71      Encounter Diagnoses   Name Primary?     LUQ abdominal pain Yes    Gastroesophageal reflux disease, esophagitis presence not specified     Severe episode of recurrent major depressive disorder, without psychotic features ([facility] [address]     PTSD (post-traumatic stress disorder)     Vitamin D insufficiency     Yellow nails     Breast pain, right      Orders Placed This Encounter    omeprazole (PRILOSEC) 20 mg capsule     Diagnoses and all orders for this visit:    1. LUQ abdominal pain  -     omeprazole (PRILOSEC) 20 mg capsule; Take 1 Cap by mouth daily. 2. Gastroesophageal reflux disease, esophagitis presence not specified - Prilosec daily. Patient scheduled to see Dr. Kait Gillespie on 7/12/18    3. Severe episode of recurrent major depressive disorder, without psychotic features ([facility] [address] - encouraged to restart Cymbalta. If tolerates for 1 week, then may restart wellbutrin    4. PTSD (post-traumatic stress disorder)    5. Vitamin D insufficiency - recommend OTC Vitamin D3 supplement    6. Yellow nails    7. Breast pain, right - patient has not scheduled US of left breast and axilla. Given contact information for  to schedule      Follow-up Disposition:  Return in about 3 months (around 9/22/2018). I have reviewed the patient's allergies and made any necessary changes. Medical, procedural, social and family histories have been reviewed and updated as medically indicated. I have reconciled and/or revised patient medications in the EMR. I have discussed each diagnosis listed in this note with Karen Bazan and/or their family. I have discussed treatment options and the risk/benefit analysis of those options, including safe use of medications and possible medication side effects. Through the use of shared decision making we have agreed to the above plan. The patient has received an after-visit summary and questions were answered concerning future plans. Kassi Bergman, GERARD    This note will not be viewable in [employer].

## 2018-06-22 NOTE — MR AVS SNAPSHOT
Christa Enamorado 
 
 
 [address] [facility] [address] [address] 
[phone] Patient: Mann Allen MRN: [id number] AZR:[birth year] Visit Information Date & Time Provider Department Dept. Phone Encounter #  
 6/22/2018 10:00 AM Kike Lopes [phone] [id number] Follow-up Instructions Return in about 3 months (around 9/22/2018). Your Appointments 7/5/2018  2:15 PM  
New Patient with [address], [place]  
[facility]) Appt Note: chest pain/dyspnea referred by pcp 6/14/18 kb  
 [address] [place] [address]  
[phone] [address] [place] [address] Upcoming Health Maintenance Date Due Influenza Age 5 to Adult 8/1/2018 PAP AKA CERVICAL CYTOLOGY 8/5/2019 DTaP/Tdap/Td series (2 - Td) 1/26/2022 Allergies as of 6/22/2018  Review Complete On: 6/22/2018 By: Brionna Paris LPN Severity Noted Reaction Type Reactions Latex Medium 03/14/2014   Side Effect Rash, Itching, Swelling Pcn [Penicillins] High 12/12/2011   Systemic Other (comments) Pt states as a child she had a reaction but does not remember specific reaction. Pt states\"mother say I stopped breathing\". Current Immunizations  Reviewed on 7/9/2015 Name Date Influenza Vaccine 2/5/2013 Influenza Vaccine Split 1/26/2012 TDAP Vaccine 1/26/2012 Not reviewed this visit You Were Diagnosed With   
  
 Codes Comments LUQ abdominal pain    -  Primary ICD-10-CM: R10.12 ICD-9-CM: 789.02 Vitals BP Pulse Temp Resp Height(growth percentile) Weight(growth percentile) 118/87 (BP 1 Location: Right arm, BP Patient Position: Sitting) 77 98.5 °F (36.9 °C) (Oral) 18 5' 3\" (1.6 m) 187 lb (84.8 kg) LMP SpO2 BMI OB Status Smoking Status 06/19/2018 98% 33.13 kg/m2 Unknown Current Every Day Smoker BMI and BSA Data Body Mass Index Body Surface Area  
 33.13 kg/m 2 1.94 m 2 Preferred Pharmacy Pharmacy Name Phone [id number] [place] [phone] Your Updated Medication List  
  
   
This list is accurate as of 6/22/18 10:59 AM.  Always use your most recent med list.  
  
  
  
  
 acetaminophen 500 mg tablet Commonly known as:  TYLENOL Take 1,000 mg by mouth every six (6) hours as needed for Pain. buPROPion  mg tablet Commonly known as:  Ro[place]velt Blazing Take 1 Tab by mouth every morning. DULoxetine 60 mg capsule Commonly known as:  CYMBALTA  
take 1 capsule by mouth once daily  
  
 omeprazole 20 mg capsule Commonly known as:  PRILOSEC Take 1 Cap by mouth daily. promethazine 25 mg tablet Commonly known as:  PHENERGAN Take 1 Tab by mouth every six (6) hours as needed for Nausea. Prescriptions Sent to Pharmacy Refills  
 omeprazole (PRILOSEC) 20 mg capsule 2 Sig: Take 1 Cap by mouth daily. Class: Normal  
 Pharmacy: [id number] [address], [address] [place] [address] #: [phone] Route: Oral  
  
Follow-up Instructions Return in about 3 months (around 9/22/2018). Patient Instructions Abdominal Pain: Care Instructions Your Care Instructions Abdominal pain has many possible causes. Some aren't serious and get better on their own in a few days. Others need more testing and treatment. If your pain continues or gets worse, you need to be rechecked and may need more tests to find out what is wrong. You may need surgery to correct the problem. Don't ignore new symptoms, such as fever, nausea and vomiting, urination problems, pain that gets worse, and dizziness. These may be signs of a more serious problem.  
Your doctor may have recommended a follow-up visit in the next 8 to 12 hours. If you are not getting better, you may need more tests or treatment. The doctor has checked you carefully, but problems can develop later. If you notice any problems or new symptoms, get medical treatment right away. Follow-up care is a key part of your treatment and safety. Be sure to make and go to all appointments, and call your doctor if you are having problems. It's also a good idea to know your test results and keep a list of the medicines you take. How can you care for yourself at home? · Rest until you feel better. · To prevent dehydration, drink plenty of fluids, enough so that your urine is light yellow or clear like water. Choose water and other caffeine-free clear liquids until you feel better. If you have kidney, heart, or liver disease and have to limit fluids, talk with your doctor before you increase the amount of fluids you drink. · If your stomach is upset, eat mild foods, such as rice, dry toast or crackers, bananas, and applesauce. Try eating several small meals instead of two or three large ones. · Wait until 48 hours after all symptoms have gone away before you have spicy foods, alcohol, and drinks that contain caffeine. · Do not eat foods that are high in fat. · Avoid anti-inflammatory medicines such as aspirin, ibuprofen (Advil, Motrin), and naproxen (Aleve). These can cause stomach upset. Talk to your doctor if you take daily aspirin for another health problem. When should you call for help? Call 911 anytime you think you may need emergency care. For example, call if: 
? · You passed out (lost consciousness). ? · You pass maroon or very bloody stools. ? · You vomit blood or what looks like coffee grounds. ? · You have new, severe belly pain. ?Call your doctor now or seek immediate medical care if: 
? · Your pain gets worse, especially if it becomes focused in one area of your belly. ? · You have a new or higher fever. ? · Your stools are black and look like tar, or they have streaks of blood. ? · You have unexpected vaginal bleeding. ? · You have symptoms of a urinary tract infection. These may include: 
¨ Pain when you urinate. ¨ Urinating more often than usual. 
¨ Blood in your urine. ? · You are dizzy or lightheaded, or you feel like you may faint. ? Watch closely for changes in your health, and be sure to contact your doctor if: 
? · You are not getting better after 1 day (24 hours). Where can you learn more? Go to http://orin-cris.info/. Enter G957 in the search box to learn more about \"Abdominal Pain: Care Instructions. \" Current as of: March 20, 2017 Content Version: 11.4 © 4076-4352 [employer]&. Care instructions adapted under license by [employer] (which disclaims liability or warranty for this information). If you have questions about a medical condition or this instruction, always ask your healthcare professional. [place] [address] any warranty or liability for your use of this information. Gastroesophageal Reflux Disease (GERD): Care Instructions Your Care Instructions Gastroesophageal reflux disease (GERD) is the backward flow of stomach acid into the esophagus. The esophagus is the tube that leads from your throat to your stomach. A one-way valve prevents the stomach acid from moving up into this tube. When you have GERD, this valve does not close tightly enough. If you have mild GERD symptoms including heartburn, you may be able to control the problem with antacids or over-the-counter medicine. Changing your diet, losing weight, and making other lifestyle changes can also help reduce symptoms. Follow-up care is a key part of your treatment and safety. Be sure to make and go to all appointments, and call your doctor if you are having problems.  It's also a good idea to know your test results and keep a list of the medicines you take. How can you care for yourself at home? · Take your medicines exactly as prescribed. Call your doctor if you think you are having a problem with your medicine. · Your doctor may recommend over-the-counter medicine. For mild or occasional indigestion, antacids, such as Tums, Gaviscon, Mylanta, or Maalox, may help. Your doctor also may recommend over-the-counter acid reducers, such as Pepcid AC, Tagamet HB, Zantac 75, or Prilosec. Read and follow all instructions on the label. If you use these medicines often, talk with your doctor. · Change your eating habits. ¨ It's best to eat several small meals instead of two or three large meals. ¨ After you eat, wait 2 to 3 hours before you lie down. ¨ Chocolate, mint, and alcohol can make GERD worse. ¨ Spicy foods, foods that have a lot of acid (like tomatoes and oranges), and coffee can make GERD symptoms worse in some people. If your symptoms are worse after you eat a certain food, you may want to stop eating that food to see if your symptoms get better. · Do not smoke or chew tobacco. Smoking can make GERD worse. If you need help quitting, talk to your doctor about stop-smoking programs and medicines. These can increase your chances of quitting for good. · If you have GERD symptoms at night, raise the head of your bed 6 to 8 inches by putting the frame on blocks or placing a foam wedge under the head of your mattress. (Adding extra pillows does not work.) · Do not wear tight clothing around your middle. · Lose weight if you need to. Losing just 5 to 10 pounds can help. When should you call for help? Call your doctor now or seek immediate medical care if: 
? · You have new or different belly pain. ? · Your stools are black and tarlike or have streaks of blood. ? Watch closely for changes in your health, and be sure to contact your doctor if: 
? · Your symptoms have not improved after 2 days. ? · Food seems to catch in your throat or chest.  
Where can you learn more? Go to http://orin-cris.info/. Enter I376 in the search box to learn more about \"Gastroesophageal Reflux Disease (GERD): Care Instructions. \" Current as of: May 12, 2017 Content Version: 11.4 © 2831-2506 [employer]. Care instructions adapted under license by [employer] (which disclaims liability or warranty for this information). If you have questions about a medical condition or this instruction, always ask your healthcare professional. [place] [address] any warranty or liability for your use of this information. Hiatal Hernia: Care Instructions Your Care Instructions A hiatal hernia occurs when part of the stomach bulges into the chest cavity. A hiatal hernia may allow stomach acid and juices to back up into the esophagus (acid reflux). This can cause a feeling of burning, warmth, heat, or pain behind the breastbone. This feeling may often occur after you eat, soon after you lie down, or when you bend forward, and it may come and go. You also may have a sour taste in your mouth. These symptoms are commonly known as heartburn or reflux. But not all hiatal hernias cause symptoms. Follow-up care is a key part of your treatment and safety. Be sure to make and go to all appointments, and call your doctor if you are having problems. It's also a good idea to know your test results and keep a list of the medicines you take. How can you care for yourself at home? · Take your medicines exactly as prescribed. Call your doctor if you think you are having a problem with your medicine. · Do not take aspirin or other nonsteroidal anti-inflammatory drugs (NSAIDs), such as ibuprofen (Advil, Motrin) or naproxen (Aleve), unless your doctor says it is okay. Ask your doctor what you can take for pain. · Your doctor may recommend over-the-counter medicine.  For mild or occasional indigestion, antacids such as Tums, Gaviscon, Maalox, or Mylanta may help. Your doctor also may recommend over-the-counter acid reducers, such as famotidine (Pepcid AC), cimetidine (Tagamet HB), ranitidine (Zantac 75 and Zantac 150), or omeprazole (Prilosec). Read and follow all instructions on the label. If you use these medicines often, talk with your doctor. · Change your eating habits. ¨ It's best to eat several small meals instead of two or three large meals. ¨ After you eat, wait 2 to 3 hours before you lie down. Late-night snacks aren't a good idea. ¨ Chocolate, mint, and alcohol can make heartburn worse. They relax the valve between the esophagus and the stomach. ¨ Spicy foods, foods that have a lot of acid (like tomatoes and oranges), and coffee can make heartburn symptoms worse in some people. If your symptoms are worse after you eat a certain food, you may want to stop eating that food to see if your symptoms get better. · Do not smoke or chew tobacco. 
· If you get heartburn at night, raise the head of your bed 6 to 8 inches by putting the frame on blocks or placing a foam wedge under the head of your mattress. (Adding extra pillows does not work.) · Do not wear tight clothing around your middle. · Lose weight if you need to. Losing just 5 to 10 pounds can help. When should you call for help? Call your doctor now or seek immediate medical care if: 
? · You have new or worse belly pain. ? · You are vomiting. ? Watch closely for changes in your health, and be sure to contact your doctor if: 
? · You have new or worse symptoms of indigestion. ? · You have trouble or pain swallowing. ? · You are losing weight. ? · You do not get better as expected. Where can you learn more? Go to http://orin-cris.info/. Enter Q418 in the search box to learn more about \"Hiatal Hernia: Care Instructions. \" Current as of: May 12, 2017 Content Version: 11.4 © 3311-0681 [employer]. Care instructions adapted under license by [employer] (which disclaims liability or warranty for this information). If you have questions about a medical condition or this instruction, always ask your healthcare professional. [address] any warranty or liability for your use of this information. Introducing [facility]! Naila Canela introduces Caviar patient portal. Now you can access parts of your medical record, email your doctor's office, and request medication refills online. 1. In your internet browser, go to [email or web address] 2. Click on the First Time User? Click Here link in the Sign In box. You will see the New Member Sign Up page. 3. Enter your Caviar Access Code exactly as it appears below. You will not need to use this code after youve completed the sign-up process. If you do not sign up before the expiration date, you must request a new code. · Caviar Access Code: [email or web address] Expires: 9/6/2018 11:04 AM 
 
4. Enter the last four digits of your Social Security Number (xxxx) and Date of Birth (mm/dd/yyyy) as indicated and click Submit. You will be taken to the next sign-up page. 5. Create a Caviar ID. This will be your Caviar login ID and cannot be changed, so think of one that is secure and easy to remember. 6. Create a Caviar password. You can change your password at any time. 7. Enter your Password Reset Question and Answer. This can be used at a later time if you forget your password. 8. Enter your e-mail address. You will receive e-mail notification when new information is available in [address] 9. Click Sign Up. You can now view and download portions of your medical record. 10. Click the Download Summary menu link to download a portable copy of your medical information.  
 
If you have questions, please visit the Frequently Asked Questions section of the "[employer] Remember, [employer] is NOT to be used for urgent needs. For medical emergencies, dial 911. Now available from your iPhone and Android! Please provide this summary of care documentation to your next provider. Your primary care clinician is listed as [address] Tobias. If you have any questions after today's visit, please call [phone].

## 2018-07-01 ENCOUNTER — HOSPITAL ENCOUNTER (EMERGENCY)
Age: 33
Discharge: HOME | End: 2018-07-01
Attending: EMERGENCY MEDICINE
Payer: MEDICAID

## 2018-07-01 ENCOUNTER — APPOINTMENT (OUTPATIENT)
Dept: CT IMAGING | Age: 33
End: 2018-07-01
Payer: MEDICAID

## 2018-07-01 VITALS
RESPIRATION RATE: 18 BRPM | HEIGHT: 63 IN | OXYGEN SATURATION: 99 % | TEMPERATURE: 98 F | BODY MASS INDEX: 32.77 KG/M2 | WEIGHT: 184.97 LBS | SYSTOLIC BLOOD PRESSURE: 131 MMHG | DIASTOLIC BLOOD PRESSURE: 88 MMHG | HEART RATE: 90 BPM

## 2018-07-01 DIAGNOSIS — G43.809 OTHER MIGRAINE WITHOUT STATUS MIGRAINOSUS, NOT INTRACTABLE: Primary | ICD-10-CM

## 2018-07-01 DIAGNOSIS — J02.9 VIRAL PHARYNGITIS: ICD-10-CM

## 2018-07-01 LAB — DEPRECATED S PYO AG THROAT QL EIA: NEGATIVE

## 2018-07-01 PROCEDURE — 87070 CULTURE OTHR SPECIMN AEROBIC: CPT | Performed by: EMERGENCY MEDICINE

## 2018-07-01 PROCEDURE — 70450 CT HEAD/BRAIN W/O DYE: CPT

## 2018-07-01 PROCEDURE — 99283 EMERGENCY DEPT VISIT LOW MDM: CPT

## 2018-07-01 PROCEDURE — 74011250637 HC RX REV CODE- 250/637: Performed by: EMERGENCY MEDICINE

## 2018-07-01 PROCEDURE — 87880 STREP A ASSAY W/OPTIC: CPT | Performed by: PHYSICIAN ASSISTANT

## 2018-07-01 RX ORDER — BUTALBITAL, ACETAMINOPHEN AND CAFFEINE 50; 325; 40 MG/1; MG/1; MG/1
1 TABLET ORAL
Status: COMPLETED | OUTPATIENT
Start: 2018-07-01 | End: 2018-07-01

## 2018-07-01 RX ORDER — KETOROLAC TROMETHAMINE 30 MG/ML
15 INJECTION, SOLUTION INTRAMUSCULAR; INTRAVENOUS
Status: DISCONTINUED | OUTPATIENT
Start: 2018-07-01 | End: 2018-07-01

## 2018-07-01 RX ORDER — BUTALBITAL, ACETAMINOPHEN AND CAFFEINE 300; 40; 50 MG/1; MG/1; MG/1
1 CAPSULE ORAL
Qty: 20 CAP | Refills: 0 | Status: SHIPPED | OUTPATIENT
Start: 2018-07-01 | End: 2018-07-14

## 2018-07-01 RX ORDER — PROCHLORPERAZINE EDISYLATE 5 MG/ML
10 INJECTION INTRAMUSCULAR; INTRAVENOUS
Status: DISCONTINUED | OUTPATIENT
Start: 2018-07-01 | End: 2018-07-01

## 2018-07-01 RX ORDER — CHOLECALCIFEROL (VITAMIN D3) 125 MCG
220 CAPSULE ORAL
COMMUNITY
End: 2018-07-14

## 2018-07-01 RX ORDER — DEXAMETHASONE SODIUM PHOSPHATE 4 MG/ML
10 INJECTION, SOLUTION INTRA-ARTICULAR; INTRALESIONAL; INTRAMUSCULAR; INTRAVENOUS; SOFT TISSUE
Status: DISCONTINUED | OUTPATIENT
Start: 2018-07-01 | End: 2018-07-01

## 2018-07-01 RX ORDER — DIPHENHYDRAMINE HYDROCHLORIDE 50 MG/ML
25 INJECTION, SOLUTION INTRAMUSCULAR; INTRAVENOUS
Status: DISCONTINUED | OUTPATIENT
Start: 2018-07-01 | End: 2018-07-01

## 2018-07-01 RX ADMIN — BUTALBITAL, ACETAMINOPHEN AND CAFFEINE 1 TABLET: 50; 325; 40 TABLET ORAL at 16:27

## 2018-07-01 NOTE — ED NOTES
Pt. Wants an MRI of head because she gets migraines and has 2 dents in her head that's been for a year. Has been seen here 2 weeks ago for vomiting and nausea. Pt. Also complains of sore throat that started last night and hurts to swallow. Pt. Has taken aleve last night.

## 2018-07-01 NOTE — ED TRIAGE NOTES
Pt arrives to ed with complaints of migranes on and off for years, worsening over the past 2-3 months. Pt states she found \"dents\" in the top of her head 2-3 weeks ago as well. Pt also complains of:  Recent ovarian cysts, chest pain intermittently with migranes, memory loss (short term), right breast \"bumps\" she will need a mammogram for, and shorter than usual yellow tinged fingernails.   Pt placed in position of comfort with call bell in reach and family at bedside

## 2018-07-01 NOTE — ED NOTES
Pt medicated  Patient discharged by Dr. Neno Brewer. Patient provided with discharge instructions Rx and instructions on follow up care. Patient out of ED ambulatory accompanied by family.

## 2018-07-01 NOTE — ED PROVIDER NOTES
EXPRESS NOTE:  1:49 PM  Bella Fernandez PA-C has evaluated the patient in [facility] for headache and sore throat. Pt offered po Fioricet but states \"my headache is bad\" \"I think I have something going on\" \"I need an MRA\". . They have reviewed the vital signs and the triage nurse assessment. They have talked with the patient and any available family and advised that the appropriate studies have been ordered to initiate the work up based on the clinical presentation during the assessment. The pt has been advised that they will be accommodated in the Main ED as soon as possible. The pt has been requested to contact the triage nurse/jet nurse immediately if they experiences any changes in their condition during this brief waiting period. EMERGENCY DEPARTMENT HISTORY AND PHYSICAL EXAM      Date: 7/1/2018  Patient Name: Ricky Howe    History of Presenting Illness     Chief Complaint   Patient presents with    Sore Throat     c/o \"blisters\" in the throat x today    Migraine     c/o chronic migraines r/t old head injury\"I have a dent in my head\"       History Provided By: Patient    HPI: Ricky Howe, 34 y.o. female with PMHx significant for DM, anxiety, migraines, presents ambulatory to the ED with cc of shooting progressing global HA that she rates a 4/10 for pain, with associated photophobia, intermittent blurry vision and \"white specks\" in her vision, and bilateral ear pain. She states that her pain is triggerred by loud noise and stress. She has seen a neurologist \"years ago\" and has another appointment set up with a new neurologist. Pt denies any nausea, vomiting, or fever. There are no other complaints, changes, or physical findings at this time. PCP: Saida Esteban NP   Neurologist: Brendon Samayoa MD    Current Outpatient Prescriptions   Medication Sig Dispense Refill    naproxen sodium (ALEVE) 220 mg cap Take 220 mg by mouth.       butalbital-acetaminophen-caff (FIORICET) -40 mg per capsule Take 1 Cap by mouth every four (4) hours as needed for Pain. 20 Cap 0    omeprazole (PRILOSEC) 20 mg capsule Take 1 Cap by mouth daily. 30 Cap 2    acetaminophen (TYLENOL) 500 mg tablet Take 1,000 mg by mouth every six (6) hours as needed for Pain. Past History     Past Medical History:  Past Medical History:   Diagnosis Date    Anxiety     Arthritis     unsure - knees, lower back, fingers and toes    Asthma     well controlled    Chronic pain     right abdomen,shooting pain groins    Depression     Diabetes ([address]     pre diabetes     GERD (gastroesophageal reflux disease)     Headache     Ill-defined condition     prolapsed cervix,rectum,bladder    Intractable nausea and vomiting 6/15/2018    Prolapse of anterior lip of cervix     Psychiatric disorder     bipolar, anxiety, depression    Rectal prolapse     Stool color black     White stool       Past Surgical History:  Past Surgical History:   Procedure Laterality Date    HX GYN      vaginal birth x2    HX HEENT      bilat tubes in ears age 3    HX HERNIA REPAIR  07/09/2015    Laparoscopic Incisional hernia repair with mesh.     HX LAP CHOLECYSTECTOMY  7/7/2014    HX OTHER SURGICAL      colonoscopy       Family History:  Family History   Problem Relation Age of Onset    Other Mother      fibromyalgia, IBS    Diabetes Mother      Pre-DM    High Cholesterol Mother     Hypertension Mother     Liver Disease Mother      fatty liver    Cancer Mother      uterine     Heart Disease Mother      CHF    Arthritis-osteo Mother     Diabetes Father     Hypertension Father     Asthma Brother     Diabetes Paternal Aunt     Hypertension Paternal Aunt     Diabetes Paternal Uncle     Hypertension Paternal Uncle     Cancer Maternal Grandfather      esoph    Hypertension Maternal Grandfather     Stroke Maternal Grandfather     Heart Disease Paternal Grandmother     Diabetes Paternal Grandfather     Heart Attack Paternal Grandfather     Stroke Paternal Grandfather     Hypertension Paternal Grandfather     Heart Disease Maternal Grandmother     Other Sister      MTHFR (clotting D/O)       Social History:  Social History   Substance Use Topics    Smoking status: Current Every Day Smoker     Packs/day: 0.50     Years: 10.00     Start date: 3/1/2004    Smokeless tobacco: Never Used    Alcohol use No       Allergies: Allergies   Allergen Reactions    Latex Rash, Itching and Swelling    Pcn [Penicillins] Other (comments)     Pt states as a child she had a reaction but does not remember specific reaction. Pt states\"mother say I stopped breathing\". Review of Systems   Review of Systems   Constitutional: Negative for chills and fever. HENT: Positive for ear pain (bilateral). Eyes: Positive for photophobia and visual disturbance. Respiratory: Negative for cough and shortness of breath. Cardiovascular: Negative for chest pain. Gastrointestinal: Negative for constipation, diarrhea, nausea and vomiting. Neurological: Positive for headaches. Negative for weakness and numbness. All other systems reviewed and are negative. Physical Exam   Physical Exam   Constitutional: She is oriented to person, place, and time. She appears well-developed and well-nourished. HENT:   Head: Normocephalic. Eyes: EOM are normal. Pupils are equal, round, and reactive to light. Neck: Normal range of motion. Cardiovascular: Normal rate and regular rhythm. Pulmonary/Chest: Effort normal and breath sounds normal.   Abdominal: Soft. She exhibits no distension. There is no tenderness. Neurological: She is alert and oriented to person, place, and time. No cranial nerve deficit. CN 2-12 intact, 5/5 strength in all 4 extremities, Finger to nose intact, negative Romberg, normal gait   Skin: Skin is warm and dry. Psychiatric: She has a normal mood and affect. Nursing note and vitals reviewed.       Diagnostic Study Results Labs -     Recent Results (from the past 12 hour(s))   STREP AG SCREEN, GROUP A    Collection Time: 07/01/18  3:21 PM   Result Value Ref Range    Group A Strep Ag ID NEGATIVE  NEG         Radiologic Studies -     CT Results  (Last 48 hours)               07/01/18 1410  CT HEAD WO CONT Final result    Impression:  IMPRESSION: Normal study. Narrative:  EXAM:  CT HEAD WO CONT       INDICATION:   Severe headache. Was recently seen in the emergency department for   nausea and vomiting. COMPARISON: CT 1/4/2007, MRI 9/23/2016. CONTRAST:  None. TECHNIQUE: Unenhanced CT of the head was performed using 5 mm images. Brain and   bone windows were generated. CT dose reduction was achieved through use of a   standardized protocol tailored for this examination and automatic exposure   control for dose modulation. FINDINGS:   The ventricles and sulci are normal in size, shape and configuration and   midline. There is no significant white matter disease. There is no intracranial   hemorrhage, extra-axial collection, mass, mass effect or midline shift. The   basilar cisterns are open. No acute infarct is identified. The bone windows   demonstrate no abnormalities. The visualized portions of the paranasal sinuses   and mastoid air cells are clear. Medical Decision Making   I am the first provider for this patient. I reviewed the vital signs, available nursing notes, past medical history, past surgical history, family history and social history. Vital Signs-Reviewed the patient's vital signs.   Patient Vitals for the past 12 hrs:   Temp Pulse Resp BP SpO2   07/01/18 1232 98 °F (36.7 °C) 90 18 131/88 99 %       Pulse Oximetry Analysis - 99% on room air    Cardiac Monitor:   Rate: 90 bpm  Rhythm: Normal Sinus Rhythm 131/88     Records Reviewed: Nursing Notes and Old Medical Records    Provider Notes (Medical Decision Making):   Patient presents with headache similar to prior migraine headaches in past.  DDx: migraine, cluster HA, tension HA, dehydration, lack of proper sleep. Less likely pseudotumor cerebri, SAH, ICH, cerebral dural venous thrombosis, or meningitis given the course, story and physical exam.  Analgesics and fluids ordered. The nature of migraine has been discussed. Various episodic and prophylactic choices have been explained. Neurodiagnostic studies have been discussed. Recommendations: lie in darkened room and apply cold packs prn for pain, side effect profile discussed in detail, patient reassured that neurodiagnostic workup not indicated from benign H & P and referral to Neurology. ED Course:   Initial assessment performed. The patients presenting problems have been discussed, and they are in agreement with the care plan formulated and outlined with them. I have encouraged them to ask questions as they arise throughout their visit. Disposition:  DISCHARGE NOTE  4:32 PM  The patient has been re-evaluated and is ready for discharge. Reviewed available results with patient. Counseled patient on diagnosis and care plan. Patient has expressed understanding, and all questions have been answered. Patient agrees with plan and agrees to follow up as recommended, or return to the ED if their symptoms worsen. Discharge instructions have been provided and explained to the patient, along with reasons to return to the ED. PLAN:  1. Current Discharge Medication List      START taking these medications    Details   butalbital-acetaminophen-caff (FIORICET) -40 mg per capsule Take 1 Cap by mouth every four (4) hours as needed for Pain. Qty: 20 Cap, Refills: 0           2.    Follow-up Information     Follow up With Details Comments 241 Devon Sanders NP  As needed [address] [address] [phone]      Stefanie Champion MD Schedule an appointment as soon as possible for a visit  [address] [facility] [address] [facility]  [phone]          Return to ED if worse     Diagnosis     Clinical Impression:   1. Other migraine without status migrainosus, not intractable    2. Viral pharyngitis        Attestations: This note is prepared by Anmol Landaverde, acting as Scribe for Stef Powell M.D. Stef Powell M.D: The scribe's documentation has been prepared under my direction and personally reviewed by me in its entirety. I confirm that the note above accurately reflects all work, treatment, procedures, and medical decision making performed by me.

## 2018-07-01 NOTE — DISCHARGE INSTRUCTIONS
Migraine Headache: Care Instructions  Your Care Instructions  Migraines are painful, throbbing headaches that often start on one side of the head. They may cause nausea and vomiting and make you sensitive to light, sound, or smell. Without treatment, migraines can last from 4 hours to a few days. Medicines can help prevent migraines or stop them after they have started. Your doctor can help you find which ones work best for you. Follow-up care is a key part of your treatment and safety. Be sure to make and go to all appointments, and call your doctor if you are having problems. It's also a good idea to know your test results and keep a list of the medicines you take. How can you care for yourself at home? · Do not drive if you have taken a prescription pain medicine. · Rest in a quiet, dark room until your headache is gone. Close your eyes, and try to relax or go to sleep. Don't watch TV or read. · Put a cold, moist cloth or cold pack on the painful area for 10 to 20 minutes at a time. Put a thin cloth between the cold pack and your skin. · Use a warm, moist towel or a heating pad set on low to relax tight shoulder and neck muscles. · Have someone gently massage your neck and shoulders. · Take your medicines exactly as prescribed. Call your doctor if you think you are having a problem with your medicine. You will get more details on the specific medicines your doctor prescribes. · Be careful not to take pain medicine more often than the instructions allow. You could get worse or more frequent headaches when the medicine wears off. To prevent migraines  · Keep a headache diary so you can figure out what triggers your headaches. Avoiding triggers may help you prevent headaches. Record when each headache began, how long it lasted, and what the pain was like.  (Was it throbbing, aching, stabbing, or dull?) Write down any other symptoms you had with the headache, such as nausea, flashing lights or dark spots, or sensitivity to bright light or loud noise. Note if the headache occurred near your period. List anything that might have triggered the headache. Triggers may include certain foods (chocolate, cheese, wine) or odors, smoke, bright light, stress, or lack of sleep. · If your doctor has prescribed medicine for your migraines, take it as directed. You may have medicine that you take only when you get a migraine and medicine that you take all the time to help prevent migraines. ¨ If your doctor has prescribed medicine for when you get a headache, take it at the first sign of a migraine, unless your doctor has given you other instructions. ¨ If your doctor has prescribed medicine to prevent migraines, take it exactly as prescribed. Call your doctor if you think you are having a problem with your medicine. · Find healthy ways to deal with stress. Migraines are most common during or right after stressful times. Take time to relax before and after you do something that has caused a migraine in the past.  · Try to keep your muscles relaxed by keeping good posture. Check your jaw, face, neck, and shoulder muscles for tension. Try to relax them. When you sit at a desk, change positions often. And make sure to stretch for 30 seconds each hour. · Get plenty of sleep and exercise. · Eat meals on a regular schedule. Avoid foods and drinks that often trigger migraines. These include chocolate, alcohol (especially red wine and port), aspartame, monosodium glutamate (MSG), and some additives found in foods (such as hot dogs, merrill, cold cuts, aged cheeses, and pickled foods). · Limit caffeine. Don't drink too much coffee, tea, or soda. But don't quit caffeine suddenly. That can also give you migraines. · Do not smoke or allow others to smoke around you. If you need help quitting, talk to your doctor about stop-smoking programs and medicines. These can increase your chances of quitting for good.   · If you are taking birth control pills or hormone therapy, talk to your doctor about whether they are triggering your migraines. When should you call for help? Call 911 anytime you think you may need emergency care. For example, call if:  ? · You have signs of a stroke. These may include:  ¨ Sudden numbness, paralysis, or weakness in your face, arm, or leg, especially on only one side of your body. ¨ Sudden vision changes. ¨ Sudden trouble speaking. ¨ Sudden confusion or trouble understanding simple statements. ¨ Sudden problems with walking or balance. ¨ A sudden, severe headache that is different from past headaches. ?Call your doctor now or seek immediate medical care if:  ? · You have new or worse nausea and vomiting. ? · You have a new or higher fever. ? · Your headache gets much worse. ? Watch closely for changes in your health, and be sure to contact your doctor if:  ? · You are not getting better after 2 days (48 hours). Where can you learn more? Go to http://orin-cris.info/. Enter I496 in the search box to learn more about \"Migraine Headache: Care Instructions. \"  Current as of: October 14, 2016  Content Version: 11.4  © 7296-0622 Healthwise, Inc[employer]. Care instructions adapted under license by [employer] (which disclaims liability or warranty for this information). If you have questions about a medical condition or this instruction, always ask your healthcare professional. [place] [address] any warranty or liability for your use of this information.

## 2018-07-03 LAB
BACTERIA SPEC CULT: NORMAL
SERVICE CMNT-IMP: NORMAL

## 2018-07-05 ENCOUNTER — OFFICE VISIT (OUTPATIENT)
Dept: CARDIOLOGY CLINIC | Age: 33
End: 2018-07-05

## 2018-07-05 VITALS
WEIGHT: 186.4 LBS | DIASTOLIC BLOOD PRESSURE: 62 MMHG | HEIGHT: 63 IN | SYSTOLIC BLOOD PRESSURE: 112 MMHG | OXYGEN SATURATION: 97 % | BODY MASS INDEX: 33.03 KG/M2 | RESPIRATION RATE: 18 BRPM | HEART RATE: 93 BPM

## 2018-07-05 DIAGNOSIS — Z76.89 ENCOUNTER TO ESTABLISH CARE: ICD-10-CM

## 2018-07-05 DIAGNOSIS — K21.9 GASTROESOPHAGEAL REFLUX DISEASE, ESOPHAGITIS PRESENCE NOT SPECIFIED: ICD-10-CM

## 2018-07-05 DIAGNOSIS — Z82.49 FAMILY HISTORY OF ISCHEMIC HEART DISEASE: ICD-10-CM

## 2018-07-05 DIAGNOSIS — F17.200 CURRENT SMOKER: ICD-10-CM

## 2018-07-05 DIAGNOSIS — R06.09 DOE (DYSPNEA ON EXERTION): ICD-10-CM

## 2018-07-05 DIAGNOSIS — R07.89 OTHER CHEST PAIN: Primary | ICD-10-CM

## 2018-07-05 RX ORDER — DULOXETIN HYDROCHLORIDE 60 MG/1
60 CAPSULE, DELAYED RELEASE ORAL DAILY
Refills: 0 | COMMUNITY
Start: 2018-06-08 | End: 2018-09-06 | Stop reason: SDUPTHER

## 2018-07-05 RX ORDER — PROMETHAZINE HYDROCHLORIDE 25 MG/1
25 TABLET ORAL AS NEEDED
Refills: 0 | COMMUNITY
Start: 2018-06-16 | End: 2018-07-14

## 2018-07-05 RX ORDER — BUPROPION HYDROCHLORIDE 150 MG/1
150 TABLET ORAL DAILY
Refills: 0 | COMMUNITY
Start: 2018-06-08 | End: 2018-12-25 | Stop reason: SDUPTHER

## 2018-07-05 NOTE — MR AVS SNAPSHOT
[address] [facility] 
[phone] Patient: Chely Flores MRN: [id number] GFX:0[birth year] Visit Information Date & Time Provider Department Dept. Phone Encounter #  
 7/5/2018  2:15 PM [address], [address] [place] [address] [facility] [phone] [id number] Your Appointments 7/10/2018 11:00 AM  
ECHO CARDIOGRAMS 2D with [address]  [facility]) Appt Note: dr BOURGEOIS 2D ECHO COMPLETE ADULT (TTE) W OR WO CONTR [[id number]] (Order [id number]),BXM  
 [address] [facility]  
[phone] [address] [facility]  
  
    
 7/11/2018  1:30 PM  
STRESS ECHOCARDIOGRAMS with [address], [facility]) Appt Note: Dr BOURGEOIS ECHO TTE STRESS COMP Clois Safe [[id number]] (Order [id number]) ,[address] [facility]  
[phone] [address] [facility]  
  
    
 7/30/2018  9:00 AM  
New Patient with Suleman Ruiz MD  
[facility]) Appt Note: New Patient referred NP Madalyn for migraines and dent in head tr 6/25/18  
 [address], [address] [phone]  
[address], [address] [address]  
  
    
 9/25/2018  4:00 PM  
Any with Nilo Marte NP [facility]) Appt Note: f/u  
 [address] [facility] [id number] [phone] [address] Upcoming Health Maintenance Date Due Influenza Age 5 to Adult 8/1/2018 PAP AKA CERVICAL CYTOLOGY 8/5/2019 DTaP/Tdap/Td series (2 - Td) 1/26/2022 Allergies as of 7/5/2018  Review Complete On: 7/5/2018 By: Elian Lorenzo LPN  
  
 Severity Noted Reaction Type Reactions Latex Medium 03/14/2014   Side Effect Rash, Itching, Swelling Pcn [Penicillins] High 12/12/2011   Systemic Other (comments) Pt states as a child she had a reaction but does not remember specific reaction. Pt states\"mother say I stopped breathing\". Current Immunizations  Reviewed on 7/9/2015 Name Date Influenza Vaccine 2/5/2013 Influenza Vaccine Split 1/26/2012 TDAP Vaccine 1/26/2012 Not reviewed this visit You Were Diagnosed With   
  
 Codes Comments Other chest pain    -  Primary ICD-10-CM: R07.89 ICD-9-CM: 786.59   
 MURGUIA (dyspnea on exertion)     ICD-10-CM: R06.09 
ICD-9-CM: 786.09 Encounter to establish care     ICD-10-CM: Z76.89 
ICD-9-CM: V65.8 Gastroesophageal reflux disease, esophagitis presence not specified     ICD-10-CM: K21.9 ICD-9-CM: 530.81 Current smoker     ICD-10-CM: F17.200 ICD-9-CM: 305.1 Family history of ischemic heart disease     ICD-10-CM: Z82.49 
ICD-9-CM: V17.3 Vitals BP Pulse Resp Height(growth percentile) Weight(growth percentile) LMP  
 112/62 (BP 1 Location: Right arm, BP Patient Position: Sitting) 93 18 5' 3\" (1.6 m) 186 lb 6.4 oz (84.6 kg) 06/19/2018 SpO2 BMI OB Status Smoking Status 97% 33.02 kg/m2 Unknown Current Every Day Smoker Vitals History BMI and BSA Data Body Mass Index Body Surface Area 33.02 kg/m 2 1.94 m 2 Preferred Pharmacy Pharmacy Name Phone [id number] [address] [place] [phone] Your Updated Medication List  
  
   
This list is accurate as of 7/5/18  3:16 PM.  Always use your most recent med list.  
  
  
  
  
 acetaminophen 500 mg tablet Commonly known as:  TYLENOL Take 1,000 mg by mouth every six (6) hours as needed for Pain. ALEVE 220 mg Cap Generic drug:  naproxen sodium Take 220 mg by mouth. buPROPion  mg tablet Commonly known as:  Oneda Butt Take 150 mg by mouth daily. butalbital-acetaminophen-caff -40 mg per capsule Commonly known as:  [employer] Take 1 Cap by mouth every four (4) hours as needed for Pain. DULoxetine 60 mg capsule Commonly known as:  CYMBALTA Take 60 mg by mouth daily. omeprazole 20 mg capsule Commonly known as:  PRILOSEC Take 1 Cap by mouth daily. promethazine 25 mg tablet Commonly known as:  PHENERGAN Take 25 mg by mouth as needed. We Performed the Following 2D ECHO COMPLETE ADULT (TTE) W OR WO CONTR [01143 CPT(R)] AMB POC EKG ROUTINE W/ 12 LEADS, INTER & REP [92639 CPT(R)] ECHO TTE STRESS COMP W OR WO CONTR [07776 Custom] Introducing [facility]! Caitlin Rodriguez introduces [employer] patient portal. Now you can access parts of your medical record, email your doctor's office, and request medication refills online. 1. In your internet browser, go to [email or web address] 2. Click on the First Time User? Click Here link in the Sign In box. You will see the New Member Sign Up page. 3. Enter your [employer] Access Code exactly as it appears below. You will not need to use this code after youve completed the sign-up process. If you do not sign up before the expiration date, you must request a new code. · [employer] Access Code: [email or web address] Expires: 9/6/2018 11:04 AM 
 
4. Enter the last four digits of your Social Security Number (xxxx) and Date of Birth (mm/dd/yyyy) as indicated and click Submit. You will be taken to the next sign-up page. 5. Create a [employer] ID. This will be your [employer] login ID and cannot be changed, so think of one that is secure and easy to remember. 6. Create a [employer] password. You can change your password at any time. 7. Enter your Password Reset Question and Answer. This can be used at a later time if you forget your password. 8. Enter your e-mail address. You will receive e-mail notification when new information is available in [address] 9. Click Sign Up. You can now view and download portions of your medical record. 10. Click the Download Summary menu link to download a portable copy of your medical information. If you have questions, please visit the Frequently Asked Questions section of the [employer] website. Remember, [employer] is NOT to be used for urgent needs. For medical emergencies, dial 911. Now available from your iPhone and Android! Please provide this summary of care documentation to your next provider. Your primary care clinician is listed as [address]ias. If you have any questions after today's visit, please call [phone].

## 2018-07-05 NOTE — PROGRESS NOTES
[address] [address]  [phone]     Subjective:      Amna Patel is a 34 y.o. female with pmhx of GERD, asthma  is here to establish care and further evaluation of the following symptoms: 1. Worsening Nonexertional SSCP that radiates to her entire anterior chest and associated with one episode of  L arm numbness 2. MURGUIA, 3. Occasional dizziness 4. r arm numbness 5. Right calf / r foot pains. Denies orthopnea, PND, LE edema, palpitations, syncope, or presyncope.        Cardiac risk factors:  elevated BMI, sedentary lifestyle, family hx of CAD, current 0.5 PPD smoker    Hx smoking: current  Alcohol: denies   Illegal drug use: previous marijuana    Family hx: mother: ok  father: Heart attack, stent at age 48 Siblings: ok    Patient Active Problem List    Diagnosis Date Noted    Other chest pain 07/05/2018    Intractable nausea and vomiting 06/15/2018    Routine medical exam 08/15/2016    Major depressive disorder, recurrent episode, severe ([address] 08/31/2015    PTSD (post-traumatic stress disorder) 08/31/2015    Incisional hernia 97/34/5180    Cyclical vomiting 07/27/6998    Acute bronchitis 12/26/2012    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    IGT (impaired glucose tolerance) 11/14/2012    GERD (gastroesophageal reflux disease) 09/19/2012    Anal prolapse 09/19/2012    Uterine prolapse 09/19/2012    Asthma 01/19/2012      Danyelle Noyola NP  Past Medical History:   Diagnosis Date    Anxiety     Arthritis     unsure - knees, lower back, fingers and toes    Asthma     well controlled    Chronic pain     right abdomen,shooting pain groins    Depression     Diabetes ([address]     pre diabetes     GERD (gastroesophageal reflux disease)     Headache     Ill-defined condition     prolapsed cervix,rectum,bladder    Intractable nausea and vomiting 6/15/2018    Prolapse of anterior lip of cervix     Psychiatric disorder     bipolar, anxiety, depression    Rectal prolapse     Stool color black     White stool      Past Surgical History:   Procedure Laterality Date    HX GYN      vaginal birth x2    HX HEENT      bilat tubes in ears age 3    HX HERNIA REPAIR  07/09/2015    Laparoscopic Incisional hernia repair with mesh.  HX LAP CHOLECYSTECTOMY  7/7/2014    HX OTHER SURGICAL      colonoscopy     Allergies   Allergen Reactions    Latex Rash, Itching and Swelling    Pcn [Penicillins] Other (comments)     Pt states as a child she had a reaction but does not remember specific reaction. Pt states\"mother say I stopped breathing\".       Family History   Problem Relation Age of Onset   [facility] Other Mother      fibromyalgia, IBS    Diabetes Mother      Pre-DM    High Cholesterol Mother     Hypertension Mother     Liver Disease Mother      fatty liver    Cancer Mother      uterine     Heart Disease Mother      CHF    Arthritis-osteo Mother     Diabetes Father     Hypertension Father     Asthma Brother     Diabetes Paternal Aunt     Hypertension Paternal Aunt     Diabetes Paternal Uncle     Hypertension Paternal Uncle     Cancer Maternal Grandfather      esoph    Hypertension Maternal Grandfather     Stroke Maternal Grandfather     Heart Disease Paternal Grandmother     Diabetes Paternal Grandfather     Heart Attack Paternal Grandfather     Stroke Paternal Grandfather     Hypertension Paternal Grandfather     Heart Disease Maternal Grandmother     Other Sister      MTHFR (clotting D/O)      Social History     Social History    Marital status: SINGLE     Spouse name: N/A    Number of children: 2    Years of education: N/A     Occupational History     Not Employed     work at home      Social History Main Topics    Smoking status: Current Every Day Smoker     Packs/day: 0.50     Years: 10.00     Start date: 3/1/2004    Smokeless tobacco: Never Used    Alcohol use No    Drug use: Yes     Special: Marijuana      Comment: few months ago    Sexual activity: Yes     Partners: Male     Birth control/ protection: None      Comment: single      Other Topics Concern    Not on file     Social History Narrative    Unemployed currently      Current Outpatient Prescriptions   Medication Sig    buPROPion XL (WELLBUTRIN XL) 150 mg tablet Take 150 mg by mouth daily.  DULoxetine (CYMBALTA) 60 mg capsule Take 60 mg by mouth daily.  promethazine (PHENERGAN) 25 mg tablet Take 25 mg by mouth as needed.  butalbital-acetaminophen-caff (FIORICET) -40 mg per capsule Take 1 Cap by mouth every four (4) hours as needed for Pain.  omeprazole (PRILOSEC) 20 mg capsule Take 1 Cap by mouth daily.  naproxen sodium (ALEVE) 220 mg cap Take 220 mg by mouth.  acetaminophen (TYLENOL) 500 mg tablet Take 1,000 mg by mouth every six (6) hours as needed for Pain. No current facility-administered medications for this visit. Review of Symptoms:  11 systems reviewed, negative other than as stated in the HPI    Physical ExamPhysical Exam:    Vitals:    07/05/18 1408 07/05/18 1420   BP: 110/60 112/62   Pulse: 93    Resp: 18    SpO2: 97%    Weight: 186 lb 6.4 oz (84.6 kg)    Height: 5' 3\" (1.6 m)      Body mass index is 33.02 kg/(m^2). General PE   Gen:  NAD  Mental Status - Alert. General Appearance - Not in acute distress. Chest and Lung Exam   Inspection: Accessory muscles - No use of accessory muscles in breathing. Auscultation:   Breath sounds: - Normal.   Cardiovascular   Inspection: Jugular vein - Bilateral - Inspection Normal.   Palpation/Percussion:   Apical Impulse: - Normal.   Auscultation: Rhythm - Regular. Heart Sounds - S1 WNL and S2 WNL. No S3 or S4. Murmurs & Other Heart Sounds: Auscultation of the heart reveals - No Murmurs. Peripheral Vascular   Upper Extremity: Inspection - Bilateral - No Cyanotic nailbeds or Digital clubbing. Lower Extremity:   Palpation: Edema - Bilateral - No edema.   Abdomen:   Soft, non-tender, bowel sounds are active. Neuro: A&O times 3, CN and motor grossly WNL    Labs:   Lab Results   Component Value Date/Time    Cholesterol, total 127 01/19/2012 10:50 AM    HDL Cholesterol 37 (L) 01/19/2012 10:50 AM    LDL, calculated 68 01/19/2012 10:50 AM    Triglyceride 109 01/19/2012 10:50 AM     No results found for: CPK, CPKX, CPX  Lab Results   Component Value Date/Time    Sodium 141 06/16/2018 06:55 AM    Potassium 3.7 06/16/2018 06:55 AM    Chloride 110 (H) 06/16/2018 06:55 AM    CO2 20 (L) 06/16/2018 06:55 AM    Anion gap 11 06/16/2018 06:55 AM    Glucose 83 06/16/2018 06:55 AM    BUN 10 06/16/2018 06:55 AM    Creatinine 0.75 06/16/2018 06:55 AM    BUN/Creatinine ratio 13 06/16/2018 06:55 AM    GFR est AA >60 06/16/2018 06:55 AM    GFR est non-AA >60 06/16/2018 06:55 AM    Calcium 8.0 (L) 06/16/2018 06:55 AM    Bilirubin, total 0.7 06/16/2018 06:55 AM    AST (SGOT) 9 (L) 06/16/2018 06:55 AM    Alk. phosphatase 73 06/16/2018 06:55 AM    Protein, total 6.4 06/16/2018 06:55 AM    Albumin 3.2 (L) 06/16/2018 06:55 AM    Globulin 3.2 06/16/2018 06:55 AM    A-G Ratio 1.0 (L) 06/16/2018 06:55 AM    ALT (SGPT) 14 06/16/2018 06:55 AM       EKG:  NSR      Assessment:     Assessment:      1. Other chest pain    2. MURGUIA (dyspnea on exertion)    3. Encounter to establish care    4. Gastroesophageal reflux disease, esophagitis presence not specified    5. Current smoker    6. Family history of ischemic heart disease        Orders Placed This Encounter    AMB POC EKG ROUTINE W/ 12 LEADS, INTER & REP     Order Specific Question:   Reason for Exam:     Answer:   ROUTINE    2D ECHO COMPLETE ADULT (TTE) W OR WO CONTR     Order Specific Question:   Reason for Exam:     Answer:   cp     Order Specific Question:   Is Patient Pregnant? Answer:   Unknown     Order Specific Question:   Contrast Enhancement (Bubble Study, Definity, Optison) may be used if criteria listed in established evidence-based protocol has been identified.      Answer: Yes    ECHO TTE STRESS COMP W OR WO CONTR     Order Specific Question:   Reason for Exam:     Answer:   cp     Order Specific Question:   Is Patient Pregnant? Answer:   Unknown     Order Specific Question:   Contrast Enhancement (Bubble Study, Definity, Optison) may be used if criteria listed in established evidence-based protocol has been identified. Answer: Yes    buPROPion XL (WELLBUTRIN XL) 150 mg tablet     Sig: Take 150 mg by mouth daily. Refill:  0    DULoxetine (CYMBALTA) 60 mg capsule     Sig: Take 60 mg by mouth daily. Refill:  0    promethazine (PHENERGAN) 25 mg tablet     Sig: Take 25 mg by mouth as needed. Refill:  0        Plan:     Pt is a 34 y.o. female with pmhx of GERD, asthma  is here to establish care and further evaluation of the following symptoms: 1. Worsening Nonexertional SSCP that radiates to her entire anterior chest and associated with one episode of  L arm numbness 2. MURGUIA, 3. Occasional dizziness 4. r arm numbness 5. Right calf / r foot pains. Cardiac risk factors:  elevated BMI, sedentary lifestyle, family hx of CAD, current 0.5 PPD smoker    Will obtain baseline echo to evaluate for structural heart disease and Will obtain stress echocardiogram to evaluate cardiac structure / function under dynamic exercise and identify possible the presence and extent of coronary artery disease by provoking regional ischemia with resulting wall motion abnormalities. Lipids and labs followed by PCP    Has an appointment to see Dr Jesus Shannon d/t complex migraine. Negative head CT 7/1/18. She wants Head MRI     Continue current care and f/u when testing complete        [facility]    7/8/2018         Patient seen, examined by me personally. Plan discussed as detailed. Agree with note as outlined by  NP. I confirm findings in history and physical exam. No additional findings noted. Agree with plan as outlined above. Atypical chest pain. Will evaluate as noted.     Cristhian Peña MD

## 2018-07-05 NOTE — PROGRESS NOTES
1. Have you been to the ER, urgent care clinic since your last visit? Hospitalized since your last visit? YES, ER FOR HEADACHES, LAST WEEKEND,     2. Have you seen or consulted any other health care providers outside of the [facility] since your last visit? Include any pap smears or colon screening. NO     NEW PATIENT. C/O STABBING CHEST PAIN OFF AND ON RADIATING DOWN LEFT ARM, CRAMPS IN LEGS OCCASIONALLY, SOB.

## 2018-07-11 ENCOUNTER — TELEPHONE (OUTPATIENT)
Dept: CARDIOLOGY CLINIC | Age: 33
End: 2018-07-11

## 2018-07-11 ENCOUNTER — CLINICAL SUPPORT (OUTPATIENT)
Dept: CARDIOLOGY CLINIC | Age: 33
End: 2018-07-11

## 2018-07-11 DIAGNOSIS — R06.09 DOE (DYSPNEA ON EXERTION): ICD-10-CM

## 2018-07-11 DIAGNOSIS — R07.89 OTHER CHEST PAIN: Primary | ICD-10-CM

## 2018-07-11 DIAGNOSIS — Z82.49 FAMILY HISTORY OF ISCHEMIC HEART DISEASE: ICD-10-CM

## 2018-07-11 DIAGNOSIS — F17.200 CURRENT SMOKER: ICD-10-CM

## 2018-07-11 NOTE — TELEPHONE ENCOUNTER
Verified patient with two identifiers. Spoke with pt advising her she will need to have the Ultrasound on Thursday, pt verbalized understanding, stated she will be in to do it.

## 2018-07-11 NOTE — PROGRESS NOTES
Identified patient using full name and . Patient education for testing complete. Patient verbalized understanding.     Sofia Hernandez RN

## 2018-07-11 NOTE — TELEPHONE ENCOUNTER
Does she need to come in tomorrow for the Ultra Sound because she was just here today for a Stress Test-   Please clarify why they are both needed she forgot to ask @ her appt today-          Please advise-  Thanks-

## 2018-07-12 ENCOUNTER — CLINICAL SUPPORT (OUTPATIENT)
Dept: CARDIOLOGY CLINIC | Age: 33
End: 2018-07-12

## 2018-07-12 DIAGNOSIS — R07.89 OTHER CHEST PAIN: Primary | ICD-10-CM

## 2018-07-14 ENCOUNTER — HOSPITAL ENCOUNTER (EMERGENCY)
Age: 33
Discharge: HOME | End: 2018-07-15
Attending: EMERGENCY MEDICINE
Payer: MEDICAID

## 2018-07-14 DIAGNOSIS — R10.2 PELVIC PAIN: Primary | ICD-10-CM

## 2018-07-14 LAB
APPEARANCE UR: CLEAR
BASOPHILS # BLD: 0.1 K/UL (ref 0–0.1)
BASOPHILS NFR BLD: 1 % (ref 0–1)
BILIRUB UR QL: NEGATIVE
COLOR UR: NORMAL
DIFFERENTIAL METHOD BLD: ABNORMAL
EOSINOPHIL # BLD: 0.3 K/UL (ref 0–0.4)
EOSINOPHIL NFR BLD: 3 % (ref 0–7)
ERYTHROCYTE [DISTWIDTH] IN BLOOD BY AUTOMATED COUNT: 14.1 % (ref 11.5–14.5)
GLUCOSE UR STRIP.AUTO-MCNC: NEGATIVE MG/DL
HCG UR QL: NEGATIVE
HCT VFR BLD AUTO: 41.8 % (ref 35–47)
HGB BLD-MCNC: 14.2 G/DL (ref 11.5–16)
HGB UR QL STRIP: NEGATIVE
IMM GRANULOCYTES # BLD: 0 K/UL (ref 0–0.04)
IMM GRANULOCYTES NFR BLD AUTO: 0 % (ref 0–0.5)
KETONES UR QL STRIP.AUTO: NEGATIVE MG/DL
LEUKOCYTE ESTERASE UR QL STRIP.AUTO: NEGATIVE
LYMPHOCYTES # BLD: 3.9 K/UL (ref 0.8–3.5)
LYMPHOCYTES NFR BLD: 34 % (ref 12–49)
MCH RBC QN AUTO: 29.5 PG (ref 26–34)
MCHC RBC AUTO-ENTMCNC: 34 G/DL (ref 30–36.5)
MCV RBC AUTO: 86.7 FL (ref 80–99)
MONOCYTES # BLD: 0.7 K/UL (ref 0–1)
MONOCYTES NFR BLD: 6 % (ref 5–13)
NEUTS SEG # BLD: 6.4 K/UL (ref 1.8–8)
NEUTS SEG NFR BLD: 56 % (ref 32–75)
NITRITE UR QL STRIP.AUTO: NEGATIVE
NRBC # BLD: 0 K/UL (ref 0–0.01)
NRBC BLD-RTO: 0 PER 100 WBC
PH UR STRIP: 5.5 [PH] (ref 5–8)
PLATELET # BLD AUTO: 245 K/UL (ref 150–400)
PMV BLD AUTO: 10.4 FL (ref 8.9–12.9)
PROT UR STRIP-MCNC: NEGATIVE MG/DL
RBC # BLD AUTO: 4.82 M/UL (ref 3.8–5.2)
SP GR UR REFRACTOMETRY: 1.03 (ref 1–1.03)
UROBILINOGEN UR QL STRIP.AUTO: 0.2 EU/DL (ref 0.2–1)
WBC # BLD AUTO: 11.5 K/UL (ref 3.6–11)

## 2018-07-14 PROCEDURE — 81025 URINE PREGNANCY TEST: CPT | Performed by: EMERGENCY MEDICINE

## 2018-07-14 PROCEDURE — 96361 HYDRATE IV INFUSION ADD-ON: CPT

## 2018-07-14 PROCEDURE — 36415 COLL VENOUS BLD VENIPUNCTURE: CPT | Performed by: EMERGENCY MEDICINE

## 2018-07-14 PROCEDURE — 83690 ASSAY OF LIPASE: CPT | Performed by: EMERGENCY MEDICINE

## 2018-07-14 PROCEDURE — 80053 COMPREHEN METABOLIC PANEL: CPT | Performed by: EMERGENCY MEDICINE

## 2018-07-14 PROCEDURE — 74011250636 HC RX REV CODE- 250/636: Performed by: EMERGENCY MEDICINE

## 2018-07-14 PROCEDURE — 96374 THER/PROPH/DIAG INJ IV PUSH: CPT

## 2018-07-14 PROCEDURE — 99284 EMERGENCY DEPT VISIT MOD MDM: CPT

## 2018-07-14 PROCEDURE — 96375 TX/PRO/DX INJ NEW DRUG ADDON: CPT

## 2018-07-14 PROCEDURE — 85025 COMPLETE CBC W/AUTO DIFF WBC: CPT | Performed by: EMERGENCY MEDICINE

## 2018-07-14 PROCEDURE — 81003 URINALYSIS AUTO W/O SCOPE: CPT | Performed by: EMERGENCY MEDICINE

## 2018-07-14 RX ORDER — DIPHENHYDRAMINE HYDROCHLORIDE 50 MG/ML
50 INJECTION, SOLUTION INTRAMUSCULAR; INTRAVENOUS
Status: COMPLETED | OUTPATIENT
Start: 2018-07-14 | End: 2018-07-14

## 2018-07-14 RX ORDER — PROCHLORPERAZINE EDISYLATE 5 MG/ML
10 INJECTION INTRAMUSCULAR; INTRAVENOUS
Status: COMPLETED | OUTPATIENT
Start: 2018-07-14 | End: 2018-07-14

## 2018-07-14 RX ADMIN — SODIUM CHLORIDE 1000 ML: 900 INJECTION, SOLUTION INTRAVENOUS at 23:27

## 2018-07-14 RX ADMIN — DIPHENHYDRAMINE HYDROCHLORIDE 50 MG: 50 INJECTION, SOLUTION INTRAMUSCULAR; INTRAVENOUS at 23:28

## 2018-07-14 RX ADMIN — PROCHLORPERAZINE EDISYLATE 10 MG: 5 INJECTION INTRAMUSCULAR; INTRAVENOUS at 23:28

## 2018-07-15 VITALS
SYSTOLIC BLOOD PRESSURE: 109 MMHG | HEART RATE: 86 BPM | TEMPERATURE: 98.7 F | OXYGEN SATURATION: 98 % | DIASTOLIC BLOOD PRESSURE: 61 MMHG | BODY MASS INDEX: 33.32 KG/M2 | RESPIRATION RATE: 18 BRPM | HEIGHT: 63 IN | WEIGHT: 188.05 LBS

## 2018-07-15 LAB
ALBUMIN SERPL-MCNC: 3.5 G/DL (ref 3.5–5)
ALBUMIN/GLOB SERPL: 1 {RATIO} (ref 1.1–2.2)
ALP SERPL-CCNC: 91 U/L (ref 45–117)
ALT SERPL-CCNC: 21 U/L (ref 12–78)
ANION GAP SERPL CALC-SCNC: 8 MMOL/L (ref 5–15)
AST SERPL-CCNC: 11 U/L (ref 15–37)
BILIRUB SERPL-MCNC: 0.3 MG/DL (ref 0.2–1)
BUN SERPL-MCNC: 13 MG/DL (ref 6–20)
BUN/CREAT SERPL: 16 (ref 12–20)
CALCIUM SERPL-MCNC: 8.7 MG/DL (ref 8.5–10.1)
CHLORIDE SERPL-SCNC: 106 MMOL/L (ref 97–108)
CO2 SERPL-SCNC: 25 MMOL/L (ref 21–32)
CREAT SERPL-MCNC: 0.82 MG/DL (ref 0.55–1.02)
GLOBULIN SER CALC-MCNC: 3.4 G/DL (ref 2–4)
GLUCOSE SERPL-MCNC: 90 MG/DL (ref 65–100)
LIPASE SERPL-CCNC: 179 U/L (ref 73–393)
POTASSIUM SERPL-SCNC: 3.6 MMOL/L (ref 3.5–5.1)
PROT SERPL-MCNC: 6.9 G/DL (ref 6.4–8.2)
SODIUM SERPL-SCNC: 139 MMOL/L (ref 136–145)

## 2018-07-15 PROCEDURE — 96361 HYDRATE IV INFUSION ADD-ON: CPT

## 2018-07-15 NOTE — ED NOTES
Assumed care of patient. Patient presents with chief complaint of lower abdominal pain, lower back pain, nausea, vomiting, and vaginal discharge. Patient states she was diagnosed with an right ovarian cyst three weeks ago that \"was the size of a golfball. \" Patient states pain has significantly increased since then and has moved to her left ovary. Patient reports severe lower back pain as well as nausea, vomiting, and diarrhea, with most recent episode of vomiting earlier this morning. Patient also stating she has been experiencing chills and pain/discomfort to top of right calf. Patient reports some vaginal discharge as well, reporting moderate amount of thick, white discharge. Patient is alert and oriented x4, respirations even and unlabored, VSS. Monitor x2, call bell within reach.

## 2018-07-15 NOTE — DISCHARGE INSTRUCTIONS
Pelvic Pain: Care Instructions  Your Care Instructions    Pelvic pain, or pain in the lower belly, can have many causes. Often pelvic pain is not serious and gets better in a few days. If your pain continues or gets worse, you may need tests and treatment. Tell your doctor about any new symptoms. These may be signs of a serious problem. Follow-up care is a key part of your treatment and safety. Be sure to make and go to all appointments, and call your doctor if you are having problems. It's also a good idea to know your test results and keep a list of the medicines you take. How can you care for yourself at home? · Rest until you feel better. Lie down, and raise your legs by placing a pillow under your knees. · Drink plenty of fluids. You may find that small, frequent sips are easier on your stomach than if you drink a lot at once. Avoid drinks with carbonation or caffeine, such as soda pop, tea, or coffee. · Try eating several small meals instead of 2 or 3 large ones. Eat mild foods, such as rice, dry toast or crackers, bananas, and applesauce. Avoid fatty and spicy foods, other fruits, and alcohol until 48 hours after your symptoms have gone away. · Take an over-the-counter pain medicine, such as acetaminophen (Tylenol), ibuprofen (Advil, Motrin), or naproxen (Aleve). Read and follow all instructions on the label. · Do not take two or more pain medicines at the same time unless the doctor told you to. Many pain medicines have acetaminophen, which is Tylenol. Too much acetaminophen (Tylenol) can be harmful. · You can put a heating pad, a warm cloth, or moist heat on your belly to relieve pain. When should you call for help?   Call your doctor now or seek immediate medical care if:    · You have a new or higher fever.     · You have unusual vaginal bleeding.     · You have new or worse belly or pelvic pain.     · You have vaginal discharge that has increased in amount or smells bad.    Watch closely for changes in your health, and be sure to contact your doctor if:    · You do not get better as expected. Where can you learn more? Go to http://orin-cris.info/. Enter [phone] in the search box to learn more about \"Pelvic Pain: Care Instructions. \"  Current as of: October 6, 2017  Content Version: 11.7  © 1538-7977 [employer]. Care instructions adapted under license by [employer] (which disclaims liability or warranty for this information). If you have questions about a medical condition or this instruction, always ask your healthcare professional. [place] [address] any warranty or liability for your use of this information.

## 2018-07-15 NOTE — ED PROVIDER NOTES
EMERGENCY DEPARTMENT HISTORY AND PHYSICAL EXAM 
 
 
Date: 7/14/2018 Patient Name: Frank Aguilar History of Presenting Illness Chief Complaint Patient presents with  
 Ovarian Cyst  
  ambulatory into triage with steady gait; pt reports known ovarian cyst on RIGHT, but pt now complains of bilateral lower abdominal pain  Vaginal Discharge  
  white; onset 3 weeks ago  Back Pain  
  lower back pain; onset wednesday, denies injury trauma or fall  Vomiting  
  x few days History Provided By: Patient and Patient's  HPI: Frank Aguilar, 34 y.o. female with PMHx significant for multiple medical problems results in frequent ED visits, presents ambulatory to the ED with multiple complaints. She explains that she was told three weeks ago that she has a small ovarian cyst on the right. No she has been having increased abdominal pain xfew days increased right greater than left, although bilateral and she is concerned that she has torsed or ruptured her ovary. She denies fevers, chills, SOB, CP, cough but has intermittent nausea with vomiting on Wednesday; no episodes since. She also complains of back pain, neck pain, right leg pain, vaginal discharge (white) and a feeling of weakness all over as well as headache. There are no other complaints, changes, or physical findings at this time. PCP: Xiomara Ardon NP Current Outpatient Prescriptions Medication Sig Dispense Refill  buPROPion XL (WELLBUTRIN XL) 150 mg tablet Take 150 mg by mouth daily. 0  
 DULoxetine (CYMBALTA) 60 mg capsule Take 60 mg by mouth daily. 0  
 promethazine (PHENERGAN) 25 mg tablet Take 25 mg by mouth as needed. 0  
 naproxen sodium (ALEVE) 220 mg cap Take 220 mg by mouth.  butalbital-acetaminophen-caff (FIORICET) -40 mg per capsule Take 1 Cap by mouth every four (4) hours as needed for Pain. 20 Cap 0  
 omeprazole (PRILOSEC) 20 mg capsule Take 1 Cap by mouth daily. 30 Cap 2  acetaminophen (TYLENOL) 500 mg tablet Take 1,000 mg by mouth every six (6) hours as needed for Pain. Past History Past Medical History: 
Past Medical History:  
Diagnosis Date  Anxiety  Arthritis   
 unsure - knees, lower back, fingers and toes  Asthma   
 well controlled  Chronic pain   
 right abdomen,shooting pain groins  Depression  Diabetes ([address]   
 pre diabetes  GERD (gastroesophageal reflux disease)  Headache  Ill-defined condition   
 prolapsed cervix,rectum,bladder  Intractable nausea and vomiting 6/15/2018  Prolapse of anterior lip of cervix  Psychiatric disorder   
 bipolar, anxiety, depression  Rectal prolapse  Stool color black White stool Past Surgical History: 
Past Surgical History:  
Procedure Laterality Date  HX GYN    
 vaginal birth x2  
 HX HEENT    
 bilat tubes in ears age 3  
 [address]  07/09/2015 Laparoscopic Incisional hernia repair with mesh.  HX LAP CHOLECYSTECTOMY  7/7/2014  HX OTHER SURGICAL    
 colonoscopy Family History: 
Family History Problem Relation Age of Onset  Other Mother   
  fibromyalgia, IBS  Diabetes Mother Pre-DM  High Cholesterol Mother  Hypertension Mother  Liver Disease Mother   
  fatty liver  Cancer Mother   
  uterine  Heart Disease Mother [facility] Arthritis-osteo Mother  Diabetes Father  Hypertension Father  Asthma Brother  Diabetes Paternal Aunt  Hypertension Paternal Aunt  Diabetes Paternal Uncle  Hypertension Paternal Uncle  Cancer Maternal Grandfather   
  esoph  Hypertension Maternal Grandfather  Stroke Maternal Grandfather  Heart Disease Paternal Grandmother  Diabetes Paternal Grandfather  Heart Attack Paternal Grandfather  Stroke Paternal Grandfather  Hypertension Paternal Grandfather  Heart Disease Maternal Grandmother  Other Sister   MTHFR (clotting D/O) Social History: 
Social History Substance Use Topics  Smoking status: Current Every Day Smoker Packs/day: 0.50 Years: 10.00 Start date: 3/1/2004  Smokeless tobacco: Never Used  Alcohol use No  
 
 
Allergies: Allergies Allergen Reactions  Latex Rash, Itching and Swelling  Pcn [Penicillins] Other (comments) Pt states as a child she had a reaction but does not remember specific reaction. Pt states\"mother say I stopped breathing\". Review of Systems Review of Systems Constitutional: Negative for activity change, appetite change, chills, fever and unexpected weight change. HENT: Negative for congestion. Eyes: Negative for pain and visual disturbance. Respiratory: Negative for cough and shortness of breath. Cardiovascular: Negative for chest pain, palpitations and leg swelling. Gastrointestinal: Negative for abdominal pain, diarrhea, nausea and vomiting. Genitourinary: Positive for urgency and vaginal pain. Negative for decreased urine volume, difficulty urinating, dysuria, flank pain and vaginal bleeding. Musculoskeletal: Positive for arthralgias, back pain and neck pain. Negative for neck stiffness. Skin: Negative for rash. Neurological: Positive for weakness and headaches. Negative for numbness. Physical Exam  
Physical Exam  
Constitutional: She is oriented to person, place, and time. She appears well-developed and well-nourished. Overweight female appearing comfortable laying on the gurney HENT:  
Head: Normocephalic and atraumatic. Mouth/Throat: Oropharynx is clear and moist.  
Eyes: Conjunctivae and EOM are normal. Pupils are equal, round, and reactive to light. Right eye exhibits no discharge. Left eye exhibits no discharge. Neck: Normal range of motion. Neck supple. Cardiovascular: Normal rate, regular rhythm and normal heart sounds. No murmur heard.  
Pulmonary/Chest: Effort normal and breath sounds normal. No respiratory distress. She has no wheezes. She has no rales. Abdominal: Soft. Bowel sounds are normal. She exhibits no distension and no mass. There is no tenderness. There is no rebound and no guarding. Exam without any area of focal tenderness Musculoskeletal: Normal range of motion. She exhibits no edema. Neurological: She is alert and oriented to person, place, and time. No cranial nerve deficit. She exhibits normal muscle tone. Skin: Skin is warm and dry. No rash noted. She is not diaphoretic. Psychiatric: She exhibits a depressed mood. Nursing note and vitals reviewed. Diagnostic Study Results Labs - No results found for this or any previous visit (from the past 12 hour(s)). Radiologic Studies - No orders to display CT Results  (Last 48 hours) None CXR Results  (Last 48 hours) None Medical Decision Making I am the first provider for this patient. I reviewed the vital signs, available nursing notes, past medical history, past surgical history, family history and social history. Vital Signs-Reviewed the patient's vital signs. Patient Vitals for the past 12 hrs: 
 Temp Pulse Resp BP SpO2  
07/14/18 2234 98.7 °F (37.1 °C) 86 18 136/87 100 % Pulse Oximetry Analysis - 100% on RA Cardiac Monitor:  
Rate: 86 bpm 
Rhythm: Normal Sinus Rhythm Records Reviewed: Nursing Notes, Old Medical Records, Previous Radiology Studies and Previous Laboratory Studies Provider Notes (Medical Decision Making): Well appearing depressed female with multiple complaints but a normal exam. Low suspicion of sepsis, torsion, ectopic, TOA. ED Course:  
Initial assessment performed. The patients presenting problems have been discussed, and they are in agreement with the care plan formulated and outlined with them. I have encouraged them to ask questions as they arise throughout their visit.  
 
Procedure Note - Pelvic Exam:   
1:55 AM 
Performed by: Ambika Rock MD Leonora 
Chaperoned by: ED RN Pelvic exam was performed using bimanual . No discharge noted. The procedure took 1-15 minutes, and pt tolerated well. 
 
1:59 AM 
Patient is feeling better and would like to go home. Patient does not want to wait for US. Discussed follow up with GYN for repeat exam 
 
 
Critical Care Time:  
0 Disposition: 
DISCHARGE NOTE: 
2:00 AM 
The patient is ready for discharge. The patients signs, symptoms, diagnosis, and instructions for discharge have been discussed and the pt has conveyed their understanding. The patient is to follow up as recommended or return to the ER should their symptoms worsen. Plan has been discussed and patient has conveyed their agreement. PLAN: Discharge home 1. Current Discharge Medication List  
  
 
2. Follow-up Information None Return to ED if worse Diagnosis Clinical Impression: No diagnosis found. Attestations: This note is prepared by Moises Sarkar, acting as Scribe for MD Candido Sprague MD: The scribe's documentation has been prepared under my direction and personally reviewed by me in its entirety. I confirm that the note above accurately reflects all work, treatment, procedures, and medical decision making performed by me.

## 2018-07-15 NOTE — ED NOTES
Patient stating she would like to leave and does not want to wait for US. Explained that US was recommendation from physician and Anny is on the way in, patient insistent that she would like to leave. MD aware.

## 2018-07-15 NOTE — ED NOTES
Discharge instructions reviewed with patient. Discharge instructions given to patient per Dr. Spenser Vega. Patient able to return/verbalize discharge instructions. Copy of discharge instructions provided. Patient condition stable, respiratory status within normal limits, neuro status intact. Ambulatory out of ER, accompanied by family.

## 2018-07-30 ENCOUNTER — OFFICE VISIT (OUTPATIENT)
Dept: NEUROLOGY | Age: 33
End: 2018-07-30

## 2018-07-30 VITALS
BODY MASS INDEX: 33.66 KG/M2 | SYSTOLIC BLOOD PRESSURE: 108 MMHG | OXYGEN SATURATION: 98 % | HEART RATE: 77 BPM | HEIGHT: 63 IN | WEIGHT: 190 LBS | DIASTOLIC BLOOD PRESSURE: 62 MMHG

## 2018-07-30 DIAGNOSIS — M54.16 LUMBAR BACK PAIN WITH RADICULOPATHY AFFECTING LEFT LOWER EXTREMITY: ICD-10-CM

## 2018-07-30 DIAGNOSIS — M54.16 LUMBAR BACK PAIN WITH RADICULOPATHY AFFECTING RIGHT LOWER EXTREMITY: ICD-10-CM

## 2018-07-30 DIAGNOSIS — I67.89 CEREBRAL MICROVASCULAR DISEASE: ICD-10-CM

## 2018-07-30 DIAGNOSIS — I65.23 BILATERAL CAROTID ARTERY STENOSIS: ICD-10-CM

## 2018-07-30 DIAGNOSIS — E03.4 HYPOTHYROIDISM DUE TO ACQUIRED ATROPHY OF THYROID: ICD-10-CM

## 2018-07-30 DIAGNOSIS — E55.9 VITAMIN D DEFICIENCY: ICD-10-CM

## 2018-07-30 DIAGNOSIS — R41.3 DISTURBANCE OF MEMORY: ICD-10-CM

## 2018-07-30 DIAGNOSIS — Z83.49 FAMILY HISTORY OF PITUITARY DISEASE: ICD-10-CM

## 2018-07-30 DIAGNOSIS — H53.9 TRANSIENT VISION DISTURBANCE OF BOTH EYES: ICD-10-CM

## 2018-07-30 DIAGNOSIS — E53.8 B12 DEFICIENCY: ICD-10-CM

## 2018-07-30 DIAGNOSIS — G44.209 TENSION VASCULAR HEADACHE: Primary | ICD-10-CM

## 2018-07-30 PROBLEM — Z98.890 HISTORY OF SURGICAL REMOVAL OF PITUITARY GLAND (HCC): Status: ACTIVE | Noted: 2018-07-30

## 2018-07-30 PROBLEM — E89.3 HISTORY OF SURGICAL REMOVAL OF PITUITARY GLAND (HCC): Status: ACTIVE | Noted: 2018-07-30

## 2018-07-30 RX ORDER — RIZATRIPTAN BENZOATE 10 MG/1
10 TABLET, ORALLY DISINTEGRATING ORAL
Qty: 12 TAB | Refills: 5 | Status: SHIPPED | OUTPATIENT
Start: 2018-07-30 | End: 2019-04-18 | Stop reason: ALTCHOICE

## 2018-07-30 NOTE — MR AVS SNAPSHOT
[address], 
[address], [address] [facility] 
[phone] Patient: Syeda Doss MRN: [id number] JNF:[id number]/48/[id number] Visit Information Date & Time Provider Department Dept. Phone Encounter #  
 7/30/2018  9:00 AM Edward Palm MD [facility]rology [facility] at [facility] [phone] [id number] Follow-up Instructions Return in about 6 months (around 1/30/2019). Your Appointments 9/25/2018  4:00 PM  
Any with Yazmin Bermudez NP [facility] [address]) Appt Note: f/u  
 [address] [phone] [id number] [phone] [address] [place] [address] [address] Upcoming Health Maintenance Date Due Influenza Age 5 to Adult 8/1/2018 PAP AKA CERVICAL CYTOLOGY 8/5/2019 DTaP/Tdap/Td series (2 - Td) 1/26/2022 Allergies as of 7/30/2018  Review Complete On: 7/30/2018 By: Edward Palm MD  
  
 Severity Noted Reaction Type Reactions Latex Medium 03/14/2014   Side Effect Rash, Itching, Swelling Pcn [Penicillins] High 12/12/2011   Systemic Other (comments) Pt states as a child she had a reaction but does not remember specific reaction. Pt states\"mother say I stopped breathing\". Current Immunizations  Reviewed on 7/9/2015 Name Date Influenza Vaccine 2/5/2013 Influenza Vaccine Split 1/26/2012 TDAP Vaccine 1/26/2012 Not reviewed this visit You Were Diagnosed With   
  
 Codes Comments Tension vascular headache    -  Primary ICD-10-CM: G58.861 ICD-9-CM: 307.81 Disturbance of memory     ICD-10-CM: R41.3 ICD-9-CM: 780.93 Bilateral carotid artery stenosis     ICD-10-CM: I65.23 ICD-9-CM: 433.10, 433.30 Cerebral microvascular disease     ICD-10-CM: I67.9 ICD-9-CM: 437.9 Lumbar back pain with radiculopathy affecting left lower extremity     ICD-10-CM: M54.17 ICD-9-CM: 724.4 Lumbar back pain with radiculopathy affecting right lower extremity     ICD-10-CM: M54.17 ICD-9-CM: 724.4 Transient vision disturbance of both eyes     ICD-10-CM: H53.9 ICD-9-CM: 368.9 Vitamin D deficiency     ICD-10-CM: E55.9 ICD-9-CM: 268.9 Hypothyroidism due to acquired atrophy of thyroid     ICD-10-CM: E03.4 ICD-9-CM: 244.8, 246.8 Family history of pituitary disease     ICD-10-CM: Z83.49 
ICD-9-CM: V18.19   
 B12 deficiency     ICD-10-CM: E53.8 ICD-9-CM: 266.2 Vitals BP Pulse Height(growth percentile) Weight(growth percentile) SpO2 BMI  
 108/62 77 5' 3\" (1.6 m) 190 lb (86.2 kg) 98% 33.66 kg/m2 OB Status Smoking Status Unknown Current Every Day Smoker Vitals History BMI and BSA Data Body Mass Index Body Surface Area  
 33.66 kg/m 2 1.96 m 2 Preferred Pharmacy Pharmacy Name Phone [id number] [facility] [phone] Your Updated Medication List  
  
   
This list is accurate as of 7/30/18 10:12 AM.  Always use your most recent med list.  
  
  
  
  
 acetaminophen 500 mg tablet Commonly known as:  TYLENOL Take 1,000 mg by mouth every six (6) hours as needed for Pain. buPROPion  mg tablet Commonly known as:  Endy Havers Take 150 mg by mouth daily. DULoxetine 60 mg capsule Commonly known as:  CYMBALTA Take 60 mg by mouth daily. rizatriptan 10 mg disintegrating tablet Commonly known as:  MAXALT-MLT Take 1 Tab by mouth every eight (8) hours as needed for Migraine. Prescriptions Sent to Pharmacy Refills  
 rizatriptan (MAXALT-MLT) 10 mg disintegrating tablet 5 Sig: Take 1 Tab by mouth every eight (8) hours as needed for Migraine. Class: Normal  
 Pharmacy: [id number] Daniel[address] Guzman, [address]  #: [phone] Route: Oral  
  
We Performed the Following ASHELY COMPREHENSIVE PLUS PANEL [[id number] Custom] REFERRAL TO PSYCHIATRY [REF91 Custom] Comments:  
 depression REFERRAL TO PSYCHOLOGY [UWE35 Custom] Comments:  
 Memory loss needs neuropsych testing SED RATE (ESR) B629545 CPT(R)] TSH 3RD GENERATION [81258 CPT(R)] VITAMIN B12 & FOLATE [53188 CPT(R)] VITAMIN D, 25 HYROXY PANEL [[id number] Custom] Follow-up Instructions Return in about 6 months (around 1/30/2019). To-Do List   
 07/30/2018 Imaging:  XR SPINE LUMB MIN 4 V   
  
 07/30/2018 Imaging:  XR SPINE THORAC 3 V   
  
 08/02/2018 Imaging:  DUPLEX CAROTID BILATERAL AMB NEURO   
  
 08/08/2018 Imaging:  MRI BRAIN W WO CONT Referral Information Referral ID Referred By Referred To  
  
 [id number] Juanjose Vides [address] [facility] 250 1 [facility], [address] [place] [address] Phone: [phone] Fax: [phone] Visits Status Start Date End Date 1 New Request 7/30/18 7/30/19 If your referral has a status of pending review or denied, additional information will be sent to support the outcome of this decision. Referral ID Referred By Referred To  
 [id number] ANDREWS, [address] [place] [address] Drive Group [address] [place] [address], [address] [facility] Phone: [phone] Fax: [phone] Visits Status Start Date End Date 1 New Request 7/30/18 7/30/19 If your referral has a status of pending review or denied, additional information will be sent to support the outcome of this decision. Patient Instructions Office Policies · Phone calls/patient messages: Please allow up to 24 hours for someone in the office to contact you about your call or message. Be mindful your provider may be out of the office or your message may require further review.  We encourage you to use KartMe for your messages as this is a faster, more efficient way to communicate with our office · Medication Refills: 
Prescription medications require up to 48 business hours to process. We encourage you to use Koko for your refills. For controlled medications: Please allow up to 72 business hours to process. Certain medications may require you to  a written prescription at our office. NO narcotic/controlled medications will be prescribed after 4pm Monday through Friday or on weekends · Form/Paperwork Completion: 
Please note there is a $25 fee for all paperwork completed by our providers. We ask that you allow 7-14 business days. Pre-payment is due prior to picking up/faxing the completed form. You may also download your forms to Koko to have your doctor print off. Introducing [facility]! Susanne Joy introduces Koko patient portal. Now you can access parts of your medical record, email your doctor's office, and request medication refills online. 1. In your internet browser, go to [email or web address] 2. Click on the First Time User? Click Here link in the Sign In box. You will see the New Member Sign Up page. 3. Enter your Koko Access Code exactly as it appears below. You will not need to use this code after youve completed the sign-up process. If you do not sign up before the expiration date, you must request a new code. · Koko Access Code: [email or web address] Expires: 9/6/2018 11:04 AM 
 
4. Enter the last four digits of your Social Security Number (xxxx) and Date of Birth (mm/dd/yyyy) as indicated and click Submit. You will be taken to the next sign-up page. 5. Create a Koko ID. This will be your Koko login ID and cannot be changed, so think of one that is secure and easy to remember. 6. Create a Koko password. You can change your password at any time. 7. Enter your Password Reset Question and Answer.  This can be used at a later time if you forget your password. 8. Enter your e-mail address. You will receive e-mail notification when new information is available in [address] 9. Click Sign Up. You can now view and download portions of your medical record. 10. Click the Download Summary menu link to download a portable copy of your medical information. If you have questions, please visit the Frequently Asked Questions section of the Knip website. Remember, Knip is NOT to be used for urgent needs. For medical emergencies, dial 911. Now available from your iPhone and Android! Please provide this summary of care documentation to your next provider. Your primary care clinician is listed as [address] [facility] Tobias. If you have any questions after today's visit, please call [phone].

## 2018-07-30 NOTE — PATIENT INSTRUCTIONS

## 2018-07-30 NOTE — PROGRESS NOTES
Consult REFERRED BY: 
Danyelle Noyola NP 
 
CHIEF COMPLAINT: Progressive memory loss, headaches, dense in her head, loss of vision, back pain, scapular pain on the right side, Subjective:  
 
Amna Patel is a 34 y.o. right-handed  female seen today at the request of Dr. Lyssa Trinidad as a new patient to me for evaluation of new problem of progressive deformity in her skull, with dents progressing in her skull associated with progressive and severe headaches in a generalized fashion are described as multiple times, pressure type, stabbing ice pick pains, throbbing, associated with nausea and vomiting at times, that are progressively getting worse now, and occurring almost on a daily basis. In addition she has had memory loss, more difficulty remembering things, seemingly more disabled, but denies any new stress or tension or anxiety. She apparently saw another neurologist and was evaluated for some of these problems, and had an MRI of the brain and pituitary gland because of galactorrhea, but the brain was normal showing no evidence of pituitary dysfunction, but she gives some vague history in the past of having an abnormal MRI. She never had surgery treatment. She is convinced that she is going to have a problem because one relative family had a pituitary tumor that had to be operated on and was missed for some time. She is also complaining of increasing blurred vision, loss of vision in her eyes, but is occurring more frequently when she is having more headaches. She is concerned that she has increased intracranial pressure of some sort causing his problems. She also has increasing low back pain, of unclear etiology but did have a negative x-ray of her lumbar spine 2 years ago. She also complains more thoracic pain wants that x-ray because she has pain radiating into the right shoulder blade.   She denies any head trauma or back injury except that a year ago she was moving she slipped on a ramp, fell backward and hit her head and back, and her boyfriend said she was dazed, but never sought medical attention and gradually got better. I reviewed the MRI scan personally on the PACS system myself, and I do not see any structural lesion in the brain or the skull to account for her symptoms. She did have previous neuropsych testing done 2 years ago also in addition to the MRI and the neuropsych testing suggested that most likely she just had depression anxiety, but she seemed to have some cognitive issues that could not completely be ignored. She has seen psychiatrists in the past, but did not apparently like her counselor but did like the female psychiatrist, and I told her she has to get back on her psychiatry medications and get back to see a psychiatrist we gave her referrals for both repeated neuropsych testing and to see a psychiatrist again. Her visual loss can occur in any eye, and usually associated with the headaches, and last sometimes for several minutes to several hours. She apparently has a known ovarian cyst in addition, and supposedly had an abnormal MRI of the pituitary gland somewhere in the distant past.  She denies any unusual fever, trauma, meningismus, unusual stress or tension, or any other toxin exposure or other causes for her symptoms. For this multiplicity of problems we were asked to evaluate. Past Medical History:  
Diagnosis Date  Anxiety  Arthritis   
 unsure - knees, lower back, fingers and toes  Asthma   
 well controlled  Chronic pain   
 right abdomen,shooting pain groins  Depression  Diabetes ([address]   
 pre diabetes  GERD (gastroesophageal reflux disease)  Headache  Ill-defined condition   
 prolapsed cervix,rectum,bladder  Intractable nausea and vomiting 6/15/2018  Prolapse of anterior lip of cervix  Psychiatric disorder   
 bipolar, anxiety, depression  Rectal prolapse  Stool color black White stool Past Surgical History:  
Procedure Laterality Date  HX GYN    
 vaginal birth x2  
 HX HEENT    
 bilat tubes in ears age 3  
 [address]  07/09/2015 Laparoscopic Incisional hernia repair with mesh.  HX LAP CHOLECYSTECTOMY  7/7/2014  HX OTHER SURGICAL    
 colonoscopy Family History Problem Relation Age of Onset  Other Mother   
  fibromyalgia, IBS  Diabetes Mother Pre-DM  High Cholesterol Mother  Hypertension Mother  Liver Disease Mother   
  fatty liver  Cancer Mother   
  uterine  Heart Disease Mother [facility] Arthritis-osteo Mother  Diabetes Father  Hypertension Father  Asthma Brother  Diabetes Paternal Aunt  Hypertension Paternal Aunt  Diabetes Paternal Uncle  Hypertension Paternal Uncle  Cancer Maternal Grandfather   
  esoph  Hypertension Maternal Grandfather  Stroke Maternal Grandfather  Heart Disease Paternal Grandmother  Diabetes Paternal Grandfather  Heart Attack Paternal Grandfather  Stroke Paternal Grandfather  Hypertension Paternal Grandfather  Heart Disease Maternal Grandmother  Other Sister MTHFR (clotting D/O) Social History Substance Use Topics  Smoking status: Current Every Day Smoker Packs/day: 0.50 Years: 10.00 Start date: 3/1/2004  Smokeless tobacco: Never Used  Alcohol use No  
   
 
Current Outpatient Prescriptions:  
  rizatriptan (MAXALT-MLT) 10 mg disintegrating tablet, Take 1 Tab by mouth every eight (8) hours as needed for Migraine. , Disp: 12 Tab, Rfl: 5 
  buPROPion XL (WELLBUTRIN XL) 150 mg tablet, Take 150 mg by mouth daily. , Disp: , Rfl: 0 
  DULoxetine (CYMBALTA) 60 mg capsule, Take 60 mg by mouth daily. , Disp: , Rfl: 0 
  acetaminophen (TYLENOL) 500 mg tablet, Take 1,000 mg by mouth every six (6) hours as needed for Pain., Disp: , Rfl:  
 
 
 
Allergies Allergen Reactions  Latex Rash, Itching and Swelling  Pcn [Penicillins] Other (comments) Pt states as a child she had a reaction but does not remember specific reaction. Pt states\"mother say I stopped breathing\". Review of Systems: A comprehensive review of systems was negative except for: Constitutional: positive for fatigue and malaise Eyes: positive for visual disturbance and Loss of vision Ears, nose, mouth, throat, and face: positive for tinnitus Cardiovascular: positive for chest pressure/discomfort, dyspnea, palpitations, dyspnea on exertion, dizziness Gastrointestinal: positive for dyspepsia and reflux symptoms Musculoskeletal: positive for myalgias, arthralgias, stiff joints, neck pain, back pain and muscle weakness Neurological: positive for headaches, dizziness, vertigo, memory problems, paresthesia, coordination problems, gait problems and weakness Behvioral/Psych: positive for anxiety and depression Vitals:  
 07/30/18 7520 BP: 108/62 Pulse: 77 SpO2: 98% Weight: 190 lb (86.2 kg) Height: 5' 3\" (1.6 m) Objective: I 
 
 
NEUROLOGICAL EXAM: 
 
Appearance: The patient is well developed, well nourished, provides a coherent history and is in no acute distress. Mental Status: Oriented to time, place and person, and the president, cognitive function is normal and speech is fluent and no aphasia or dysarthria. Mood and affect appropriate. Cranial Nerves:   Intact visual fields. Fundi are benign. LEEANN, EOM's full, no nystagmus, no ptosis. Facial sensation is normal. Corneal reflexes are not tested. Facial movement is symmetric. Hearing is normal bilaterally. Palate is midline with normal sternocleidomastoid and trapezius muscles are normal. Tongue is midline. Neck without meningismus or bruits Motor:  5/5 strength in upper and lower proximal and distal muscles. Normal bulk and tone. No fasciculations. Reflexes:   Deep tendon reflexes 2+/4 and symmetrical. 
No babinski or clonus present Sensory:   Normal to touch, pinprick and vibration. DSS is intact Gait:  Normal gait. Tremor:   No tremor noted. Cerebellar:  No cerebellar signs present. Neurovascular:  Normal heart sounds and regular rhythm, peripheral pulses intact, and no carotid bruits. Assessment: ICD-10-CM ICD-9-CM 1. Tension vascular headache G44.209 307.81 MRI BRAIN W WO CONT  
   REFERRAL TO PSYCHOLOGY  
   ASHELY COMPREHENSIVE PLUS PANEL  
   TSH 3RD GENERATION  
   VITAMIN D, 25 HYROXY PANEL  
   VITAMIN B12 & FOLATE  
   SED RATE (ESR) DUPLEX CAROTID BILATERAL AMB NEURO  
   XR SPINE THORAC 3 V  
   XR SPINE LUMB MIN 4 V  
   rizatriptan (MAXALT-MLT) 10 mg disintegrating tablet REFERRAL TO PSYCHIATRY 2. Disturbance of memory R41.3 780.93 MRI BRAIN W WO CONT  
   REFERRAL TO PSYCHOLOGY  
   ASHELY COMPREHENSIVE PLUS PANEL  
   TSH 3RD GENERATION  
   VITAMIN D, 25 HYROXY PANEL  
   VITAMIN B12 & FOLATE  
   SED RATE (ESR) DUPLEX CAROTID BILATERAL AMB NEURO  
   XR SPINE THORAC 3 V  
   XR SPINE LUMB MIN 4 V  
   rizatriptan (MAXALT-MLT) 10 mg disintegrating tablet REFERRAL TO PSYCHIATRY 3. Bilateral carotid artery stenosis I65.23 433.10 MRI BRAIN W WO CONT  
  433.30 REFERRAL TO PSYCHOLOGY  
   ASHELY COMPREHENSIVE PLUS PANEL  
   TSH 3RD GENERATION  
   VITAMIN D, 25 HYROXY PANEL  
   VITAMIN B12 & FOLATE  
   SED RATE (ESR) DUPLEX CAROTID BILATERAL AMB NEURO  
   XR SPINE THORAC 3 V  
   XR SPINE LUMB MIN 4 V  
   rizatriptan (MAXALT-MLT) 10 mg disintegrating tablet REFERRAL TO PSYCHIATRY 4. Cerebral microvascular disease I67.9 437.9 MRI BRAIN W WO CONT  
   REFERRAL TO PSYCHOLOGY  
   ASHELY COMPREHENSIVE PLUS PANEL  
   TSH 3RD GENERATION  
   VITAMIN D, 25 HYROXY PANEL  
   VITAMIN B12 & FOLATE  
   SED RATE (ESR) DUPLEX CAROTID BILATERAL AMB NEURO  
   XR SPINE THORAC 3 V  
   XR SPINE LUMB MIN 4 V  
   rizatriptan (MAXALT-MLT) 10 mg disintegrating tablet REFERRAL TO PSYCHIATRY 5.  Lumbar back pain with radiculopathy affecting left lower extremity M54.17 724.4 MRI BRAIN W WO CONT  
   REFERRAL TO PSYCHOLOGY  
   ASHELY COMPREHENSIVE PLUS PANEL  
   TSH 3RD GENERATION  
   VITAMIN D, 25 HYROXY PANEL  
   VITAMIN B12 & FOLATE  
   SED RATE (ESR) DUPLEX CAROTID BILATERAL AMB NEURO  
   XR SPINE THORAC 3 V  
   XR SPINE LUMB MIN 4 V  
   rizatriptan (MAXALT-MLT) 10 mg disintegrating tablet REFERRAL TO PSYCHIATRY 6. Lumbar back pain with radiculopathy affecting right lower extremity M54.17 724.4 MRI BRAIN W WO CONT  
   REFERRAL TO PSYCHOLOGY  
   ASHELY COMPREHENSIVE PLUS PANEL  
   TSH 3RD GENERATION  
   VITAMIN D, 25 HYROXY PANEL  
   VITAMIN B12 & FOLATE  
   SED RATE (ESR) DUPLEX CAROTID BILATERAL AMB NEURO  
   XR SPINE THORAC 3 V  
   XR SPINE LUMB MIN 4 V  
   rizatriptan (MAXALT-MLT) 10 mg disintegrating tablet REFERRAL TO PSYCHIATRY 7. Transient vision disturbance of both eyes H53.9 368.9 MRI BRAIN W WO CONT  
   REFERRAL TO PSYCHOLOGY  
   ASHELY COMPREHENSIVE PLUS PANEL  
   TSH 3RD GENERATION  
   VITAMIN D, 25 HYROXY PANEL  
   VITAMIN B12 & FOLATE  
   SED RATE (ESR) DUPLEX CAROTID BILATERAL AMB NEURO  
   XR SPINE THORAC 3 V  
   XR SPINE LUMB MIN 4 V  
   rizatriptan (MAXALT-MLT) 10 mg disintegrating tablet REFERRAL TO PSYCHIATRY 8. Vitamin D deficiency E55.9 268.9 MRI BRAIN W WO CONT  
   REFERRAL TO PSYCHOLOGY  
   ASHELY COMPREHENSIVE PLUS PANEL  
   TSH 3RD GENERATION  
   VITAMIN D, 25 HYROXY PANEL  
   VITAMIN B12 & FOLATE  
   SED RATE (ESR) DUPLEX CAROTID BILATERAL AMB NEURO  
   XR SPINE THORAC 3 V  
   XR SPINE LUMB MIN 4 V  
   rizatriptan (MAXALT-MLT) 10 mg disintegrating tablet REFERRAL TO PSYCHIATRY 9.  Hypothyroidism due to acquired atrophy of thyroid E03.4 244.8 MRI BRAIN W WO CONT  
  246.8 REFERRAL TO PSYCHOLOGY  
   ASHELY COMPREHENSIVE PLUS PANEL  
   TSH 3RD GENERATION  
   VITAMIN D, 25 HYROXY PANEL  
   VITAMIN B12 & FOLATE  
   SED RATE (ESR) DUPLEX CAROTID BILATERAL AMB NEURO  
   XR SPINE THORAC 3 V  
   XR SPINE LUMB MIN 4 V  
   rizatriptan (MAXALT-MLT) 10 mg disintegrating tablet REFERRAL TO PSYCHIATRY 10. Family history of pituitary disease Z83.49 V18.19 MRI BRAIN W WO CONT  
   REFERRAL TO PSYCHOLOGY  
   ASHELY COMPREHENSIVE PLUS PANEL  
   TSH 3RD GENERATION  
   VITAMIN D, 25 HYROXY PANEL  
   VITAMIN B12 & FOLATE  
   SED RATE (ESR) DUPLEX CAROTID BILATERAL AMB NEURO  
   XR SPINE THORAC 3 V  
   XR SPINE LUMB MIN 4 V  
   rizatriptan (MAXALT-MLT) 10 mg disintegrating tablet REFERRAL TO PSYCHIATRY 11. B12 deficiency E53.8 266.2 MRI BRAIN W WO CONT  
   REFERRAL TO PSYCHOLOGY  
   ASHELY COMPREHENSIVE PLUS PANEL  
   TSH 3RD GENERATION  
   VITAMIN D, 25 HYROXY PANEL  
   VITAMIN B12 & FOLATE  
   SED RATE (ESR) DUPLEX CAROTID BILATERAL AMB NEURO  
   XR SPINE THORAC 3 V  
   XR SPINE LUMB MIN 4 V  
   rizatriptan (MAXALT-MLT) 10 mg disintegrating tablet REFERRAL TO PSYCHIATRY Active Problems: * No active hospital problems. * 
 
 
Plan:  
 
Patient with a very difficult multiplicity of neurological complaints that sound alarming for increased intracranial pressure, structural lesion of the brain, possible recurrence of her pituitary abnormality, causing her to severe headaches, visual loss, marked dizziness unsteadiness walking, memory loss, and inability to function. We will get an MRI of the brain just to rule this out, but on feeling her goal, I do not feel any significantly worrisome nodules or bumps on her head, just the usual cranial suture line irregularities. Need to rule out pituitary lesion causing her visual loss and progressive headaches Patient has a family history of pituitary tumors need to acknowledge this.  
Patient is to go back and get neuropsych testing to further evaluate her memory in addition her B12 level thyroid test and vitamin D levels were all checked and she is going to get back on her antidepressant medication. Patient will also go to psychiatry to further reevaluate her medications. Because of her persistent headaches we will give her Maxalt to take sublingual as needed for headaches. X-ray both the thoracic and lumbar spine, and it was unremarkable she still has a lot of pain we can send her to therapy and consider an EMG. Over 1 hour spent with patient and her mother going over all her history, reviewing all her records, reviewing her MRI scan personally on the PACS system myself, and discussing her diagnosis prognosis and further treatment options in detail. Follow-up after the above-mentioned tests on 3-6 months time or earlier as needed. Signed By: Keshia Bull MD   
 July 30, 2018 CC: Ban Miranda NP 
FAX: [phone] This note will not be viewable in [address]

## 2018-07-30 NOTE — LETTER
7/30/2018 12:35 PM 
 
Patient:  Torres Cortes YOB: 1985 Date of Visit: 7/30/2018 Dear No Recipients: Thank you for referring Ms. Andrade Hummel to me for evaluation/treatment. Below are the relevant portions of my assessment and plan of care. Consult REFERRED BY: 
Yary Newsome NP 
 
CHIEF COMPLAINT: Progressive memory loss, headaches, dense in her head, loss of vision, back pain, scapular pain on the right side, Subjective:  
 
Torres Cortes is a 34 y.o. right-handed  female seen today at the request of Dr. Adarsh Plascencia as a new patient to me for evaluation of new problem of progressive deformity in her skull, with dents progressing in her skull associated with progressive and severe headaches in a generalized fashion are described as multiple times, pressure type, stabbing ice pick pains, throbbing, associated with nausea and vomiting at times, that are progressively getting worse now, and occurring almost on a daily basis. In addition she has had memory loss, more difficulty remembering things, seemingly more disabled, but denies any new stress or tension or anxiety. She apparently saw another neurologist and was evaluated for some of these problems, and had an MRI of the brain and pituitary gland because of galactorrhea, but the brain was normal showing no evidence of pituitary dysfunction, but she gives some vague history in the past of having an abnormal MRI. She never had surgery treatment. She is convinced that she is going to have a problem because one relative family had a pituitary tumor that had to be operated on and was missed for some time. She is also complaining of increasing blurred vision, loss of vision in her eyes, but is occurring more frequently when she is having more headaches. She is concerned that she has increased intracranial pressure of some sort causing his problems.   She also has increasing low back pain, of unclear etiology but did have a negative x-ray of her lumbar spine 2 years ago. She also complains more thoracic pain wants that x-ray because she has pain radiating into the right shoulder blade. She denies any head trauma or back injury except that a year ago she was moving she slipped on a ramp, fell backward and hit her head and back, and her boyfriend said she was dazed, but never sought medical attention and gradually got better. I reviewed the MRI scan personally on the PACS system myself, and I do not see any structural lesion in the brain or the skull to account for her symptoms. She did have previous neuropsych testing done 2 years ago also in addition to the MRI and the neuropsych testing suggested that most likely she just had depression anxiety, but she seemed to have some cognitive issues that could not completely be ignored. She has seen psychiatrists in the past, but did not apparently like her counselor but did like the female psychiatrist, and I told her she has to get back on her psychiatry medications and get back to see a psychiatrist we gave her referrals for both repeated neuropsych testing and to see a psychiatrist again. Her visual loss can occur in any eye, and usually associated with the headaches, and last sometimes for several minutes to several hours. She apparently has a known ovarian cyst in addition, and supposedly had an abnormal MRI of the pituitary gland somewhere in the distant past.  She denies any unusual fever, trauma, meningismus, unusual stress or tension, or any other toxin exposure or other causes for her symptoms. For this multiplicity of problems we were asked to evaluate. Past Medical History:  
Diagnosis Date  Anxiety  Arthritis   
 unsure - knees, lower back, fingers and toes  Asthma   
 well controlled  Chronic pain   
 right abdomen,shooting pain groins  Depression  Diabetes ([facility] [address]   
 pre diabetes  GERD (gastroesophageal reflux disease)  Headache  Ill-defined condition   
 prolapsed cervix,rectum,bladder  Intractable nausea and vomiting 6/15/2018  Prolapse of anterior lip of cervix  Psychiatric disorder   
 bipolar, anxiety, depression  Rectal prolapse  Stool color black White stool Past Surgical History:  
Procedure Laterality Date  HX GYN    
 vaginal birth x2  
 HX HEENT    
 bilat tubes in ears age 3  
 [address]  07/09/2015 Laparoscopic Incisional hernia repair with mesh.  HX LAP CHOLECYSTECTOMY  7/7/2014  HX OTHER SURGICAL    
 colonoscopy Family History Problem Relation Age of Onset  Other Mother   
  fibromyalgia, IBS  Diabetes Mother Pre-DM  High Cholesterol Mother  Hypertension Mother  Liver Disease Mother   
  fatty liver  Cancer Mother   
  uterine  Heart Disease Mother [facility] Arthritis-osteo Mother  Diabetes Father  Hypertension Father  Asthma Brother  Diabetes Paternal Aunt  Hypertension Paternal Aunt  Diabetes Paternal Uncle  Hypertension Paternal Uncle  Cancer Maternal Grandfather   
  esoph  Hypertension Maternal Grandfather  Stroke Maternal Grandfather  Heart Disease Paternal Grandmother  Diabetes Paternal Grandfather  Heart Attack Paternal Grandfather  Stroke Paternal Grandfather  Hypertension Paternal Grandfather  Heart Disease Maternal Grandmother  Other Sister MTHFR (clotting D/O) Social History Substance Use Topics  Smoking status: Current Every Day Smoker Packs/day: 0.50 Years: 10.00 Start date: 3/1/2004  Smokeless tobacco: Never Used  Alcohol use No  
   
 
Current Outpatient Prescriptions:  
  rizatriptan (MAXALT-MLT) 10 mg disintegrating tablet, Take 1 Tab by mouth every eight (8) hours as needed for Migraine. , Disp: 12 Tab, Rfl: 5   buPROPion XL (WELLBUTRIN XL) 150 mg tablet, Take 150 mg by mouth daily. , Disp: , Rfl: 0 
  DULoxetine (CYMBALTA) 60 mg capsule, Take 60 mg by mouth daily. , Disp: , Rfl: 0 
  acetaminophen (TYLENOL) 500 mg tablet, Take 1,000 mg by mouth every six (6) hours as needed for Pain., Disp: , Rfl:  
 
 
 
Allergies Allergen Reactions  Latex Rash, Itching and Swelling  Pcn [Penicillins] Other (comments) Pt states as a child she had a reaction but does not remember specific reaction. Pt states\"mother say I stopped breathing\". Review of Systems: A comprehensive review of systems was negative except for: Constitutional: positive for fatigue and malaise Eyes: positive for visual disturbance and Loss of vision Ears, nose, mouth, throat, and face: positive for tinnitus Cardiovascular: positive for chest pressure/discomfort, dyspnea, palpitations, dyspnea on exertion, dizziness Gastrointestinal: positive for dyspepsia and reflux symptoms Musculoskeletal: positive for myalgias, arthralgias, stiff joints, neck pain, back pain and muscle weakness Neurological: positive for headaches, dizziness, vertigo, memory problems, paresthesia, coordination problems, gait problems and weakness Behvioral/Psych: positive for anxiety and depression Vitals:  
 07/30/18 3673 BP: 108/62 Pulse: 77 SpO2: 98% Weight: 190 lb (86.2 kg) Height: 5' 3\" (1.6 m) Objective: I 
 
 
NEUROLOGICAL EXAM: 
 
Appearance: The patient is well developed, well nourished, provides a coherent history and is in no acute distress. Mental Status: Oriented to time, place and person, and the president, cognitive function is normal and speech is fluent and no aphasia or dysarthria. Mood and affect appropriate. Cranial Nerves:   Intact visual fields. Fundi are benign. LEEANN, EOM's full, no nystagmus, no ptosis. Facial sensation is normal. Corneal reflexes are not tested. Facial movement is symmetric.  Hearing is normal bilaterally. Palate is midline with normal sternocleidomastoid and trapezius muscles are normal. Tongue is midline. Neck without meningismus or bruits Motor:  5/5 strength in upper and lower proximal and distal muscles. Normal bulk and tone. No fasciculations. Reflexes:   Deep tendon reflexes 2+/4 and symmetrical. 
No babinski or clonus present Sensory:   Normal to touch, pinprick and vibration. DSS is intact Gait:  Normal gait. Tremor:   No tremor noted. Cerebellar:  No cerebellar signs present. Neurovascular:  Normal heart sounds and regular rhythm, peripheral pulses intact, and no carotid bruits. Assessment: ICD-10-CM ICD-9-CM 1. Tension vascular headache G44.209 307.81 MRI BRAIN W WO CONT  
   REFERRAL TO PSYCHOLOGY  
   ASHELY COMPREHENSIVE PLUS PANEL  
   TSH 3RD GENERATION  
   VITAMIN D, 25 HYROXY PANEL  
   VITAMIN B12 & FOLATE  
   SED RATE (ESR) DUPLEX CAROTID BILATERAL AMB NEURO  
   XR SPINE THORAC 3 V  
   XR SPINE LUMB MIN 4 V  
   rizatriptan (MAXALT-MLT) 10 mg disintegrating tablet REFERRAL TO PSYCHIATRY 2. Disturbance of memory R41.3 780.93 MRI BRAIN W WO CONT  
   REFERRAL TO PSYCHOLOGY  
   ASHELY COMPREHENSIVE PLUS PANEL  
   TSH 3RD GENERATION  
   VITAMIN D, 25 HYROXY PANEL  
   VITAMIN B12 & FOLATE  
   SED RATE (ESR) DUPLEX CAROTID BILATERAL AMB NEURO  
   XR SPINE THORAC 3 V  
   XR SPINE LUMB MIN 4 V  
   rizatriptan (MAXALT-MLT) 10 mg disintegrating tablet REFERRAL TO PSYCHIATRY 3. Bilateral carotid artery stenosis I65.23 433.10 MRI BRAIN W WO CONT  
  433.30 REFERRAL TO PSYCHOLOGY  
   ASHELY COMPREHENSIVE PLUS PANEL  
   TSH 3RD GENERATION  
   VITAMIN D, 25 HYROXY PANEL  
   VITAMIN B12 & FOLATE  
   SED RATE (ESR) DUPLEX CAROTID BILATERAL AMB NEURO  
   XR SPINE THORAC 3 V  
   XR SPINE LUMB MIN 4 V  
   rizatriptan (MAXALT-MLT) 10 mg disintegrating tablet REFERRAL TO PSYCHIATRY 4. Cerebral microvascular disease I67.9 437.9 MRI BRAIN W WO CONT  
   REFERRAL TO PSYCHOLOGY  
   ASHELY COMPREHENSIVE PLUS PANEL  
   TSH 3RD GENERATION  
   VITAMIN D, 25 HYROXY PANEL  
   VITAMIN B12 & FOLATE  
   SED RATE (ESR) DUPLEX CAROTID BILATERAL AMB NEURO  
   XR SPINE THORAC 3 V  
   XR SPINE LUMB MIN 4 V  
   rizatriptan (MAXALT-MLT) 10 mg disintegrating tablet REFERRAL TO PSYCHIATRY 5. Lumbar back pain with radiculopathy affecting left lower extremity M54.17 724.4 MRI BRAIN W WO CONT  
   REFERRAL TO PSYCHOLOGY  
   ASHELY COMPREHENSIVE PLUS PANEL  
   TSH 3RD GENERATION  
   VITAMIN D, 25 HYROXY PANEL  
   VITAMIN B12 & FOLATE  
   SED RATE (ESR) DUPLEX CAROTID BILATERAL AMB NEURO  
   XR SPINE THORAC 3 V  
   XR SPINE LUMB MIN 4 V  
   rizatriptan (MAXALT-MLT) 10 mg disintegrating tablet REFERRAL TO PSYCHIATRY 6. Lumbar back pain with radiculopathy affecting right lower extremity M54.17 724.4 MRI BRAIN W WO CONT  
   REFERRAL TO PSYCHOLOGY  
   ASHELY COMPREHENSIVE PLUS PANEL  
   TSH 3RD GENERATION  
   VITAMIN D, 25 HYROXY PANEL  
   VITAMIN B12 & FOLATE  
   SED RATE (ESR) DUPLEX CAROTID BILATERAL AMB NEURO  
   XR SPINE THORAC 3 V  
   XR SPINE LUMB MIN 4 V  
   rizatriptan (MAXALT-MLT) 10 mg disintegrating tablet REFERRAL TO PSYCHIATRY 7. Transient vision disturbance of both eyes H53.9 368.9 MRI BRAIN W WO CONT  
   REFERRAL TO PSYCHOLOGY  
   ASHELY COMPREHENSIVE PLUS PANEL  
   TSH 3RD GENERATION  
   VITAMIN D, 25 HYROXY PANEL  
   VITAMIN B12 & FOLATE  
   SED RATE (ESR) DUPLEX CAROTID BILATERAL AMB NEURO  
   XR SPINE THORAC 3 V  
   XR SPINE LUMB MIN 4 V  
   rizatriptan (MAXALT-MLT) 10 mg disintegrating tablet REFERRAL TO PSYCHIATRY 8. Vitamin D deficiency E55.9 268.9 MRI BRAIN W WO CONT  
   REFERRAL TO PSYCHOLOGY  
   ASHELY COMPREHENSIVE PLUS PANEL  
   TSH 3RD GENERATION  
   VITAMIN D, 25 HYROXY PANEL  
   VITAMIN B12 & FOLATE  
   SED RATE (ESR) DUPLEX CAROTID BILATERAL AMB NEURO  
   XR SPINE THORAC 3 V  
   XR SPINE LUMB MIN 4 V  
   rizatriptan (MAXALT-MLT) 10 mg disintegrating tablet REFERRAL TO PSYCHIATRY 9. Hypothyroidism due to acquired atrophy of thyroid E03.4 244.8 MRI BRAIN W WO CONT  
  246.8 REFERRAL TO PSYCHOLOGY  
   ASHELY COMPREHENSIVE PLUS PANEL  
   TSH 3RD GENERATION  
   VITAMIN D, 25 HYROXY PANEL  
   VITAMIN B12 & FOLATE  
   SED RATE (ESR) DUPLEX CAROTID BILATERAL AMB NEURO  
   XR SPINE THORAC 3 V  
   XR SPINE LUMB MIN 4 V  
   rizatriptan (MAXALT-MLT) 10 mg disintegrating tablet REFERRAL TO PSYCHIATRY 10. Family history of pituitary disease Z83.49 V18.19 MRI BRAIN W WO CONT  
   REFERRAL TO PSYCHOLOGY  
   ASHELY COMPREHENSIVE PLUS PANEL  
   TSH 3RD GENERATION  
   VITAMIN D, 25 HYROXY PANEL  
   VITAMIN B12 & FOLATE  
   SED RATE (ESR) DUPLEX CAROTID BILATERAL AMB NEURO  
   XR SPINE THORAC 3 V  
   XR SPINE LUMB MIN 4 V  
   rizatriptan (MAXALT-MLT) 10 mg disintegrating tablet REFERRAL TO PSYCHIATRY 11. B12 deficiency E53.8 266.2 MRI BRAIN W WO CONT  
   REFERRAL TO PSYCHOLOGY  
   ASHELY COMPREHENSIVE PLUS PANEL  
   TSH 3RD GENERATION  
   VITAMIN D, 25 HYROXY PANEL  
   VITAMIN B12 & FOLATE  
   SED RATE (ESR) DUPLEX CAROTID BILATERAL AMB NEURO  
   XR SPINE THORAC 3 V  
   XR SPINE LUMB MIN 4 V  
   rizatriptan (MAXALT-MLT) 10 mg disintegrating tablet REFERRAL TO PSYCHIATRY Active Problems: * No active hospital problems. * 
 
 
Plan:  
 
Patient with a very difficult multiplicity of neurological complaints that sound alarming for increased intracranial pressure, structural lesion of the brain, possible recurrence of her pituitary abnormality, causing her to severe headaches, visual loss, marked dizziness unsteadiness walking, memory loss, and inability to function.  
We will get an MRI of the brain just to rule this out, but on feeling her goal, I do not feel any significantly worrisome nodules or bumps on her head, just the usual cranial suture line irregularities. Need to rule out pituitary lesion causing her visual loss and progressive headaches Patient has a family history of pituitary tumors need to acknowledge this. Patient is to go back and get neuropsych testing to further evaluate her memory in addition her B12 level thyroid test and vitamin D levels were all checked and she is going to get back on her antidepressant medication. Patient will also go to psychiatry to further reevaluate her medications. Because of her persistent headaches we will give her Maxalt to take sublingual as needed for headaches. X-ray both the thoracic and lumbar spine, and it was unremarkable she still has a lot of pain we can send her to therapy and consider an EMG. Over 1 hour spent with patient and her mother going over all her history, reviewing all her records, reviewing her MRI scan personally on the PACS system myself, and discussing her diagnosis prognosis and further treatment options in detail. Follow-up after the above-mentioned tests on 3-6 months time or earlier as needed. Signed By: Moises Zelaya MD   
 July 30, 2018 CC: David De Jesus NP 
FAX: [phone] This note will not be viewable in [address] If you have questions, please do not hesitate to call me. I look forward to following Ms. Ren Ma along with you. Sincerely, Moises Zelaya MD

## 2018-08-02 ENCOUNTER — OFFICE VISIT (OUTPATIENT)
Dept: NEUROLOGY | Age: 33
End: 2018-08-02

## 2018-08-02 DIAGNOSIS — Z83.49 FAMILY HISTORY OF PITUITARY DISEASE: ICD-10-CM

## 2018-08-02 DIAGNOSIS — M54.16 LUMBAR BACK PAIN WITH RADICULOPATHY AFFECTING LEFT LOWER EXTREMITY: ICD-10-CM

## 2018-08-02 DIAGNOSIS — M54.16 LUMBAR BACK PAIN WITH RADICULOPATHY AFFECTING RIGHT LOWER EXTREMITY: ICD-10-CM

## 2018-08-02 DIAGNOSIS — R41.3 DISTURBANCE OF MEMORY: ICD-10-CM

## 2018-08-02 DIAGNOSIS — G44.209 TENSION VASCULAR HEADACHE: ICD-10-CM

## 2018-08-02 DIAGNOSIS — E55.9 VITAMIN D DEFICIENCY: ICD-10-CM

## 2018-08-02 DIAGNOSIS — I65.23 BILATERAL CAROTID ARTERY STENOSIS: Primary | ICD-10-CM

## 2018-08-02 DIAGNOSIS — I67.89 CEREBRAL MICROVASCULAR DISEASE: ICD-10-CM

## 2018-08-02 DIAGNOSIS — E53.8 B12 DEFICIENCY: ICD-10-CM

## 2018-08-02 DIAGNOSIS — H53.9 TRANSIENT VISION DISTURBANCE OF BOTH EYES: ICD-10-CM

## 2018-08-02 DIAGNOSIS — E03.4 HYPOTHYROIDISM DUE TO ACQUIRED ATROPHY OF THYROID: ICD-10-CM

## 2018-08-02 NOTE — LETTER
8/2/2018 8:25 PM 
 
Patient:  Chely Flores YOB: 1985 Date of Visit: 8/2/2018 Dear No Recipients: Thank you for referring Ms. Danae Tracy to me for evaluation/treatment. Below are the relevant portions of my assessment and plan of care. Doppler dictated in procedure note If you have questions, please do not hesitate to call me. I look forward to following Ms. Morgan Leggett along with you. Sincerely, [address]

## 2018-08-03 NOTE — PROCEDURES
This study consisted of pulsed wave Doppler examination, Color-flow imaging, and Duplex imaging of both the right and left carotid systems, and both vertebral arteries.        Imaging of both right and left carotid systems showed no mixed plaquing at the bifurcations and proximal and distal internal and external carotid arteries bilaterally, with stenosis in the range of 0% only and with no flow abnormalities identified.        Both vertebral arteries showed normal antegrade flow.

## 2018-08-06 LAB
25(OH)D2 SERPL-MCNC: 8.3 NG/ML
25(OH)D3 SERPL-MCNC: 18 NG/ML
25(OH)D3+25(OH)D2 SERPL-MCNC: 26 NG/ML
CENTROMERE B AB SER-ACNC: <0.2 AI (ref 0–0.9)
CHROMATIN AB SERPL-ACNC: <0.2 AI (ref 0–0.9)
DSDNA AB SER-ACNC: 1 IU/ML (ref 0–9)
ENA JO1 AB SER-ACNC: <0.2 AI (ref 0–0.9)
ENA RNP AB SER-ACNC: <0.2 AI (ref 0–0.9)
ENA SCL70 AB SER-ACNC: <0.2 AI (ref 0–0.9)
ENA SM AB SER-ACNC: <0.2 AI (ref 0–0.9)
ENA SM+RNP AB SER-ACNC: <0.2 AI (ref 0–0.9)
ENA SS-A AB SER-ACNC: <0.2 AI (ref 0–0.9)
ENA SS-B AB SER-ACNC: <0.2 AI (ref 0–0.9)
ERYTHROCYTE [SEDIMENTATION RATE] IN BLOOD BY WESTERGREN METHOD: 2 MM/HR (ref 0–32)
FOLATE SERPL-MCNC: 3.3 NG/ML
RIBOSOMAL P AB SER-ACNC: <0.2 AI (ref 0–0.9)
SEE BELOW:, 164879: NORMAL
TSH SERPL DL<=0.005 MIU/L-ACNC: 2.12 UIU/ML (ref 0.45–4.5)
VIT B12 SERPL-MCNC: 256 PG/ML (ref 232–1245)

## 2018-08-08 ENCOUNTER — TELEPHONE (OUTPATIENT)
Dept: NEUROLOGY | Age: 33
End: 2018-08-08

## 2018-08-08 NOTE — TELEPHONE ENCOUNTER
I called the patient and notified that she can come in tomorrow before her appointment and  as they might need this

## 2018-08-08 NOTE — TELEPHONE ENCOUNTER
----- Message from Viibar sent at 8/8/2018  3:52 PM EDT -----  Regarding: Dr Vitale/telephone  Pt (p) [phone], pt said she is scheduled for an MRI  and x-rays tomorrow at 10:00am at [facility], and misplaced the order, she would like to know does she need the order for the appt or will they be able to bring it up in the system if not can a replacement  order be faxed over

## 2018-08-09 ENCOUNTER — HOSPITAL ENCOUNTER (OUTPATIENT)
Dept: GENERAL RADIOLOGY | Age: 33
Discharge: HOME | End: 2018-08-09
Attending: PSYCHIATRY & NEUROLOGY
Payer: MEDICAID

## 2018-08-09 ENCOUNTER — HOSPITAL ENCOUNTER (OUTPATIENT)
Dept: MRI IMAGING | Age: 33
Discharge: HOME | End: 2018-08-09
Attending: PSYCHIATRY & NEUROLOGY
Payer: MEDICAID

## 2018-08-09 DIAGNOSIS — M54.16 LUMBAR BACK PAIN WITH RADICULOPATHY AFFECTING LEFT LOWER EXTREMITY: ICD-10-CM

## 2018-08-09 DIAGNOSIS — I65.23 BILATERAL CAROTID ARTERY STENOSIS: ICD-10-CM

## 2018-08-09 DIAGNOSIS — M54.16 LUMBAR BACK PAIN WITH RADICULOPATHY AFFECTING RIGHT LOWER EXTREMITY: ICD-10-CM

## 2018-08-09 DIAGNOSIS — E53.8 B12 DEFICIENCY: ICD-10-CM

## 2018-08-09 DIAGNOSIS — I67.89 CEREBRAL MICROVASCULAR DISEASE: ICD-10-CM

## 2018-08-09 DIAGNOSIS — M54.9 BACK PAIN: ICD-10-CM

## 2018-08-09 DIAGNOSIS — E03.4 HYPOTHYROIDISM DUE TO ACQUIRED ATROPHY OF THYROID: ICD-10-CM

## 2018-08-09 DIAGNOSIS — H53.9 TRANSIENT VISION DISTURBANCE OF BOTH EYES: ICD-10-CM

## 2018-08-09 DIAGNOSIS — R41.3 DISTURBANCE OF MEMORY: ICD-10-CM

## 2018-08-09 DIAGNOSIS — E55.9 VITAMIN D DEFICIENCY: ICD-10-CM

## 2018-08-09 DIAGNOSIS — Z83.49 FAMILY HISTORY OF PITUITARY DISEASE: ICD-10-CM

## 2018-08-09 DIAGNOSIS — G44.209 TENSION VASCULAR HEADACHE: ICD-10-CM

## 2018-08-09 PROCEDURE — 72110 X-RAY EXAM L-2 SPINE 4/>VWS: CPT

## 2018-08-09 PROCEDURE — 70553 MRI BRAIN STEM W/O & W/DYE: CPT

## 2018-08-09 PROCEDURE — 74011250636 HC RX REV CODE- 250/636: Performed by: PSYCHIATRY & NEUROLOGY

## 2018-08-09 PROCEDURE — A9575 INJ GADOTERATE MEGLUMI 0.1ML: HCPCS | Performed by: PSYCHIATRY & NEUROLOGY

## 2018-08-09 PROCEDURE — 72072 X-RAY EXAM THORAC SPINE 3VWS: CPT

## 2018-08-09 RX ORDER — GADOTERATE MEGLUMINE 376.9 MG/ML
20 INJECTION INTRAVENOUS
Status: COMPLETED | OUTPATIENT
Start: 2018-08-09 | End: 2018-08-09

## 2018-08-09 RX ADMIN — GADOTERATE MEGLUMINE 20 ML: 376.9 INJECTION INTRAVENOUS at 10:34

## 2018-09-19 ENCOUNTER — HOSPITAL ENCOUNTER (OUTPATIENT)
Dept: GENERAL RADIOLOGY | Age: 33
Discharge: HOME | End: 2018-09-19
Payer: MEDICAID

## 2018-09-19 DIAGNOSIS — R10.11 ABDOMINAL PAIN, RIGHT UPPER QUADRANT: ICD-10-CM

## 2018-09-19 PROCEDURE — 74018 RADEX ABDOMEN 1 VIEW: CPT

## 2018-09-25 ENCOUNTER — OFFICE VISIT (OUTPATIENT)
Dept: FAMILY MEDICINE CLINIC | Age: 33
End: 2018-09-25

## 2018-09-25 VITALS
OXYGEN SATURATION: 98 % | SYSTOLIC BLOOD PRESSURE: 131 MMHG | BODY MASS INDEX: 34.09 KG/M2 | HEIGHT: 63 IN | TEMPERATURE: 97.9 F | RESPIRATION RATE: 16 BRPM | WEIGHT: 192.4 LBS | HEART RATE: 77 BPM | DIASTOLIC BLOOD PRESSURE: 89 MMHG

## 2018-09-25 DIAGNOSIS — E55.9 VITAMIN D DEFICIENCY: ICD-10-CM

## 2018-09-25 DIAGNOSIS — R11.2 INTRACTABLE VOMITING WITH NAUSEA, UNSPECIFIED VOMITING TYPE: ICD-10-CM

## 2018-09-25 DIAGNOSIS — E53.8 B12 DEFICIENCY: ICD-10-CM

## 2018-09-25 DIAGNOSIS — F33.2 SEVERE EPISODE OF RECURRENT MAJOR DEPRESSIVE DISORDER, WITHOUT PSYCHOTIC FEATURES (HCC): Primary | ICD-10-CM

## 2018-09-25 DIAGNOSIS — R41.3 DISTURBANCE OF MEMORY: ICD-10-CM

## 2018-09-25 DIAGNOSIS — Z23 ENCOUNTER FOR IMMUNIZATION: ICD-10-CM

## 2018-09-25 DIAGNOSIS — N81.4 UTERINE PROLAPSE: ICD-10-CM

## 2018-09-25 DIAGNOSIS — K62.2 ANAL PROLAPSE: ICD-10-CM

## 2018-09-25 NOTE — PROGRESS NOTES
HISTORY OF PRESENT ILLNESS Anna Grant is a 34 y.o. female. HPI patient comes in today for follow up. Dr. Bi Scott - GYN. Has uterine, bladder and rectal prolapse. Will be having total hysterectomy. Will have to call to schedule date. Has pain with sex and painful periods, will be looking for endometriosis. Reford Dorianacher Urologist states prolapses have gotten worse. She will be doing part of surgery at same time. Scheduled for neuropsych with Dr. Precious Khan in March 2019 for memory issues. Taking cymbalta and wellbutrin. Was referred to Psych - needs to make appointment. Sometimes she feels edgy, like she can lose temper quick. States she just started taking medications together, refuses any adjustments on medication at this time. Saw Dr. James Acevedo - neurology. Had head CT and brain MRI - normal.  Thoracic xray normal.  Lumbar xray with degenerative changes. States the \"dent\" in head comes and goes. No bony abnormality noted on scans. Allergies Allergen Reactions  Latex Rash, Itching and Swelling  Pcn [Penicillins] Other (comments) Pt states as a child she had a reaction but does not remember specific reaction. Pt states\"mother say I stopped breathing\". Past Medical History:  
Diagnosis Date  Anxiety  Arthritis   
 unsure - knees, lower back, fingers and toes  Asthma   
 well controlled  Chronic pain   
 right abdomen,shooting pain groins  Depression  Diabetes ([address]   
 pre diabetes  GERD (gastroesophageal reflux disease)  Headache  Ill-defined condition   
 prolapsed cervix,rectum,bladder  Intractable nausea and vomiting 6/15/2018  Prolapse of anterior lip of cervix  Psychiatric disorder   
 bipolar, anxiety, depression  Rectal prolapse  Stool color black White stool Past Surgical History:  
Procedure Laterality Date  HX GYN    
 vaginal birth x2  
 HX HEENT    
 bilat tubes in ears age 3  
 [address]  07/09/2015 Laparoscopic Incisional hernia repair with mesh.  HX LAP CHOLECYSTECTOMY  7/7/2014  HX OTHER SURGICAL    
 colonoscopy Social History Social History  Marital status: SINGLE Spouse name: N/A  
 Number of children: 2  
 Years of education: N/A Occupational History   Not Employed  
  work at home Social History Main Topics  Smoking status: Current Every Day Smoker Packs/day: 0.50 Years: 10.00 Start date: 3/1/2004  Smokeless tobacco: Never Used  Alcohol use No  
 Drug use: Yes Special: Marijuana Comment: few months ago  Sexual activity: Yes  
  Partners: Male Birth control/ protection: None Comment: single Other Topics Concern  Not on file Social History Narrative Unemployed currently Family History Problem Relation Age of Onset  Other Mother   
  fibromyalgia, IBS  Diabetes Mother Pre-DM  High Cholesterol Mother  Hypertension Mother  Liver Disease Mother   
  fatty liver  Cancer Mother   
  uterine  Heart Disease Mother CHF [place] Arthritis-osteo Mother  Diabetes Father  Hypertension Father  Asthma Brother  Diabetes Paternal Aunt  Hypertension Paternal Aunt  Diabetes Paternal Uncle  Hypertension Paternal Uncle  Cancer Maternal Grandfather   
  esoph  Hypertension Maternal Grandfather  Stroke Maternal Grandfather  Heart Disease Paternal Grandmother  Diabetes Paternal Grandfather  Heart Attack Paternal Grandfather  Stroke Paternal Grandfather  Hypertension Paternal Grandfather  Heart Disease Maternal Grandmother  Other Sister MTHFR (clotting D/O) Current Outpatient Prescriptions Medication Sig  DULoxetine (CYMBALTA) 60 mg capsule take 1 capsule by mouth once daily  rizatriptan (MAXALT-MLT) 10 mg disintegrating tablet Take 1 Tab by mouth every eight (8) hours as needed for Migraine.  buPROPion XL (WELLBUTRIN XL) 150 mg tablet Take 150 mg by mouth daily.  acetaminophen (TYLENOL) 500 mg tablet Take 1,000 mg by mouth every six (6) hours as needed for Pain. No current facility-administered medications for this visit. Review of Systems Constitutional: Positive for malaise/fatigue. Negative for chills and fever. HENT: Negative for congestion, ear pain, sore throat and tinnitus. Eyes: Negative for blurred vision. Respiratory: Negative for cough, sputum production, shortness of breath and wheezing. Cardiovascular: Negative for chest pain, palpitations and leg swelling. Gastrointestinal: Positive for nausea. Negative for abdominal pain, blood in stool, constipation, diarrhea and vomiting. Genitourinary: Negative for dysuria, flank pain, frequency, hematuria and urgency. Musculoskeletal: Positive for back pain, joint pain (multiple joints) and myalgias. Skin: Negative. Neurological: Negative for dizziness, tingling, sensory change, speech change, focal weakness and headaches. Psychiatric/Behavioral: Positive for depression. Negative for suicidal ideas. The patient is nervous/anxious. Vitals:  
 09/25/18 1613 BP: 131/89 Pulse: 77 Resp: 16 Temp: 97.9 °F (36.6 °C) TempSrc: Oral  
SpO2: 98% Weight: 192 lb 6.4 oz (87.3 kg) Height: 5' 3\" (1.6 m) Physical Exam  
Constitutional: She is oriented to person, place, and time. Vital signs are normal. She appears well-developed and well-nourished. She is cooperative. HENT:  
Head: Normocephalic. No bony abnormality Cardiovascular: Normal rate, regular rhythm and normal heart sounds. Pulmonary/Chest: Effort normal and breath sounds normal.  
Neurological: She is alert and oriented to person, place, and time. Skin: Skin is warm and dry. Psychiatric: Her behavior is normal. Judgment and thought content normal. She exhibits a depressed mood (mood improved from previous visit). ASSESSMENT and PLAN 
  ICD-10-CM ICD-9-CM 1. Severe episode of recurrent major depressive disorder, without psychotic features ([facility] F33.2 296.33   
2. Disturbance of memory R41.3 780.93   
3. Vitamin D deficiency E55.9 268.9 4. B12 deficiency E53.8 266.2 5. Encounter for immunization Z23 V03.89 INFLUENZA VIRUS VAC QUAD,SPLIT,PRESV FREE SYRINGE IM  
   NV IMMUNIZ ADMIN,1 SINGLE/COMB VAC/TOXOID 6. Uterine prolapse N81.4 618.1 7. Anal prolapse K62.2 569.1 8. Intractable vomiting with nausea, unspecified vomiting type R11.2 536.2 Encounter Diagnoses Name Primary?  Severe episode of recurrent major depressive disorder, without psychotic features ([facility] Yes  Disturbance of memory  Vitamin D deficiency  B12 deficiency  Encounter for immunization  Uterine prolapse  Anal prolapse  Intractable vomiting with nausea, unspecified vomiting type Orders Placed This Encounter  NV IMMUNIZ ADMIN,1 SINGLE/COMB VAC/TOXOID  Influenza virus vaccine (QUADRIVALENT PRES FREE SYRINGE) IM ([id number]) Diagnoses and all orders for this visit: 1. Severe episode of recurrent major depressive disorder, without psychotic features ([facility] 2. Disturbance of memory - has neuropsych appt in March 2019 3. Vitamin D deficiency - patient to take OTC Vitamin D 5,000 units daily 4. B12 deficiency - patient to take OTC B12 1,000mcg daily or B-complex 5. Encounter for immunization -     Influenza virus vaccine (QUADRIVALENT PRES FREE SYRINGE) IM ([id number]) -     NV IMMUNIZ ADMIN,1 SINGLE/COMB VAC/TOXOID 6. Uterine prolapse - managed by urology, will be scheduled for surgical repair 7. Anal prolapse - managed by urology, will be scheduled for surgical repair 8. Intractable vomiting with nausea, unspecified vomiting type - per GI, scheduled for EGD on 10/8/18 Follow-up Disposition: 
Return if symptoms worsen or fail to improve. I have reviewed the patient's allergies and made any necessary changes. Medical, procedural, social and family histories have been reviewed and updated as medically indicated. I have reconciled and/or revised patient medications in the EMR. I have discussed each diagnosis listed in this note with James Escalera and/or their family. I have discussed treatment options and the risk/benefit analysis of those options, including safe use of medications and possible medication side effects. Through the use of shared decision making we have agreed to the above plan. The patient has received an after-visit summary and questions were answered concerning future plans. Kassi Bergman, FNP-C This note will not be viewable in [address] Total visit time spent in direct patient care/contact:  25-39 minutes Greater than 50% of visit time was spent counseling and coordinating care.

## 2018-09-25 NOTE — PROGRESS NOTES
1. Have you been to the ER, urgent care clinic since your last visit? Hospitalized since your last visit? Yes  When: 7/1/18  Where [facility]   Why  Sore throat and migraines 2. Have you seen or consulted any other health care providers outside of the [address] [place] [address] since your last visit? Include any pap smears or colon screening. Yes When: 7/2018 Where: Dr Dumont Ranks Reason for visit: uterine prolapse Has not seen psych at this time. States feels a little better with depression After obtaining consent, and per orders of Vangie London NP given to  Right deltoid IM . Patient instructed to remain in clinic for 15 minutes afterwards, and to report any adverse reaction to me immediately. Patient did not have any adverse reactions during this office visit

## 2018-09-25 NOTE — PATIENT INSTRUCTIONS
Learning About Vitamin D Why is it important to get enough vitamin D? Your body needs vitamin D to absorb calcium. Calcium keeps your bones and muscles, including your heart, healthy and strong. If your muscles don't get enough calcium, they can cramp, hurt, or feel weak. You may have long-term (chronic) muscle aches and pains. If you don't get enough vitamin D throughout life, you have an increased chance of having thin and brittle bones (osteoporosis) in your later years. Children who don't get enough vitamin D may not grow as much as others their age. They also have a chance of getting a rare disease called rickets. It causes weak bones. Vitamin D and calcium are added to many foods. And your body uses sunshine to make its own vitamin D. How much vitamin D do you need? The [place] of Medicine recommends that people ages 3 through 79 get 600 IU (international units) every day. Adults 71 and older need 800 IU every day. Blood tests for vitamin D can check your vitamin D level. But there is no standard normal range used by all laboratories. The [place] of Medicine recommends a blood level of 20 ng/mL of vitamin D for healthy bones. And most people in the [place] and [facility]) meet this goal. 
How can you get more vitamin D? Foods that contain vitamin D include: 
· [place], tuna, and mackerel. These are some of the best foods to eat when you need to get more vitamin D. 
· Cheese, egg yolks, and beef liver. These foods have vitamin D in small amounts. · Milk, soy drinks, orange juice, yogurt, margarine, and some kinds of cereal have vitamin D added to them. Some people don't make vitamin D as well as others. They may have to take extra care in getting enough vitamin D. Things that reduce how much vitamin D your body makes include: · Dark skin, such as many  Americans have. · Age, especially if you are older than 72. · Digestive problems, such as Crohn's or celiac disease. · Liver and kidney disease. Some people who do not get enough vitamin D may need supplements. Are there any risks from taking vitamin D? 
· Too much vitamin D: 
¨ Can damage your kidneys. ¨ Can cause nausea and vomiting, constipation, and weakness. ¨ Raises the amount of calcium in your blood. If this happens, you can get confused or have an irregular heart rhythm. · Vitamin D may interact with other medicines. Tell your doctor about all of the medicines you take, including over-the-counter drugs, herbs, and pills. Tell your doctor about all of your current medical problems. Where can you learn more? Go to http://orin-cris.info/. Enter [id number] in the search box to learn more about \"Learning About Vitamin D.\" 
Current as of: May 12, 2017 Content Version: 11.7 © 0624-4309 [employer]. Care instructions adapted under license by [employer] (which disclaims liability or warranty for this information). If you have questions about a medical condition or this instruction, always ask your healthcare professional. [address] any warranty or liability for your use of this information.

## 2018-09-25 NOTE — MR AVS SNAPSHOT
[facility] 
 
 
 [address] [facility] [address] [address] 
[phone] Patient: Jennifer Rodarte MRN: [id number] IBY:[birth year] Visit Information Date & Time Provider Department Dept. Phone Encounter #  
 9/25/2018  4:00 PM Ned Greenberg NP [address] [phone] [id number] Follow-up Instructions Return if symptoms worsen or fail to improve. Your Appointments 3/14/2019  1:40 PM  
New Patient with Carrie Rash, PsyD [address] ([address]) Appt Note: new patient memory loss/ ajn; . [place] [address] [place] [address] [phone]  
  
   
 Francis Juan  
  
    
 3/20/2019  9:00 AM  
Follow Up with Salinas Bo MD  
Neurology [facility] at [facility] [address]) Appt Note: follow up $0 [facility] 7/30/18  
 [address], [address] [address]  
[address] [facility], [address] [place] [address], [address] [place] [address] [address] [address] Upcoming Health Maintenance Date Due Influenza Age 5 to Adult 8/1/2018 PAP AKA CERVICAL CYTOLOGY 8/5/2019 DTaP/Tdap/Td series (2 - Td) 1/26/2022 Allergies as of 9/25/2018  Review Complete On: 9/25/2018 By: Justa Adrian LPN Severity Noted Reaction Type Reactions Latex Medium 03/14/2014   Side Effect Rash, Itching, Swelling Pcn [Penicillins] High 12/12/2011   Systemic Other (comments) Pt states as a child she had a reaction but does not remember specific reaction. Pt states\"mother say I stopped breathing\". Current Immunizations  Reviewed on 7/9/2015 Name Date Influenza Vaccine 2/5/2013 Influenza Vaccine (Quad) PF 9/25/2018 Influenza Vaccine Split 1/26/2012 TDAP Vaccine 1/26/2012 Not reviewed this visit You Were Diagnosed With   
  
 Codes Comments Encounter for immunization    -  Primary ICD-10-CM: R24 ICD-9-CM: V03.89 Severe episode of recurrent major depressive disorder, without psychotic features ([facility]     ICD-10-CM: F33.2 ICD-9-CM: 296.33 Disturbance of memory     ICD-10-CM: R41.3 ICD-9-CM: 780.93 Vitamin D deficiency     ICD-10-CM: E55.9 ICD-9-CM: 268.9 B12 deficiency     ICD-10-CM: E53.8 ICD-9-CM: 266.2 Vitals BP Pulse Temp Resp Height(growth percentile) Weight(growth percentile) 131/89 (BP 1 Location: Left arm, BP Patient Position: Sitting) 77 97.9 °F (36.6 °C) (Oral) 16 5' 3\" (1.6 m) 192 lb 6.4 oz (87.3 kg) SpO2 BMI OB Status Smoking Status 98% 34.08 kg/m2 Unknown Current Every Day Smoker Vitals History BMI and BSA Data Body Mass Index Body Surface Area 34.08 kg/m 2 1.97 m 2 Preferred Pharmacy Pharmacy Name Phone [id number] [address] [phone] Your Updated Medication List  
  
   
This list is accurate as of 9/25/18  5:05 PM.  Always use your most recent med list.  
  
  
  
  
 acetaminophen 500 mg tablet Commonly known as:  TYLENOL Take 1,000 mg by mouth every six (6) hours as needed for Pain. buPROPion  mg tablet Commonly known as:  Charu Salm Take 150 mg by mouth daily. DULoxetine 60 mg capsule Commonly known as:  CYMBALTA  
take 1 capsule by mouth once daily  
  
 rizatriptan 10 mg disintegrating tablet Commonly known as:  MAXALT-MLT Take 1 Tab by mouth every eight (8) hours as needed for Migraine. We Performed the Following INFLUENZA VIRUS VAC QUAD,SPLIT,PRESV FREE SYRINGE IM [id number] CPT(R)] OK IMMUNIZ ADMIN,1 SINGLE/COMB VAC/TOXOID [id number] CPT(R)] Follow-up Instructions Return if symptoms worsen or fail to improve. Patient Instructions Learning About Vitamin D Why is it important to get enough vitamin D? Your body needs vitamin D to absorb calcium. Calcium keeps your bones and muscles, including your heart, healthy and strong. If your muscles don't get enough calcium, they can cramp, hurt, or feel weak. You may have long-term (chronic) muscle aches and pains. If you don't get enough vitamin D throughout life, you have an increased chance of having thin and brittle bones (osteoporosis) in your later years. Children who don't get enough vitamin D may not grow as much as others their age. They also have a chance of getting a rare disease called rickets. It causes weak bones. Vitamin D and calcium are added to many foods. And your body uses sunshine to make its own vitamin D. How much vitamin D do you need? The [place] of Medicine recommends that people ages 3 through 79 get 600 IU (international units) every day. Adults 71 and older need 800 IU every day. Blood tests for vitamin D can check your vitamin D level. But there is no standard normal range used by all laboratories. The [place] of Medicine recommends a blood level of 20 ng/mL of vitamin D for healthy bones. And most people in the [place] and [facility]) meet this goal. 
How can you get more vitamin D? Foods that contain vitamin D include: 
· [place], tuna, and mackerel. These are some of the best foods to eat when you need to get more vitamin D. 
· Cheese, egg yolks, and beef liver. These foods have vitamin D in small amounts. · Milk, soy drinks, orange juice, yogurt, margarine, and some kinds of cereal have vitamin D added to them. Some people don't make vitamin D as well as others. They may have to take extra care in getting enough vitamin D. Things that reduce how much vitamin D your body makes include: · Dark skin, such as many  Americans have. · Age, especially if you are older than 72. · Digestive problems, such as Crohn's or celiac disease. · Liver and kidney disease. Some people who do not get enough vitamin D may need supplements. Are there any risks from taking vitamin D? 
· Too much vitamin D: 
¨ Can damage your kidneys. ¨ Can cause nausea and vomiting, constipation, and weakness. ¨ Raises the amount of calcium in your blood. If this happens, you can get confused or have an irregular heart rhythm. · Vitamin D may interact with other medicines. Tell your doctor about all of the medicines you take, including over-the-counter drugs, herbs, and pills. Tell your doctor about all of your current medical problems. Where can you learn more? Go to http://orinWriteLatexcris.info/. Enter [id number] in the search box to learn more about \"Learning About Vitamin D.\" 
Current as of: May 12, 2017 Content Version: 11.7 © 3371-8794 [employer]. Care instructions adapted under license by [employer] (which disclaims liability or warranty for this information). If you have questions about a medical condition or this instruction, always ask your healthcare professional. [address] any warranty or liability for your use of this information. Introducing [facility]! Dez Britton introduces MedCenterDisplay patient portal. Now you can access parts of your medical record, email your doctor's office, and request medication refills online. 1. In your internet browser, go to [email or web address] 2. Click on the First Time User? Click Here link in the Sign In box. You will see the New Member Sign Up page. 3. Enter your MedCenterDisplay Access Code exactly as it appears below. You will not need to use this code after youve completed the sign-up process. If you do not sign up before the expiration date, you must request a new code. · MedCenterDisplay Access Code: [email or web address] Expires: 12/18/2018 12:44 PM 
 
4.  Enter the last four digits of your Social Security Number (xxxx) and Date of Birth (mm/dd/yyyy) as indicated and click Submit. You will be taken to the next sign-up page. 5. Create a Arecont Vision ID. This will be your Are[employer] Vision login ID and cannot be changed, so think of one that is secure and easy to remember. 6. Create a Are[employer] Vision password. You can change your password at any time. 7. Enter your Password Reset Question and Answer. This can be used at a later time if you forget your password. 8. Enter your e-mail address. You will receive e-mail notification when new information is available in [address] 9. Click Sign Up. You can now view and download portions of your medical record. 10. Click the Download Summary menu link to download a portable copy of your medical information. If you have questions, please visit the Frequently Asked Questions section of the Arecont Vision website. Remember, [employer] is NOT to be used for urgent needs. For medical emergencies, dial 911. Now available from your iPhone and Android! Please provide this summary of care documentation to your next provider. Your primary care clinician is listed as [address]. If you have any questions after today's visit, please call [phone].

## 2018-10-01 RX ORDER — CALCIUM CARBONATE 200(500)MG
1 TABLET,CHEWABLE ORAL DAILY
COMMUNITY
End: 2019-05-15 | Stop reason: ALTCHOICE

## 2018-10-01 RX ORDER — OMEPRAZOLE 20 MG/1
20 CAPSULE, DELAYED RELEASE ORAL DAILY
Status: ON HOLD | COMMUNITY
End: 2018-10-08

## 2018-10-01 NOTE — PERIOP NOTES
During PAT call, pt reported hx of chest pain, cardio work up 7/2018. Per pt. She still experiences occasional chest pain. Report has not had CP in awhile. Instructed pt to follow up with her PCP if she experiences an episode of CP. Pt verbalized understanding. Pt also reports no SOB or CP at this time.

## 2018-10-01 NOTE — PERIOP NOTES
[facility]  Ambulatory Surgery Unit  Pre-operative Instructions for Endo Procedures    Procedure Date  10/8/18            Tentative Arrival Time 0915      1. On the day of your procedure, please report to the Ambulatory Surgery Unit Registration Desk and sign in at your designated time. The Ambulatory Surgery Unit is located in [facility] on the [facility] side of the [facility] across from the [address] [place] [address]. Please have all of your health insurance cards and a photo ID. 2. You must have someone with you to drive you home, as you should not drive a car for 24 hours following anesthesia. Please make arrangements for a responsible adult friend or family member to stay with you for at least the first 24 hours after your procedure. 3. Do not have anything to eat or drink (including water, gum, mints, coffee, juice) after 11:59 PM 10/7/18. This may not apply to medications prescribed by your physician. (Please note below the special instructions with medications to take the morning of your procedure.)    4. If applicable, follow the clear liquid diet and bowel prep instructions provided by your physician's office. If you do not have this information, or have any questions, please contact your physician's office. 5. We recommend you do not drink any alcoholic beverages for 24 hours before and after your procedure. 6. Contact your surgeons office for instructions on the following medications: non-steroidal anti-inflammatory drugs (i.e. Advil, Aleve), vitamins, and supplements. (Some surgeons will want you to stop these medications prior to surgery and others may allow you to take them)   **If you are currently taking Plavix, Coumadin, Aspirin and/or other blood-thinning agents, contact your surgeon for instructions. ** Your surgeon will partner with the physician prescribing these medications to determine if it is safe to stop or if you need to continue taking.  Please do not stop taking these medications without instructions from your surgeon. 7. In an effort to help prevent surgical site infection, we ask that you shower with an anti-bacterial soap (i.e. Dial or Safeguard) on the morning of your procedure. Do not apply any lotions, powders, or deodorants after showering. 8. Wear comfortable clothes. Wear glasses instead of contacts. Do not bring any jewelry or money (other than copays or fees as instructed). Do not wear make-up, particularly mascara, the morning of your procedure. Wear your hair loose or down, no ponytails, buns, alexander pins or clips. All body piercings must be removed. 9. You should understand that if you do not follow these instructions your procedure may be cancelled. If your physical condition changes (i.e. fever, cold or flu) please contact your surgeon as soon as possible. 10. It is important that you be on time. If a situation occurs where you may be late, or if you have any questions or problems, please call [phone]. 11. Your procedure time may be subject to change. You will receive a phone call the day prior to confirm your arrival time. Special Instructions: Take all medications and inhalers, as prescribed, on the morning of surgery with a sip of water EXCEPT: None      Insulin Dependent Diabetic patients: Take your diabetic medications as prescribed the day before surgery. Hold all diabetic medications the day of surgery. If you are scheduled to arrive for surgery after 8:00 AM, and your AM blood sugar is >200, please call Ambulatory Surgery. I understand a pre-operative phone call will be made to verify my procedure time. In the event that I am not available, I give permission for a message to be left on my answering service and/or with another person?       Yes      The above note was reviewed with patient during PAT phone call on 10/1/18, pt verbalized understanding   ___________________      ___________________ ___________________  (Signature of Patient)          (Witness)                   (Date and Time)

## 2018-10-05 ENCOUNTER — ANESTHESIA EVENT (OUTPATIENT)
Dept: SURGERY | Age: 33
End: 2018-10-05
Payer: MEDICAID

## 2018-10-08 ENCOUNTER — HOSPITAL ENCOUNTER (OUTPATIENT)
Age: 33
Setting detail: OUTPATIENT SURGERY
Discharge: HOME | End: 2018-10-08
Attending: INTERNAL MEDICINE | Admitting: INTERNAL MEDICINE
Payer: MEDICAID

## 2018-10-08 ENCOUNTER — ANESTHESIA (OUTPATIENT)
Dept: SURGERY | Age: 33
End: 2018-10-08
Payer: MEDICAID

## 2018-10-08 VITALS
HEART RATE: 87 BPM | SYSTOLIC BLOOD PRESSURE: 126 MMHG | OXYGEN SATURATION: 99 % | WEIGHT: 192 LBS | HEIGHT: 63 IN | RESPIRATION RATE: 15 BRPM | BODY MASS INDEX: 34.02 KG/M2 | TEMPERATURE: 97.8 F | DIASTOLIC BLOOD PRESSURE: 85 MMHG

## 2018-10-08 LAB
GLUCOSE BLD STRIP.AUTO-MCNC: 93 MG/DL (ref 65–100)
H PYLORI FROM TISSUE: NEGATIVE
HCG UR QL: NEGATIVE
KIT LOT NO., HCLOLOT: NORMAL
NEGATIVE CONTROL: NEGATIVE
POSITIVE CONTROL: POSITIVE
SERVICE CMNT-IMP: NORMAL

## 2018-10-08 PROCEDURE — 87077 CULTURE AEROBIC IDENTIFY: CPT | Performed by: INTERNAL MEDICINE

## 2018-10-08 PROCEDURE — 76210000040 HC AMBSU PH I REC FIRST 0.5 HR: Performed by: INTERNAL MEDICINE

## 2018-10-08 PROCEDURE — 74011250636 HC RX REV CODE- 250/636: Performed by: ANESTHESIOLOGY

## 2018-10-08 PROCEDURE — 82962 GLUCOSE BLOOD TEST: CPT

## 2018-10-08 PROCEDURE — 76030000002 HC AMB SURG OR TIME FIRST 0.: Performed by: INTERNAL MEDICINE

## 2018-10-08 PROCEDURE — 77030021352 HC CBL LD SYS DISP COVD -B: Performed by: INTERNAL MEDICINE

## 2018-10-08 PROCEDURE — 81025 URINE PREGNANCY TEST: CPT

## 2018-10-08 PROCEDURE — 88305 TISSUE EXAM BY PATHOLOGIST: CPT | Performed by: INTERNAL MEDICINE

## 2018-10-08 PROCEDURE — 74011250636 HC RX REV CODE- 250/636

## 2018-10-08 PROCEDURE — 77030019988 HC FCPS ENDOSC DISP BSC -B: Performed by: INTERNAL MEDICINE

## 2018-10-08 PROCEDURE — 76210000046 HC AMBSU PH II REC FIRST 0.5 HR: Performed by: INTERNAL MEDICINE

## 2018-10-08 PROCEDURE — 77030020255 HC SOL INJ LR 1000ML BG: Performed by: INTERNAL MEDICINE

## 2018-10-08 PROCEDURE — 76060000073 HC AMB SURG ANES FIRST 0.5 HR: Performed by: INTERNAL MEDICINE

## 2018-10-08 RX ORDER — NALOXONE HYDROCHLORIDE 0.4 MG/ML
0.4 INJECTION, SOLUTION INTRAMUSCULAR; INTRAVENOUS; SUBCUTANEOUS
Status: DISCONTINUED | OUTPATIENT
Start: 2018-10-08 | End: 2018-10-08 | Stop reason: HOSPADM

## 2018-10-08 RX ORDER — SODIUM CHLORIDE 0.9 % (FLUSH) 0.9 %
5-10 SYRINGE (ML) INJECTION EVERY 8 HOURS
Status: DISCONTINUED | OUTPATIENT
Start: 2018-10-08 | End: 2018-10-08 | Stop reason: HOSPADM

## 2018-10-08 RX ORDER — FLUMAZENIL 0.1 MG/ML
0.2 INJECTION INTRAVENOUS
Status: DISCONTINUED | OUTPATIENT
Start: 2018-10-08 | End: 2018-10-08 | Stop reason: HOSPADM

## 2018-10-08 RX ORDER — OMEPRAZOLE 40 MG/1
20 CAPSULE, DELAYED RELEASE ORAL DAILY
Qty: 60 CAP | Refills: 3 | Status: SHIPPED | OUTPATIENT
Start: 2018-10-08 | End: 2019-03-25 | Stop reason: SDUPTHER

## 2018-10-08 RX ORDER — SODIUM CHLORIDE, SODIUM LACTATE, POTASSIUM CHLORIDE, CALCIUM CHLORIDE 600; 310; 30; 20 MG/100ML; MG/100ML; MG/100ML; MG/100ML
25 INJECTION, SOLUTION INTRAVENOUS CONTINUOUS
Status: DISCONTINUED | OUTPATIENT
Start: 2018-10-08 | End: 2018-10-08 | Stop reason: HOSPADM

## 2018-10-08 RX ORDER — DIPHENHYDRAMINE HYDROCHLORIDE 50 MG/ML
12.5 INJECTION, SOLUTION INTRAMUSCULAR; INTRAVENOUS AS NEEDED
Status: DISCONTINUED | OUTPATIENT
Start: 2018-10-08 | End: 2018-10-08 | Stop reason: HOSPADM

## 2018-10-08 RX ORDER — LIDOCAINE HYDROCHLORIDE 10 MG/ML
0.1 INJECTION, SOLUTION EPIDURAL; INFILTRATION; INTRACAUDAL; PERINEURAL AS NEEDED
Status: DISCONTINUED | OUTPATIENT
Start: 2018-10-08 | End: 2018-10-08 | Stop reason: HOSPADM

## 2018-10-08 RX ORDER — EPINEPHRINE 0.1 MG/ML
1 INJECTION INTRACARDIAC; INTRAVENOUS
Status: DISCONTINUED | OUTPATIENT
Start: 2018-10-08 | End: 2018-10-08 | Stop reason: HOSPADM

## 2018-10-08 RX ORDER — DEXTROMETHORPHAN/PSEUDOEPHED 2.5-7.5/.8
1.2 DROPS ORAL
Status: DISCONTINUED | OUTPATIENT
Start: 2018-10-08 | End: 2018-10-08 | Stop reason: HOSPADM

## 2018-10-08 RX ORDER — ATROPINE SULFATE 0.1 MG/ML
0.5 INJECTION INTRAVENOUS
Status: DISCONTINUED | OUTPATIENT
Start: 2018-10-08 | End: 2018-10-08 | Stop reason: HOSPADM

## 2018-10-08 RX ORDER — SODIUM CHLORIDE 9 MG/ML
75 INJECTION, SOLUTION INTRAVENOUS CONTINUOUS
Status: DISCONTINUED | OUTPATIENT
Start: 2018-10-08 | End: 2018-10-08 | Stop reason: HOSPADM

## 2018-10-08 RX ORDER — PROPOFOL 10 MG/ML
INJECTION, EMULSION INTRAVENOUS AS NEEDED
Status: DISCONTINUED | OUTPATIENT
Start: 2018-10-08 | End: 2018-10-08 | Stop reason: HOSPADM

## 2018-10-08 RX ORDER — MIDAZOLAM HYDROCHLORIDE 1 MG/ML
.25-5 INJECTION, SOLUTION INTRAMUSCULAR; INTRAVENOUS
Status: DISCONTINUED | OUTPATIENT
Start: 2018-10-08 | End: 2018-10-08 | Stop reason: HOSPADM

## 2018-10-08 RX ORDER — LIDOCAINE HYDROCHLORIDE 20 MG/ML
INJECTION, SOLUTION EPIDURAL; INFILTRATION; INTRACAUDAL; PERINEURAL AS NEEDED
Status: DISCONTINUED | OUTPATIENT
Start: 2018-10-08 | End: 2018-10-08 | Stop reason: HOSPADM

## 2018-10-08 RX ORDER — SODIUM CHLORIDE 0.9 % (FLUSH) 0.9 %
5-10 SYRINGE (ML) INJECTION AS NEEDED
Status: DISCONTINUED | OUTPATIENT
Start: 2018-10-08 | End: 2018-10-08 | Stop reason: HOSPADM

## 2018-10-08 RX ORDER — ONDANSETRON 2 MG/ML
4 INJECTION INTRAMUSCULAR; INTRAVENOUS AS NEEDED
Status: DISCONTINUED | OUTPATIENT
Start: 2018-10-08 | End: 2018-10-08 | Stop reason: HOSPADM

## 2018-10-08 RX ORDER — FENTANYL CITRATE 50 UG/ML
25 INJECTION, SOLUTION INTRAMUSCULAR; INTRAVENOUS
Status: DISCONTINUED | OUTPATIENT
Start: 2018-10-08 | End: 2018-10-08 | Stop reason: HOSPADM

## 2018-10-08 RX ADMIN — PROPOFOL 100 MG: 10 INJECTION, EMULSION INTRAVENOUS at 10:38

## 2018-10-08 RX ADMIN — PROPOFOL 100 MG: 10 INJECTION, EMULSION INTRAVENOUS at 10:42

## 2018-10-08 RX ADMIN — PROPOFOL 100 MG: 10 INJECTION, EMULSION INTRAVENOUS at 10:36

## 2018-10-08 RX ADMIN — LIDOCAINE HYDROCHLORIDE 100 MG: 20 INJECTION, SOLUTION EPIDURAL; INFILTRATION; INTRACAUDAL; PERINEURAL at 10:35

## 2018-10-08 RX ADMIN — SODIUM CHLORIDE, SODIUM LACTATE, POTASSIUM CHLORIDE, AND CALCIUM CHLORIDE 25 ML/HR: 600; 310; 30; 20 INJECTION, SOLUTION INTRAVENOUS at 10:09

## 2018-10-08 RX ADMIN — PROPOFOL 100 MG: 10 INJECTION, EMULSION INTRAVENOUS at 10:37

## 2018-10-08 RX ADMIN — PROPOFOL 100 MG: 10 INJECTION, EMULSION INTRAVENOUS at 10:35

## 2018-10-08 NOTE — H&P
Pre-endoscopy H and P    The patient was seen and examined in the room/pre-op holding area. The airway was assessed and documented. The problem list, past medical history, and medications were reviewed. 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    Acute bronchitis Q52.3    Cyclical vomiting B58. A0    Incisional hernia K43.2    Major depressive disorder, recurrent episode, severe (HCC) F33.2    PTSD (post-traumatic stress disorder) F43.10    Routine medical exam Z00.00    Intractable nausea and vomiting R11.2    Other chest pain R07.89    Tension vascular headache G44.209    Disturbance of memory R41.3    Transient vision disturbance of both eyes H53.9    Bilateral carotid artery stenosis I65.23    Cerebral microvascular disease I67.9    Lumbar back pain with radiculopathy affecting left lower extremity M54.16    Lumbar back pain with radiculopathy affecting right lower extremity M54.16    B12 deficiency E53.8    Vitamin D deficiency E55.9    Hypothyroidism due to acquired atrophy of thyroid E03.4    Family history of pituitary disease Z80.51     Social History     Social History    Marital status: SINGLE     Spouse name: N/A    Number of children: 2    Years of education: N/A     Occupational History     Not Employed     work at home      Social History Main Topics    Smoking status: Current Every Day Smoker     Packs/day: 0.50     Years: 10.00     Start date: 3/1/2004    Smokeless tobacco: Never Used    Alcohol use No      Comment: rarely    Drug use: Yes     Special: Marijuana      Comment: 10/1/18-per pt last use was a year ago    Sexual activity: Yes     Partners: Male     Birth control/ protection: None      Comment: single      Other Topics Concern    Not on file     Social History Narrative Unemployed currently     Past Medical History:   Diagnosis Date    Anxiety     Arthritis     unsure - knees, lower back, fingers and toes    Asthma     well controlled-as a child    Chest pain     Per pt-CP related to anxiety. Stress and echo test done 7/2018. Ocassional CP per pt; 10/1/18; pt denies CP at this time.  Chronic pain     right abdomen,shooting pain groins    Diabetes ([address]     pre diabetes ; no current diabetic meds    Female bladder prolapse     GERD (gastroesophageal reflux disease)     Headache     Ill-defined condition     prolapsed cervix,rectum,bladder    Prolapse of anterior lip of cervix     Rectal prolapse     Stool color black     White stool         Prior to Admission Medications   Prescriptions Last Dose Informant Patient Reported? Taking? DULoxetine (CYMBALTA) 60 mg capsule 10/8/2018 at Unknown time  No Yes   Sig: take 1 capsule by mouth once daily   buPROPion XL (WELLBUTRIN XL) 150 mg tablet 10/8/2018 at Unknown time  Yes Yes   Sig: Take 150 mg by mouth daily. calcium carbonate (TUMS) 200 mg calcium (500 mg) chew 10/1/2018 at Unknown time  Yes Yes   Sig: Take 1 Tab by mouth daily. omeprazole (PRILOSEC) 20 mg capsule 10/7/2018 at Unknown time  Yes Yes   Sig: Take 20 mg by mouth daily. rizatriptan (MAXALT-MLT) 10 mg disintegrating tablet 9/8/2018 at Unknown time  No Yes   Sig: Take 1 Tab by mouth every eight (8) hours as needed for Migraine. Facility-Administered Medications: None           The review of systems is:  Negative  for shortness of breath or chest pain      The heart, lungs, and mental status were satisfactory for the administration of deep sedation and for the procedure. I discussed with the patient the objectives, risks, consequences and alternatives to the procedure.       Carla Canseco MD  10/8/2018  10:22 AM

## 2018-10-08 NOTE — PERIOP NOTES
Permission received to review discharge instructions and discuss private health information with mother, Nidia Jj and grandmother, Swathi.

## 2018-10-08 NOTE — ANESTHESIA POSTPROCEDURE EVALUATION
Post-Anesthesia Evaluation and Assessment Patient: Adonay Keller MRN: [id number]  SSN: [id number] YOB: 1985  Age: 34 y.o. Sex: female Cardiovascular Function/Vital Signs Visit Vitals  /85  Pulse 87  Temp 36.6 °C (97.8 °F)  Resp 15  Ht 5' 3\" (1.6 m)  Wt 87.1 kg (192 lb)  SpO2 99%  BMI 34.01 kg/m2 Patient is status post general, total IV anesthesia anesthesia for Procedure(s): ESOPHAGOGASTRODUODENOSCOPY with biopsies ESOPHAGOGASTRODUODENAL (EGD) BIOPSY. Nausea/Vomiting: None Postoperative hydration reviewed and adequate. Pain: 
Pain Scale 1: FLACC (10/08/18 1100) Pain Intensity 1: 3 (10/08/18 0933) Managed Neurological Status:  
Neuro (WDL): Exceptions to WDL (10/08/18 1048) Neuro Neurologic State: Eyes do not open to any stimulus (10/08/18 1048) At baseline Mental Status and Level of Consciousness: Arousable Pulmonary Status:  
O2 Device: CO2 nasal cannula (10/08/18 1048) Adequate oxygenation and airway patent Complications related to anesthesia: None Post-anesthesia assessment completed. No concerns Signed By: Beverly Alejo MD   
 October 8, 2018

## 2018-10-08 NOTE — PROCEDURES
[place] Office: [phone]      Esophagogastroduodenoscopy Procedure Note      Hector Callahan  1985  [id number]    Indication: pain in abdomen,nausea/vomiting    : Judith Chase MD    Referring Provider:  Zenia Gordillo NP    Sedation:  MAC anesthesia Propofol    Procedure Details:  After detailed informed consent was obtained for the procedure, with all risks and benefits of procedure explained the patient was taken to the endoscopy suite and placed in the left lateral decubitus position. Following sequential administration of sedation as per above, the endoscope was inserted into the mouth and advanced under direct vision to second portion of the duodenum. A careful inspection was made as the gastroscope was withdrawn, including a retroflexed view of the proximal stomach; findings and interventions are described below. Findings:     Esophagus: The esophageal mucosa in the proximal, mid and distal esophagus is normal.   Z line is irregular. 2 small biopsies taken as there is a small tongue of possible SS Lujan's noted  The squamo-columnar junction is at 40 cm where the Z-line was noted. Stomach: The gastric mucosa has erythema in the body with an antral erosion. GUILLE testing was done and multiple mucosal biopsies taken. The fundus was found to be normal with no lesions noted on retroflexion. The angularis is normal as well. Duodenum:   The bulb and post bulbar mucosa is normal in appearance. The duodenal folds are normal. Biopsies taken    Therapies:  biopsy of esophagus  biopsy of stomach body, antrum, And GUILLE  biopsy of duodenal second portion    Specimen:  Specimens were collected as described and send to the laboratory. Complications:   None were encountered during the procedure. EBL:  None. Recommendations:     -Acid suppression with a proton pump inhibitor. ,   -Await pathology. ,   -Follow symptoms. ,   -Follow up with primary care physician      Thank you for entrusting me with this patient's care. Please do not hesitate to contact me with any questions or if I can be of assistance with any of your other patients' GI needs. Lawrence Lutz MD  10/8/2018  10:44 AM

## 2018-10-08 NOTE — DISCHARGE INSTRUCTIONS
[place] Office: [phone]    Alta Gage  [id number]  1985    EGD/COLONOSCOPY DISCHARGE INSTRUCTIONS  Discomfort:  Sore throat- throat lozenges or warm salt water gargle  redness at IV site- apply warm compress to area; if redness or soreness persist- contact your physician  Gaseous discomfort- walking, belching will help relieve any discomfort  You may not operate a vehicle for 12 hours  You may not engage in an occupation involving machinery or appliances for rest of today. You may not drink alcoholic beverages for at least 12 hours  Avoid making any critical decisions for at least 24 hour  DIET  You may resume your regular diet - however -  remember your colon is empty and a heavy meal will produce gas. Avoid these foods:  fried / greasy foods, excessive carbonated drinks or too much caffeine  MEDICATIONS   Regarding Aspirin or Nonsteroidal medications specifically, please see below. ACTIVITY  You may resume your normal daily activities. Spend the remainder of the day resting -  avoid any strenuous activity. CALL M.D. ANY SIGN OF   Increasing pain, nausea, vomiting  Abdominal distension (swelling)  New increased bleeding (oral or rectal)  Fever (chills)  Pain in chest area  Bloody discharge from nose or mouth  Shortness of breath    You may not take any Advil, Aspirin, Ibuprofen, Motrin, Aleve, or Goodys for 7 days, ONLY  Tylenol as needed for pain. Follow-up Instructions:   Call  Lawrence Darling MD for any questions or concerns  Results of procedure / biopsy in 7 days   Telephone # [phone]      Follow-up Information     None         DO NOT TAKE TYLENOL/ACETAMINOPHEN WITH PERCOCET, [address], [address] [address] [facility]. TAKE NARCOTIC PAIN MEDICATIONS WITH FOOD     If given 2 pain narcotics do NOT take together! Narcotics tend to be constipating, we suggest taking a stool softener such as Colace or Miralax (follow package instructions).   DO NOT DRIVE WHILE TAKING NARCOTIC PAIN MEDICATIONS. DO NOT TAKE SLEEPING MEDICATIONS OR ANTIANXIETY MEDICATIONS WHILE TAKING NARCOTIC PAIN MEDICATIONS,  ESPECIALLY THE NIGHT OF ANESTHESIA! CPAP PATIENTS BE SURE TO WEAR MACHINE WHENEVER NAPPING OR SLEEPING! DISCHARGE SUMMARY from Nurse    The following personal items collected during your admission are returned to you:   Dental Appliance: Dental Appliances: None  Vision: Visual Aid: None  Hearing Aid:    Jewelry:    Clothing:    Other Valuables:    Valuables sent to safe:        PATIENT INSTRUCTIONS:    After General Anesthesia or Intravenous Sedation, for 24 hours or while taking prescription Narcotics:        Someone should be with you for the next 24 hours. For your own safety, a responsible adult must drive you home. · Limit your activities  · Recommended activity: Rest today, up with assistance today. Do not climb stairs or shower unattended for the next 24 hours. · Please start with a soft bland diet and advance as tolerated (no nausea) to regular diet. · If you have a sore throat you should try the following: fluids, warm salt water gargles, or throat lozenges. If it does not improve after several days please follow up with your primary physician. · Do not drive and operate hazardous machinery  · Do not make important personal or business decisions  · Do  not drink alcoholic beverages  · If you have not urinated within 8 hours after discharge, please contact your surgeon on call. Report the following to your surgeon:  · Excessive pain, swelling, redness or odor of or around the surgical area  · Temperature over 100.5  · Nausea and vomiting lasting longer than 4 hours or if unable to take medications  · Any signs of decreased circulation or nerve impairment to extremity: change in color, persistent  numbness, tingling, coldness or increase pain      · You will receive a Post Operative Call from one of the Recovery Room Nurses on the day after your surgery to check on you.  It is very important for us to know how you are recovering after your surgery. If you have an issue or need to speak with someone, please call your surgeon, do not wait for the post operative call. · You may receive an e-mail or letter in the mail from [facility] regarding your experience with us in the Ambulatory Surgery Unit. Your feedback is valuable to us and we appreciate your participation in the survey. · If the above instructions are not adequate or you are having problems after your surgery, call the physician at their office number. · We wish you a speedy recovery ? What to do at Home:      *  Please give a list of your current medications to your Primary Care Provider. *  Please update this list whenever your medications are discontinued, doses are      changed, or new medications (including over-the-counter products) are added. *  Please carry medication information at all times in case of emergency situations. These are general instructions for a healthy lifestyle:    No smoking/ No tobacco products/ Avoid exposure to second hand smoke    Surgeon General's Warning:  Quitting smoking now greatly reduces serious risk to your health. Obesity, smoking, and sedentary lifestyle greatly increases your risk for illness    A healthy diet, regular physical exercise & weight monitoring are important for maintaining a healthy lifestyle    You may be retaining fluid if you have a history of heart failure or if you experience any of the following symptoms:  Weight gain of 3 pounds or more overnight or 5 pounds in a week, increased swelling in our hands or feet or shortness of breath while lying flat in bed. Please call your doctor as soon as you notice any of these symptoms; do not wait until your next office visit.     Recognize signs and symptoms of STROKE:    B - Balance  E - Eyes    F-  Face looks uneven  A-  Arms unable to move or move even  S-  Speech slurred or non-existent  T-  Time-call 911 as soon as signs and symptoms begin-DO NOT go       Back to bed or wait to see if you get better-TIME IS BRAIN. If you have not received your influenza and/or pneumococcal vaccine, please follow up with your primary care physician. The discharge information has been reviewed with the patient and caregiver. The patient and caregiver verbalized understanding.

## 2018-10-08 NOTE — ANESTHESIA PREPROCEDURE EVALUATION
Anesthetic History PONV Review of Systems / Medical History Patient summary reviewed, nursing notes reviewed and pertinent labs reviewed Pulmonary Smoker (1/2 ppd) Asthma (childhood) Neuro/Psych Psychiatric history (anxiety) Cardiovascular Exercise tolerance: <4 METS Comments: H/o atypical CP 
07/18 ECHO: EF 55% 07/18 Stress Test negative GI/Hepatic/Renal 
  
GERD (mostly controlled) Comments: Abdominal pain Endo/Other Diabetes (prediabetes) Arthritis Other Findings Comments: Rectal prolapse Uterine prolapse Bladder prolapse Physical Exam 
 
Airway Mallampati: I 
TM Distance: > 6 cm Neck ROM: normal range of motion Mouth opening: Normal 
 
 Cardiovascular Rhythm: regular Rate: normal 
 
 
Pertinent negatives: No murmur Dental 
 
 
Comments: Caries at gumline, lower Pulmonary Breath sounds clear to auscultation Abdominal 
GI exam deferred Other Findings Anesthetic Plan ASA: 2 Anesthesia type: general and total IV anesthesia Induction: Intravenous Anesthetic plan and risks discussed with: Patient

## 2018-10-08 NOTE — IP AVS SNAPSHOT
[address] [place] [address] 280 [id number] [facility] 
[phone] Patient: Deandre Morrow MRN: [id number] [facility] Emergency:8/17/9931 About your hospitalization You were admitted on:  October 8, 2018 You last received care in the:  [facility] PACU You were discharged on:  October 8, 2018 Why you were hospitalized Your primary diagnosis was:  Not on File Follow-up Information None Discharge Orders None A check trudy indicates which time of day the medication should be taken. My Medications CHANGE how you take these medications Instructions Each Dose to Equal  
 Morning Noon Evening Bedtime  
 omeprazole 40 mg capsule Commonly known as:  PriLOSEC What changed:   
- medication strength 
- how much to take Your last dose was: Your next dose is: Take 1 Cap by mouth daily. 20 mg CONTINUE taking these medications Instructions Each Dose to Equal  
 Morning Noon Evening Bedtime buPROPion  mg tablet Commonly known as:  Cathy Clayton Your last dose was: Your next dose is: Take 150 mg by mouth daily. 150 mg  
    
   
   
   
  
 calcium carbonate 200 mg calcium (500 mg) Chew Commonly known as:  TUMS Your last dose was: Your next dose is: Take 1 Tab by mouth daily. 1 Tab DULoxetine 60 mg capsule Commonly known as:  CYMBALTA Your last dose was: Your next dose is:    
   
   
 take 1 capsule by mouth once daily  
     
   
   
   
  
 rizatriptan 10 mg disintegrating tablet Commonly known as:  MAXALT-MLT Your last dose was: Your next dose is: Take 1 Tab by mouth every eight (8) hours as needed for Migraine. 10 mg Where to Get Your Medications Information on where to get these meds will be given to you by the nurse or doctor. ! Ask your nurse or doctor about these medications  
  omeprazole 40 mg capsule Discharge Instructions [place] Office: [phone] Padmini Dietrich [phone] 
1985 EGD/COLONOSCOPY DISCHARGE INSTRUCTIONS Discomfort: 
Sore throat- throat lozenges or warm salt water gargle 
redness at IV site- apply warm compress to area; if redness or soreness persist- contact your physician Gaseous discomfort- walking, belching will help relieve any discomfort You may not operate a vehicle for 12 hours You may not engage in an occupation involving machinery or appliances for rest of today. You may not drink alcoholic beverages for at least 12 hours Avoid making any critical decisions for at least 24 hour DIET You may resume your regular diet  however -  remember your colon is empty and a heavy meal will produce gas. Avoid these foods:  fried / greasy foods, excessive carbonated drinks or too much caffeine MEDICATIONS Regarding Aspirin or Nonsteroidal medications specifically, please see below. ACTIVITY You may resume your normal daily activities. Spend the remainder of the day resting -  avoid any strenuous activity. CALL M.D. ANY SIGN OF Increasing pain, nausea, vomiting Abdominal distension (swelling) New increased bleeding (oral or rectal) Fever (chills) Pain in chest area Bloody discharge from nose or mouth Shortness of breath You may not take any Advil, Aspirin, Ibuprofen, Motrin, Aleve, or Goodys for 7 days, ONLY  Tylenol as needed for pain. Follow-up Instructions: 
 Call  Lawrence Ferris MD for any questions or concerns Results of procedure / biopsy in 7 days Telephone # [phone] Follow-up Information None DO NOT TAKE TYLENOL/ACETAMINOPHEN WITH PERCOCET, LORTAB, [address] TAKE NARCOTIC PAIN MEDICATIONS WITH FOOD If given 2 pain narcotics do NOT take together! Narcotics tend to be constipating, we suggest taking a stool softener such as Colace or Miralax (follow package instructions). DO NOT DRIVE WHILE TAKING NARCOTIC PAIN MEDICATIONS. DO NOT TAKE SLEEPING MEDICATIONS OR ANTIANXIETY MEDICATIONS WHILE TAKING NARCOTIC PAIN MEDICATIONS,  ESPECIALLY THE NIGHT OF ANESTHESIA! CPAP PATIENTS BE SURE TO WEAR MACHINE WHENEVER NAPPING OR SLEEPING! DISCHARGE SUMMARY from Nurse The following personal items collected during your admission are returned to you:  
Dental Appliance: Dental Appliances: None Vision: Visual Aid: None Hearing Aid:   
Jewelry:   
Clothing:   
Other Valuables:   
Valuables sent to safe:   
 
 
PATIENT INSTRUCTIONS: 
 
 
B - Balance E - Eyes F-  Face looks uneven A-  Arms unable to move or move even S-  Speech slurred or non-existent T-  Time-call 911 as soon as signs and symptoms begin-DO NOT go Back to bed or wait to see if you get better-TIME IS BRAIN. If you have not received your influenza and/or pneumococcal vaccine, please follow up with your primary care physician. The discharge information has been reviewed with the patient and caregiver. The patient and caregiver verbalized understanding. Introducing [facility]! [facility] introduces Raptr patient portal. Now you can access parts of your medical record, email your doctor's office, and request medication refills online. 1. In your internet browser, go to [email or web address] 2. Click on the First Time User? Click Here link in the Sign In box. You will see the New Member Sign Up page. 3. Enter your Raptr Access Code exactly as it appears below. You will not need to use this code after youve completed the sign-up process. If you do not sign up before the expiration date, you must request a new code. · Raptr Access Code: [email or web address] Expires: 12/18/2018 12:44 PM 
 
4. Enter the last four digits of your Social Security Number (xxxx) and Date of Birth (mm/dd/yyyy) as indicated and click Submit. You will be taken to the next sign-up page. 5. Create a Raptr ID. This will be your Raptr login ID and cannot be changed, so think of one that is secure and easy to remember. 6. Create a Raptr password. You can change your password at any time. 7. Enter your Password Reset Question and Answer. This can be used at a later time if you forget your password. 8. Enter your e-mail address. You will receive e-mail notification when new information is available in [address] 9. Click Sign Up. You can now view and download portions of your medical record. 10. Click the Download Summary menu link to download a portable copy of your medical information. If you have questions, please visit the Frequently Asked Questions section of the [employer] website. Remember, [employer] is NOT to be used for urgent needs. For medical emergencies, dial 911. Now available from your iPhone and Android! Introducing Kerwin Andersen As a [employer] patient, I wanted to make you aware of our electronic visit tool called Kerwin Andersen. [employer] 24/7 allows you to connect within minutes with a medical provider 24 hours a day, seven days a week via a mobile device or tablet or logging into a secure website from your computer. You can access Kerwin Andersen from anywhere in the [place]. A virtual visit might be right for you when you have a simple condition and feel like you just dont want to get out of bed, or cant get away from work for an appointment, when your regular [employer] provider is not available (evenings, weekends or holidays), or when youre out of town and need minor care. Electronic visits cost only $49 and if the [employer] 24/7 provider determines a prescription is needed to treat your condition, one can be electronically transmitted to a nearby pharmacy*. Please take a moment to enroll today if you have not already done so. The enrollment process is free and takes just a few minutes. To enroll, please download the [employer] 24/7 tasha to your tablet or phone, or visit [email or web address] to enroll on your computer.    
And, as an [address] [place] [address] patient with a [employer] account, the results of your visits will be scanned into your electronic medical record and your primary care provider will be able to view the scanned results. We urge you to continue to see your regular [facility] provider for your ongoing medical care. And while your primary care provider may not be the one available when you seek a [employer] virtual visit, the peace of mind you get from getting a real diagnosis real time can be priceless. For more information on [employer], view our Frequently Asked Questions (FAQs) at [email or web address]. Sincerely, 
 
Lasha Kinney MD 
Chief Medical Officer Juan C Poe *:  certain medications cannot be prescribed via [employer] Providers Seen During Your Hospitalization Provider Specialty Primary office phone Dick Woodard MD Gastroenterology [phone] Your Primary Care Physician (PCP) Primary Care Physician Office Phone Office Fax Robertjerzy Lindsay [phone] You are allergic to the following Allergen Reactions Latex Rash Itching Swelling Pcn (Penicillins) Other (comments) Pt states as a child she had a reaction but does not remember specific reaction. Pt states\"mother say I stopped breathing\". Recent Documentation Height Weight BMI OB Status Smoking Status 1.6 m 87.1 kg 34.01 kg/m2 Unknown Current Every Day Smoker Emergency Contacts Name Discharge Info Relation Home Work Mobile [place] Arjun DISCHARGE CAREGIVER [3] Mother [14] [phone] Patient Belongings The following personal items are in your possession at time of discharge: 
  Dental Appliances: None  Visual Aid: None Discharge Instructions Attachments/References MEFS - OMEPRAZOLE (PRILOSEC, PRILOSEC OTC, OMECLAMOX-MITCH, FIRST - OMEPRAZOLE) - (BY MOUTH) (ENGLISH) Patient Handouts Omeprazole (PriLOSEC, PriLOSEC OTC, Omeclamox-Mitch, First - Omeprazole) - (By mouth) Why this medicine is used: Treats heartburn, a damaged esophagus, stomach ulcers, and gastroesophageal reflux disease (GERD). Contact a nurse or doctor right away if you have: · Blistering, peeling, red skin rash · Muscle cramps or twitching, muscle pain or stiffness, unusual weight gain · Joint pain, rash on your cheeks or arms that gets worse in the sun · Dark-colored urine, change in how much or how often you urinate · Seizures, dizziness, uneven heartbeat · Severe diarrhea, stomach cramps, fever, unusual tiredness or weakness Common side effects: · Mild diarrhea, stomach pain · Headache © 2017 [address] Information is for End User's use only and may not be sold, redistributed or otherwise used for commercial purposes. Please provide this summary of care documentation to your next provider. Signatures-by signing, you are acknowledging that this After Visit Summary has been reviewed with you and you have received a copy. Patient Signature:  ____________________________________________________________ Date:  ____________________________________________________________  
  
[facility] Provider Signature:  ____________________________________________________________ Date:  ____________________________________________________________

## 2018-11-15 ENCOUNTER — OFFICE VISIT (OUTPATIENT)
Dept: FAMILY MEDICINE CLINIC | Age: 33
End: 2018-11-15

## 2018-11-15 VITALS
HEIGHT: 63 IN | WEIGHT: 206.2 LBS | DIASTOLIC BLOOD PRESSURE: 83 MMHG | SYSTOLIC BLOOD PRESSURE: 117 MMHG | HEART RATE: 99 BPM | BODY MASS INDEX: 36.54 KG/M2 | RESPIRATION RATE: 18 BRPM | TEMPERATURE: 98.1 F | OXYGEN SATURATION: 98 %

## 2018-11-15 DIAGNOSIS — J02.9 PHARYNGITIS, UNSPECIFIED ETIOLOGY: Primary | ICD-10-CM

## 2018-11-15 DIAGNOSIS — J30.9 ALLERGIC RHINITIS, UNSPECIFIED SEASONALITY, UNSPECIFIED TRIGGER: ICD-10-CM

## 2018-11-15 LAB
S PYO AG THROAT QL: NEGATIVE
VALID INTERNAL CONTROL?: YES

## 2018-11-15 RX ORDER — CLINDAMYCIN HYDROCHLORIDE 300 MG/1
CAPSULE ORAL
Refills: 0 | COMMUNITY
Start: 2018-11-01 | End: 2019-04-18 | Stop reason: ALTCHOICE

## 2018-11-15 RX ORDER — ACETAMINOPHEN AND CODEINE PHOSPHATE 300; 30 MG/1; MG/1
TABLET ORAL
Refills: 0 | COMMUNITY
Start: 2018-11-01 | End: 2019-04-18 | Stop reason: ALTCHOICE

## 2018-11-15 RX ORDER — PREDNISONE 20 MG/1
20 TABLET ORAL 2 TIMES DAILY
Qty: 6 TAB | Refills: 0 | Status: SHIPPED | OUTPATIENT
Start: 2018-11-15 | End: 2018-11-18

## 2018-11-15 RX ORDER — CETIRIZINE HCL 10 MG
10 TABLET ORAL
Qty: 90 TAB | Refills: 3 | Status: SHIPPED | OUTPATIENT
Start: 2018-11-15 | End: 2019-10-02 | Stop reason: SDUPTHER

## 2018-11-15 RX ORDER — FLUTICASONE PROPIONATE 50 MCG
2 SPRAY, SUSPENSION (ML) NASAL DAILY
Qty: 1 BOTTLE | Refills: 11 | Status: SHIPPED | OUTPATIENT
Start: 2018-11-15 | End: 2019-04-18 | Stop reason: ALTCHOICE

## 2018-11-15 NOTE — PROGRESS NOTES
HISTORY OF PRESENT ILLNESS Kathy Weiss is a 34 y.o. female. HPI  Patient comes in today for sore throat and sinus pain. Nasal congestion, thick mucus, yellow/greenish and sore throat x2-3 days. Uvula laying on back of tongue. Makes her nauseated, difficulty swallowing. Denies ear pain, fever, chills. Patient is taking Clindamycin for dental work, only has 3 more doses. No sick contacts. Theadore Catrachita Allergies Allergen Reactions  Latex Rash, Itching and Swelling  Pcn [Penicillins] Other (comments) Pt states as a child she had a reaction but does not remember specific reaction. Pt states\"mother say I stopped breathing\". Past Medical History:  
Diagnosis Date  Anxiety  Arthritis   
 unsure - knees, lower back, fingers and toes  Asthma   
 well controlled-as a child  Chest pain Per pt-CP related to anxiety. Stress and echo test done 7/2018. Ocassional CP per pt; 10/1/18; pt denies CP at this time.  Chronic pain   
 right abdomen,shooting pain groins  Diabetes ([address]   
 pre diabetes ; no current diabetic meds  Female bladder prolapse  GERD (gastroesophageal reflux disease)  Headache  Ill-defined condition   
 prolapsed cervix,rectum,bladder  Prolapse of anterior lip of cervix  Rectal prolapse  Stool color black White stool Past Surgical History:  
Procedure Laterality Date  HX GYN    
 vaginal birth x2  
 HX HEENT    
 bilat tubes in ears age 3  
 [address]  07/09/2015 Laparoscopic Incisional hernia repair with mesh.  HX LAP CHOLECYSTECTOMY  7/7/2014  HX OTHER SURGICAL    
 colonoscopy  HX OTHER SURGICAL    
 endoscopsy Social History Socioeconomic History  Marital status: SINGLE Spouse name: Not on file  Number of children: 2  
 Years of education: Not on file  Highest education level: Not on file Social Needs  Financial resource strain: Not on file  Food insecurity - worry: Not on file  Food insecurity - inability: Not on file  Transportation needs - medical: Not on file  Transportation needs - non-medical: Not on file Occupational History Employer: NOT EMPLOYED Comment: work at home Tobacco Use  Smoking status: Current Every Day Smoker Packs/day: 0.50 Years: 10.00 Pack years: 5.00 Start date: 3/1/2004  Smokeless tobacco: Never Used Substance and Sexual Activity  Alcohol use: No  
  Comment: rarely  Drug use: Yes Types: Marijuana Comment: 10/1/18-per pt last use was a year ago  Sexual activity: Yes  
  Partners: Male Birth control/protection: None Comment: single Other Topics Concern  Not on file Social History Narrative Unemployed currently Family History Problem Relation Age of Onset  Other Mother   
     fibromyalgia, IBS  Diabetes Mother Pre-DM  High Cholesterol Mother  Hypertension Mother  Liver Disease Mother   
     fatty liver  Cancer Mother   
     uterine  Heart Disease Mother [address] Arthritis-osteo Mother  Diabetes Father  Hypertension Father  Asthma Brother  Diabetes Paternal Aunt  Hypertension Paternal Aunt  Diabetes Paternal Uncle  Hypertension Paternal Uncle  Cancer Maternal Grandfather   
     esoph  Hypertension Maternal Grandfather  Stroke Maternal Grandfather  Heart Disease Paternal Grandmother  Diabetes Paternal Grandfather  Heart Attack Paternal Grandfather  Stroke Paternal Grandfather  Hypertension Paternal Grandfather  Heart Disease Maternal Grandmother  Other Sister MTHFR (clotting D/O) Current Outpatient Medications Medication Sig  
 clindamycin (CLEOCIN) 300 mg capsule take 1 capsule by mouth three times a day for 7 days  acetaminophen-codeine (TYLENOL #3) 300-30 mg per tablet take 1 tablet by mouth every 6 hours if needed  omeprazole (PRILOSEC) 40 mg capsule Take 1 Cap by mouth daily.  calcium carbonate (TUMS) 200 mg calcium (500 mg) chew Take 1 Tab by mouth daily.  DULoxetine (CYMBALTA) 60 mg capsule take 1 capsule by mouth once daily  rizatriptan (MAXALT-MLT) 10 mg disintegrating tablet Take 1 Tab by mouth every eight (8) hours as needed for Migraine.  buPROPion XL (WELLBUTRIN XL) 150 mg tablet Take 150 mg by mouth daily. No current facility-administered medications for this visit. Review of Systems Constitutional: Negative for chills, fever and malaise/fatigue. HENT: Positive for congestion, sinus pain and sore throat. Negative for ear pain and tinnitus. Respiratory: Negative for cough and shortness of breath. Cardiovascular: Negative for chest pain and palpitations. Gastrointestinal: Negative for diarrhea, nausea and vomiting. Musculoskeletal: Negative for myalgias. Skin: Negative. Neurological: Negative for dizziness and headaches. Endo/Heme/Allergies: Positive for environmental allergies. Psychiatric/Behavioral: Positive for depression. Vitals:  
 11/15/18 0850 BP: 117/83 Pulse: 99 Resp: 18 Temp: 98.1 °F (36.7 °C) TempSrc: Oral  
SpO2: 98% Weight: 206 lb 3.2 oz (93.5 kg) Height: 5' 3\" (1.6 m) Physical Exam  
Constitutional: She is oriented to person, place, and time. Vital signs are normal. She appears well-developed and well-nourished. She is cooperative. HENT:  
Right Ear: Hearing, tympanic membrane, external ear and ear canal normal.  
Left Ear: Hearing, tympanic membrane, external ear and ear canal normal.  
Nose: Mucosal edema and rhinorrhea present. Right sinus exhibits no maxillary sinus tenderness and no frontal sinus tenderness. Left sinus exhibits no maxillary sinus tenderness and no frontal sinus tenderness. Mouth/Throat: Uvula is midline and mucous membranes are normal. Mucous membranes are not pale and not dry.  Posterior oropharyngeal erythema present. No oropharyngeal exudate or posterior oropharyngeal edema. Cardiovascular: Normal rate, regular rhythm, S1 normal, S2 normal and normal heart sounds. Pulmonary/Chest: Effort normal and breath sounds normal. She has no decreased breath sounds. She has no wheezes. She has no rhonchi. She has no rales. Lymphadenopathy:  
     Head (right side): No submental, no submandibular, no tonsillar, no preauricular and no posterior auricular adenopathy present. Head (left side): No submental, no submandibular, no tonsillar, no preauricular and no posterior auricular adenopathy present. She has no cervical adenopathy. Right: No supraclavicular adenopathy present. Left: No supraclavicular adenopathy present. Neurological: She is alert and oriented to person, place, and time. Skin: Skin is warm, dry and intact. Psychiatric: She has a normal mood and affect. Her speech is normal and behavior is normal. Thought content normal.  
Vitals reviewed. ASSESSMENT and PLAN 
  ICD-10-CM ICD-9-CM 1. Pharyngitis, unspecified etiology J02.9 462 AMB POC RAPID STREP A  
   cetirizine (ZYRTEC) 10 mg tablet  
   fluticasone (FLONASE) 50 mcg/actuation nasal spray  
   predniSONE (DELTASONE) 20 mg tablet 2. Allergic rhinitis, unspecified seasonality, unspecified trigger J30.9 477.9 cetirizine (ZYRTEC) 10 mg tablet  
   fluticasone (FLONASE) 50 mcg/actuation nasal spray Encounter Diagnoses Name Primary?  Pharyngitis, unspecified etiology Yes  Allergic rhinitis, unspecified seasonality, unspecified trigger Orders Placed This Encounter  AMB POC RAPID STREP A  
 clindamycin (CLEOCIN) 300 mg capsule  acetaminophen-codeine (TYLENOL #3) 300-30 mg per tablet  cetirizine (ZYRTEC) 10 mg tablet  fluticasone (FLONASE) 50 mcg/actuation nasal spray  predniSONE (DELTASONE) 20 mg tablet Diagnoses and all orders for this visit: 1. Pharyngitis, unspecified etiology -     AMB POC RAPID STREP A - NEGATIVE 
-     cetirizine (ZYRTEC) 10 mg tablet; Take 1 Tab by mouth nightly. -     fluticasone (FLONASE) 50 mcg/actuation nasal spray; 2 Sprays by Both Nostrils route daily. -     predniSONE (DELTASONE) 20 mg tablet; Take 20 mg by mouth two (2) times a day for 3 days. 2. Allergic rhinitis, unspecified seasonality, unspecified trigger 
-     cetirizine (ZYRTEC) 10 mg tablet; Take 1 Tab by mouth nightly. -     fluticasone (FLONASE) 50 mcg/actuation nasal spray; 2 Sprays by Both Nostrils route daily. Follow-up Disposition: 
Return if symptoms worsen or fail to improve. I have reviewed the patient's allergies and made any necessary changes. Medical, procedural, social and family histories have been reviewed and updated as medically indicated. I have reconciled and/or revised patient medications in the EMR. I have discussed each diagnosis listed in this note with Susan Smalls and/or their family. I have discussed treatment options and the risk/benefit analysis of those options, including safe use of medications and possible medication side effects. Through the use of shared decision making we have agreed to the above plan. The patient has received an after-visit summary and questions were answered concerning future plans. Kassi Bergman, LAWANDA-C This note will not be viewable in [address]

## 2018-11-15 NOTE — PATIENT INSTRUCTIONS
Rhinitis: Care Instructions Your Care Instructions Rhinitis is swelling and irritation in the nose. Allergies and infections are often the cause. Your nose may run or feel stuffy. Other symptoms are itchy and sore eyes, ears, throat, and mouth. If allergies are the cause, your doctor may do tests to find out what you are allergic to. You may be able to stop symptoms if you avoid the things that cause them. Your doctor may suggest or prescribe medicine to ease your symptoms. Follow-up care is a key part of your treatment and safety. Be sure to make and go to all appointments, and call your doctor if you are having problems. It's also a good idea to know your test results and keep a list of the medicines you take. How can you care for yourself at home? · If your rhinitis is caused by allergies, try to find out what sets off (triggers) your symptoms. Take steps to avoid these triggers. ? Avoid yard work. It can stir up both pollen and mold. ? Do not smoke or allow others to smoke around you. If you need help quitting, talk to your doctor about stop-smoking programs and medicines. These can increase your chances of quitting for good. ? Do not use aerosol sprays, cleaning products, or perfumes. ? If pollen is one of your triggers, close your house and car windows during blooming season. ? Clean your house often to control dust. 
? Keep pets outside. · If your doctor recommends over-the-counter medicines to relieve symptoms, take your medicines exactly as prescribed. Call your doctor if you think you are having a problem with your medicine. · Use saline (saltwater) nasal washes to help keep your nasal passages open and wash out mucus and bacteria. You can buy saline nose drops at a grocery store or drugstore. Or you can make your own at home by adding 1 teaspoon of salt and 1 teaspoon of baking soda to 2 cups of distilled water.  If you make your own, fill a bulb syringe with the solution, insert the tip into your nostril, and squeeze gently. Tony Banuelos your nose. When should you call for help? Call your doctor now or seek immediate medical care if: 
  · You are having trouble breathing.  
 Watch closely for changes in your health, and be sure to contact your doctor if: 
  · Mucus from your nose gets thicker (like pus) or has new blood in it.  
  · You have new or worse symptoms.  
  · You do not get better as expected. Where can you learn more? Go to http://orin-cris.info/. Enter M030 in the search box to learn more about \"Rhinitis: Care Instructions. \" Current as of: March 28, 2018 Content Version: 11.8 © 8885-9957 [employer]. Care instructions adapted under license by [employer] (which disclaims liability or warranty for this information). If you have questions about a medical condition or this instruction, always ask your healthcare professional. [place] [address] any warranty or liability for your use of this information. Sore Throat: Care Instructions Your Care Instructions Infection by bacteria or a virus causes most sore throats. Cigarette smoke, dry air, air pollution, allergies, and yelling can also cause a sore throat. Sore throats can be painful and annoying. Fortunately, most sore throats go away on their own. If you have a bacterial infection, your doctor may prescribe antibiotics. Follow-up care is a key part of your treatment and safety. Be sure to make and go to all appointments, and call your doctor if you are having problems. It's also a good idea to know your test results and keep a list of the medicines you take. How can you care for yourself at home? · If your doctor prescribed antibiotics, take them as directed. Do not stop taking them just because you feel better. You need to take the full course of antibiotics.  
· Gargle with warm salt water once an hour to help reduce swelling and relieve discomfort. Use 1 teaspoon of salt mixed in 1 cup of warm water. · Take an over-the-counter pain medicine, such as acetaminophen (Tylenol), ibuprofen (Advil, Motrin), or naproxen (Aleve). Read and follow all instructions on the label. · Be careful when taking over-the-counter cold or flu medicines and Tylenol at the same time. Many of these medicines have acetaminophen, which is Tylenol. Read the labels to make sure that you are not taking more than the recommended dose. Too much acetaminophen (Tylenol) can be harmful. · Drink plenty of fluids. Fluids may help soothe an irritated throat. Hot fluids, such as tea or soup, may help decrease throat pain. · Use over-the-counter throat lozenges to soothe pain. Regular cough drops or hard candy may also help. These should not be given to young children because of the risk of choking. · Do not smoke or allow others to smoke around you. If you need help quitting, talk to your doctor about stop-smoking programs and medicines. These can increase your chances of quitting for good. · Use a vaporizer or humidifier to add moisture to your bedroom. Follow the directions for cleaning the machine. When should you call for help? Call your doctor now or seek immediate medical care if: 
  · You have new or worse trouble swallowing.  
  · Your sore throat gets much worse on one side.  
 Watch closely for changes in your health, and be sure to contact your doctor if you do not get better as expected. Where can you learn more? Go to http://orin-cris.info/. Enter 062 441 80 19 in the search box to learn more about \"Sore Throat: Care Instructions. \" Current as of: March 28, 2018 Content Version: 11.8 © 7024-7941 [employer]. Care instructions adapted under license by [employer] (which disclaims liability or warranty for this information).  If you have questions about a medical condition or this instruction, always ask your healthcare professional. [place] [address] any warranty or liability for your use of this information.

## 2018-11-15 NOTE — PROGRESS NOTES
Chief Complaint Patient presents with  Sinus Infection Pt states symptoms for 3 days. Pt has congestion, sore throat, coughing and sputum that is yellowish with a hint green. 1. Have you been to the ER, urgent care clinic since your last visit? Hospitalized since your last visit? No 
 
2. Have you seen or consulted any other health care providers outside of the [facility] since your last visit? Include any pap smears or colon screening.  No

## 2018-12-25 DIAGNOSIS — F33.2 SEVERE EPISODE OF RECURRENT MAJOR DEPRESSIVE DISORDER, WITHOUT PSYCHOTIC FEATURES (HCC): Primary | ICD-10-CM

## 2018-12-26 RX ORDER — BUPROPION HYDROCHLORIDE 150 MG/1
TABLET ORAL
Qty: 30 TAB | Refills: 0 | Status: SHIPPED | OUTPATIENT
Start: 2018-12-26 | End: 2019-04-18 | Stop reason: SDUPTHER

## 2019-01-09 DIAGNOSIS — N64.4 BREAST PAIN: Primary | ICD-10-CM

## 2019-01-21 ENCOUNTER — HOSPITAL ENCOUNTER (OUTPATIENT)
Dept: MAMMOGRAPHY | Age: 34
Discharge: HOME | End: 2019-01-21
Payer: MEDICAID

## 2019-01-21 DIAGNOSIS — N64.4 BREAST PAIN: ICD-10-CM

## 2019-01-21 PROCEDURE — 77063 BREAST TOMOSYNTHESIS BI: CPT

## 2019-03-14 ENCOUNTER — OFFICE VISIT (OUTPATIENT)
Dept: NEUROLOGY | Age: 34
End: 2019-03-14

## 2019-03-14 DIAGNOSIS — I67.89 CEREBRAL MICROVASCULAR DISEASE: ICD-10-CM

## 2019-03-14 DIAGNOSIS — G31.84 MILD COGNITIVE IMPAIRMENT: Primary | ICD-10-CM

## 2019-03-14 DIAGNOSIS — I65.23 BILATERAL CAROTID ARTERY STENOSIS: ICD-10-CM

## 2019-03-14 DIAGNOSIS — F33.2 SEVERE EPISODE OF RECURRENT MAJOR DEPRESSIVE DISORDER, WITHOUT PSYCHOTIC FEATURES (HCC): ICD-10-CM

## 2019-03-14 DIAGNOSIS — F43.10 PTSD (POST-TRAUMATIC STRESS DISORDER): ICD-10-CM

## 2019-03-14 DIAGNOSIS — G44.209 TENSION VASCULAR HEADACHE: ICD-10-CM

## 2019-03-14 DIAGNOSIS — E03.4 HYPOTHYROIDISM DUE TO ACQUIRED ATROPHY OF THYROID: ICD-10-CM

## 2019-03-14 DIAGNOSIS — M54.16 LUMBAR BACK PAIN WITH RADICULOPATHY AFFECTING LEFT LOWER EXTREMITY: ICD-10-CM

## 2019-03-14 DIAGNOSIS — E55.9 VITAMIN D DEFICIENCY: ICD-10-CM

## 2019-03-14 DIAGNOSIS — R41.3 DISTURBANCE OF MEMORY: ICD-10-CM

## 2019-03-14 DIAGNOSIS — Z83.49 FAMILY HISTORY OF PITUITARY DISEASE: ICD-10-CM

## 2019-03-14 NOTE — PROGRESS NOTES
[address] [facility],[address]  [address]   [place] [address] [facility]   [phone] Office   [phone] Fax      Neuropsychology    Exam # 2    Initial Diagnostic Interview Note      Referral:  Idris Randle NP, Nadeem Machado MD    Danette Rodarte is a 30 y.o. right handed  female who was unaccompanied to the initial clinical interview on 3/14/19 . Please refer to her medical records for details pertaining to her history. When I saw her last: Danette Rodarte is a 36 y.o. right handed  Single  female who was accompanied by her mother to the initial clinical interview on 4/28/16 . Please refer to her medical records for details pertaining to her history. Briefly, the patient reported that she completed high school and received special education services for reading, writing, and math and graduate with a special education diploma. She is not working. Patient and her family give a 2 year history of slowly progressive memory loss, and wandering behavior, and forgetfulness. Patient forgot how to use the stove, and sometimes has difficulty turning the water on and off. Patient has no family history of similar problems. Patient is having some behavioral problems and that she is not bathing as much as she used to, she occasionally has wandered out of the house, and she occasionally may see things that aren't there. She had one episode of slurring of her speech and possible TIA and mini stroke that occurred several weeks ago. She refused to go to the hospital. She is no longer able to handle her financial affairs and frequently gets mixed up about her bank account. She has a pillbox for her medication and her son is there to help her, but they are concerned that she does not take her pills regularly, sometimes will hide the medications or missed take them.  She has no headache, no weakness, no vision changes, no cranial nerve problems, no head trauma, no fever, no meningismus, no loss of bowel or bladder control, and her gait seems stable. She's had no recent medical problems, toxin exposure or other causes for memory loss. She commonly will pace the house at nighttime and tends not to sleep very well. She is tending to sleep more in the daytime. She may be a little paranoid at times in addition. She's had no evaluation for this memory loss. She is a diabetic and is currently on Risperdal one pill at bedtime. She is depressed and anxious. She drives and takes her own medications, balances her finances. She has two kids, ages 8 and 6. They live with mother and grandmother. Patient's mother says patient is able to care for kids independently, but still patient's mother helps. Sleep is restless. Appetite is fine. Sex drive is up.          No previous neuropsych testing     MMSE: Recall 1/3, even with cues  Three step 2/3  Not oriented to date  Or to county        Clock: Very slow to completed     TOPF:  29     My diagnostic impressions then included:      While there is concern for organic based memory decline, the patient is young for dementia and there is support for severe depression and anxiety, as well as social isolation and borderline personality features. Her anxiety is severe to the degree whereby her ability to attend and to concentrate is significantly compromised.                 It is my opinion that the patient's reported (but not clinically corroborated) day-to-day memory problems are secondary to emotional distress. In addition to continued medical care, my recommendations include a review of her psychiatric medication management for depression and anxiety. I do not see her as bipolar. She should be participating in at least weekly psychotherapy. Once her mood improves, a follow up Neuropsych eval would then be indicated to further clarify possible organic based memory deficits . Baseline now established. Follow up prn. Clinical correlation is, of course, indicated.                 I will discuss these findings with the patient when she follows up with me in the near future. A follow up Neuropsychological Evaluation is indicated on a prn basis, especially if there are any cognitive and/or emotional changes.       DIAGNOSES:                         The Referral Diagnosis Of  Cognitive Difficulties - IS DEFERRED                                                  Major Depression - Severe                                                   Anxiety Disorder - Severe                                                  Borderline Personality Traits        Since I saw her last, the patient comes in on referral from Dr. Ashlie Corbett with progressive deformity in her skulll with dents associated with severe headaches which are worsening. She has pressure, stabbing pain, throbbing, and n/v at times. In addition she has had memory loss, more difficulty remembering things, seemingly more disabled. Memory has been significantly worsening. She forgets almost everything. Forgets the content of conversations. Misplaces things. Shanda Vieyra will tell her something and she forgets 10-15 minutes later. Has to write everything down. Has to slow down to process and comprehend things. Loses words. Longer to make decisions, even simple decisions. Forgets to pick her son up from his bible study class. Definitely feels like problems are worse. She is forgetting her medications more and more. She has not had any new major medical issues. She came off the medication for her nausea and digestion issues. She is concerned because she had a relative with pituitary tumor that had been operated on that was missed and had similar symptoms. The patient's recent MRI was done and showed no major pituitary issue, but is abnormal as noted above. Her vision has declined.   She sees two of me and then I pop back into one, for example. When she's trying to visually concentrate her vision will become problematic. She has significant lower back pain, which is worsening, despite unclear etiology and negative X ray. She is having problems in the right shoulder as well. One year ago she fell and skipped on a ramp, falling backwards, hitting her head. Her boyfriend says she was very dazed. She has seen psychiatrists in the past, and was seeing counselor but not anymore as they did not seem to get along. Dr. Leela Ibarra reviewed the MRI scan personally on the PACS system myself, and he did not see any structural lesion in the brain or the skull to account for her symptoms. She denies any unusual fever, trauma, meningismus, unusual stress or tension, or any other toxin exposure or other causes for her symptoms. Her mood is described as being an emotional roller coaster and cranky. She can be happy one minute and quite upset the next. She has severe headaches to the points that at least once she went to ED. Headaches are worsening. They start around her face and then around and behind her eyes. Sometimes on the top of her head. She feels like her skull itself feels different, but did imaging and Dr. Leela Ibarra palpated and it didn't feel different to him, but she had never felt that line/dent before. Sometimes, it gets deeper, and sometimes does not. Feels like it's filled with a puss or a fluid and other times it feels like a ditch. Sleep is terrible. She has balance issues. She has not been very hungry in the past and now famished and has put on 40 pounds in the past couple of months. She is on Cymbalta and Welbutrin. No changes in memory since she got back on her meds in the last few months. She is back to seeing counselor and psychiatry appointment is pending. She remains independent for driving, medications, finances, day-to-day chores, and her ADLs, though she forgets as noted above.   Boyfriend notices the cognitive decline also and is concerned. She has noticed diminished sense of taste. Things seem to taste metallic to her. Has noticed that more recently. 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 She is not on disability. She is scheduled to have a full hysterectomy soon. Has bladder, anus, and vaginal prolapses from her 2007 delivery of her son. She is having those surgeries soon. That will be on April 25th. They have lost their home and have been living in a shed for coming up on a year and patient feels like such a failure. EXAM:  CT HEAD WO CONT     INDICATION:   Severe headache. Was recently seen in the emergency department for  nausea and vomiting.     COMPARISON: CT 1/4/2007, MRI 9/23/2016.     CONTRAST:  None.     TECHNIQUE: Unenhanced CT of the head was performed using 5 mm images. Brain and  bone windows were generated. CT dose reduction was achieved through use of a  standardized protocol tailored for this examination and automatic exposure  control for dose modulation.       FINDINGS:  The ventricles and sulci are normal in size, shape and configuration and  midline. There is no significant white matter disease. There is no intracranial  hemorrhage, extra-axial collection, mass, mass effect or midline shift. The  basilar cisterns are open. No acute infarct is identified. The bone windows  demonstrate no abnormalities. The visualized portions of the paranasal sinuses  and mastoid air cells are clear.     IMPRESSION  IMPRESSION: Normal study.           EXAM:  MRI BRAIN W WO CONT     INDICATION:    special views of pituitary gland. Tension-type headache.     COMPARISON:  MRI brain 9/23/2016.     CONTRAST: 20 ml Dotarem.     TECHNIQUE:    Multiplanar multisequence acquisition without and with contrast of the brain.     FINDINGS:  The pituitary gland is normal in size. No discrete pituitary lesion is  identified. Normal posterior pituitary bright spot is seen. Pituitary stalk is  midline. The optic chiasm and cavernous sinuses are unremarkable.     The ventricles are normal in size and position. Single new nonspecific T2/FLAIR  hyperintensity in the right parietal subcortical white matter (series 11 image  24). There is no acute infarct, hemorrhage, extra-axial fluid collection, or  mass effect. There is no cerebellar tonsillar herniation. Expected arterial  flow-voids are present. No evidence of abnormal enhancement.     Minimal mucosal thickening in the ethmoidal air cells. The mastoid air cells and  middle ears are clear. The orbital contents are within normal limits. No  significant osseous or scalp lesions are identified.     IMPRESSION  IMPRESSION:   1. No discrete pituitary lesion identified, and otherwise unremarkable brain  MRI. A single new nonspecific T2/FLAIR hyperintensity in the right parietal  subcortical white matter is likely of no clinical significance. Neuropsychological Mental Status Exam (NMSE):  Historian: Good  Praxis: No UE apraxia  R/L Orientation: Intact to self and to other  Dress: within normal limits   Weight: Overweight  Appearance/Hygiene: within normal limits   Gait: within normal limits   Assistive Devices: None  Mood: Mildly depressed   Affect: flat   Comprehension:Mildly impaired   Thought Process: within normal limits   Expressive Language: Mild WFD   Receptive Language: within normal limits   Motor:  No cognitive or motor perseveration  ETOH: Denied  Tobacco: Cigarettes, 1/2 ppd or a bit more. Counseling to quit given  Illicit: Denied  SI/HI: She had passive thoughts in the past.  Denied current, but wonders if her family would be happier if she wasn't around. Denied HI  Psychosis: Denied  Insight: Within normal limits  Judgment: Within normal limits  Other Psych: She repeats herself multiple times.   She is slower than what I remember of her last time (she had overt cognitive symptoms last time also)       Past Medical History:   Diagnosis Date    Anxiety  Arthritis     unsure - knees, lower back, fingers and toes    Asthma     well controlled-as a child    Chest pain     Per pt-CP related to anxiety. Stress and echo test done 7/2018. Ocassional CP per pt; 10/1/18; pt denies CP at this time.  Chronic pain     right abdomen,shooting pain groins    Diabetes ([address]     pre diabetes ; no current diabetic meds    Female bladder prolapse     GERD (gastroesophageal reflux disease)     Headache     Ill-defined condition     prolapsed cervix,rectum,bladder    Prolapse of anterior lip of cervix     Rectal prolapse     Stool color black     White stool       Past Surgical History:   Procedure Laterality Date    HX GYN      vaginal birth x2    HX HEENT      bilat tubes in ears age 3    HX HERNIA REPAIR  07/09/2015    Laparoscopic Incisional hernia repair with mesh.  HX LAP CHOLECYSTECTOMY  7/7/2014    HX OTHER SURGICAL      colonoscopy    HX OTHER SURGICAL      endoscopsy       Allergies   Allergen Reactions    Latex Rash, Itching and Swelling    Pcn [Penicillins] Other (comments)     Pt states as a child she had a reaction but does not remember specific reaction. Pt states\"mother say I stopped breathing\".        Family History   Problem Relation Age of Onset    Other Mother         fibromyalgia, IBS    Diabetes Mother         Pre-DM    High Cholesterol Mother     Hypertension Mother     Liver Disease Mother         fatty liver    Cancer Mother         uterine     Heart Disease Mother         CHF    Arthritis-osteo Mother     Diabetes Father     Hypertension Father     Asthma Brother     Diabetes Paternal Aunt     Hypertension Paternal Aunt     Diabetes Paternal Uncle     Hypertension Paternal Uncle     Cancer Maternal Grandfather         esoph    Hypertension Maternal Grandfather     Stroke Maternal Grandfather     Heart Disease Paternal Grandmother     Diabetes Paternal Grandfather     Heart Attack Paternal Buzzy Marten Stroke Paternal Grandfather     Hypertension Paternal Grandfather     Heart Disease Maternal Grandmother     Other Sister         MTHFR (clotting D/O)       Social History     Tobacco Use    Smoking status: Current Every Day Smoker     Packs/day: 0.50     Years: 10.00     Pack years: 5.00     Start date: 3/1/2004    Smokeless tobacco: Never Used   Substance Use Topics    Alcohol use: No     Comment: rarely    Drug use: Yes     Types: Marijuana     Comment: 10/1/18-per pt last use was a year ago       Current Outpatient Medications   Medication Sig Dispense Refill    buPROPion XL (WELLBUTRIN XL) 150 mg tablet take 1 tablet by mouth every morning 30 Tab 0    clindamycin (CLEOCIN) 300 mg capsule take 1 capsule by mouth three times a day for 7 days  0    acetaminophen-codeine (TYLENOL #3) 300-30 mg per tablet take 1 tablet by mouth every 6 hours if needed  0    cetirizine (ZYRTEC) 10 mg tablet Take 1 Tab by mouth nightly. 90 Tab 3    fluticasone (FLONASE) 50 mcg/actuation nasal spray 2 Sprays by Both Nostrils route daily. 1 Bottle 11    omeprazole (PRILOSEC) 40 mg capsule Take 1 Cap by mouth daily. 60 Cap 3    calcium carbonate (TUMS) 200 mg calcium (500 mg) chew Take 1 Tab by mouth daily.  DULoxetine (CYMBALTA) 60 mg capsule take 1 capsule by mouth once daily 30 Cap 5    rizatriptan (MAXALT-MLT) 10 mg disintegrating tablet Take 1 Tab by mouth every eight (8) hours as needed for Migraine. 12 Tab 5         Plan:  Obtain authorization for testing from [facility]. Report to follow once testing, scoring, and interpretation completed. ? Organic based neurocognitive issues versus mood disorder or combination of same. ? Problems organic, functional, or both? This note will not be viewable in [address]       [address]*3 and [id number] x 1 1 hour 50 minutes spent with patient and reviewing all of her data from previous and her previous report and updated neurology notes and diagnostics in this patient with a history of psychiatric distress as well as neurocognitive domain. Will compare from baseline testing to track changes and go from there.

## 2019-03-18 ENCOUNTER — TELEPHONE (OUTPATIENT)
Dept: NEUROLOGY | Age: 34
End: 2019-03-18

## 2019-03-18 NOTE — TELEPHONE ENCOUNTER
----- Message from Lance Sanders sent at 3/18/2019  2:41 PM EDT -----  Regarding: Dr. Yvette Epstein  Pt canceled appt for Wednesday 03/20/19, because she have not had the memory test done and would like a call back to r/s a f/up after the testing on 04/08/19. Best contact number [phone].

## 2019-03-23 DIAGNOSIS — R10.12 LUQ ABDOMINAL PAIN: ICD-10-CM

## 2019-03-24 RX ORDER — OMEPRAZOLE 20 MG/1
CAPSULE, DELAYED RELEASE ORAL
Qty: 30 CAP | Refills: 2 | OUTPATIENT
Start: 2019-03-24

## 2019-03-25 RX ORDER — OMEPRAZOLE 40 MG/1
40 CAPSULE, DELAYED RELEASE ORAL DAILY
Qty: 90 CAP | Refills: 1 | Status: SHIPPED | OUTPATIENT
Start: 2019-03-25 | End: 2019-10-07 | Stop reason: SDUPTHER

## 2019-04-08 ENCOUNTER — OFFICE VISIT (OUTPATIENT)
Dept: NEUROLOGY | Age: 34
End: 2019-04-08

## 2019-04-08 DIAGNOSIS — G31.84 MILD COGNITIVE IMPAIRMENT: Primary | ICD-10-CM

## 2019-04-08 DIAGNOSIS — F45.0 SOMATIZATION DISORDER: ICD-10-CM

## 2019-04-08 DIAGNOSIS — F41.9 SEVERE ANXIETY: ICD-10-CM

## 2019-04-08 DIAGNOSIS — F43.10 PTSD (POST-TRAUMATIC STRESS DISORDER): ICD-10-CM

## 2019-04-08 DIAGNOSIS — F33.2 SEVERE EPISODE OF RECURRENT MAJOR DEPRESSIVE DISORDER, WITHOUT PSYCHOTIC FEATURES (HCC): ICD-10-CM

## 2019-04-10 NOTE — PROGRESS NOTES
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 Mild Cognitive Impairment with Memory loss exacerbated by severe and complex psychiatric distress. She needs intensive psychiatric intervention to include a review of her medication management for anxiety and depression as well as active and continued engagement in intensive psychotherapy. Consider EMDR. Consider TMS if medication by itself has not proven helpful for her - she would be an appropriate candidate for same. I do believe medication for memory should be considered, as there is no other definite explanation for her impaired recognition recall, which often clears the difference between organic and functional memory loss. Any metabolic cause needs to be ruled out, and sleep issues may be a factor as well. This is concerning and very atypical changes over time, with improvement in some scores and declines in others. She remains capable of making decisions for herself., but needs reminders for meals, medications, and finances. I see no evidence of malingering here.   I am concerned about driving safety issues as well, so this should be formally evaluated if deemed medically appropriate. My hope is to see her again once mood improves to better clarify, because my worry is that if cognitive problems continue to worsen despite positive psychiatric interventions, then she has dementia and the prognosis would be quite poor at that point. Stay active mentally, physically, and socially. Consult with OT and Speech, Neurocognitive Rehab. We now have baseline and updated data on her. Follow up as noted. Clinical correlation is, of course, indicated. I will discuss these findings with the patient when she follows up with me in the near future. A follow up Neuropsychological Evaluation is indicated on a prn basis, especially if there are any cognitive and/or emotional changes. DIAGNOSES:  MCI With Memory Loss     Severe Depression     Severe Anxiety     PTSD     Borderline Personality Traits     Somatization     The above information is based upon information currently available to me. If there is any additional information of which I am currently unaware, I would be more than happy to review it upon having it made available to me. Thank you for the opportunity to see this interesting individual.     Sincerely,       Violetta Leon. Marley [facility], [facility]    CC: Yvonne Bergman NP , Catie Lin MD    Time Documentation:    58371*4 92422*2 1 one hour neuropsych testing/data gathering by Neuropsychologist    57421 x 1  96139 x 3 Test Administration/Data Gathering By Technician: (1.5 hours).  84066 x 1 (first 30 minutes), 96138 x 2 (each additional 30 minutes) Total time was four hours but insurance has limited this exam.      Testing evaluation services by Neuropsychologist (1 hour, 50 minutes)   96132 x 1 (first hour), 96133 x 1 (50 minutes)  This includes review of referral question, reviewing records, planning test battery (30 minutes), interpreting initial data (30 minutes), and interpretation and report writing (50 minutes)       Anticipated Integrated Feedback (10414) - Service to be completed on a future date and not currently billed. Definitions:      24232/10050:  Neurobehavioral Status Exam, Clinical interview. Clinical assessment of thinking, reasoning and judgment, by neuropsychologist, both face to face time with patient and time interpreting those test results and reporting, first and subsequent hours)    74200/35325: Neuropsychological Test Administration by Technician/Psychometrist, first 30 minutes and each additional 30 minutes. The above includes: Record review. Review of history provided by patient. Review of collaborative information. Testing by Clinician. Review of raw data. Scoring. Report writing of individual tests administered by Clinician. Integration of individual tests administered by psychometrist with NSE/testing by clinician, review of records/history/collaborative information, case Conceptualization, treatment planning, clinical decision making, report writing, coordination Of Care. Psychometry test codes as time spent by psychometrist administering and scoring neurocognitive/psychological tests under supervision of neuropsychologist.  Integral services including scoring of raw data, data interpretation, case conceptualization, report writing etcetera were initiated after the patient finished testing/raw data collected and was completed on the date the report was signed.

## 2019-04-18 ENCOUNTER — OFFICE VISIT (OUTPATIENT)
Dept: FAMILY MEDICINE CLINIC | Age: 34
End: 2019-04-18

## 2019-04-18 VITALS
BODY MASS INDEX: 39.76 KG/M2 | WEIGHT: 224.4 LBS | HEIGHT: 63 IN | SYSTOLIC BLOOD PRESSURE: 117 MMHG | OXYGEN SATURATION: 100 % | DIASTOLIC BLOOD PRESSURE: 78 MMHG | RESPIRATION RATE: 18 BRPM | TEMPERATURE: 98.2 F | HEART RATE: 100 BPM

## 2019-04-18 DIAGNOSIS — N80.9 ENDOMETRIOSIS: ICD-10-CM

## 2019-04-18 DIAGNOSIS — K62.2 ANAL PROLAPSE: ICD-10-CM

## 2019-04-18 DIAGNOSIS — Z01.818 PREOPERATIVE GENERAL PHYSICAL EXAMINATION: Primary | ICD-10-CM

## 2019-04-18 DIAGNOSIS — K21.9 GASTROESOPHAGEAL REFLUX DISEASE, ESOPHAGITIS PRESENCE NOT SPECIFIED: ICD-10-CM

## 2019-04-18 DIAGNOSIS — E66.9 OBESITY (BMI 35.0-39.9 WITHOUT COMORBIDITY): ICD-10-CM

## 2019-04-18 DIAGNOSIS — F33.2 SEVERE EPISODE OF RECURRENT MAJOR DEPRESSIVE DISORDER, WITHOUT PSYCHOTIC FEATURES (HCC): ICD-10-CM

## 2019-04-18 DIAGNOSIS — N81.4 UTERINE PROLAPSE: ICD-10-CM

## 2019-04-18 PROBLEM — E66.01 SEVERE OBESITY (HCC): Status: ACTIVE | Noted: 2019-04-18

## 2019-04-18 RX ORDER — BUPROPION HYDROCHLORIDE 150 MG/1
TABLET ORAL
Qty: 30 TAB | Refills: 5 | Status: SHIPPED | OUTPATIENT
Start: 2019-04-18 | End: 2019-09-05

## 2019-04-18 RX ORDER — DULOXETIN HYDROCHLORIDE 60 MG/1
CAPSULE, DELAYED RELEASE ORAL
Qty: 30 CAP | Refills: 5 | Status: SHIPPED | OUTPATIENT
Start: 2019-04-18 | End: 2019-10-02

## 2019-04-18 RX ORDER — IBUPROFEN 800 MG/1
TABLET ORAL
Refills: 0 | COMMUNITY
Start: 2019-04-04 | End: 2019-09-05

## 2019-04-18 NOTE — PROGRESS NOTES
Chief Complaint Patient presents with  Pre-op Exam  
 
1. Have you been to the ER, urgent care clinic since your last visit? Hospitalized since your last visit? No 
 
2. Have you seen or consulted any other health care providers outside of the [address] [place] [address] since your last visit? Include any pap smears or colon screening. Yes, OBGYN Health Maintenance Due Topic Date Due  Pneumococcal 0-64 years (1 of 1 - PPSV23) 02/25/1993  PAP AKA CERVICAL CYTOLOGY  08/05/2019

## 2019-05-15 ENCOUNTER — OFFICE VISIT (OUTPATIENT)
Dept: NEUROLOGY | Age: 34
End: 2019-05-15

## 2019-05-15 VITALS
OXYGEN SATURATION: 97 % | WEIGHT: 227 LBS | RESPIRATION RATE: 16 BRPM | SYSTOLIC BLOOD PRESSURE: 128 MMHG | HEIGHT: 63 IN | BODY MASS INDEX: 40.22 KG/M2 | DIASTOLIC BLOOD PRESSURE: 86 MMHG | HEART RATE: 99 BPM

## 2019-05-15 DIAGNOSIS — M54.16 LUMBAR BACK PAIN WITH RADICULOPATHY AFFECTING LEFT LOWER EXTREMITY: ICD-10-CM

## 2019-05-15 DIAGNOSIS — I67.89 CEREBRAL MICROVASCULAR DISEASE: Primary | ICD-10-CM

## 2019-05-15 DIAGNOSIS — H53.9 TRANSIENT VISION DISTURBANCE OF BOTH EYES: ICD-10-CM

## 2019-05-15 DIAGNOSIS — I65.23 BILATERAL CAROTID ARTERY STENOSIS: ICD-10-CM

## 2019-05-15 DIAGNOSIS — G31.84 MILD COGNITIVE IMPAIRMENT WITH MEMORY LOSS: ICD-10-CM

## 2019-05-15 DIAGNOSIS — G44.209 TENSION VASCULAR HEADACHE: ICD-10-CM

## 2019-05-15 DIAGNOSIS — R41.3 DISTURBANCE OF MEMORY: ICD-10-CM

## 2019-05-15 DIAGNOSIS — M54.16 LUMBAR BACK PAIN WITH RADICULOPATHY AFFECTING RIGHT LOWER EXTREMITY: ICD-10-CM

## 2019-05-15 RX ORDER — CHOLECALCIFEROL (VITAMIN D3) 125 MCG
1 CAPSULE ORAL DAILY
COMMUNITY
End: 2021-02-16 | Stop reason: ALTCHOICE

## 2019-05-15 RX ORDER — MULTIVIT WITH MINERALS/HERBS
1 TABLET ORAL DAILY
COMMUNITY
End: 2022-03-25

## 2019-05-15 NOTE — PROGRESS NOTES
Consult REFERRED BY: 
Anitra Diaz NP 
 
CHIEF COMPLAINT: Progressive memory loss, headaches, dense in her head, loss of vision, back pain, scapular pain on the right side, Subjective:  
 
Linnea Coronel is a 30 y.o. right-handed  female seen today at the request of Dr. Jose Luis Moctezuma for evaluation of new problem of abnormal neuropsychiatric testing for her memory she has done in April of this year, and she has not had the results from Dr. Roc Byers yet, but looking at the test it does show that she may have mild cognitive impairment with severe emotional and depressive factors overlying her memory loss, but I did look a little more progressive than the last neuropsych testing she had, and we explained this to the patient that she has had some mild progression, but is not a definite dementia. There is no treatment FDA approved for mild cognitive impairment, so no other treatment other than encouraging the patient to remain mentally and physically active, exercise regular, try to quit smoking, eat a good diet, weight loss, take her vitamins and vitamin D on a regular basis. She is not doing that her B12 level was normal but low normal so we make sure she does start taking a vitamin B complex and vitamin D level was low at 26 she is to go back on 2000 units a day for that. Her PCP will follow up on that. Patient had an MRI scan of the brain that was normal except for one minuscule white matter lesion seen in the right hemisphere which was thought to be clinically insignificant. And her Dopplers were normal study being done in July 2018. Her lab work was all done then 2.   Patient also was seen for progressive deformity in her skull, with dents progressing in her skull associated with progressive and severe headaches in a generalized fashion are described as multiple times, pressure type, stabbing ice pick pains, throbbing, associated with nausea and vomiting at times, that are progressively getting worse now, and occurring almost on a daily basis. In addition she has had memory loss, more difficulty remembering things, seemingly more disabled, but denies any new stress or tension or anxiety. She apparently saw another neurologist and was evaluated for some of these problems, and had an MRI of the brain and pituitary gland because of galactorrhea, but the brain was normal showing no evidence of pituitary dysfunction, but she gives some vague history in the past of having an abnormal MRI. And family member of a pituitary tumor but her scan was negative for that. Her headaches which were more prominent last July have gotten better, she just takes over-the-counter Motrin found that the Fioricet did not seem to help that much just prefers Motrin. She is seeing his therapist but has not seen her in a while, recommend she get back with her psychotherapist after the neuropsych testing is suggesting severe depressive and psychiatric symptoms. Mother asked about disability, told her from the mild cognitive impairment, he would get it she may get it from the psychiatric standpoint if her therapist will fill out the form for her. .  She also has increasing low back pain, of unclear etiology but did have a negative x-ray of her lumbar spine 2 years ago. She also complains more thoracic pain wants that x-ray because she has pain radiating into the right shoulder blade, and the thoracic spine was normal, lumbar spine just showed mild degenerative changes at L4-L5, and L5-S1. Debbi Osmani She denies any head trauma or back injury except that a year ago she was moving she slipped on a ramp, fell backward and hit her head and back, and her boyfriend said she was dazed, but never sought medical attention and gradually got better. I reviewed the MRI scan personally on the PACS system myself, and I do not see any structural lesion in the brain or the skull to account for her symptoms.   She did have previous neuropsych testing done 3 years ago also in addition to the one done April 2019. Her neuropsych testing 3 years ago and one just done, consistent with mild cognitive impairment and severe emotional problems. Her visual loss can occur in any eye, and usually associated with the headaches, and last sometimes for several minutes to several hours. She apparently has a known ovarian cyst in addition, and supposedly had an abnormal MRI of the pituitary gland somewhere in the distant past.  She denies any unusual fever, trauma, meningismus, unusual stress or tension, or any other toxin exposure or other causes for her symptoms. For this multiplicity of problems we were asked to evaluate. Patient encouraged to see her psychiatrist, exercise regular, remain mentally and physically active, take her multivitamin and vitamin D pills on a regular basis, for her memory and quit smoking Past Medical History:  
Diagnosis Date  Anxiety  Arthritis   
 unsure - knees, lower back, fingers and toes  Asthma   
 well controlled-as a child  Chest pain Per pt-CP related to anxiety. Stress and echo test done 7/2018. Ocassional CP per pt; 10/1/18; pt denies CP at this time.  Chronic pain   
 right abdomen,shooting pain groins  Diabetes ([address]   
 pre diabetes ; no current diabetic meds  Female bladder prolapse  GERD (gastroesophageal reflux disease)  Headache  Ill-defined condition   
 prolapsed cervix,rectum,bladder  Prolapse of anterior lip of cervix  Rectal prolapse  Stool color black White stool Past Surgical History:  
Procedure Laterality Date  HX GYN    
 vaginal birth x2  
 HX HEENT    
 bilat tubes in ears age 3  
 [address]  07/09/2015 Laparoscopic Incisional hernia repair with mesh.  HX LAP CHOLECYSTECTOMY  7/7/2014  HX OTHER SURGICAL    
 colonoscopy  HX OTHER SURGICAL    
 endoscopsy Family History Problem Relation Age of Onset  Other Mother   
     fibromyalgia, IBS  Diabetes Mother Pre-DM  High Cholesterol Mother  Hypertension Mother  Liver Disease Mother   
     fatty liver  Cancer Mother   
     uterine  Heart Disease Mother CHF [facility] [address] Arthritis-osteo Mother  Diabetes Father  Hypertension Father  Asthma Brother  Diabetes Paternal Aunt  Hypertension Paternal Aunt  Diabetes Paternal Uncle  Hypertension Paternal Uncle  Cancer Maternal Grandfather   
     esoph  Hypertension Maternal Grandfather  Stroke Maternal Grandfather  Heart Disease Paternal Grandmother  Diabetes Paternal Grandfather  Heart Attack Paternal Grandfather  Stroke Paternal Grandfather  Hypertension Paternal Grandfather  Heart Disease Maternal Grandmother  Other Sister MTHFR (clotting D/O) Social History Tobacco Use  Smoking status: Current Every Day Smoker Packs/day: 0.50 Years: 10.00 Pack years: 5.00 Start date: 3/1/2004  Smokeless tobacco: Never Used Substance Use Topics  Alcohol use: No  
  Comment: rarely Current Outpatient Medications:  
  cholecalciferol, vitamin D3, (VITAMIN D3) 2,000 unit tab, Take  by mouth., Disp: , Rfl:  
  b complex vitamins (B COMPLEX 1) tablet, Take 1 Tab by mouth daily. , Disp: , Rfl:  
   mg tablet, take 1 tablet by mouth every 8 hours if needed for pain, Disp: , Rfl: 0 
  buPROPion XL (WELLBUTRIN XL) 150 mg tablet, take 1 tablet by mouth every morning, Disp: 30 Tab, Rfl: 5 
  DULoxetine (CYMBALTA) 60 mg capsule, take 1 capsule by mouth once daily, Disp: 30 Cap, Rfl: 5 
  omeprazole (PRILOSEC) 40 mg capsule, Take 1 Cap by mouth daily. , Disp: 90 Cap, Rfl: 1   cetirizine (ZYRTEC) 10 mg tablet, Take 1 Tab by mouth nightly., Disp: 90 Tab, Rfl: 3 Allergies Allergen Reactions  Latex Rash, Itching and Swelling  Pcn [Penicillins] Other (comments) Pt states as a child she had a reaction but does not remember specific reaction. Pt states\"mother say I stopped breathing\". Review of Systems: A comprehensive review of systems was negative except for: Constitutional: positive for fatigue and malaise Eyes: positive for visual disturbance and Loss of vision Ears, nose, mouth, throat, and face: positive for tinnitus Cardiovascular: positive for chest pressure/discomfort, dyspnea, palpitations, dyspnea on exertion, dizziness Gastrointestinal: positive for dyspepsia and reflux symptoms Musculoskeletal: positive for myalgias, arthralgias, stiff joints, neck pain, back pain and muscle weakness Neurological: positive for headaches, dizziness, vertigo, memory problems, paresthesia, coordination problems, gait problems and weakness Behvioral/Psych: positive for anxiety and depression Vitals:  
 05/15/19 8418 BP: 128/86 Pulse: 99 Resp: 16 SpO2: 97% Weight: 227 lb (103 kg) Height: 5' 3\" (1.6 m) Objective: I 
 
 
NEUROLOGICAL EXAM: 
 
Appearance: The patient is well developed, well nourished, provides a coherent history and is in no acute distress. Mental Status: Oriented to time, place and person, and the president, except missed the day of the month, and looks depressed and is slow in answering questions, cognitive function is probably normal and speech is fluent and no aphasia or dysarthria. Mood and affect appropriate but depressed Cranial Nerves:   Intact visual fields. Fundi are benign, discs are flat. LEEANN, EOM's full, no nystagmus, no ptosis. Facial sensation is normal. Corneal reflexes are not tested. Facial movement is symmetric. Hearing is normal bilaterally. Palate is midline with normal sternocleidomastoid and trapezius muscles are normal. Tongue is midline. Neck without meningismus or bruits Motor:  5/5 strength in upper and lower proximal and distal muscles. Normal bulk and tone. No fasciculations. Rapid altering movement is symmetric bilaterally Reflexes:   Deep tendon reflexes 2+/4 and symmetrical. 
No babinski or clonus present Sensory:   Normal to touch, pinprick and vibration and temperature. DSS is intact Gait:  Normal gait. Tremor:   No tremor noted. Cerebellar:  No cerebellar signs present on Romberg or tandem or finger-nose-finger examination. Neurovascular:  Normal heart sounds and regular rhythm, peripheral pulses intact, and no carotid bruits. Assessment:  
 
Active Problems: * No active hospital problems. * 
 
 
Plan:  
 
Patient with abnormal neuropsych testing suggesting severe depression, and mild cognitive impairment, and there is no treatment DA approved for mild cognitive impairment so we will asked the patient to try to quit smoking, is her main risk factor, exercise more, lose weight, get more mentally and physically active, start her vitamins and vitamin D, we can re-see her again in 6 months time to see how she is doing and consider retesting again in 1 years time if needed Her MRI scan reviewed completely on the PACS system I agree it was basically normal, no evidence of pituitary tumor, pituitary problem seems resolved Patient encouraged to see her psychiatrist and treat her severe psychiatric problems. Mother asked about disability and advised him the best chance of disability going to her psychiatrist 
For her headaches they are better now, she takes ibuprofen as needed and will continue this. The Maxalt and Fioricet did not help that much. X-ray both the thoracic and lumbar spine, and it was unremarkable she still has a lot of pain we can send her to therapy and consider an EMG. Over 1 hour spent with patient and her mother going over all her history, reviewing all her records, reviewing her MRI scan personally on the PACS system myself, and discussing her diagnosis prognosis and further treatment options in detail. Follow-up after the above-mentioned tests on 6 months time or earlier as needed. Signed By: Veronique Meier MD   
 May 15, 2019 CC: Obed Romberg, NP 
FAX: [phone] This note will not be viewable in [address]

## 2019-05-15 NOTE — LETTER
5/15/2019 10:27 AM 
 
Patient:  Syeda Doss YOB: 1985 Date of Visit: 5/15/2019 Dear No Recipients: Thank you for referring Ms. Yue Mckinney to me for evaluation/treatment. Below are the relevant portions of my assessment and plan of care. Consult REFERRED BY: 
Lana Hendrix NP 
 
CHIEF COMPLAINT: Progressive memory loss, headaches, dense in her head, loss of vision, back pain, scapular pain on the right side, Subjective:  
 
Syeda Doss is a 30 y.o. right-handed  female seen today at the request of Dr. Ricarda Marrero for evaluation of new problem of abnormal neuropsychiatric testing for her memory she has done in April of this year, and she has not had the results from Dr. Franco Wills yet, but looking at the test it does show that she may have mild cognitive impairment with severe emotional and depressive factors overlying her memory loss, but I did look a little more progressive than the last neuropsych testing she had, and we explained this to the patient that she has had some mild progression, but is not a definite dementia. There is no treatment FDA approved for mild cognitive impairment, so no other treatment other than encouraging the patient to remain mentally and physically active, exercise regular, try to quit smoking, eat a good diet, weight loss, take her vitamins and vitamin D on a regular basis. She is not doing that her B12 level was normal but low normal so we make sure she does start taking a vitamin B complex and vitamin D level was low at 26 she is to go back on 2000 units a day for that. Her PCP will follow up on that. Patient had an MRI scan of the brain that was normal except for one minuscule white matter lesion seen in the right hemisphere which was thought to be clinically insignificant. And her Dopplers were normal study being done in July 2018. Her lab work was all done then 2.   Patient also was seen for progressive deformity in her skull, with dents progressing in her skull associated with progressive and severe headaches in a generalized fashion are described as multiple times, pressure type, stabbing ice pick pains, throbbing, associated with nausea and vomiting at times, that are progressively getting worse now, and occurring almost on a daily basis. In addition she has had memory loss, more difficulty remembering things, seemingly more disabled, but denies any new stress or tension or anxiety. She apparently saw another neurologist and was evaluated for some of these problems, and had an MRI of the brain and pituitary gland because of galactorrhea, but the brain was normal showing no evidence of pituitary dysfunction, but she gives some vague history in the past of having an abnormal MRI. And family member of a pituitary tumor but her scan was negative for that. Her headaches which were more prominent last July have gotten better, she just takes over-the-counter Motrin found that the Fioricet did not seem to help that much just prefers Motrin. She is seeing his therapist but has not seen her in a while, recommend she get back with her psychotherapist after the neuropsych testing is suggesting severe depressive and psychiatric symptoms. Mother asked about disability, told her from the mild cognitive impairment, he would get it she may get it from the psychiatric standpoint if her therapist will fill out the form for her. .  She also has increasing low back pain, of unclear etiology but did have a negative x-ray of her lumbar spine 2 years ago. She also complains more thoracic pain wants that x-ray because she has pain radiating into the right shoulder blade, and the thoracic spine was normal, lumbar spine just showed mild degenerative changes at L4-L5, and L5-S1. Mabeline Sink   She denies any head trauma or back injury except that a year ago she was moving she slipped on a ramp, fell backward and hit her head and back, and her boyfriend said she was dazed, but never sought medical attention and gradually got better. I reviewed the MRI scan personally on the PACS system myself, and I do not see any structural lesion in the brain or the skull to account for her symptoms. She did have previous neuropsych testing done 3 years ago also in addition to the one done April 2019. Her neuropsych testing 3 years ago and one just done, consistent with mild cognitive impairment and severe emotional problems. Her visual loss can occur in any eye, and usually associated with the headaches, and last sometimes for several minutes to several hours. She apparently has a known ovarian cyst in addition, and supposedly had an abnormal MRI of the pituitary gland somewhere in the distant past.  She denies any unusual fever, trauma, meningismus, unusual stress or tension, or any other toxin exposure or other causes for her symptoms. For this multiplicity of problems we were asked to evaluate. Patient encouraged to see her psychiatrist, exercise regular, remain mentally and physically active, take her multivitamin and vitamin D pills on a regular basis, for her memory and quit smoking Past Medical History:  
Diagnosis Date  Anxiety  Arthritis   
 unsure - knees, lower back, fingers and toes  Asthma   
 well controlled-as a child  Chest pain Per pt-CP related to anxiety. Stress and echo test done 7/2018. Ocassional CP per pt; 10/1/18; pt denies CP at this time.  Chronic pain   
 right abdomen,shooting pain groins  Diabetes ([address]   
 pre diabetes ; no current diabetic meds  Female bladder prolapse  GERD (gastroesophageal reflux disease)  Headache  Ill-defined condition   
 prolapsed cervix,rectum,bladder  Prolapse of anterior lip of cervix  Rectal prolapse  Stool color black White stool Past Surgical History:  
Procedure Laterality Date  HX GYN    
 vaginal birth x2  
 HX HEENT    
 bilat tubes in ears age 3  
 [address]  07/09/2015 Laparoscopic Incisional hernia repair with mesh.  HX LAP CHOLECYSTECTOMY  7/7/2014  HX OTHER SURGICAL    
 colonoscopy  HX OTHER SURGICAL    
 endoscopsy Family History Problem Relation Age of Onset  Other Mother   
     fibromyalgia, IBS  Diabetes Mother Pre-DM  High Cholesterol Mother  Hypertension Mother  Liver Disease Mother   
     fatty liver  Cancer Mother   
     uterine  Heart Disease Mother CHF [facility] Arthritis-osteo Mother  Diabetes Father  Hypertension Father  Asthma Brother  Diabetes Paternal Aunt  Hypertension Paternal Aunt  Diabetes Paternal Uncle  Hypertension Paternal Uncle  Cancer Maternal Grandfather   
     esoph  Hypertension Maternal Grandfather  Stroke Maternal Grandfather  Heart Disease Paternal Grandmother  Diabetes Paternal Grandfather  Heart Attack Paternal Grandfather  Stroke Paternal Grandfather  Hypertension Paternal Grandfather  Heart Disease Maternal Grandmother  Other Sister MTHFR (clotting D/O) Social History Tobacco Use  Smoking status: Current Every Day Smoker Packs/day: 0.50 Years: 10.00 Pack years: 5.00 Start date: 3/1/2004  Smokeless tobacco: Never Used Substance Use Topics  Alcohol use: No  
  Comment: rarely Current Outpatient Medications:  
  cholecalciferol, vitamin D3, (VITAMIN D3) 2,000 unit tab, Take  by mouth., Disp: , Rfl:  
  b complex vitamins (B COMPLEX 1) tablet, Take 1 Tab by mouth daily. , Disp: , Rfl:  
   mg tablet, take 1 tablet by mouth every 8 hours if needed for pain, Disp: , Rfl: 0 
  buPROPion XL (WELLBUTRIN XL) 150 mg tablet, take 1 tablet by mouth every morning, Disp: 30 Tab, Rfl: 5 
  DULoxetine (CYMBALTA) 60 mg capsule, take 1 capsule by mouth once daily, Disp: 30 Cap, Rfl: 5   omeprazole (PRILOSEC) 40 mg capsule, Take 1 Cap by mouth daily. , Disp: 90 Cap, Rfl: 1   cetirizine (ZYRTEC) 10 mg tablet, Take 1 Tab by mouth nightly., Disp: 90 Tab, Rfl: 3 Allergies Allergen Reactions  Latex Rash, Itching and Swelling  Pcn [Penicillins] Other (comments) Pt states as a child she had a reaction but does not remember specific reaction. Pt states\"mother say I stopped breathing\". Review of Systems: A comprehensive review of systems was negative except for: Constitutional: positive for fatigue and malaise Eyes: positive for visual disturbance and Loss of vision Ears, nose, mouth, throat, and face: positive for tinnitus Cardiovascular: positive for chest pressure/discomfort, dyspnea, palpitations, dyspnea on exertion, dizziness Gastrointestinal: positive for dyspepsia and reflux symptoms Musculoskeletal: positive for myalgias, arthralgias, stiff joints, neck pain, back pain and muscle weakness Neurological: positive for headaches, dizziness, vertigo, memory problems, paresthesia, coordination problems, gait problems and weakness Behvioral/Psych: positive for anxiety and depression Vitals:  
 05/15/19 8941 BP: 128/86 Pulse: 99 Resp: 16 SpO2: 97% Weight: 227 lb (103 kg) Height: 5' 3\" (1.6 m) Objective: I 
 
 
NEUROLOGICAL EXAM: 
 
Appearance: The patient is well developed, well nourished, provides a coherent history and is in no acute distress. Mental Status: Oriented to time, place and person, and the president, except missed the day of the month, and looks depressed and is slow in answering questions, cognitive function is probably normal and speech is fluent and no aphasia or dysarthria. Mood and affect appropriate but depressed Cranial Nerves:   Intact visual fields. Fundi are benign, discs are flat. LEEANN, EOM's full, no nystagmus, no ptosis.  Facial sensation is normal. Corneal reflexes are not tested. Facial movement is symmetric. Hearing is normal bilaterally. Palate is midline with normal sternocleidomastoid and trapezius muscles are normal. Tongue is midline. Neck without meningismus or bruits Motor:  5/5 strength in upper and lower proximal and distal muscles. Normal bulk and tone. No fasciculations. Rapid altering movement is symmetric bilaterally Reflexes:   Deep tendon reflexes 2+/4 and symmetrical. 
No babinski or clonus present Sensory:   Normal to touch, pinprick and vibration and temperature. DSS is intact Gait:  Normal gait. Tremor:   No tremor noted. Cerebellar:  No cerebellar signs present on Romberg or tandem or finger-nose-finger examination. Neurovascular:  Normal heart sounds and regular rhythm, peripheral pulses intact, and no carotid bruits. Assessment:  
 
{No Diagnosis Found} Active Problems: * No active hospital problems. * 
 
 
Plan:  
 
Patient with abnormal neuropsych testing suggesting severe depression, and mild cognitive impairment, and there is no treatment DA approved for mild cognitive impairment so we will asked the patient to try to quit smoking, is her main risk factor, exercise more, lose weight, get more mentally and physically active, start her vitamins and vitamin D, we can re-see her again in 6 months time to see how she is doing and consider retesting again in 1 years time if needed Her MRI scan reviewed completely on the PACS system I agree it was basically normal, no evidence of pituitary tumor, pituitary problem seems resolved Patient encouraged to see her psychiatrist and treat her severe psychiatric problems. Mother asked about disability and advised him the best chance of disability going to her psychiatrist 
For her headaches they are better now, she takes ibuprofen as needed and will continue this. The Maxalt and Fioricet did not help that much. X-ray both the thoracic and lumbar spine, and it was unremarkable she still has a lot of pain we can send her to therapy and consider an EMG. Over 1 hour spent with patient and her mother going over all her history, reviewing all her records, reviewing her MRI scan personally on the PACS system myself, and discussing her diagnosis prognosis and further treatment options in detail. Follow-up after the above-mentioned tests on 6 months time or earlier as needed. Signed By: Warren Boast, MD   
 May 15, 2019 CC: Radha Estrada NP 
FAX: [phone] This note will not be viewable in [address] If you have questions, please do not hesitate to call me. I look forward to following Ms. Radhika You along with you. Sincerely, Warren Boast, MD

## 2019-05-15 NOTE — LETTER
5/15/2019 10:30 AM 
 
Patient:  Wisam Vegas YOB: 1985 Date of Visit: 5/15/2019 Dear No Recipients: Thank you for referring Ms. John Estrada to me for evaluation/treatment. Below are the relevant portions of my assessment and plan of care. Consult REFERRED BY: 
Jovanny Box NP 
 
CHIEF COMPLAINT: Progressive memory loss, headaches, dense in her head, loss of vision, back pain, scapular pain on the right side, Subjective:  
 
Wisam Vegas is a 30 y.o. right-handed  female seen today at the request of Dr. Emily Ron for evaluation of new problem of abnormal neuropsychiatric testing for her memory she has done in April of this year, and she has not had the results from Dr. Jose Luis Germain yet, but looking at the test it does show that she may have mild cognitive impairment with severe emotional and depressive factors overlying her memory loss, but I did look a little more progressive than the last neuropsych testing she had, and we explained this to the patient that she has had some mild progression, but is not a definite dementia. There is no treatment FDA approved for mild cognitive impairment, so no other treatment other than encouraging the patient to remain mentally and physically active, exercise regular, try to quit smoking, eat a good diet, weight loss, take her vitamins and vitamin D on a regular basis. She is not doing that her B12 level was normal but low normal so we make sure she does start taking a vitamin B complex and vitamin D level was low at 26 she is to go back on 2000 units a day for that. Her PCP will follow up on that. Patient had an MRI scan of the brain that was normal except for one minuscule white matter lesion seen in the right hemisphere which was thought to be clinically insignificant. And her Dopplers were normal study being done in July 2018. Her lab work was all done then 2.   Patient also was seen for progressive deformity in her skull, with dents progressing in her skull associated with progressive and severe headaches in a generalized fashion are described as multiple times, pressure type, stabbing ice pick pains, throbbing, associated with nausea and vomiting at times, that are progressively getting worse now, and occurring almost on a daily basis. In addition she has had memory loss, more difficulty remembering things, seemingly more disabled, but denies any new stress or tension or anxiety. She apparently saw another neurologist and was evaluated for some of these problems, and had an MRI of the brain and pituitary gland because of galactorrhea, but the brain was normal showing no evidence of pituitary dysfunction, but she gives some vague history in the past of having an abnormal MRI. And family member of a pituitary tumor but her scan was negative for that. Her headaches which were more prominent last July have gotten better, she just takes over-the-counter Motrin found that the Fioricet did not seem to help that much just prefers Motrin. She is seeing his therapist but has not seen her in a while, recommend she get back with her psychotherapist after the neuropsych testing is suggesting severe depressive and psychiatric symptoms. Mother asked about disability, told her from the mild cognitive impairment, he would get it she may get it from the psychiatric standpoint if her therapist will fill out the form for her. .  She also has increasing low back pain, of unclear etiology but did have a negative x-ray of her lumbar spine 2 years ago. She also complains more thoracic pain wants that x-ray because she has pain radiating into the right shoulder blade, and the thoracic spine was normal, lumbar spine just showed mild degenerative changes at L4-L5, and L5-S1. Dorcas Jorgensen   She denies any head trauma or back injury except that a year ago she was moving she slipped on a ramp, fell backward and hit her head and back, and her boyfriend said she was dazed, but never sought medical attention and gradually got better. I reviewed the MRI scan personally on the PACS system myself, and I do not see any structural lesion in the brain or the skull to account for her symptoms. She did have previous neuropsych testing done 3 years ago also in addition to the one done April 2019. Her neuropsych testing 3 years ago and one just done, consistent with mild cognitive impairment and severe emotional problems. Her visual loss can occur in any eye, and usually associated with the headaches, and last sometimes for several minutes to several hours. She apparently has a known ovarian cyst in addition, and supposedly had an abnormal MRI of the pituitary gland somewhere in the distant past.  She denies any unusual fever, trauma, meningismus, unusual stress or tension, or any other toxin exposure or other causes for her symptoms. For this multiplicity of problems we were asked to evaluate. Patient encouraged to see her psychiatrist, exercise regular, remain mentally and physically active, take her multivitamin and vitamin D pills on a regular basis, for her memory and quit smoking Past Medical History:  
Diagnosis Date  Anxiety  Arthritis   
 unsure - knees, lower back, fingers and toes  Asthma   
 well controlled-as a child  Chest pain Per pt-CP related to anxiety. Stress and echo test done 7/2018. Ocassional CP per pt; 10/1/18; pt denies CP at this time.  Chronic pain   
 right abdomen,shooting pain groins  Diabetes ([address]   
 pre diabetes ; no current diabetic meds  Female bladder prolapse  GERD (gastroesophageal reflux disease)  Headache  Ill-defined condition   
 prolapsed cervix,rectum,bladder  Prolapse of anterior lip of cervix  Rectal prolapse  Stool color black White stool Past Surgical History:  
Procedure Laterality Date  HX GYN    
 vaginal birth x2  
 HX HEENT    
 bilat tubes in ears age 3  
 [address]  07/09/2015 Laparoscopic Incisional hernia repair with mesh.  HX LAP CHOLECYSTECTOMY  7/7/2014  HX OTHER SURGICAL    
 colonoscopy  HX OTHER SURGICAL    
 endoscopsy Family History Problem Relation Age of Onset  Other Mother   
     fibromyalgia, IBS  Diabetes Mother Pre-DM  High Cholesterol Mother  Hypertension Mother  Liver Disease Mother   
     fatty liver  Cancer Mother   
     uterine  Heart Disease Mother CHF [facility] Arthritis-osteo Mother  Diabetes Father  Hypertension Father  Asthma Brother  Diabetes Paternal Aunt  Hypertension Paternal Aunt  Diabetes Paternal Uncle  Hypertension Paternal Uncle  Cancer Maternal Grandfather   
     esoph  Hypertension Maternal Grandfather  Stroke Maternal Grandfather  Heart Disease Paternal Grandmother  Diabetes Paternal Grandfather  Heart Attack Paternal Grandfather  Stroke Paternal Grandfather  Hypertension Paternal Grandfather  Heart Disease Maternal Grandmother  Other Sister MTHFR (clotting D/O) Social History Tobacco Use  Smoking status: Current Every Day Smoker Packs/day: 0.50 Years: 10.00 Pack years: 5.00 Start date: 3/1/2004  Smokeless tobacco: Never Used Substance Use Topics  Alcohol use: No  
  Comment: rarely Current Outpatient Medications:  
  cholecalciferol, vitamin D3, (VITAMIN D3) 2,000 unit tab, Take  by mouth., Disp: , Rfl:  
  b complex vitamins (B COMPLEX 1) tablet, Take 1 Tab by mouth daily. , Disp: , Rfl:  
   mg tablet, take 1 tablet by mouth every 8 hours if needed for pain, Disp: , Rfl: 0 
  buPROPion XL (WELLBUTRIN XL) 150 mg tablet, take 1 tablet by mouth every morning, Disp: 30 Tab, Rfl: 5 
  DULoxetine (CYMBALTA) 60 mg capsule, take 1 capsule by mouth once daily, Disp: 30 Cap, Rfl: 5   omeprazole (PRILOSEC) 40 mg capsule, Take 1 Cap by mouth daily. , Disp: 90 Cap, Rfl: 1   cetirizine (ZYRTEC) 10 mg tablet, Take 1 Tab by mouth nightly., Disp: 90 Tab, Rfl: 3 Allergies Allergen Reactions  Latex Rash, Itching and Swelling  Pcn [Penicillins] Other (comments) Pt states as a child she had a reaction but does not remember specific reaction. Pt states\"mother say I stopped breathing\". Review of Systems: A comprehensive review of systems was negative except for: Constitutional: positive for fatigue and malaise Eyes: positive for visual disturbance and Loss of vision Ears, nose, mouth, throat, and face: positive for tinnitus Cardiovascular: positive for chest pressure/discomfort, dyspnea, palpitations, dyspnea on exertion, dizziness Gastrointestinal: positive for dyspepsia and reflux symptoms Musculoskeletal: positive for myalgias, arthralgias, stiff joints, neck pain, back pain and muscle weakness Neurological: positive for headaches, dizziness, vertigo, memory problems, paresthesia, coordination problems, gait problems and weakness Behvioral/Psych: positive for anxiety and depression Vitals:  
 05/15/19 7555 BP: 128/86 Pulse: 99 Resp: 16 SpO2: 97% Weight: 227 lb (103 kg) Height: 5' 3\" (1.6 m) Objective: I 
 
 
NEUROLOGICAL EXAM: 
 
Appearance: The patient is well developed, well nourished, provides a coherent history and is in no acute distress. Mental Status: Oriented to time, place and person, and the president, except missed the day of the month, and looks depressed and is slow in answering questions, cognitive function is probably normal and speech is fluent and no aphasia or dysarthria. Mood and affect appropriate but depressed Cranial Nerves:   Intact visual fields. Fundi are benign, discs are flat. LEEANN, EOM's full, no nystagmus, no ptosis.  Facial sensation is normal. Corneal reflexes are not tested. Facial movement is symmetric. Hearing is normal bilaterally. Palate is midline with normal sternocleidomastoid and trapezius muscles are normal. Tongue is midline. Neck without meningismus or bruits Motor:  5/5 strength in upper and lower proximal and distal muscles. Normal bulk and tone. No fasciculations. Rapid altering movement is symmetric bilaterally Reflexes:   Deep tendon reflexes 2+/4 and symmetrical. 
No babinski or clonus present Sensory:   Normal to touch, pinprick and vibration and temperature. DSS is intact Gait:  Normal gait. Tremor:   No tremor noted. Cerebellar:  No cerebellar signs present on Romberg or tandem or finger-nose-finger examination. Neurovascular:  Normal heart sounds and regular rhythm, peripheral pulses intact, and no carotid bruits. Assessment:  
 
{No Diagnosis Found} Active Problems: * No active hospital problems. * 
 
 
Plan:  
 
Patient with abnormal neuropsych testing suggesting severe depression, and mild cognitive impairment, and there is no treatment DA approved for mild cognitive impairment so we will asked the patient to try to quit smoking, is her main risk factor, exercise more, lose weight, get more mentally and physically active, start her vitamins and vitamin D, we can re-see her again in 6 months time to see how she is doing and consider retesting again in 1 years time if needed Her MRI scan reviewed completely on the PACS system I agree it was basically normal, no evidence of pituitary tumor, pituitary problem seems resolved Patient encouraged to see her psychiatrist and treat her severe psychiatric problems. Mother asked about disability and advised him the best chance of disability going to her psychiatrist 
For her headaches they are better now, she takes ibuprofen as needed and will continue this. The Maxalt and Fioricet did not help that much. X-ray both the thoracic and lumbar spine, and it was unremarkable she still has a lot of pain we can send her to therapy and consider an EMG. Over 1 hour spent with patient and her mother going over all her history, reviewing all her records, reviewing her MRI scan personally on the PACS system myself, and discussing her diagnosis prognosis and further treatment options in detail. Follow-up after the above-mentioned tests on 6 months time or earlier as needed. Signed By: Hemanth Zamora MD   
 May 15, 2019 CC: Adriana Wade NP 
FAX: [phone] This note will not be viewable in [address] Consult REFERRED BY: 
Adriana Wade NP 
 
CHIEF COMPLAINT: Progressive memory loss, headaches, dense in her head, loss of vision, back pain, scapular pain on the right side, Subjective:  
 
Justice Byrd is a 30 y.o. right-handed  female seen today at the request of Dr. Melburn Severance for evaluation of new problem of abnormal neuropsychiatric testing for her memory she has done in April of this year, and she has not had the results from Dr. Alexia Conway yet, but looking at the test it does show that she may have mild cognitive impairment with severe emotional and depressive factors overlying her memory loss, but I did look a little more progressive than the last neuropsych testing she had, and we explained this to the patient that she has had some mild progression, but is not a definite dementia. There is no treatment FDA approved for mild cognitive impairment, so no other treatment other than encouraging the patient to remain mentally and physically active, exercise regular, try to quit smoking, eat a good diet, weight loss, take her vitamins and vitamin D on a regular basis.   She is not doing that her B12 level was normal but low normal so we make sure she does start taking a vitamin B complex and vitamin D level was low at 26 she is to go back on 2000 units a day for that. Her PCP will follow up on that. Patient had an MRI scan of the brain that was normal except for one minuscule white matter lesion seen in the right hemisphere which was thought to be clinically insignificant. And her Dopplers were normal study being done in July 2018. Her lab work was all done then 2. Patient also was seen for progressive deformity in her skull, with dents progressing in her skull associated with progressive and severe headaches in a generalized fashion are described as multiple times, pressure type, stabbing ice pick pains, throbbing, associated with nausea and vomiting at times, that are progressively getting worse now, and occurring almost on a daily basis. In addition she has had memory loss, more difficulty remembering things, seemingly more disabled, but denies any new stress or tension or anxiety. She apparently saw another neurologist and was evaluated for some of these problems, and had an MRI of the brain and pituitary gland because of galactorrhea, but the brain was normal showing no evidence of pituitary dysfunction, but she gives some vague history in the past of having an abnormal MRI. And family member of a pituitary tumor but her scan was negative for that. Her headaches which were more prominent last July have gotten better, she just takes over-the-counter Motrin found that the Fioricet did not seem to help that much just prefers Motrin. She is seeing his therapist but has not seen her in a while, recommend she get back with her psychotherapist after the neuropsych testing is suggesting severe depressive and psychiatric symptoms. Mother asked about disability, told her from the mild cognitive impairment, he would get it she may get it from the psychiatric standpoint if her therapist will fill out the form for her. .  She also has increasing low back pain, of unclear etiology but did have a negative x-ray of her lumbar spine 2 years ago. She also complains more thoracic pain wants that x-ray because she has pain radiating into the right shoulder blade, and the thoracic spine was normal, lumbar spine just showed mild degenerative changes at L4-L5, and L5-S1. Mliss Nice She denies any head trauma or back injury except that a year ago she was moving she slipped on a ramp, fell backward and hit her head and back, and her boyfriend said she was dazed, but never sought medical attention and gradually got better. I reviewed the MRI scan personally on the PACS system myself, and I do not see any structural lesion in the brain or the skull to account for her symptoms. She did have previous neuropsych testing done 3 years ago also in addition to the one done April 2019. Her neuropsych testing 3 years ago and one just done, consistent with mild cognitive impairment and severe emotional problems. Her visual loss can occur in any eye, and usually associated with the headaches, and last sometimes for several minutes to several hours. She apparently has a known ovarian cyst in addition, and supposedly had an abnormal MRI of the pituitary gland somewhere in the distant past.  She denies any unusual fever, trauma, meningismus, unusual stress or tension, or any other toxin exposure or other causes for her symptoms. For this multiplicity of problems we were asked to evaluate. Patient encouraged to see her psychiatrist, exercise regular, remain mentally and physically active, take her multivitamin and vitamin D pills on a regular basis, for her memory and quit smoking Past Medical History:  
Diagnosis Date  Anxiety  Arthritis   
 unsure - knees, lower back, fingers and toes  Asthma   
 well controlled-as a child  Chest pain Per pt-CP related to anxiety. Stress and echo test done 7/2018. Ocassional CP per pt; 10/1/18; pt denies CP at this time.  Chronic pain   
 right abdomen,shooting pain groins  Diabetes ([address] pre diabetes ; no current diabetic meds  Female bladder prolapse  GERD (gastroesophageal reflux disease)  Headache  Ill-defined condition   
 prolapsed cervix,rectum,bladder  Prolapse of anterior lip of cervix  Rectal prolapse  Stool color black White stool Past Surgical History:  
Procedure Laterality Date  HX GYN    
 vaginal birth x2  
 HX HEENT    
 bilat tubes in ears age 3  
 [address]  07/09/2015 Laparoscopic Incisional hernia repair with mesh.  HX LAP CHOLECYSTECTOMY  7/7/2014  HX OTHER SURGICAL    
 colonoscopy  HX OTHER SURGICAL    
 endoscopsy Family History Problem Relation Age of Onset  Other Mother   
     fibromyalgia, IBS  Diabetes Mother Pre-DM  High Cholesterol Mother  Hypertension Mother  Liver Disease Mother   
     fatty liver  Cancer Mother   
     uterine  Heart Disease Mother CHF [facility] Arthritis-osteo Mother  Diabetes Father  Hypertension Father  Asthma Brother  Diabetes Paternal Aunt  Hypertension Paternal Aunt  Diabetes Paternal Uncle  Hypertension Paternal Uncle  Cancer Maternal Grandfather   
     esoph  Hypertension Maternal Grandfather  Stroke Maternal Grandfather  Heart Disease Paternal Grandmother  Diabetes Paternal Grandfather  Heart Attack Paternal Grandfather  Stroke Paternal Grandfather  Hypertension Paternal Grandfather  Heart Disease Maternal Grandmother  Other Sister MTHFR (clotting D/O) Social History Tobacco Use  Smoking status: Current Every Day Smoker Packs/day: 0.50 Years: 10.00 Pack years: 5.00 Start date: 3/1/2004  Smokeless tobacco: Never Used Substance Use Topics  Alcohol use: No  
  Comment: rarely Current Outpatient Medications:  
  cholecalciferol, vitamin D3, (VITAMIN D3) 2,000 unit tab, Take  by mouth., Disp: , Rfl:  
   b complex vitamins (B COMPLEX 1) tablet, Take 1 Tab by mouth daily. , Disp: , Rfl:  
   mg tablet, take 1 tablet by mouth every 8 hours if needed for pain, Disp: , Rfl: 0 
  buPROPion XL (WELLBUTRIN XL) 150 mg tablet, take 1 tablet by mouth every morning, Disp: 30 Tab, Rfl: 5 
  DULoxetine (CYMBALTA) 60 mg capsule, take 1 capsule by mouth once daily, Disp: 30 Cap, Rfl: 5 
  omeprazole (PRILOSEC) 40 mg capsule, Take 1 Cap by mouth daily. , Disp: 90 Cap, Rfl: 1   cetirizine (ZYRTEC) 10 mg tablet, Take 1 Tab by mouth nightly., Disp: 90 Tab, Rfl: 3 Allergies Allergen Reactions  Latex Rash, Itching and Swelling  Pcn [Penicillins] Other (comments) Pt states as a child she had a reaction but does not remember specific reaction. Pt states\"mother say I stopped breathing\". Review of Systems: A comprehensive review of systems was negative except for: Constitutional: positive for fatigue and malaise Eyes: positive for visual disturbance and Loss of vision Ears, nose, mouth, throat, and face: positive for tinnitus Cardiovascular: positive for chest pressure/discomfort, dyspnea, palpitations, dyspnea on exertion, dizziness Gastrointestinal: positive for dyspepsia and reflux symptoms Musculoskeletal: positive for myalgias, arthralgias, stiff joints, neck pain, back pain and muscle weakness Neurological: positive for headaches, dizziness, vertigo, memory problems, paresthesia, coordination problems, gait problems and weakness Behvioral/Psych: positive for anxiety and depression Vitals:  
 05/15/19 0480 BP: 128/86 Pulse: 99 Resp: 16 SpO2: 97% Weight: 227 lb (103 kg) Height: 5' 3\" (1.6 m) Objective: I 
 
 
NEUROLOGICAL EXAM: 
 
Appearance: The patient is well developed, well nourished, provides a coherent history and is in no acute distress.   
Mental Status: Oriented to time, place and person, and the president, except missed the day of the month, and looks depressed and is slow in answering questions, cognitive function is probably normal and speech is fluent and no aphasia or dysarthria. Mood and affect appropriate but depressed Cranial Nerves:   Intact visual fields. Fundi are benign, discs are flat. LEEANN, EOM's full, no nystagmus, no ptosis. Facial sensation is normal. Corneal reflexes are not tested. Facial movement is symmetric. Hearing is normal bilaterally. Palate is midline with normal sternocleidomastoid and trapezius muscles are normal. Tongue is midline. Neck without meningismus or bruits Motor:  5/5 strength in upper and lower proximal and distal muscles. Normal bulk and tone. No fasciculations. Rapid altering movement is symmetric bilaterally Reflexes:   Deep tendon reflexes 2+/4 and symmetrical. 
No babinski or clonus present Sensory:   Normal to touch, pinprick and vibration and temperature. DSS is intact Gait:  Normal gait. Tremor:   No tremor noted. Cerebellar:  No cerebellar signs present on Romberg or tandem or finger-nose-finger examination. Neurovascular:  Normal heart sounds and regular rhythm, peripheral pulses intact, and no carotid bruits. Assessment:  
 
Active Problems: * No active hospital problems. * 
 
 
Plan:  
 
Patient with abnormal neuropsych testing suggesting severe depression, and mild cognitive impairment, and there is no treatment DA approved for mild cognitive impairment so we will asked the patient to try to quit smoking, is her main risk factor, exercise more, lose weight, get more mentally and physically active, start her vitamins and vitamin D, we can re-see her again in 6 months time to see how she is doing and consider retesting again in 1 years time if needed Her MRI scan reviewed completely on the PACS system I agree it was basically normal, no evidence of pituitary tumor, pituitary problem seems resolved Patient encouraged to see her psychiatrist and treat her severe psychiatric problems. Mother asked about disability and advised him the best chance of disability going to her psychiatrist 
For her headaches they are better now, she takes ibuprofen as needed and will continue this. The Maxalt and Fioricet did not help that much. X-ray both the thoracic and lumbar spine, and it was unremarkable she still has a lot of pain we can send her to therapy and consider an EMG. Over 1 hour spent with patient and her mother going over all her history, reviewing all her records, reviewing her MRI scan personally on the PACS system myself, and discussing her diagnosis prognosis and further treatment options in detail. Follow-up after the above-mentioned tests on 6 months time or earlier as needed. Signed By: Ann Nuno MD   
 May 15, 2019 CC: Josias Vasques NP 
FAX: [phone] This note will not be viewable in [address] If you have questions, please do not hesitate to call me. I look forward to following Ms. Chava Sapp along with you. Sincerely, Ann Nuno MD

## 2019-05-15 NOTE — PATIENT INSTRUCTIONS
A Healthy Lifestyle: Care Instructions Your Care Instructions A healthy lifestyle can help you feel good, stay at a healthy weight, and have plenty of energy for both work and play. A healthy lifestyle is something you can share with your whole family. A healthy lifestyle also can lower your risk for serious health problems, such as high blood pressure, heart disease, and diabetes. You can follow a few steps listed below to improve your health and the health of your family. Follow-up care is a key part of your treatment and safety. Be sure to make and go to all appointments, and call your doctor if you are having problems. It's also a good idea to know your test results and keep a list of the medicines you take. How can you care for yourself at home? · Do not eat too much sugar, fat, or fast foods. You can still have dessert and treats now and then. The goal is moderation. · Start small to improve your eating habits. Pay attention to portion sizes, drink less juice and soda pop, and eat more fruits and vegetables. ? Eat a healthy amount of food. A 3-ounce serving of meat, for example, is about the size of a deck of cards. Fill the rest of your plate with vegetables and whole grains. ? Limit the amount of soda and sports drinks you have every day. Drink more water when you are thirsty. ? Eat at least 5 servings of fruits and vegetables every day. It may seem like a lot, but it is not hard to reach this goal. A serving or helping is 1 piece of fruit, 1 cup of vegetables, or 2 cups of leafy, raw vegetables. Have an apple or some carrot sticks as an afternoon snack instead of a candy bar. Try to have fruits and/or vegetables at every meal. 
· Make exercise part of your daily routine. You may want to start with simple activities, such as walking, bicycling, or slow swimming. Try to be active 30 to 60 minutes every day.  You do not need to do all 30 to 60 minutes all at once. For example, you can exercise 3 times a day for 10 or 20 minutes. Moderate exercise is safe for most people, but it is always a good idea to talk to your doctor before starting an exercise program. 
· Keep moving. [employer] the lawn, work in the garden, or [employer]. Take the stairs instead of the elevator at work. · If you smoke, quit. People who smoke have an increased risk for heart attack, stroke, cancer, and other lung illnesses. Quitting is hard, but there are ways to boost your chance of quitting tobacco for good. ? Use nicotine gum, patches, or lozenges. ? Ask your doctor about stop-smoking programs and medicines. ? Keep trying. In addition to reducing your risk of diseases in the future, you will notice some benefits soon after you stop using tobacco. If you have shortness of breath or asthma symptoms, they will likely get better within a few weeks after you quit. · Limit how much alcohol you drink. Moderate amounts of alcohol (up to 2 drinks a day for men, 1 drink a day for women) are okay. But drinking too much can lead to liver problems, high blood pressure, and other health problems. Family health If you have a family, there are many things you can do together to improve your health. · Eat meals together as a family as often as possible. · Eat healthy foods. This includes fruits, vegetables, lean meats and dairy, and whole grains. · Include your family in your fitness plan. Most people think of activities such as jogging or tennis as the way to fitness, but there are many ways you and your family can be more active. Anything that makes you breathe hard and gets your heart pumping is exercise. Here are some tips: 
? Walk to do errands or to take your child to school or the bus. 
? Go for a family bike ride after dinner instead of watching TV. Where can you learn more? Go to http://orin-cris.info/. Enter U452 in the search box to learn more about \"A Healthy Lifestyle: Care Instructions. \" Current as of: September 11, 2018 Content Version: 11.9 © 4506-3788 [employer]. Care instructions adapted under license by [employer] (which disclaims liability or warranty for this information). If you have questions about a medical condition or this instruction, always ask your healthcare professional. [address] any warranty or liability for your use of this information. Office Policieso Phone calls/patient messages: Please allow up to 24 hours for someone in the office to contact you about your call or message. Be mindful your provider may be out of the office or your message may require further review. We encourage you to use Mapbox for your messages as this is a faster, more efficient way to communicate with our office 
o Medication Refills: 
Prescription medications require up to 48 business hours to process. We encourage you to use Mapbox for your refills. For controlled medications: Please allow up to 72 business hours to process. Certain medications may require you to  a written prescription at our office. NO narcotic/controlled medications will be prescribed after 4pm Monday through Friday or on weekends 
o Form/Paperwork Completion: We ask that you allow 7-14 business days. You may also download your forms to Mapbox to have your doctor print off.

## 2019-05-15 NOTE — LETTER
5/15/2019 10:31 AM 
 
Patient:  Laverne Sevilla YOB: 1985 Date of Visit: 5/15/2019 Dear No Recipients: Thank you for referring Ms. Anderson Benjamin to me for evaluation/treatment. Below are the relevant portions of my assessment and plan of care. Consult REFERRED BY: 
Mell Archer NP 
 
CHIEF COMPLAINT: Progressive memory loss, headaches, dense in her head, loss of vision, back pain, scapular pain on the right side, Subjective:  
 
Laverne Sevilla is a 30 y.o. right-handed  female seen today at the request of Dr. Elsa Howard for evaluation of new problem of abnormal neuropsychiatric testing for her memory she has done in April of this year, and she has not had the results from Dr. Charleen Crawley yet, but looking at the test it does show that she may have mild cognitive impairment with severe emotional and depressive factors overlying her memory loss, but I did look a little more progressive than the last neuropsych testing she had, and we explained this to the patient that she has had some mild progression, but is not a definite dementia. There is no treatment FDA approved for mild cognitive impairment, so no other treatment other than encouraging the patient to remain mentally and physically active, exercise regular, try to quit smoking, eat a good diet, weight loss, take her vitamins and vitamin D on a regular basis. She is not doing that her B12 level was normal but low normal so we make sure she does start taking a vitamin B complex and vitamin D level was low at 26 she is to go back on 2000 units a day for that. Her PCP will follow up on that. Patient had an MRI scan of the brain that was normal except for one minuscule white matter lesion seen in the right hemisphere which was thought to be clinically insignificant. And her Dopplers were normal study being done in July 2018. Her lab work was all done then 2.   Patient also was seen for progressive deformity in her skull, with dents progressing in her skull associated with progressive and severe headaches in a generalized fashion are described as multiple times, pressure type, stabbing ice pick pains, throbbing, associated with nausea and vomiting at times, that are progressively getting worse now, and occurring almost on a daily basis. In addition she has had memory loss, more difficulty remembering things, seemingly more disabled, but denies any new stress or tension or anxiety. She apparently saw another neurologist and was evaluated for some of these problems, and had an MRI of the brain and pituitary gland because of galactorrhea, but the brain was normal showing no evidence of pituitary dysfunction, but she gives some vague history in the past of having an abnormal MRI. And family member of a pituitary tumor but her scan was negative for that. Her headaches which were more prominent last July have gotten better, she just takes over-the-counter Motrin found that the Fioricet did not seem to help that much just prefers Motrin. She is seeing his therapist but has not seen her in a while, recommend she get back with her psychotherapist after the neuropsych testing is suggesting severe depressive and psychiatric symptoms. Mother asked about disability, told her from the mild cognitive impairment, he would get it she may get it from the psychiatric standpoint if her therapist will fill out the form for her. .  She also has increasing low back pain, of unclear etiology but did have a negative x-ray of her lumbar spine 2 years ago. She also complains more thoracic pain wants that x-ray because she has pain radiating into the right shoulder blade, and the thoracic spine was normal, lumbar spine just showed mild degenerative changes at L4-L5, and L5-S1. [facility]   She denies any head trauma or back injury except that a year ago she was moving she slipped on a ramp, fell backward and hit her head and back, and her boyfriend said she was dazed, but never sought medical attention and gradually got better. I reviewed the MRI scan personally on the PACS system myself, and I do not see any structural lesion in the brain or the skull to account for her symptoms. She did have previous neuropsych testing done 3 years ago also in addition to the one done April 2019. Her neuropsych testing 3 years ago and one just done, consistent with mild cognitive impairment and severe emotional problems. Her visual loss can occur in any eye, and usually associated with the headaches, and last sometimes for several minutes to several hours. She apparently has a known ovarian cyst in addition, and supposedly had an abnormal MRI of the pituitary gland somewhere in the distant past.  She denies any unusual fever, trauma, meningismus, unusual stress or tension, or any other toxin exposure or other causes for her symptoms. For this multiplicity of problems we were asked to evaluate. Patient encouraged to see her psychiatrist, exercise regular, remain mentally and physically active, take her multivitamin and vitamin D pills on a regular basis, for her memory and quit smoking Past Medical History:  
Diagnosis Date  Anxiety  Arthritis   
 unsure - knees, lower back, fingers and toes  Asthma   
 well controlled-as a child  Chest pain Per pt-CP related to anxiety. Stress and echo test done 7/2018. Ocassional CP per pt; 10/1/18; pt denies CP at this time.  Chronic pain   
 right abdomen,shooting pain groins  Diabetes ([address]   
 pre diabetes ; no current diabetic meds  Female bladder prolapse  GERD (gastroesophageal reflux disease)  Headache  Ill-defined condition   
 prolapsed cervix,rectum,bladder  Prolapse of anterior lip of cervix  Rectal prolapse  Stool color black White stool Past Surgical History:  
Procedure Laterality Date  HX GYN    
 vaginal birth x2  
 HX HEENT    
 bilat tubes in ears age 3  
 [address]  07/09/2015 Laparoscopic Incisional hernia repair with mesh.  HX LAP CHOLECYSTECTOMY  7/7/2014  HX OTHER SURGICAL    
 colonoscopy  HX OTHER SURGICAL    
 endoscopsy Family History Problem Relation Age of Onset  Other Mother   
     fibromyalgia, IBS  Diabetes Mother Pre-DM  High Cholesterol Mother  Hypertension Mother  Liver Disease Mother   
     fatty liver  Cancer Mother   
     uterine  Heart Disease Mother CHF [facility] Arthritis-osteo Mother  Diabetes Father  Hypertension Father  Asthma Brother  Diabetes Paternal Aunt  Hypertension Paternal Aunt  Diabetes Paternal Uncle  Hypertension Paternal Uncle  Cancer Maternal Grandfather   
     esoph  Hypertension Maternal Grandfather  Stroke Maternal Grandfather  Heart Disease Paternal Grandmother  Diabetes Paternal Grandfather  Heart Attack Paternal Grandfather  Stroke Paternal Grandfather  Hypertension Paternal Grandfather  Heart Disease Maternal Grandmother  Other Sister MTHFR (clotting D/O) Social History Tobacco Use  Smoking status: Current Every Day Smoker Packs/day: 0.50 Years: 10.00 Pack years: 5.00 Start date: 3/1/2004  Smokeless tobacco: Never Used Substance Use Topics  Alcohol use: No  
  Comment: rarely Current Outpatient Medications:  
  cholecalciferol, vitamin D3, (VITAMIN D3) 2,000 unit tab, Take  by mouth., Disp: , Rfl:  
  b complex vitamins (B COMPLEX 1) tablet, Take 1 Tab by mouth daily. , Disp: , Rfl:  
   mg tablet, take 1 tablet by mouth every 8 hours if needed for pain, Disp: , Rfl: 0 
  buPROPion XL (WELLBUTRIN XL) 150 mg tablet, take 1 tablet by mouth every morning, Disp: 30 Tab, Rfl: 5 
  DULoxetine (CYMBALTA) 60 mg capsule, take 1 capsule by mouth once daily, Disp: 30 Cap, Rfl: 5   omeprazole (PRILOSEC) 40 mg capsule, Take 1 Cap by mouth daily. , Disp: 90 Cap, Rfl: 1   cetirizine (ZYRTEC) 10 mg tablet, Take 1 Tab by mouth nightly., Disp: 90 Tab, Rfl: 3 Allergies Allergen Reactions  Latex Rash, Itching and Swelling  Pcn [Penicillins] Other (comments) Pt states as a child she had a reaction but does not remember specific reaction. Pt states\"mother say I stopped breathing\". Review of Systems: A comprehensive review of systems was negative except for: Constitutional: positive for fatigue and malaise Eyes: positive for visual disturbance and Loss of vision Ears, nose, mouth, throat, and face: positive for tinnitus Cardiovascular: positive for chest pressure/discomfort, dyspnea, palpitations, dyspnea on exertion, dizziness Gastrointestinal: positive for dyspepsia and reflux symptoms Musculoskeletal: positive for myalgias, arthralgias, stiff joints, neck pain, back pain and muscle weakness Neurological: positive for headaches, dizziness, vertigo, memory problems, paresthesia, coordination problems, gait problems and weakness Behvioral/Psych: positive for anxiety and depression Vitals:  
 05/15/19 3423 BP: 128/86 Pulse: 99 Resp: 16 SpO2: 97% Weight: 227 lb (103 kg) Height: 5' 3\" (1.6 m) Objective: I 
 
 
NEUROLOGICAL EXAM: 
 
Appearance: The patient is well developed, well nourished, provides a coherent history and is in no acute distress. Mental Status: Oriented to time, place and person, and the president, except missed the day of the month, and looks depressed and is slow in answering questions, cognitive function is probably normal and speech is fluent and no aphasia or dysarthria. Mood and affect appropriate but depressed Cranial Nerves:   Intact visual fields. Fundi are benign, discs are flat. LEEANN, EOM's full, no nystagmus, no ptosis.  Facial sensation is normal. Corneal reflexes are not tested. Facial movement is symmetric. Hearing is normal bilaterally. Palate is midline with normal sternocleidomastoid and trapezius muscles are normal. Tongue is midline. Neck without meningismus or bruits Motor:  5/5 strength in upper and lower proximal and distal muscles. Normal bulk and tone. No fasciculations. Rapid altering movement is symmetric bilaterally Reflexes:   Deep tendon reflexes 2+/4 and symmetrical. 
No babinski or clonus present Sensory:   Normal to touch, pinprick and vibration and temperature. DSS is intact Gait:  Normal gait. Tremor:   No tremor noted. Cerebellar:  No cerebellar signs present on Romberg or tandem or finger-nose-finger examination. Neurovascular:  Normal heart sounds and regular rhythm, peripheral pulses intact, and no carotid bruits. Assessment:  
 
Active Problems: * No active hospital problems. * 
 
 
Plan:  
 
Patient with abnormal neuropsych testing suggesting severe depression, and mild cognitive impairment, and there is no treatment DA approved for mild cognitive impairment so we will asked the patient to try to quit smoking, is her main risk factor, exercise more, lose weight, get more mentally and physically active, start her vitamins and vitamin D, we can re-see her again in 6 months time to see how she is doing and consider retesting again in 1 years time if needed Her MRI scan reviewed completely on the PACS system I agree it was basically normal, no evidence of pituitary tumor, pituitary problem seems resolved Patient encouraged to see her psychiatrist and treat her severe psychiatric problems. Mother asked about disability and advised him the best chance of disability going to her psychiatrist 
For her headaches they are better now, she takes ibuprofen as needed and will continue this. The Maxalt and Fioricet did not help that much. X-ray both the thoracic and lumbar spine, and it was unremarkable she still has a lot of pain we can send her to therapy and consider an EMG. Over 1 hour spent with patient and her mother going over all her history, reviewing all her records, reviewing her MRI scan personally on the PACS system myself, and discussing her diagnosis prognosis and further treatment options in detail. Follow-up after the above-mentioned tests on 6 months time or earlier as needed. Signed By: Eddy Jordan MD   
 May 15, 2019 CC: Joaquin Cuevas NP 
FAX: [phone] This note will not be viewable in [address] If you have questions, please do not hesitate to call me. I look forward to following Ms. Benoit Curiel along with you. Sincerely, Eddy Jordan MD

## 2019-05-16 ENCOUNTER — TELEPHONE (OUTPATIENT)
Dept: SURGERY | Age: 34
End: 2019-05-16

## 2019-05-16 NOTE — TELEPHONE ENCOUNTER
Patient calling to see when her hernia and gallbladder surgeries was done. Patient needs actual dates. Patient also would like to know of all surgical producers Dr. Chay Pike has performed on her in the past. Patient can be reached on cell.

## 2019-05-21 ENCOUNTER — TELEPHONE (OUTPATIENT)
Dept: FAMILY MEDICINE CLINIC | Age: 34
End: 2019-05-21

## 2019-05-21 NOTE — TELEPHONE ENCOUNTER
Spoke with pt and she would like to  notes from last few visit for disability claim. Informed pt would print and leave at  for pickup. Pt verbalized understanding.

## 2019-05-21 NOTE — TELEPHONE ENCOUNTER
----- Message from Matheus Sarabia sent at 5/21/2019 11:23 AM EDT -----  Regarding: NP A ondina/telephone   Pt is requesting a copy of her medical records.  Best contact number [phone]

## 2019-06-21 ENCOUNTER — OFFICE VISIT (OUTPATIENT)
Dept: NEUROLOGY | Age: 34
End: 2019-06-21

## 2019-06-21 DIAGNOSIS — F33.2 SEVERE EPISODE OF RECURRENT MAJOR DEPRESSIVE DISORDER, WITHOUT PSYCHOTIC FEATURES (HCC): ICD-10-CM

## 2019-06-21 DIAGNOSIS — F43.10 PTSD (POST-TRAUMATIC STRESS DISORDER): Primary | ICD-10-CM

## 2019-06-21 DIAGNOSIS — R41.3 DISTURBANCE OF MEMORY: ICD-10-CM

## 2019-06-21 DIAGNOSIS — F41.9 SEVERE ANXIETY: ICD-10-CM

## 2019-06-21 DIAGNOSIS — F45.0 SOMATIZATION DISORDER: ICD-10-CM

## 2019-06-21 DIAGNOSIS — G31.84 MILD COGNITIVE IMPAIRMENT WITH MEMORY LOSS: ICD-10-CM

## 2019-06-21 NOTE — PROGRESS NOTES
Psychoeducational and supportive psychotherapy and feedback session with the patient today. I reviewed the results of the recent Neuropsychological Evaluation, including discussing individual tests as well as patient's areas of neurocognitive strength versus weakness. Prior to seeing the patient I reviewed the records, including the previously completed report, the records in [place], and any updated visits from other providers since I saw the patient last.      We discussed, in detail, the following: This patient has now undergone two Neuropsychological Evaluations. On previous exam, she had shown cognitive deficits as well as marked psychiatric distress. Over time, declines are noted for verbal fluency, confrontation naming remains impaired, sustained attention has decline, IQ scores have improved, and memory has declined. Executive functioning abilities remain normal.  From an emotional standpoint, she has worsening depression and anxiety as well as PTSD and borderline personality traits. Anxiety remains severe to the degree whereby her ability to attend and to concentrate are compromised.                 As noted previously, she is young for dementia and there is no neurologic correlate which would explain the type of cognitive deficits she is showing. At the same time, the CURRENT profile is not consistent with a purely psychiatric issue. As such, the diagnosis here is that of Mild Cognitive Impairment with Memory loss exacerbated by severe and complex psychiatric distress. She needs intensive psychiatric intervention to include a review of her medication management for anxiety and depression as well as active and continued engagement in intensive psychotherapy. Consider EMDR. Consider TMS if medication by itself has not proven helpful for her - she would be an appropriate candidate for same.   I do believe medication for memory should be considered, as there is no other definite explanation for her impaired recognition recall, which often clears the difference between organic and functional memory loss. Any metabolic cause needs to be ruled out, and sleep issues may be a factor as well. This is concerning and very atypical changes over time, with improvement in some scores and declines in others. She remains capable of making decisions for herself., but needs reminders for meals, medications, and finances. I see no evidence of malingering here. I am concerned about driving safety issues as well, so this should be formally evaluated if deemed medically appropriate. My hope is to see her again once mood improves to better clarify, because my worry is that if cognitive problems continue to worsen despite positive psychiatric interventions, then she has dementia and the prognosis would be quite poor at that point. Stay active mentally, physically, and socially. Consult with OT and Speech, Neurocognitive Rehab. We now have baseline and updated data on her. Follow up as noted. Clinical correlation is, of course, indicated.                   I will discuss these findings with the patient when she follows up with me in the near future. A follow up Neuropsychological Evaluation is indicated on a prn basis, especially if there are any cognitive and/or emotional changes.       DIAGNOSES:             MCI With Memory Loss                                      Severe Depression                                      Severe Anxiety                                      PTSD                                      Borderline Personality Traits                                      Somatization       Education was provided regarding my diagnostic impressions, and we discussed treatment plan/options. I also answered numerous questions related to the clinical findings, including discussing various methods to improve cognition and mood. Counseling provided regarding mood and cognition.    CBT and supportive psychotherapy techniques were utilized. She did have previous neuropsych testing done 3 years ago also in addition to the one done April 2019. Her neuropsych testing 3 years ago and one just done, consistent with mild cognitive impairment and severe emotional problems. Her visual loss can occur in any eye, and usually associated with the headaches, and last sometimes for several minutes to several hours. She apparently has a known ovarian cyst in addition, and supposedly had an abnormal MRI of the pituitary gland somewhere in the distant past.  She denies any unusual fever, trauma, meningismus, unusual stress or tension, or any other toxin exposure or other causes for her symptoms. For this multiplicity of problems we were asked to evaluate. Patient encouraged to see her psychiatrist, exercise regular, remain mentally and physically active, take her multivitamin and vitamin D pills on a regular basis, for her memory and quit smoking   She needs to see psychiatry. Memory is worsening. Discussed not a dementia. She has been seeing a psychiatrist.  I'm wondering now if [facility] is a wise idea. Discussed this with patient. 30 with patient. Need to get report to psychiatrist.  Patient says psychiatrist is in [place] but does not know her name but sees her Monday, and will take the report to her. Cris Ryan is Counselor.

## 2019-07-12 ENCOUNTER — HOSPITAL ENCOUNTER (OUTPATIENT)
Dept: NUCLEAR MEDICINE | Age: 34
Discharge: HOME | End: 2019-07-12
Attending: INTERNAL MEDICINE
Payer: MEDICAID

## 2019-07-12 DIAGNOSIS — R19.4 CHANGE IN BOWEL HABIT: ICD-10-CM

## 2019-07-12 DIAGNOSIS — Z83.79 FAMILY HISTORY OF OTHER DIGESTIVE DISORDERS: ICD-10-CM

## 2019-07-12 DIAGNOSIS — R11.2 NAUSEA AND VOMITING: ICD-10-CM

## 2019-07-12 DIAGNOSIS — F31.9 BIPOLAR DISORDER (HCC): ICD-10-CM

## 2019-07-12 PROCEDURE — 78264 GASTRIC EMPTYING IMG STUDY: CPT

## 2019-08-14 ENCOUNTER — TELEPHONE (OUTPATIENT)
Dept: FAMILY MEDICINE CLINIC | Age: 34
End: 2019-08-14

## 2019-08-14 DIAGNOSIS — J20.9 ACUTE BRONCHITIS: ICD-10-CM

## 2019-08-14 RX ORDER — PROMETHAZINE HYDROCHLORIDE AND CODEINE PHOSPHATE 6.25; 1 MG/5ML; MG/5ML
5 SOLUTION ORAL
Qty: 120 ML | Refills: 0 | Status: SHIPPED | OUTPATIENT
Start: 2019-08-14 | End: 2019-09-13

## 2019-08-14 RX ORDER — AZITHROMYCIN 250 MG/1
TABLET, FILM COATED ORAL
Qty: 6 TAB | Refills: 0 | Status: SHIPPED | OUTPATIENT
Start: 2019-08-14 | End: 2019-08-14 | Stop reason: SDUPTHER

## 2019-08-14 RX ORDER — PROMETHAZINE HYDROCHLORIDE AND CODEINE PHOSPHATE 6.25; 1 MG/5ML; MG/5ML
5 SOLUTION ORAL
Qty: 120 ML | Refills: 0 | Status: SHIPPED | OUTPATIENT
Start: 2019-08-14 | End: 2019-08-14 | Stop reason: SDUPTHER

## 2019-08-14 RX ORDER — AZITHROMYCIN 250 MG/1
TABLET, FILM COATED ORAL
Qty: 6 TAB | Refills: 0 | Status: SHIPPED | OUTPATIENT
Start: 2019-08-14 | End: 2019-09-05

## 2019-08-14 NOTE — TELEPHONE ENCOUNTER
Verified patient with two type of identifiers. Pt states symptoms x 2 days. Pt scheduled for same day with Dr. Socorro Upton at 12:15 PM today. Pt verbalized understanding.

## 2019-08-14 NOTE — TELEPHONE ENCOUNTER
Patient said that Dr. Jhony Mota mentioned that she could switch to her to be her Primary Care Physician . I was not able to confirm with Dr. Jhony Mota to schedule the patients next appt.      Patient can be reached at [phone]

## 2019-08-14 NOTE — PROGRESS NOTES
HISTORY OF PRESENT ILLNESS  Zulema Hines is a 30 y.o. female. HPI   C/o nasal congestion and cough for the past few days. Coughing up phlegm. No fever or chills noted. Has malaise. Throat is scratchy. Has post nasal drainage. No chest pain or SOB. No wheezing noted. Increase cough at night. 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    Acute bronchitis G45.9    Cyclical vomiting T21. A0    Incisional hernia K43.2    Major depressive disorder, recurrent episode, severe (HCC) F33.2    PTSD (post-traumatic stress disorder) F43.10    Routine medical exam Z00.00    Intractable nausea and vomiting R11.2    Other chest pain R07.89    Tension vascular headache G44.209    Disturbance of memory R41.3    Transient vision disturbance of both eyes H53.9    Bilateral carotid artery stenosis I65.23    Cerebral microvascular disease I67.9    Lumbar back pain with radiculopathy affecting left lower extremity M54.16    Lumbar back pain with radiculopathy affecting right lower extremity M54.16    B12 deficiency E53.8    Vitamin D deficiency E55.9    Hypothyroidism due to acquired atrophy of thyroid E03.4    Family history of pituitary disease Z83.49    Severe obesity (HCC) E66.01       Current Outpatient Medications   Medication Sig Dispense Refill    promethazine-codeine (PHENERGAN WITH CODEINE) 6.25-10 mg/5 mL syrup Take 5 mL by mouth every six (6) hours as needed for Cough for up to 30 days. Max Daily Amount: 20 mL. 120 mL 0    azithromycin (ZITHROMAX) 250 mg tablet Take 2 tablets today, then take 1 tablet daily 6 Tab 0    cholecalciferol, vitamin D3, (VITAMIN D3) 2,000 unit tab Take  by mouth.  b complex vitamins (B COMPLEX 1) tablet Take 1 Tab by mouth daily.        mg tablet take 1 tablet by mouth every 8 hours if needed for pain  0    buPROPion XL (WELLBUTRIN XL) 150 mg tablet take 1 tablet by mouth every morning 30 Tab 5    DULoxetine (CYMBALTA) 60 mg capsule take 1 capsule by mouth once daily 30 Cap 5    omeprazole (PRILOSEC) 40 mg capsule Take 1 Cap by mouth daily. 90 Cap 1    cetirizine (ZYRTEC) 10 mg tablet Take 1 Tab by mouth nightly. 90 Tab 3       Allergies   Allergen Reactions    Latex Rash, Itching and Swelling    Pcn [Penicillins] Other (comments)     Pt states as a child she had a reaction but does not remember specific reaction. Pt states\"mother say I stopped breathing\". Past Medical History:   Diagnosis Date    Anxiety     Arthritis     unsure - knees, lower back, fingers and toes    Asthma     well controlled-as a child    Chest pain     Per pt-CP related to anxiety. Stress and echo test done 7/2018. Ocassional CP per pt; 10/1/18; pt denies CP at this time.  Chronic pain     right abdomen,shooting pain groins    Diabetes ([address]     pre diabetes ; no current diabetic meds    Female bladder prolapse     GERD (gastroesophageal reflux disease)     Headache     Ill-defined condition     prolapsed cervix,rectum,bladder    Prolapse of anterior lip of cervix     Rectal prolapse     Stool color black     White stool       Past Surgical History:   Procedure Laterality Date    HX GYN      vaginal birth x2    HX HEENT      bilat tubes in ears age 3    HX HERNIA REPAIR  07/09/2015    Laparoscopic Incisional hernia repair with mesh.     HX LAP CHOLECYSTECTOMY  7/7/2014    HX OTHER SURGICAL      colonoscopy    HX OTHER SURGICAL      endoscopsy       Family History   Problem Relation Age of Onset    Other Mother         fibromyalgia, IBS    Diabetes Mother         Pre-DM    High Cholesterol Mother     Hypertension Mother     Liver Disease Mother         fatty liver    Cancer Mother         uterine     Heart Disease Mother         CHF    Arthritis-osteo Mother  Diabetes Father     Hypertension Father     Asthma Brother     Diabetes Paternal Aunt     Hypertension Paternal Aunt     Diabetes Paternal Uncle     Hypertension Paternal Uncle     Cancer Maternal Grandfather         esoph    Hypertension Maternal Grandfather     Stroke Maternal Grandfather     Heart Disease Paternal Grandmother     Diabetes Paternal Grandfather     Heart Attack Paternal Grandfather     Stroke Paternal Grandfather     Hypertension Paternal Grandfather     Heart Disease Maternal Grandmother     Other Sister         MTHFR (clotting D/O)       Social History     Tobacco Use    Smoking status: Current Every Day Smoker     Packs/day: 0.50     Years: 10.00     Pack years: 5.00     Start date: 3/1/2004    Smokeless tobacco: Never Used   Substance Use Topics    Alcohol use: No     Comment: rarely        ROS    Physical Exam   Constitutional: She appears well-developed and well-nourished. There were no vitals taken for this visit. HENT:   Right Ear: Tympanic membrane and ear canal normal.   Left Ear: Tympanic membrane and ear canal normal.   Nose: Mucosal edema and rhinorrhea present. Mouth/Throat: Mucous membranes are normal. Posterior oropharyngeal erythema present. No oropharyngeal exudate. Neck: Trachea normal, normal range of motion and full passive range of motion without pain. Neck supple. No edema present. No thyromegaly present. Cardiovascular: Normal rate and regular rhythm. Pulmonary/Chest: Effort normal and breath sounds normal. She has no wheezes. She has no rales. Abdominal: Soft. Normal appearance and bowel sounds are normal. There is no tenderness. Lymphadenopathy:     She has no cervical adenopathy. Nursing note and vitals reviewed. ASSESSMENT and PLAN  Diagnoses and all orders for this visit:    1. Acute bronchitis  -     promethazine-codeine (PHENERGAN WITH CODEINE) 6.25-10 mg/5 mL syrup;  Take 5 mL by mouth every six (6) hours as needed for Cough for up to 30 days. Max Daily Amount: 20 mL. Other orders  -     azithromycin (ZITHROMAX) 250 mg tablet;  Take 2 tablets today, then take 1 tablet daily

## 2019-08-14 NOTE — TELEPHONE ENCOUNTER
Patient wants a return call regarding having a sore throat / vomiting/ body pain, she wants to be seen today or call something in.   Please give her a call @ [phone]

## 2019-08-23 ENCOUNTER — TELEPHONE (OUTPATIENT)
Dept: FAMILY MEDICINE CLINIC | Age: 34
End: 2019-08-23

## 2019-08-23 NOTE — TELEPHONE ENCOUNTER
Offered patient a 10 am appointment with NP, but she could not get to office. Patient thinks it might be teeth related, pain is in her jaw & ear. She has called dentis but closed on fridays, she will go to urgent care.

## 2019-08-23 NOTE — TELEPHONE ENCOUNTER
----- Message from [employer] sent at 8/23/2019  7:03 AM EDT -----  Regarding: Dr. [facility]: [phone]  Pt experiencing pain in right ear and states the pain is moving into her jaw. Pt would like to be seen today, however, there are no available sick appts.

## 2019-09-05 RX ORDER — CLINDAMYCIN HYDROCHLORIDE 300 MG/1
300 CAPSULE ORAL 4 TIMES DAILY
COMMUNITY
End: 2019-10-02 | Stop reason: ALTCHOICE

## 2019-09-05 RX ORDER — BUPROPION HYDROCHLORIDE 300 MG/1
300 TABLET ORAL
COMMUNITY
End: 2021-09-16 | Stop reason: SDUPTHER

## 2019-09-05 RX ORDER — LAMOTRIGINE 200 MG/1
200 TABLET ORAL DAILY
COMMUNITY
End: 2021-09-16 | Stop reason: SDUPTHER

## 2019-09-05 RX ORDER — ACETAMINOPHEN AND CODEINE PHOSPHATE 300; 30 MG/1; MG/1
1 TABLET ORAL
COMMUNITY
End: 2019-10-02

## 2019-09-05 NOTE — PERIOP NOTES
[facility]  Ambulatory Surgery Unit  Pre-operative Instructions for Endo Procedures    Procedure Date  Monday, September 9, 2019            Tentative Arrival Time 0700      1. On the day of your procedure, please report to the Ambulatory Surgery Unit Registration Desk and sign in at your designated time. The Ambulatory Surgery Unit is located in [facility] on the [facility] side of the [facility] across from the [address] [place] [address]. Please have all of your health insurance cards and a photo ID. 2. You must have someone with you to drive you home, as you should not drive a car for 24 hours following anesthesia. Please make arrangements for a responsible adult friend or family member to stay with you for at least the first 24 hours after your procedure. 3. Do not have anything to eat or drink (including water, gum, mints, coffee, juice) after 11:59 PM, Sunday. This may not apply to medications prescribed by your physician. (Please note below the special instructions with medications to take the morning of your procedure.)    4. If applicable, follow the clear liquid diet and bowel prep instructions provided by your physician's office. If you do not have this information, or have any questions, please contact your physician's office. 5. We recommend you do not drink any alcoholic beverages for 24 hours before and after your procedure. 6. Contact your surgeons office for instructions on the following medications: non-steroidal anti-inflammatory drugs (i.e. Advil, Aleve), vitamins, and supplements. (Some surgeons will want you to stop these medications prior to surgery and others may allow you to take them)   **If you are currently taking Plavix, Coumadin, Aspirin and/or other blood-thinning agents, contact your surgeon for instructions. ** Your surgeon will partner with the physician prescribing these medications to determine if it is safe to stop or if you need to continue taking. Please do not stop taking these medications without instructions from your surgeon. 7. In an effort to help prevent surgical site infection, we ask that you shower with an anti-bacterial soap (i.e. Dial or Safeguard) on the morning of your procedure. Do not apply any lotions, powders, or deodorants after showering. 8. Wear comfortable clothes. Wear glasses instead of contacts. Do not bring any jewelry or money (other than copays or fees as instructed). Do not wear make-up, particularly mascara, the morning of your procedure. Wear your hair loose or down, no ponytails, buns, alexander pins or clips. All body piercings must be removed. 9. You should understand that if you do not follow these instructions your procedure may be cancelled. If your physical condition changes (i.e. fever, cold or flu) please contact your surgeon as soon as possible. 10. It is important that you be on time. If a situation occurs where you may be late, or if you have any questions or problems, please call [phone]. 11. Your procedure time may be subject to change. You will receive a phone call the day prior to confirm your arrival time. Special Instructions: Take all medications and inhalers, as prescribed, on the morning of surgery with a sip of water. Insulin Dependent Diabetic patients: Take your diabetic medications as prescribed the day before surgery. Hold all diabetic medications the day of surgery. If you are scheduled to arrive for surgery after 8:00 AM, and your AM blood sugar is >200, please call Ambulatory Surgery. I understand a pre-operative phone call will be made to verify my procedure time. In the event that I am not available, I give permission for a message to be left on my answering service and/or with another person?       yes    Preop instructions reviewed  Pt verbalized understanding.      ___________________      ___________________      ___________________  (Signature of Patient)          (Witness)                   (Date and Time)

## 2019-09-16 NOTE — ADVANCED PRACTICE NURSE
Abram 4 in PAT phone assessment. Pt reports fatigue after a good night's sleep, + witnessed apnea while sleeping. + BMI > 35, + neck size > 16\". Has upcoming appt with PCP. Results sent to PCP via [facility] [facility] [employer] for further review and recommendations regarding sleep apnea evaluation.

## 2019-10-02 ENCOUNTER — OFFICE VISIT (OUTPATIENT)
Dept: FAMILY MEDICINE CLINIC | Age: 34
End: 2019-10-02

## 2019-10-02 VITALS
BODY MASS INDEX: 41.32 KG/M2 | HEART RATE: 94 BPM | OXYGEN SATURATION: 98 % | TEMPERATURE: 97.8 F | DIASTOLIC BLOOD PRESSURE: 85 MMHG | SYSTOLIC BLOOD PRESSURE: 116 MMHG | RESPIRATION RATE: 16 BRPM | HEIGHT: 63 IN | WEIGHT: 233.2 LBS

## 2019-10-02 DIAGNOSIS — Z79.899 ENCOUNTER FOR LONG-TERM (CURRENT) USE OF MEDICATIONS: ICD-10-CM

## 2019-10-02 DIAGNOSIS — M54.50 CHRONIC BILATERAL LOW BACK PAIN WITHOUT SCIATICA: ICD-10-CM

## 2019-10-02 DIAGNOSIS — Z76.89 ENCOUNTER TO ESTABLISH CARE WITH NEW DOCTOR: Primary | ICD-10-CM

## 2019-10-02 DIAGNOSIS — G89.29 CHRONIC BILATERAL LOW BACK PAIN WITHOUT SCIATICA: ICD-10-CM

## 2019-10-02 DIAGNOSIS — F43.12 CHRONIC POST-TRAUMATIC STRESS DISORDER (PTSD): ICD-10-CM

## 2019-10-02 DIAGNOSIS — E53.8 LOW VITAMIN B12 LEVEL: ICD-10-CM

## 2019-10-02 DIAGNOSIS — E55.9 HYPOVITAMINOSIS D: ICD-10-CM

## 2019-10-02 DIAGNOSIS — R10.9 CHRONIC ABDOMINAL PAIN: ICD-10-CM

## 2019-10-02 DIAGNOSIS — R21 RASH AND NONSPECIFIC SKIN ERUPTION: ICD-10-CM

## 2019-10-02 DIAGNOSIS — M79.18 MYALGIA OF MUSCLE OF NECK: ICD-10-CM

## 2019-10-02 DIAGNOSIS — M25.50 ARTHRALGIA OF MULTIPLE SITES: ICD-10-CM

## 2019-10-02 DIAGNOSIS — E11.9 DIABETES MELLITUS TYPE 2, DIET-CONTROLLED (HCC): ICD-10-CM

## 2019-10-02 DIAGNOSIS — Z23 ENCOUNTER FOR IMMUNIZATION: ICD-10-CM

## 2019-10-02 DIAGNOSIS — K21.9 GASTROESOPHAGEAL REFLUX DISEASE, ESOPHAGITIS PRESENCE NOT SPECIFIED: ICD-10-CM

## 2019-10-02 DIAGNOSIS — G89.29 CHRONIC ABDOMINAL PAIN: ICD-10-CM

## 2019-10-02 DIAGNOSIS — J30.9 ALLERGIC RHINITIS, UNSPECIFIED SEASONALITY, UNSPECIFIED TRIGGER: ICD-10-CM

## 2019-10-02 DIAGNOSIS — F33.40 RECURRENT MAJOR DEPRESSIVE DISORDER, IN REMISSION (HCC): ICD-10-CM

## 2019-10-02 LAB
BILIRUB UR QL STRIP: NEGATIVE
GLUCOSE UR-MCNC: NEGATIVE MG/DL
HBA1C MFR BLD HPLC: 5.7 %
KETONES P FAST UR STRIP-MCNC: NEGATIVE MG/DL
PH UR STRIP: 5.5 [PH] (ref 4.6–8)
PROT UR QL STRIP: NEGATIVE
SP GR UR STRIP: 1.01 (ref 1–1.03)
UA UROBILINOGEN AMB POC: NORMAL (ref 0.2–1)
URINALYSIS CLARITY POC: CLEAR
URINALYSIS COLOR POC: YELLOW
URINE BLOOD POC: NORMAL
URINE LEUKOCYTES POC: NEGATIVE
URINE NITRITES POC: NEGATIVE

## 2019-10-02 RX ORDER — DULOXETIN HYDROCHLORIDE 30 MG/1
30 CAPSULE, DELAYED RELEASE ORAL DAILY
COMMUNITY
End: 2020-02-21

## 2019-10-02 RX ORDER — OXYCODONE AND ACETAMINOPHEN 5; 325 MG/1; MG/1
1 TABLET ORAL
COMMUNITY
End: 2020-12-11 | Stop reason: ALTCHOICE

## 2019-10-02 RX ORDER — CETIRIZINE HCL 10 MG
10 TABLET ORAL
Qty: 90 TAB | Refills: 3 | COMMUNITY
Start: 2019-10-02 | End: 2021-05-14 | Stop reason: SDUPTHER

## 2019-10-02 RX ORDER — TRIAMCINOLONE ACETONIDE 5 MG/G
CREAM TOPICAL 2 TIMES DAILY
Qty: 15 G | Refills: 0 | Status: SHIPPED | OUTPATIENT
Start: 2019-10-02 | End: 2020-01-08

## 2019-10-02 RX ORDER — DULOXETIN HYDROCHLORIDE 60 MG/1
120 CAPSULE, DELAYED RELEASE ORAL DAILY
COMMUNITY
End: 2021-09-16 | Stop reason: SDUPTHER

## 2019-10-02 RX ORDER — IBUPROFEN 200 MG
600 TABLET ORAL
COMMUNITY
End: 2021-05-14 | Stop reason: DRUGHIGH

## 2019-10-02 RX ORDER — TRAZODONE HYDROCHLORIDE 100 MG/1
100 TABLET ORAL
Refills: 0 | COMMUNITY
Start: 2019-10-01 | End: 2022-03-25

## 2019-10-02 NOTE — PROGRESS NOTES
Order placed for flu shot per Verbal Order from Dr. Alok Dotson on 10/2/2019 due to need. Flu shot 0.5 ml given in left arm IM, no reaction noted.

## 2019-10-02 NOTE — PROGRESS NOTES
Chief Complaint   Patient presents with   [address]     patient here to get established with Dr. Fabio King           1. Have you been to the ER, urgent care clinic since your last visit? Hospitalized since your last visit? Yes 4/25/2019 at [facility] for hysterectomy. 2. Have you seen or consulted any other health care providers outside of the [address] [place] [address] since your last visit? Include any pap smears or colon screening.  No
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
## 2019-10-03 LAB
25(OH)D3+25(OH)D2 SERPL-MCNC: 31.6 NG/ML (ref 30–100)
ALBUMIN SERPL-MCNC: 4.4 G/DL (ref 3.5–5.5)
ALBUMIN/GLOB SERPL: 1.8 {RATIO} (ref 1.2–2.2)
ALP SERPL-CCNC: 113 IU/L (ref 39–117)
ALT SERPL-CCNC: 12 IU/L (ref 0–32)
ANA SER QL: NEGATIVE
AST SERPL-CCNC: 10 IU/L (ref 0–40)
BILIRUB SERPL-MCNC: <0.2 MG/DL (ref 0–1.2)
BUN SERPL-MCNC: 8 MG/DL (ref 6–20)
BUN/CREAT SERPL: 10 (ref 9–23)
CALCIUM SERPL-MCNC: 9.3 MG/DL (ref 8.7–10.2)
CHLORIDE SERPL-SCNC: 101 MMOL/L (ref 96–106)
CK SERPL-CCNC: 67 U/L (ref 24–173)
CO2 SERPL-SCNC: 24 MMOL/L (ref 20–29)
CREAT SERPL-MCNC: 0.81 MG/DL (ref 0.57–1)
CRP SERPL-MCNC: 15 MG/L (ref 0–10)
ERYTHROCYTE [DISTWIDTH] IN BLOOD BY AUTOMATED COUNT: 14 % (ref 12.3–15.4)
ERYTHROCYTE [SEDIMENTATION RATE] IN BLOOD BY WESTERGREN METHOD: 25 MM/HR (ref 0–32)
GLOBULIN SER CALC-MCNC: 2.5 G/DL (ref 1.5–4.5)
GLUCOSE SERPL-MCNC: 87 MG/DL (ref 65–99)
HCT VFR BLD AUTO: 43.3 % (ref 34–46.6)
HGB BLD-MCNC: 14.2 G/DL (ref 11.1–15.9)
MCH RBC QN AUTO: 28.1 PG (ref 26.6–33)
MCHC RBC AUTO-ENTMCNC: 32.8 G/DL (ref 31.5–35.7)
MCV RBC AUTO: 86 FL (ref 79–97)
PLATELET # BLD AUTO: 345 X10E3/UL (ref 150–450)
POTASSIUM SERPL-SCNC: 4.8 MMOL/L (ref 3.5–5.2)
PROT SERPL-MCNC: 6.9 G/DL (ref 6–8.5)
RBC # BLD AUTO: 5.06 X10E6/UL (ref 3.77–5.28)
RHEUMATOID FACT SERPL-ACNC: <10 IU/ML (ref 0–13.9)
SODIUM SERPL-SCNC: 140 MMOL/L (ref 134–144)
VIT B12 SERPL-MCNC: 368 PG/ML (ref 232–1245)
WBC # BLD AUTO: 11.2 X10E3/UL (ref 3.4–10.8)

## 2019-10-07 RX ORDER — OMEPRAZOLE 40 MG/1
40 CAPSULE, DELAYED RELEASE ORAL DAILY
Qty: 90 CAP | Refills: 3 | Status: SHIPPED | OUTPATIENT
Start: 2019-10-07 | End: 2020-09-22 | Stop reason: SDUPTHER

## 2019-10-07 NOTE — TELEPHONE ENCOUNTER
Received fax from patient's pharmacy requesting the following prescription.      Last OV:10/2/2019  Next Appt:1/8/2020  Last Refill: 3/25/19 (90+R1)

## 2019-10-23 ENCOUNTER — TELEPHONE (OUTPATIENT)
Dept: NEUROLOGY | Age: 34
End: 2019-10-23

## 2019-10-25 ENCOUNTER — TELEPHONE (OUTPATIENT)
Dept: NEUROLOGY | Age: 34
End: 2019-10-25

## 2019-10-25 NOTE — TELEPHONE ENCOUNTER
----- Message from [employer] sent at 10/25/2019  8:40 AM EDT -----  Regarding: Dr. Maribeth Pruitt  General Message/Vendor Calls    Caller's first and last name: Neal Speaks Som/mother      Reason for call: pt is requesting a call back in regards to the testing  with Dr. Kimberley Arellano. Ms. Brenda Thais also advised that the pt is having a hard time comprehending thing.        Callback required yes/no and why: yes      Best contact number(s): [phone]      Details to clarify the request:      Delia Nice DISPLAY PLAN FREE TEXT

## 2019-10-28 NOTE — TELEPHONE ENCOUNTER
Patient's mother took Skippy Marking to the ER because she kept losing her thoughts. Could not remember what she was doing or saying. This happened last week. On Friday, she started shaking.    Will have notes sent to our office  Scheduled with NP and will see PCP in the meantime

## 2019-11-22 ENCOUNTER — TELEPHONE (OUTPATIENT)
Dept: FAMILY MEDICINE CLINIC | Age: 34
End: 2019-11-22

## 2019-11-22 RX ORDER — ASCORBIC ACID 500 MG
1000 TABLET ORAL DAILY
COMMUNITY
End: 2022-03-25

## 2019-11-22 NOTE — TELEPHONE ENCOUNTER
----- Message from Lizet Nunn sent at 11/22/2019 12:30 PM EST -----  Regarding: Dr. Kayden Mulligan Kirill/Telephone  Contact: [phone]  Caller's first and last name and relationship to patient (if not the patient): N/A  Best contact number: [phone]  Preferred date and time: first appt available  Scheduled appointment date and time: No appt available  Reason for appointment: Diagnose for rash, persistent cough that wakes her out her sleep  Details to clarify the request: Pt stated she has surgery coming up and was advised to see PCP about her rash before surgery. Pt also would like to speak with the doctor.

## 2019-11-22 NOTE — PERIOP NOTES
[facility]  Ambulatory Surgery Unit  Pre-operative Instructions for Endo Procedures    Procedure Date  12/2            Tentative Arrival Time 06:15am      1. On the day of your procedure, please report to the Ambulatory Surgery Unit Registration Desk and sign in at your designated time. The Ambulatory Surgery Unit is located in [facility] on the [facility] side of the [facility] across from the [address] [place] [address]. Please have all of your health insurance cards and a photo ID. 2. You must have someone with you to drive you home, as you should not drive a car for 24 hours following anesthesia. Please make arrangements for a responsible adult friend or family member to stay with you for at least the first 24 hours after your procedure. 3. Do not have anything to eat or drink (including water, gum, mints, coffee, juice) after 11:59 PM 12/1. This may not apply to medications prescribed by your physician. (Please note below the special instructions with medications to take the morning of your procedure.)    4. If applicable, follow the clear liquid diet and bowel prep instructions provided by your physician's office. If you do not have this information, or have any questions, please contact your physician's office. 5. We recommend you do not drink any alcoholic beverages for 24 hours before and after your procedure. 6. Contact your surgeons office for instructions on the following medications: non-steroidal anti-inflammatory drugs (i.e. Advil, Aleve), vitamins, and supplements. (Some surgeons will want you to stop these medications prior to surgery and others may allow you to take them)   **If you are currently taking Plavix, Coumadin, Aspirin and/or other blood-thinning agents, contact your surgeon for instructions. ** Your surgeon will partner with the physician prescribing these medications to determine if it is safe to stop or if you need to continue taking.  Please do not stop taking these medications without instructions from your surgeon. 7. In an effort to help prevent surgical site infection, we ask that you shower with an anti-bacterial soap (i.e. Dial or Safeguard) on the morning of your procedure. Do not apply any lotions, powders, or deodorants after showering. 8. Wear comfortable clothes. Wear glasses instead of contacts. Do not bring any jewelry or money (other than copays or fees as instructed). Do not wear make-up, particularly mascara, the morning of your procedure. Wear your hair loose or down, no ponytails, buns, alexander pins or clips. All body piercings must be removed. 9. You should understand that if you do not follow these instructions your procedure may be cancelled. If your physical condition changes (i.e. fever, cold or flu) please contact your surgeon as soon as possible. 10. It is important that you be on time. If a situation occurs where you may be late, or if you have any questions or problems, please call [phone]. 11. Your procedure time may be subject to change. You will receive a phone call the day prior to confirm your arrival time. Special Instructions: Take all medications and inhalers, as prescribed, on the morning of surgery with a sip of water EXCEPT: none      I understand a pre-operative phone call will be made to verify my procedure time. In the event that I am not available, I give permission for a message to be left on my answering service and/or with another person?       yes      Reviewed by phone with pt, verbalized understanding   ___________________      ___________________      ___________________  (Signature of Patient)          (Witness)                   (Date and Time)

## 2019-11-25 ENCOUNTER — TELEPHONE (OUTPATIENT)
Dept: FAMILY MEDICINE CLINIC | Age: 34
End: 2019-11-25

## 2019-11-25 NOTE — TELEPHONE ENCOUNTER
----- Message from Jenifer Gan sent at 11/25/2019  9:49 AM EST -----  Regarding: Dr. Fabio Wellsles/ Telephone  Contact: [phone]  Callback required yes/no and why:  Yes- returning phone call    Best contact number(s): [phone]  Details to clarify the request: Pt stated Someone from practice called her. Pt requesting a call back.

## 2019-11-25 NOTE — TELEPHONE ENCOUNTER
Yordan Fragoso LPN           93/24/84 9:02 AM   Note      Tried to call patient to offer appt for today 11/25/2019 at 10:15am but unable to LM due to voicemail being full.

## 2019-11-25 NOTE — TELEPHONE ENCOUNTER
Tried to call patient to offer appt for today 11/25/2019 at 10:15am but unable to LM due to voicemail being full.

## 2019-11-27 ENCOUNTER — OFFICE VISIT (OUTPATIENT)
Dept: FAMILY MEDICINE CLINIC | Age: 34
End: 2019-11-27

## 2019-11-27 ENCOUNTER — ANESTHESIA EVENT (OUTPATIENT)
Dept: SURGERY | Age: 34
End: 2019-11-27
Payer: MEDICAID

## 2019-11-27 VITALS
DIASTOLIC BLOOD PRESSURE: 89 MMHG | SYSTOLIC BLOOD PRESSURE: 117 MMHG | TEMPERATURE: 97.6 F | WEIGHT: 242 LBS | RESPIRATION RATE: 16 BRPM | OXYGEN SATURATION: 97 % | BODY MASS INDEX: 42.88 KG/M2 | HEART RATE: 80 BPM | HEIGHT: 63 IN

## 2019-11-27 DIAGNOSIS — L30.9 DERMATITIS: ICD-10-CM

## 2019-11-27 DIAGNOSIS — J06.9 UPPER RESPIRATORY TRACT INFECTION, UNSPECIFIED TYPE: Primary | ICD-10-CM

## 2019-11-27 RX ORDER — AZITHROMYCIN 250 MG/1
TABLET, FILM COATED ORAL
Qty: 6 TAB | Refills: 0 | Status: SHIPPED | OUTPATIENT
Start: 2019-11-27 | End: 2020-01-08 | Stop reason: ALTCHOICE

## 2019-11-27 RX ORDER — BETAMETHASONE DIPROPIONATE 0.5 MG/G
OINTMENT TOPICAL
Qty: 45 G | Refills: 0 | Status: SHIPPED | OUTPATIENT
Start: 2019-11-27 | End: 2020-02-21

## 2019-11-27 RX ORDER — PREDNISONE 10 MG/1
TABLET ORAL
Qty: 21 TAB | Refills: 0 | Status: SHIPPED | OUTPATIENT
Start: 2019-11-27 | End: 2020-01-08 | Stop reason: ALTCHOICE

## 2019-11-27 NOTE — PROGRESS NOTES
Chief Complaint   Patient presents with    Rash     patient still c/o itchy rash on both lower legs and folds of abd    Cough     patient c/o cough on and off and coughing up clear/yellowish sputum at times     Patient states her cough is worse at night and wakes her up out of her sleep. Patient reports that she is taking Mucinex and doesn't feel like it is helping much. 1. Have you been to the ER, urgent care clinic since your last visit? Hospitalized since your last visit? 2. Have you seen or consulted any other health care providers outside of the [address] [place] [address] since your last visit? Include any pap smears or colon screening.  No

## 2019-11-27 NOTE — PROGRESS NOTES
HISTORY OF PRESENT ILLNESS  Rafaela Lassiter is a 30 y.o. female. HPI   C/o nasal congestion and cough for the past few days. Coughing up phlegm. No fever or chills noted. Has malaise. Throat is scratchy. Has post nasal drainage. No chest pain or SOB. She has had some intermittent wheezing noted. No sick contacts noted. She is still smoking despite having the chest congestion. Also still has the rash on the lower legs. Has intense itching at times. Treated on last month with the cream which help slightly. Then seen at [facility] er and given steroid dose pack which did help but rash cam back after a few days. Rash is just on the lower legs. Denies any animals in the house. No indication for fleas or bed bugs.       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    Acute bronchitis V73.2    Cyclical vomiting J41.70    Incisional hernia K43.2    Major depressive disorder, recurrent episode, severe (HCC) F33.2    PTSD (post-traumatic stress disorder) F43.10    Intractable nausea and vomiting R11.2    Other chest pain R07.89    Tension vascular headache G44.209    Disturbance of memory R41.3    Transient vision disturbance of both eyes H53.9    Bilateral carotid artery stenosis I65.23    Cerebral microvascular disease I67.9    Lumbar back pain with radiculopathy affecting left lower extremity M54.16    Lumbar back pain with radiculopathy affecting right lower extremity M54.16    B12 deficiency E53.8    Vitamin D deficiency E55.9    Hypothyroidism due to acquired atrophy of thyroid E03.4    Family history of pituitary disease Z83.49    Severe obesity (HCC) E66.01    Endometriosis N80.9       Current Outpatient Medications   Medication Sig Dispense Refill    azithromycin (ZITHROMAX) 250 mg tablet Take 2 tablets today, then take 1 tablet daily 6 Tab 0    predniSONE (STERAPRED DS) 10 mg dose pack See administration instruction per 10mg dose pack 21 Tab 0    betamethasone dipropionate (DIPROLENE) 0.05 % ointment Apply  to affected area two (2) times daily as needed for Skin Irritation. 45 g 0    guaiFENesin-dextromethorphan SR (MUCINEX DM) 600-30 mg per tablet Take 1 Tab by mouth every twelve (12) hours as needed for Cough or Congestion. 60 Tab 0    ascorbic acid, vitamin C, (VITAMIN C) 500 mg tablet Take 1,000 mg by mouth daily.  omeprazole (PRILOSEC) 40 mg capsule Take 1 Cap by mouth daily. 90 Cap 3    DULoxetine (CYMBALTA) 30 mg capsule Take 30 mg by mouth daily.  DULoxetine (CYMBALTA) 60 mg capsule Take 60 mg by mouth daily.  ibuprofen (MOTRIN) 200 mg tablet Take 600 mg by mouth every six (6) hours as needed for Pain.  traZODone (DESYREL) 100 mg tablet Take 100 mg by mouth nightly. 0    oxyCODONE-acetaminophen (PERCOCET) 5-325 mg per tablet Take 1 Tab by mouth every six (6) hours as needed for Pain.  cetirizine (ZYRTEC) 10 mg tablet Take 1 Tab by mouth nightly as needed for Allergies. 90 Tab 3    lamoTRIgine (LAMICTAL) 200 mg tablet Take 200 mg by mouth daily. Indications: Bipolar Depression      buPROPion XL (WELLBUTRIN XL) 300 mg XL tablet Take 300 mg by mouth every morning.  cholecalciferol, vitamin D3, (VITAMIN D3) 2,000 unit tab Take 1 Tab by mouth daily.  b complex vitamins (B COMPLEX 1) tablet Take 1 Tab by mouth daily.  triamcinolone (ARISTOCORT) 0.5 % topical cream Apply  to affected area two (2) times a day. use thin layer 15 g 0       Allergies   Allergen Reactions    Latex Rash, Itching and Swelling    Pcn [Penicillins] Other (comments)     Pt states as a child she had a reaction but does not remember specific reaction. Pt states\"mother say I stopped breathing\".        Past Medical History:   Diagnosis Date    Anxiety     Arthritis     unsure - knees, lower back, fingers and toes    At risk for sleep apnea 09/05/2019    KATIA score 4 on PAT assessment    Bipolar 2 disorder (HCC)     Chest pain     Childhood asthma     Chronic pain     right abdomen,shooting pain groins    Depression     Endometriosis     Female bladder prolapse     GERD (gastroesophageal reflux disease)     MCI (mild cognitive impairment) with memory loss     Migraines     Prediabetes     Prolapse of anterior lip of cervix     PTSD (post-traumatic stress disorder)     borderline personality traits    Rectal prolapse        Past Surgical History:   Procedure Laterality Date    HX COLONOSCOPY      HX ENDOSCOPY      HX GYN      vaginal birth x2    HX HERNIA REPAIR  07/09/2015    Laparoscopic Incisional hernia repair with mesh.     HX LAP CHOLECYSTECTOMY  7/7/2014    HX MYRINGOTOMY Bilateral     childhood    HX PARTIAL HYSTERECTOMY  04/25/2019    laparoscopic, left ovaries       Family History   Problem Relation Age of Onset    Other Mother         fibromyalgia, IBS    Diabetes Mother         Pre-DM    High Cholesterol Mother     Hypertension Mother     Liver Disease Mother         fatty liver    Cancer Mother         uterine     Heart Disease Mother         CHF    Arthritis-osteo Mother     Diabetes Father     Hypertension Father     Asthma Brother     Diabetes Paternal Aunt     Hypertension Paternal Aunt     Diabetes Paternal Uncle     Hypertension Paternal Uncle     Cancer Maternal Grandfather         esoph    Hypertension Maternal Grandfather     Stroke Maternal Grandfather     Heart Disease Paternal Grandmother     Diabetes Paternal Grandfather     Heart Attack Paternal Grandfather     Stroke Paternal Grandfather     Hypertension Paternal Grandfather     Heart Disease Maternal Grandmother     Other Sister         MTHFR (clotting D/O)       Social History     Tobacco Use    Smoking status: Current Every Day Smoker     Packs/day: 0.50     Years: 10.00 Pack years: 5.00     Start date: 3/1/2004    Smokeless tobacco: Never Used   Substance Use Topics    Alcohol use: Yes     Comment: rarely, not weeklt        Lab Results   Component Value Date/Time    WBC 11.2 (H) 10/02/2019 11:36 AM    HGB 14.2 10/02/2019 11:36 AM    HCT 43.3 10/02/2019 11:36 AM    Hematocrit (POC) 46 06/15/2018 10:54 AM    PLATELET 505 29/04/3335 11:36 AM    MCV 86 10/02/2019 11:36 AM     Lab Results   Component Value Date/Time    Cholesterol, total 127 01/19/2012 10:50 AM    HDL Cholesterol 37 (L) 01/19/2012 10:50 AM    LDL, calculated 68 01/19/2012 10:50 AM    Triglyceride 109 01/19/2012 10:50 AM     Lab Results   Component Value Date/Time    TSH 2.120 07/30/2018 10:47 AM    T3 Uptake 31 08/05/2016 03:22 PM    Triiodothyronine (T3), free 3.2 11/13/2012 11:36 AM    T4, Free 1.38 08/17/2015 03:39 PM    T4, Total 8.0 08/05/2016 03:22 PM      Lab Results   Component Value Date/Time    Sodium 140 10/02/2019 11:36 AM    Potassium 4.8 10/02/2019 11:36 AM    Chloride 101 10/02/2019 11:36 AM    CO2 24 10/02/2019 11:36 AM    Anion gap 8 07/14/2018 11:20 PM    Glucose 87 10/02/2019 11:36 AM    BUN 8 10/02/2019 11:36 AM    Creatinine 0.81 10/02/2019 11:36 AM    BUN/Creatinine ratio 10 10/02/2019 11:36 AM    GFR est  10/02/2019 11:36 AM    GFR est non-AA 96 10/02/2019 11:36 AM    Calcium 9.3 10/02/2019 11:36 AM    Bilirubin, total <0.2 10/02/2019 11:36 AM    ALT (SGPT) 12 10/02/2019 11:36 AM    AST (SGOT) 10 10/02/2019 11:36 AM    Alk. phosphatase 113 10/02/2019 11:36 AM    Protein, total 6.9 10/02/2019 11:36 AM    Albumin 4.4 10/02/2019 11:36 AM    Globulin 3.4 07/14/2018 11:20 PM    A-G Ratio 1.8 10/02/2019 11:36 AM      Lab Results   Component Value Date/Time    Hemoglobin A1c 6.0 (H) 11/13/2012 11:36 AM    Hemoglobin A1c (POC) 5.7 10/02/2019 04:33 PM         Review of Systems   Constitutional: Negative for malaise/fatigue. HENT: Negative for congestion. Eyes: Negative for blurred vision. Respiratory: Negative for cough and shortness of breath. Cardiovascular: Negative for chest pain, palpitations and leg swelling. Gastrointestinal: Negative for abdominal pain, constipation and heartburn. Genitourinary: Negative for dysuria, frequency and urgency. Musculoskeletal: Positive for back pain, joint pain and myalgias. Skin: Positive for itching and rash. Neurological: Negative for dizziness, tingling and headaches. Endo/Heme/Allergies: Negative for environmental allergies. Psychiatric/Behavioral: Negative for depression. The patient does not have insomnia. Physical Exam  Vitals signs and nursing note reviewed. Constitutional:       Appearance: Normal appearance. She is well-developed. Comments: /89 (BP 1 Location: Left arm, BP Patient Position: Sitting)   Pulse 80   Temp 97.6 °F (36.4 °C) (Oral)   Resp 16   Ht 5' 3\" (1.6 m)   Wt 242 lb (109.8 kg)   LMP 04/25/2019   SpO2 97%    L/min   BMI 42.87 kg/m²    HENT:      Right Ear: Tympanic membrane and ear canal normal.      Left Ear: Tympanic membrane and ear canal normal.      Nose: Mucosal edema and rhinorrhea present. Mouth/Throat:      Pharynx: Posterior oropharyngeal erythema present. No oropharyngeal exudate. Neck:      Musculoskeletal: Full passive range of motion without pain, normal range of motion and neck supple. No edema. Thyroid: No thyromegaly. Trachea: Trachea normal.   Cardiovascular:      Rate and Rhythm: Normal rate and regular rhythm. Pulmonary:      Effort: Pulmonary effort is normal.      Breath sounds: Normal breath sounds. No wheezing or rales. Abdominal:      General: Bowel sounds are normal.      Palpations: Abdomen is soft. Tenderness: There is no tenderness. Lymphadenopathy:      Cervical: No cervical adenopathy. Skin:     General: Skin is warm and dry. Findings: Rash present.       Comments: Has isolated slightly erythematous bumps noted mostly on the front of both lower legs. No swelling in the legs. No drainage. ASSESSMENT and PLAN  Diagnoses and all orders for this visit:    1. Upper respiratory tract infection, unspecified type  -     azithromycin (ZITHROMAX) 250 mg tablet; Take 2 tablets today, then take 1 tablet daily  -     predniSONE (STERAPRED DS) 10 mg dose pack; See administration instruction per 10mg dose pack  -     guaiFENesin-dextromethorphan SR (MUCINEX DM) 600-30 mg per tablet; Take 1 Tab by mouth every twelve (12) hours as needed for Cough or Congestion. Advise to eliminate the smoking while recovering from the respiratory infection. 2. Dermatitis  -     predniSONE (STERAPRED DS) 10 mg dose pack; See administration instruction per 10mg dose pack  -     betamethasone dipropionate (DIPROLENE) 0.05 % ointment; Apply  to affected area two (2) times daily as needed for Skin Irritation. If not improving in the next several days then will refer to dermatology.             reviewed medications and side effects in detail

## 2019-12-01 RX ORDER — SODIUM CHLORIDE 9 MG/ML
75 INJECTION, SOLUTION INTRAVENOUS CONTINUOUS
Status: CANCELLED | OUTPATIENT
Start: 2019-12-01 | End: 2019-12-02

## 2019-12-01 RX ORDER — MIDAZOLAM HYDROCHLORIDE 1 MG/ML
.25-5 INJECTION, SOLUTION INTRAMUSCULAR; INTRAVENOUS
Status: CANCELLED | OUTPATIENT
Start: 2019-12-01 | End: 2019-12-02

## 2019-12-01 RX ORDER — SODIUM CHLORIDE 0.9 % (FLUSH) 0.9 %
5-40 SYRINGE (ML) INJECTION AS NEEDED
Status: CANCELLED | OUTPATIENT
Start: 2019-12-01

## 2019-12-01 RX ORDER — NALOXONE HYDROCHLORIDE 0.4 MG/ML
0.4 INJECTION, SOLUTION INTRAMUSCULAR; INTRAVENOUS; SUBCUTANEOUS
Status: CANCELLED | OUTPATIENT
Start: 2019-12-01 | End: 2019-12-02

## 2019-12-01 RX ORDER — SODIUM CHLORIDE 0.9 % (FLUSH) 0.9 %
5-40 SYRINGE (ML) INJECTION EVERY 8 HOURS
Status: CANCELLED | OUTPATIENT
Start: 2019-12-01

## 2019-12-01 RX ORDER — ATROPINE SULFATE 0.1 MG/ML
0.5 INJECTION INTRAVENOUS
Status: CANCELLED | OUTPATIENT
Start: 2019-12-01 | End: 2019-12-02

## 2019-12-01 RX ORDER — EPINEPHRINE 0.1 MG/ML
1 INJECTION INTRACARDIAC; INTRAVENOUS
Status: CANCELLED | OUTPATIENT
Start: 2019-12-01 | End: 2019-12-02

## 2019-12-01 RX ORDER — FLUMAZENIL 0.1 MG/ML
0.2 INJECTION INTRAVENOUS
Status: CANCELLED | OUTPATIENT
Start: 2019-12-01 | End: 2019-12-02

## 2019-12-01 RX ORDER — DEXTROMETHORPHAN/PSEUDOEPHED 2.5-7.5/.8
1.2 DROPS ORAL
Status: CANCELLED | OUTPATIENT
Start: 2019-12-01

## 2019-12-02 ENCOUNTER — HOSPITAL ENCOUNTER (OUTPATIENT)
Age: 34
Setting detail: OUTPATIENT SURGERY
Discharge: HOME | End: 2019-12-02
Attending: INTERNAL MEDICINE | Admitting: INTERNAL MEDICINE
Payer: MEDICAID

## 2019-12-02 ENCOUNTER — ANESTHESIA (OUTPATIENT)
Dept: SURGERY | Age: 34
End: 2019-12-02
Payer: MEDICAID

## 2019-12-02 VITALS
OXYGEN SATURATION: 98 % | BODY MASS INDEX: 42.57 KG/M2 | SYSTOLIC BLOOD PRESSURE: 104 MMHG | TEMPERATURE: 98 F | WEIGHT: 240.25 LBS | RESPIRATION RATE: 19 BRPM | DIASTOLIC BLOOD PRESSURE: 86 MMHG | HEART RATE: 82 BPM | HEIGHT: 63 IN

## 2019-12-02 LAB — HCG UR QL: NEGATIVE

## 2019-12-02 PROCEDURE — 88305 TISSUE EXAM BY PATHOLOGIST: CPT

## 2019-12-02 PROCEDURE — 77030021352 HC CBL LD SYS DISP COVD -B: Performed by: INTERNAL MEDICINE

## 2019-12-02 PROCEDURE — 76030000002 HC AMB SURG OR TIME FIRST 0.: Performed by: INTERNAL MEDICINE

## 2019-12-02 PROCEDURE — 76210000046 HC AMBSU PH II REC FIRST 0.5 HR: Performed by: INTERNAL MEDICINE

## 2019-12-02 PROCEDURE — 81025 URINE PREGNANCY TEST: CPT

## 2019-12-02 PROCEDURE — 74011250636 HC RX REV CODE- 250/636: Performed by: ANESTHESIOLOGY

## 2019-12-02 PROCEDURE — 76060000073 HC AMB SURG ANES FIRST 0.5 HR: Performed by: INTERNAL MEDICINE

## 2019-12-02 PROCEDURE — 74011250636 HC RX REV CODE- 250/636: Performed by: NURSE ANESTHETIST, CERTIFIED REGISTERED

## 2019-12-02 PROCEDURE — 76210000040 HC AMBSU PH I REC FIRST 0.5 HR: Performed by: INTERNAL MEDICINE

## 2019-12-02 PROCEDURE — 77030021593 HC FCPS BIOP ENDOSC BSC -A: Performed by: INTERNAL MEDICINE

## 2019-12-02 PROCEDURE — 77030020255 HC SOL INJ LR 1000ML BG: Performed by: INTERNAL MEDICINE

## 2019-12-02 RX ORDER — SODIUM CHLORIDE, SODIUM LACTATE, POTASSIUM CHLORIDE, CALCIUM CHLORIDE 600; 310; 30; 20 MG/100ML; MG/100ML; MG/100ML; MG/100ML
25 INJECTION, SOLUTION INTRAVENOUS CONTINUOUS
Status: DISCONTINUED | OUTPATIENT
Start: 2019-12-02 | End: 2019-12-02 | Stop reason: HOSPADM

## 2019-12-02 RX ORDER — SODIUM CHLORIDE 0.9 % (FLUSH) 0.9 %
5-40 SYRINGE (ML) INJECTION EVERY 8 HOURS
Status: DISCONTINUED | OUTPATIENT
Start: 2019-12-02 | End: 2019-12-02 | Stop reason: HOSPADM

## 2019-12-02 RX ORDER — PROPOFOL 10 MG/ML
INJECTION, EMULSION INTRAVENOUS AS NEEDED
Status: DISCONTINUED | OUTPATIENT
Start: 2019-12-02 | End: 2019-12-02 | Stop reason: HOSPADM

## 2019-12-02 RX ORDER — DIPHENHYDRAMINE HYDROCHLORIDE 50 MG/ML
12.5 INJECTION, SOLUTION INTRAMUSCULAR; INTRAVENOUS AS NEEDED
Status: DISCONTINUED | OUTPATIENT
Start: 2019-12-02 | End: 2019-12-02 | Stop reason: HOSPADM

## 2019-12-02 RX ORDER — SODIUM CHLORIDE 0.9 % (FLUSH) 0.9 %
5-40 SYRINGE (ML) INJECTION AS NEEDED
Status: DISCONTINUED | OUTPATIENT
Start: 2019-12-02 | End: 2019-12-02 | Stop reason: HOSPADM

## 2019-12-02 RX ORDER — LIDOCAINE HYDROCHLORIDE 10 MG/ML
0.1 INJECTION, SOLUTION EPIDURAL; INFILTRATION; INTRACAUDAL; PERINEURAL AS NEEDED
Status: DISCONTINUED | OUTPATIENT
Start: 2019-12-02 | End: 2019-12-02 | Stop reason: HOSPADM

## 2019-12-02 RX ORDER — HYDROMORPHONE HYDROCHLORIDE 1 MG/ML
0.2 INJECTION, SOLUTION INTRAMUSCULAR; INTRAVENOUS; SUBCUTANEOUS
Status: DISCONTINUED | OUTPATIENT
Start: 2019-12-02 | End: 2019-12-02 | Stop reason: HOSPADM

## 2019-12-02 RX ORDER — ONDANSETRON 2 MG/ML
INJECTION INTRAMUSCULAR; INTRAVENOUS AS NEEDED
Status: DISCONTINUED | OUTPATIENT
Start: 2019-12-02 | End: 2019-12-02 | Stop reason: HOSPADM

## 2019-12-02 RX ORDER — FENTANYL CITRATE 50 UG/ML
25 INJECTION, SOLUTION INTRAMUSCULAR; INTRAVENOUS
Status: DISCONTINUED | OUTPATIENT
Start: 2019-12-02 | End: 2019-12-02 | Stop reason: HOSPADM

## 2019-12-02 RX ADMIN — SODIUM CHLORIDE, SODIUM LACTATE, POTASSIUM CHLORIDE, AND CALCIUM CHLORIDE 25 ML/HR: 600; 310; 30; 20 INJECTION, SOLUTION INTRAVENOUS at 06:55

## 2019-12-02 RX ADMIN — PROPOFOL 50 MG: 10 INJECTION, EMULSION INTRAVENOUS at 07:55

## 2019-12-02 RX ADMIN — PROPOFOL 50 MG: 10 INJECTION, EMULSION INTRAVENOUS at 07:52

## 2019-12-02 RX ADMIN — ONDANSETRON HYDROCHLORIDE 4 MG: 2 INJECTION, SOLUTION INTRAMUSCULAR; INTRAVENOUS at 07:39

## 2019-12-02 RX ADMIN — PROPOFOL 50 MG: 10 INJECTION, EMULSION INTRAVENOUS at 07:50

## 2019-12-02 RX ADMIN — PROPOFOL 100 MG: 10 INJECTION, EMULSION INTRAVENOUS at 07:45

## 2019-12-02 RX ADMIN — PROPOFOL 50 MG: 10 INJECTION, EMULSION INTRAVENOUS at 07:47

## 2019-12-02 NOTE — PERIOP NOTES
Patient states that mom Acie Silvius will be with them for at least 24 hours following today's procedure.

## 2019-12-02 NOTE — ANESTHESIA POSTPROCEDURE EVALUATION
Procedure(s):  COLONOSCOPY (LATEX ALLERGY)  COLON BIOPSY.     general, total IV anesthesia    Anesthesia Post Evaluation      Multimodal analgesia: multimodal analgesia not used between 6 hours prior to anesthesia start to PACU discharge  Patient location during evaluation: PACU  Patient participation: complete - patient participated  Level of consciousness: awake  Pain score: 0  Airway patency: patent  Anesthetic complications: no  Cardiovascular status: acceptable  Respiratory status: acceptable  Hydration status: acceptable  Post anesthesia nausea and vomiting:  none      Vitals Value Taken Time   /86 12/2/2019  8:10 AM   Temp 36.7 °C (98 °F) 12/2/2019  8:02 AM   Pulse 82 12/2/2019  8:10 AM   Resp 19 12/2/2019  8:10 AM   SpO2 98 % 12/2/2019  8:05 AM

## 2019-12-02 NOTE — PERIOP NOTES
Permission received to review discharge instructions and discuss private health information with mom Jeannine Carrington

## 2019-12-02 NOTE — H&P
Pre-endoscopy H and P    The patient was seen and examined in the room/pre-op holding area. The airway was assessed and documented. The problem list, past medical history, and medications were reviewed.      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    Acute bronchitis U89.2    Cyclical vomiting F97.78    Incisional hernia K43.2    Major depressive disorder, recurrent episode, severe (HCC) F33.2    PTSD (post-traumatic stress disorder) F43.10    Intractable nausea and vomiting R11.2    Other chest pain R07.89    Tension vascular headache G44.209    Disturbance of memory R41.3    Transient vision disturbance of both eyes H53.9    Bilateral carotid artery stenosis I65.23    Cerebral microvascular disease I67.9    Lumbar back pain with radiculopathy affecting left lower extremity M54.16    Lumbar back pain with radiculopathy affecting right lower extremity M54.16    B12 deficiency E53.8    Vitamin D deficiency E55.9    Hypothyroidism due to acquired atrophy of thyroid E03.4    Family history of pituitary disease Z83.49    Severe obesity (HCC) E66.01    Endometriosis N80.9     Social History     Socioeconomic History    Marital status: SINGLE     Spouse name: Not on file    Number of children: 2    Years of education: Not on file    Highest education level: Not on file   Occupational History     Employer: NOT EMPLOYED     Comment: work at home    Social Needs    Financial resource strain: Not on file    Food insecurity:     Worry: Not on file     Inability: Not on file    Transportation needs:     Medical: Not on file     Non-medical: Not on file   Tobacco Use    Smoking status: Current Every Day Smoker     Packs/day: 0.50     Years: 10.00     Pack years: 5.00     Start date: 3/1/2004    Smokeless tobacco: Never Used   Substance and Sexual Activity    Alcohol use: Yes     Comment: rarely, not weeklt    Drug use: Not Currently     Types: Marijuana     Comment: 11/22/19, last use >1 year    Sexual activity: Yes     Partners: Male     Birth control/protection: None     Comment: single    Lifestyle    Physical activity:     Days per week: Not on file     Minutes per session: Not on file    Stress: Not on file   Relationships    Social connections:     Talks on phone: Not on file     Gets together: Not on file     Attends Sikhism service: Not on file     Active member of club or organization: Not on file     Attends meetings of clubs or organizations: Not on file     Relationship status: Not on file    Intimate partner violence:     Fear of current or ex partner: Not on file     Emotionally abused: Not on file     Physically abused: Not on file     Forced sexual activity: Not on file   Other Topics Concern    Not on file   Social History Narrative    Unemployed currently     Past Medical History:   Diagnosis Date    Anxiety     Arthritis     unsure - knees, lower back, fingers and toes    At risk for sleep apnea 09/05/2019    KATIA score 4 on PAT assessment    Bipolar 2 disorder ([facility] [address]     Chest pain     Childhood asthma     Chronic pain     right abdomen,shooting pain groins    Depression     Endometriosis     Female bladder prolapse     GERD (gastroesophageal reflux disease)     MCI (mild cognitive impairment) with memory loss     Migraines     Prediabetes     Prolapse of anterior lip of cervix     PTSD (post-traumatic stress disorder)     borderline personality traits    Rectal prolapse          Prior to Admission Medications   Prescriptions Last Dose Informant Patient Reported? Taking? DULoxetine (CYMBALTA) 30 mg capsule 12/1/2019 at Unknown time  Yes Yes   Sig: Take 30 mg by mouth daily. DULoxetine (CYMBALTA) 60 mg capsule 12/1/2019 at Unknown time  Yes Yes   Sig: Take 60 mg by mouth daily. ascorbic acid, vitamin C, (VITAMIN C) 500 mg tablet 12/1/2019 at Unknown time  Yes Yes   Sig: Take 1,000 mg by mouth daily. azithromycin (ZITHROMAX) 250 mg tablet 12/1/2019 at Unknown time  No Yes   Sig: Take 2 tablets today, then take 1 tablet daily   b complex vitamins (B COMPLEX 1) tablet 12/1/2019 at Unknown time  Yes Yes   Sig: Take 1 Tab by mouth daily. betamethasone dipropionate (DIPROLENE) 0.05 % ointment Not Taking at Unknown time  No No   Sig: Apply  to affected area two (2) times daily as needed for Skin Irritation. buPROPion XL (WELLBUTRIN XL) 300 mg XL tablet 12/1/2019 at Unknown time  Yes Yes   Sig: Take 300 mg by mouth every morning. cetirizine (ZYRTEC) 10 mg tablet 12/1/2019 at Unknown time  Yes Yes   Sig: Take 1 Tab by mouth nightly as needed for Allergies. cholecalciferol, vitamin D3, (VITAMIN D3) 2,000 unit tab 12/1/2019 at Unknown time  Yes Yes   Sig: Take 1 Tab by mouth daily. guaiFENesin-dextromethorphan SR (MUCINEX DM) 600-30 mg per tablet 11/25/2019 at Unknown time  No Yes   Sig: Take 1 Tab by mouth every twelve (12) hours as needed for Cough or Congestion. ibuprofen (MOTRIN) 200 mg tablet 11/2/2019 at Unknown time  Yes Yes   Sig: Take 600 mg by mouth every six (6) hours as needed for Pain.   lamoTRIgine (LAMICTAL) 200 mg tablet 11/25/2019 at Unknown time  Yes Yes   Sig: Take 200 mg by mouth daily. Indications: Bipolar Depression   omeprazole (PRILOSEC) 40 mg capsule 12/1/2019 at Unknown time  No Yes   Sig: Take 1 Cap by mouth daily. oxyCODONE-acetaminophen (PERCOCET) 5-325 mg per tablet Not Taking at Unknown time  Yes No   Sig: Take 1 Tab by mouth every six (6) hours as needed for Pain. predniSONE (STERAPRED DS) 10 mg dose pack 12/1/2019 at Unknown time  No Yes   Sig: See administration instruction per 10mg dose pack   traZODone (DESYREL) 100 mg tablet 11/25/2019 at Unknown time  Yes Yes   Sig: Take 100 mg by mouth nightly.    triamcinolone (ARISTOCORT) 0.5 % topical cream Not Taking at Unknown time  No No   Sig: Apply  to affected area two (2) times a day. use thin layer      Facility-Administered Medications: None           The review of systems is:  Negative  for shortness of breath or chest pain      The heart, lungs, and mental status were satisfactory for the administration of deep sedation and for the procedure. I discussed with the patient the objectives, risks, consequences and alternatives to the procedure.       Deep Carpenter MD  12/2/2019  7:35 AM

## 2019-12-02 NOTE — PERIOP NOTES
Laverne Padilla  1985  [id number]    Situation:  Verbal report given from: ZORAIDA Almonte CRNA, Siddharth Coy RN  Procedure: Procedure(s):  COLONOSCOPY (LATEX ALLERGY)  COLON BIOPSY    Background:    Preoperative diagnosis: CHANGE IN BOWEL HABITS, NAUSEA AND VOMITING, FAMILY  HISTORY OF OTHER DIGESTIVE DISORDERS-MOTHER WITH CELIAC DISEASE    Postoperative diagnosis: poor prep, mild terminal ileum inflammation    :  Dr. Easley Sample    Assistant(s): Circ-1: Puja Keller RN  Scrub RN-1: Rebecca Santana RN    Specimens:   ID Type Source Tests Collected by Time Destination   1 : terminal ileum biospy Preservative Ileum, Terminal  Chioma Tejada MD 12/2/2019 6625 Pathology   2 : random colon biopsy Preservative Colon  Chioma Tejada MD 12/2/2019 2036 Pathology       Assessment:  Intra-procedure medications   Propofol 300 mg      Anesthesia gave intra-procedure sedation and medications, see anesthesia flow sheet     Intravenous fluids: Talavera Fairly     Vital signs stable     Abdominal assessment: round and soft       Recommendation:    Permission to share finding with mother yes    All side rails up, bed in low position, wheels locked. Nurse at bedside.

## 2019-12-02 NOTE — DISCHARGE INSTRUCTIONS
[place] Office: [phone]    Patricia Poe  [id number]  1985    EGD/COLONOSCOPY DISCHARGE INSTRUCTIONS  Discomfort:  Sore throat- throat lozenges or warm salt water gargle  redness at IV site- apply warm compress to area; if redness or soreness persist- contact your physician  Gaseous discomfort- walking, belching will help relieve any discomfort  You may not operate a vehicle for 24 hours  You may not engage in an occupation involving machinery or appliances for rest of today. You may not drink alcoholic beverages for at least 24 hours  Avoid making any critical decisions for at least 24 hour  DIET  You may resume your regular diet - however -  remember your colon is empty and a heavy meal will produce gas. Avoid these foods:  fried / greasy foods, excessive carbonated drinks or too much caffeine  MEDICATIONS   Regarding Aspirin or Nonsteroidal medications specifically, please see below. ACTIVITY  You may resume your normal daily activities. Spend the remainder of the day resting -  avoid any strenuous activity. CALL M.D. ANY SIGN OF   Increasing pain, nausea, vomiting  Abdominal distension (swelling)  New increased bleeding (oral or rectal)  Fever (chills)  Pain in chest area  Bloody discharge from nose or mouth  Shortness of breath    You may not take any Advil, Aspirin, Ibuprofen, Motrin, Aleve, or Goodys for 7 days, ONLY  Tylenol as needed for pain. Follow-up Instructions:   Call  Lawrence Chowdhury MD for any questions or concerns  Results of procedure / biopsy in 7 days   Telephone # [phone]      Follow-up Information    None              DO NOT TAKE SLEEPING MEDICATIONS OR ANTIANXIETY MEDICATIONS WHILE TAKING NARCOTIC PAIN MEDICATIONS,  ESPECIALLY THE NIGHT OF ANESTHESIA. CPAP PATIENTS BE SURE TO WEAR MACHINE WHENEVER NAPPING OR SLEEPING.     DISCHARGE SUMMARY from Nurse    The following personal items collected during your admission are returned to you:   Dental Appliance: Dental Appliances: None  Vision: Visual Aid: None  Hearing Aid:    Jewelry:    Clothing:    Other Valuables:    Valuables sent to safe:        PATIENT INSTRUCTIONS:    After General Anesthesia or Intravenous Sedation, for 24 hours or while taking prescription Narcotics:        Someone should be with you for the next 24 hours. For your own safety, a responsible adult must drive you home. · Limit your activities  · Recommended activity: Rest today, up with assistance today. Do not climb stairs or shower unattended for the next 24 hours. · Please start with a soft bland diet and advance as tolerated (no nausea) to regular diet. · If you have a sore throat you should try the following: fluids, warm salt water gargles, or throat lozenges. If it does not improve after several days please follow up with your primary physician. · Do not drive and operate hazardous machinery  · Do not make important personal or business decisions  · Do  not drink alcoholic beverages  · If you have not urinated within 8 hours after discharge, please contact your surgeon on call. Report the following to your surgeon:  · Excessive pain, swelling, redness or odor of or around the surgical area  · Temperature over 100.5  · Nausea and vomiting lasting longer than 4 hours or if unable to take medications    · You will receive a Post Operative Call from one of the Recovery Room Nurses on the day after your surgery to check on you. It is very important for us to know how you are recovering after your surgery. If you have an issue or need to speak with someone, please call your surgeon, do not wait for the post operative call. · You may receive an e-mail or letter in the mail from [employer] regarding your experience with us in the Ambulatory Surgery Unit. Your feedback is valuable to us and we appreciate your participation in the survey.        · If the above instructions are not adequate, please contact Jael Van, ABIGAILN, RN Perianesthesia Manager at [phone]. If you are having problems after your surgery, call the physician at his office number. · We wish you a speedy recovery ? What to do at Home:      *  Please give a list of your current medications to your Primary Care Provider. *  Please update this list whenever your medications are discontinued, doses are      changed, or new medications (including over-the-counter products) are added. *  Please carry medication information at all times in case of emergency situations. If you have not received your influenza and/or pneumococcal vaccine, please follow up with your primary care physician. The discharge information has been reviewed with the patient and parent. The patient and spouse verbalized understanding.

## 2019-12-02 NOTE — PROCEDURES
Colonoscopy Procedure Note    Violetta Pike  1985  [id number]    Indications:  Please see below. Pre-operative Diagnosis: CHANGE IN BOWEL HABITS    Post-operative Diagnosis: poor prep, possible terminal ileum inflammation      : Lawrence Wong MD    Referring Provider: Ciaran Becerra MD    Sedation:  MAC anesthesia Propofol        Procedure Details:    After detailed informed consent was obtained with all risks and benefits of procedure explained and preoperative exam completed, the patient was taken to the endoscopy suite and placed in the left lateral decubitus position. Upon sequential sedation as per above, a digital rectal exam was performed  And was normal.  The Olympus videocolonoscope  was inserted in the rectum and carefully advanced to the cecum, which was identified by the ileocecal valve. The quality of preparation was inadequate. The colonoscope was slowly withdrawn with careful evaluation between folds. Retroflexion in the rectum was performed. Findings:   · The colon is not well prepped. I could see about 50% of the mucosa. · The TI has mild patchy erythema: this was biopsied. · The visualized colonic mucosa is normal. I took multiple biopsies. Therapies:  biopsy of colon cecum, ascending colon, transverse colon, descending colon, sigmoid colon, rectum and terminal ileum    Specimen:   Specimens were collected as described above and sent to pathology. Complications: None were encountered during the procedure. EBL:  None. Recommendations:     -Await pathology. -Repeat colonoscopy in 2 years w/ a better prep.    -Naturally, for new bleeding, unexplained weight loss,change in bowel habits and anemia, an earlier colonoscopy should be considered. Lawrence Wong MD  12/2/2019  7:58 AM

## 2019-12-02 NOTE — PERIOP NOTES
Cleaned pt of left over stool. Pt dressed self. Pt discharged via wheelchair to car, accompanied by RN. Pt discharged awake and alert, respirations equal and unlabored, skin warm, dry, and intact. Pt and family members' questions and concerns addressed prior to discharge.

## 2019-12-02 NOTE — ANESTHESIA PREPROCEDURE EVALUATION
Anesthetic History     PONV          Review of Systems / Medical History  Patient summary reviewed, nursing notes reviewed and pertinent labs reviewed    Pulmonary          Smoker (1/2 ppd)  Asthma (childhood)        Neuro/Psych         Psychiatric history (anxiety)    Comments: PTSD  Borderline personality  Mild cognitive impairment Cardiovascular                  Exercise tolerance: <4 METS  Comments: H/o atypical CP  07/18 ECHO: EF 55%  07/18 Stress ECHO= negative   GI/Hepatic/Renal     GERD (mostly controlled)          Comments: Abdominal pain Endo/Other    Diabetes (prediabetes)    Morbid obesity and arthritis     Other Findings   Comments: Rectal prolapse  Uterine prolapse  Bladder prolapse           Physical Exam    Airway  Mallampati: II  TM Distance: > 6 cm  Neck ROM: normal range of motion   Mouth opening: Normal     Cardiovascular    Rhythm: regular  Rate: normal      Pertinent negatives: No murmur   Dental      Comments: Caries at gumline, lower   Pulmonary  Breath sounds clear to auscultation               Abdominal  GI exam deferred       Other Findings            Anesthetic Plan    ASA: 3  Anesthesia type: general and total IV anesthesia          Induction: Intravenous  Anesthetic plan and risks discussed with: Patient

## 2020-01-08 ENCOUNTER — OFFICE VISIT (OUTPATIENT)
Dept: FAMILY MEDICINE CLINIC | Age: 35
End: 2020-01-08

## 2020-01-08 VITALS
SYSTOLIC BLOOD PRESSURE: 111 MMHG | WEIGHT: 241.6 LBS | HEART RATE: 79 BPM | RESPIRATION RATE: 16 BRPM | DIASTOLIC BLOOD PRESSURE: 80 MMHG | TEMPERATURE: 97 F | OXYGEN SATURATION: 99 % | BODY MASS INDEX: 42.81 KG/M2 | HEIGHT: 63 IN

## 2020-01-08 DIAGNOSIS — F33.40 RECURRENT MAJOR DEPRESSIVE DISORDER, IN REMISSION (HCC): ICD-10-CM

## 2020-01-08 DIAGNOSIS — G89.29 CHRONIC ABDOMINAL PAIN: ICD-10-CM

## 2020-01-08 DIAGNOSIS — F43.12 CHRONIC POST-TRAUMATIC STRESS DISORDER (PTSD): Primary | ICD-10-CM

## 2020-01-08 DIAGNOSIS — L60.3 NAIL DYSTROPHY: ICD-10-CM

## 2020-01-08 DIAGNOSIS — M25.50 ARTHRALGIA OF MULTIPLE SITES: ICD-10-CM

## 2020-01-08 DIAGNOSIS — M79.18 MYALGIA OF MUSCLE OF NECK: ICD-10-CM

## 2020-01-08 DIAGNOSIS — G89.29 CHRONIC BILATERAL LOW BACK PAIN WITHOUT SCIATICA: ICD-10-CM

## 2020-01-08 DIAGNOSIS — M54.50 CHRONIC BILATERAL LOW BACK PAIN WITHOUT SCIATICA: ICD-10-CM

## 2020-01-08 DIAGNOSIS — R10.9 CHRONIC ABDOMINAL PAIN: ICD-10-CM

## 2020-01-08 DIAGNOSIS — K21.9 GASTROESOPHAGEAL REFLUX DISEASE, ESOPHAGITIS PRESENCE NOT SPECIFIED: ICD-10-CM

## 2020-01-08 DIAGNOSIS — L30.9 DERMATITIS: ICD-10-CM
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
## 2020-01-08 NOTE — PROGRESS NOTES
Chief Complaint   Patient presents with    Rash     patient still c/o itchy rash on both lower legs         1. Have you been to the ER, urgent care clinic since your last visit? Hospitalized since your last visit? No    2. Have you seen or consulted any other health care providers outside of the [address] [place] [address] since your last visit? Include any pap smears or colon screening.  No

## 2020-01-10 ENCOUNTER — DOCUMENTATION ONLY (OUTPATIENT)
Dept: FAMILY MEDICINE CLINIC | Age: 35
End: 2020-01-10

## 2020-01-10 NOTE — PROGRESS NOTES
Made an appointment for Jerome Hernandez NP on 7/1/2020 at 8 am     Jadiel Veliz    Patient notified.

## 2020-01-21 ENCOUNTER — OFFICE VISIT (OUTPATIENT)
Dept: NEUROLOGY | Age: 35
End: 2020-01-21

## 2020-01-21 VITALS
HEART RATE: 102 BPM | DIASTOLIC BLOOD PRESSURE: 76 MMHG | OXYGEN SATURATION: 97 % | WEIGHT: 241 LBS | SYSTOLIC BLOOD PRESSURE: 112 MMHG | BODY MASS INDEX: 42.7 KG/M2 | HEIGHT: 63 IN

## 2020-01-21 DIAGNOSIS — I67.89 CEREBRAL MICROVASCULAR DISEASE: ICD-10-CM

## 2020-01-21 DIAGNOSIS — F43.10 PTSD (POST-TRAUMATIC STRESS DISORDER): ICD-10-CM

## 2020-01-21 DIAGNOSIS — G31.84 MILD COGNITIVE IMPAIRMENT WITH MEMORY LOSS: ICD-10-CM

## 2020-01-21 DIAGNOSIS — G44.209 TENSION VASCULAR HEADACHE: ICD-10-CM

## 2020-01-21 DIAGNOSIS — F33.2 SEVERE EPISODE OF RECURRENT MAJOR DEPRESSIVE DISORDER, WITHOUT PSYCHOTIC FEATURES (HCC): Primary | ICD-10-CM

## 2020-01-21 RX ORDER — BETAMETHASONE DIPROPIONATE 0.5 MG/G
OINTMENT TOPICAL
COMMUNITY
Start: 2019-12-02 | End: 2020-01-21 | Stop reason: SDUPTHER

## 2020-01-21 RX ORDER — PREDNISONE 10 MG/1
TABLET ORAL
COMMUNITY
Start: 2019-11-27 | End: 2020-01-21

## 2020-01-21 RX ORDER — LAMOTRIGINE 50 MG/1
TABLET, EXTENDED RELEASE ORAL
COMMUNITY
End: 2020-02-21

## 2020-01-21 RX ORDER — BUPROPION HYDROCHLORIDE 100 MG/1
TABLET, EXTENDED RELEASE ORAL
COMMUNITY
Start: 2018-07-30 | End: 2020-04-07 | Stop reason: SDUPTHER

## 2020-01-21 RX ORDER — OMEPRAZOLE 10 MG/1
CAPSULE, DELAYED RELEASE ORAL
COMMUNITY
End: 2020-01-21

## 2020-01-21 RX ORDER — TRAZODONE HYDROCHLORIDE 50 MG/1
TABLET ORAL
Refills: 0 | COMMUNITY
Start: 2019-10-27 | End: 2020-02-21

## 2020-01-21 RX ORDER — DONEPEZIL HYDROCHLORIDE 5 MG/1
5 TABLET, FILM COATED ORAL DAILY
Qty: 30 TAB | Refills: 1 | Status: SHIPPED | OUTPATIENT
Start: 2020-01-21 | End: 2020-04-07 | Stop reason: SDUPTHER

## 2020-01-21 RX ORDER — FACIAL-BODY WIPES
EACH TOPICAL
COMMUNITY
End: 2020-02-21

## 2020-01-21 RX ORDER — LAMOTRIGINE 200 MG/1
TABLET ORAL
COMMUNITY
End: 2020-02-21

## 2020-01-21 NOTE — PATIENT INSTRUCTIONS
Office Policies 
 
o Phone calls/patient messages: Please allow up to 24 hours for someone in the office to contact you about your call or message. Be mindful your provider may be out of the office or your message may require further review. We encourage you to use [employer] for your messages as this is a faster, more efficient way to communicate with our office 
 
o Medication Refills: 
Prescription medications require up to 48 business hours to process. We encourage you to use [employer] for your refills. For controlled medications: Please allow up to 72 business hours to process. Certain medications may require you to  a written prescription at our office. NO narcotic/controlled medications will be prescribed after 4pm Monday through Friday or on weekends 
 
o Form/Paperwork Completion: We ask that you allow 7-14 business days. You may also download your forms to [employer] to have your doctor print off. Plan from today's visit: We can start you on low-dose Aricept 5 mg 1 a day. This is to help reduce progression of cognitive impairment over time. We are starting you on Emgality. This will be a month once a month injection to help reduce headache pain and frequency. It may take several months to kick in. But we are hopeful that you will see more immediate results. Finally please focus on several things: 
Mindfulness Restfulness: Look at apps for meditation and white noise to help you relax and to sleep better Mild exercise: Try to walk 10 minutes 3 times a week. At those times listen to music that may be restful for you or relaxing use your apps or perhaps podcasts Consider adult coloring books to help reduce anxiety and improve focus Continue to work with your mental health team closely to help walk you through the issues that you are struggling with to get to a better place Be excepting of where you are right now instead of trying to fight to where you used to be. Focus on who you want to become going forward. Pat yourself  on the back each day you get out of bed At the end of the day list all the things that you did that was positive for yourself even if it is nothing more than getting out of bed that day Learning About Mindfulness for Stress What are mindfulness and stress? Stress is what you feel when you have to handle more than you are used to. A lot of things can cause stress. You may feel stress when you go on a job interview, take a test, or run a race. This kind of short-term stress is normal and even useful. It can help you if you need to work hard or react quickly. Stress also can last a long time. Long-term stress is caused by stressful situations or events. Examples of long-term stress include long-term health problems, ongoing problems at work, and conflicts in your family. Long-term stress can harm your health. Mindfulness is a focus only on things happening in the present moment. It's a process of purposefully paying attention to and being aware of your surroundings, your emotions, your thoughts, and how your body feels. You are aware of these things, but you aren't judging these experiences as \"good\" or \"bad. \" Mindfulness can help you learn to calm your mind and body to help you cope with illness, pain, and stress. How does mindfulness help to relieve stress? Mindfulness can help quiet your mind and relax your body. Studies show that it can help some people sleep better, feel less anxious, and bring their blood pressure down. And it's been shown to help some people live and cope better with certain health problems like heart disease, depression, chronic pain, and cancer. How do you practice mindfulness? To be mindful is to pay attention, to be present, and to be accepting. · When you're mindful, you do just one thing and you pay close attention to that one thing.  For example, you may sit quietly and notice your emotions or how your food tastes and smells. · When you're present, you focus on the things that are happening right now. You let go of your thoughts about the past and the future. When you dwell on the past or the future, you miss moments that can heal and strengthen you. You may miss moments like hearing a child laugh or seeing a friendly face when you think you're all alone. · When you're accepting, you don't  the present moment. Instead you accept your thoughts and feelings as they come. You can practice anytime, anywhere, and in any way you choose. You can practice in many ways. Here are a few ideas: · While doing your chores, like washing the dishes, let your mind focus on what's in your hand. What does the dish feel like? Is the water warm or cold? · Go outside and take a few deep breaths. What is the air like? Is it warm or cold? · When you can, take some time at the start of your day to sit alone and think. · Take a slow walk by yourself. Count your steps while you breathe in and out. · Try yoga breathing exercises, stretches, and poses to strengthen and relax your muscles. · At work, if you can, try to stop for a few moments each hour. Note how your body feels. Let yourself regroup and let your mind settle before you return to what you were doing. · If you struggle with anxiety or \"worry thoughts,\" imagine your mind as a blue austen and your worry thoughts as clouds. Now imagine those worry thoughts floating across your mind's austen. Just let them pass by as you watch. Follow-up care is a key part of your treatment and safety. Be sure to make and go to all appointments, and call your doctor if you are having problems. It's also a good idea to know your test results and keep a list of the medicines you take. Where can you learn more? Go to http://orin-cris.info/. Enter E450 in the search box to learn more about \"Learning About Mindfulness for Stress. \" 
 Current as of: April 7, 2019 Content Version: 12.2 © 9793-1822 [employer]. Care instructions adapted under license by [employer] (which disclaims liability or warranty for this information). If you have questions about a medical condition or this instruction, always ask your healthcare professional. [address] any warranty or liability for your use of this information.

## 2020-01-21 NOTE — LETTER
1/21/2020 4:09 PM 
 
Ms. John Pardo [id number] [id number] To whom it may concern We have been following this patient in the practice. She has significant issues related to cognition and memory deficit, she has significant mental health disorder. At this time she is not capable of employment. I do recommend disability for this patient. We are working aggressively to July to get things under better control but this is going to be a long-term process and will not improve quickly. Sincerely, Silvia Bowie NP

## 2020-01-21 NOTE — PROGRESS NOTES
Patient given Emgality 120mg/mL injection in upper left arm. Lot: [id number] Exp: 05/2021 Patient tolerated well, no adverse reactions here in office. Patient advised of signs and symptoms of an allergic reaction and advised to call 911 or go to nearest emergency room should she have them.

## 2020-01-21 NOTE — PROGRESS NOTES
Syeda Doss is a 30 y.o. female presents today for   Chief Complaint   Patient presents with    Memory Loss       HPI  Historical Data      This is a patient previously seen in our practice. Neuro diagnosis includes chronic headaches mild cognitive impairment, low back pain with radiculopathy affecting the right and left, bilateral carotid artery stenosis, Transient visual disturbance of both eyes    Other significant diagnoses include depression posttraumatic stress disorder    She recently had neuropsychiatric testing completed April 8, 2019 and was found to have PTSD severe depressive disorder without psychosis severe anxiety and somatic sensation disorder along with her mild cognitive impairment and borderline personality traits. Other comments by on the neuropsych evaluation by the neuropsychologist include the following:  She is young for dementia and there is no neurologic correlate which would explain the type of cognitive deficits she is showing. At the same time, the CURRENT profile is not consistent with a purely psychiatric issue. As such, the diagnosis here is that of Mild Cognitive Impairment with Memory loss exacerbated by severe and complex psychiatric distress     She needs intensive psychiatric intervention to include a review of her medication management for anxiety and depression as well as active and continued engagement in intensive psychotherapy. Consider EMDR.  Consider TMS if medication by itself has not proven helpful for her - she would be an appropriate candidate for same. I do believe medication for memory should be considered, as there is no other definite explanation for her impaired recognition recall, which often clears the difference between organic and functional memory loss. Any metabolic cause needs to be ruled out, and sleep issues may be a factor as well. This is concerning and very atypical changes over time, with improvement in some scores and declines in others. She remains capable of making decisions for herself., but needs reminders for meals, medications, and finances. I see no evidence of malingering here. I am concerned about driving safety issues as well, so this should be formally evaluated if deemed medically appropriate. My hope is to see her again once mood improves to better clarify, because my worry is that if cognitive problems continue to worsen despite positive psychiatric interventions, then she has dementia and the prognosis would be quite poor at that point. Stay active mentally, physically, and socially. Consult with OT and Speech, Neurocognitive Rehab. We now have baseline and updated data on her. Follow up as noted. Clinical correlation is, of course, indicated. Interim Data:     Patient has a letter from her mother. That was reviewed in the office in detail. Is it attached to the bottom of this note. One is invited to review that for details and will not be repeated here. The patient states that she does agree with the information in the letter that her mother sent. Regarding her headaches. She tells me they come and go. She does take OTCs with some mild relief. If she can sleep that tends to work best to get rid of her headaches. They vary in intensity. She has them frequently. Patient tells me there are days where it is difficult for her to even get out of bed.   She does focus on her children which is helpful in giving her some level of motivation. She continues to complain of cognitive issues, memory issues, depression [again beautifully illustrated in the note attached to the bottom of the chart]    She is seen by mental health. She is applied for disability twice. And has been denied. Flowsheets    3 most recent PHQ Screens 5/26/2015   Little interest or pleasure in doing things Not at all   Feeling down, depressed, irritable, or hopeless Not at all   Total Score PHQ 2 0       No flowsheet data found. No flowsheet data found. Past Medical History:   Diagnosis Date    Anxiety     Arthritis     unsure - knees, lower back, fingers and toes    At risk for sleep apnea 09/05/2019    KATIA score 4 on PAT assessment    Bipolar 2 disorder (HCC)     Chest pain     Childhood asthma     Chronic pain     right abdomen,shooting pain groins    Depression     Endometriosis     Female bladder prolapse     GERD (gastroesophageal reflux disease)     MCI (mild cognitive impairment) with memory loss     Migraines     Prediabetes     Prolapse of anterior lip of cervix     PTSD (post-traumatic stress disorder)     borderline personality traits    Rectal prolapse        Past Surgical History:   Procedure Laterality Date    COLONOSCOPY N/A 12/2/2019    COLONOSCOPY (LATEX ALLERGY) performed by Nelia Ramirez MD at [facility]    HX COLONOSCOPY      HX ENDOSCOPY      HX GYN      vaginal birth x2    HX HERNIA REPAIR  07/09/2015    Laparoscopic Incisional hernia repair with mesh.     HX LAP CHOLECYSTECTOMY  7/7/2014    HX MYRINGOTOMY Bilateral     childhood    HX PARTIAL HYSTERECTOMY  04/25/2019    laparoscopic, left ovaries       Social History     Socioeconomic History    Marital status: SINGLE     Spouse name: Not on file    Number of children: 2    Years of education: Not on file    Highest education level: Not on file   Occupational History     Employer: NOT EMPLOYED     Comment: work at home    Tobacco Use    Smoking status: Current Every Day Smoker     Packs/day: 0.50     Years: 10.00     Pack years: 5.00     Start date: 3/1/2004    Smokeless tobacco: Never Used   Substance and Sexual Activity    Alcohol use: Yes     Comment: rarely, not weeklt    Drug use: Not Currently     Types: Marijuana     Comment: 11/22/19, last use >1 year    Sexual activity: Yes     Partners: Male     Birth control/protection: None     Comment: single    Social History Narrative    Unemployed currently       Patient does not qualify to have social determinant information on file (likely too young). Family History   Problem Relation Age of Onset   [place] Other Mother         fibromyalgia, IBS    Diabetes Mother         Pre-DM    High Cholesterol Mother     Hypertension Mother     Liver Disease Mother         fatty liver    Cancer Mother         uterine     Heart Disease Mother         CHF    Arthritis-osteo Mother     Diabetes Father     Hypertension Father     Asthma Brother     Diabetes Paternal Aunt     Hypertension Paternal Aunt     Diabetes Paternal Uncle     Hypertension Paternal Uncle     Cancer Maternal Grandfather         esoph    Hypertension Maternal Grandfather     Stroke Maternal Grandfather     Heart Disease Paternal Grandmother     Diabetes Paternal Grandfather     Heart Attack Paternal Grandfather     Stroke Paternal Grandfather     Hypertension Paternal Grandfather     Heart Disease Maternal Grandmother     Other Sister         MTHFR (clotting D/O)       Current Outpatient Medications   Medication Sig    galcanezumab-gnlm (EMGALITY PEN) 120 mg/mL injection 1 mL by SubCUTAneous route every thirty (30) days.  donepeziL (ARICEPT) 5 mg tablet Take 1 Tab by mouth daily. Indications: mild cognitve impairment    betamethasone dipropionate (DIPROLENE) 0.05 % ointment Apply  to affected area two (2) times daily as needed for Skin Irritation.     ascorbic acid, vitamin C, (VITAMIN C) 500 mg tablet Take 1,000 mg by mouth daily.  omeprazole (PRILOSEC) 40 mg capsule Take 1 Cap by mouth daily.  DULoxetine (CYMBALTA) 60 mg capsule Take 120 mg by mouth daily.  ibuprofen (MOTRIN) 200 mg tablet Take 600 mg by mouth every six (6) hours as needed for Pain.  traZODone (DESYREL) 100 mg tablet Take 100 mg by mouth nightly.  oxyCODONE-acetaminophen (PERCOCET) 5-325 mg per tablet Take 1 Tab by mouth every six (6) hours as needed for Pain.  cetirizine (ZYRTEC) 10 mg tablet Take 1 Tab by mouth nightly as needed for Allergies.  lamoTRIgine (LAMICTAL) 200 mg tablet Take 200 mg by mouth daily. Indications: Bipolar Depression    buPROPion XL (WELLBUTRIN XL) 300 mg XL tablet Take 300 mg by mouth every morning.  cholecalciferol, vitamin D3, (VITAMIN D3) 2,000 unit tab Take 1 Tab by mouth daily.  b complex vitamins (B COMPLEX 1) tablet Take 1 Tab by mouth daily.  multivitamin with minerals (HAIR,SKIN AND NAILS PO) Hair,Skin and Nails    lamoTRIgine (LAMICTAL) 200 mg tablet Lamictal 200 mg tablet   Take 1 tablet twice a day by oral route.  traZODone (DESYREL) 50 mg tablet     bisacodyL (DULCOLAX) 10 mg suppository bisacodyl 10 mg rectal suppository   Insert 1 suppository rectally every 24 hours as needed for constipation    buPROPion SR (WELLBUTRIN SR) 100 mg SR tablet Wellbutrin  mg tablet, 12 hr sustained-release   prescribed by non [facility] md    lamoTRIgine (LAMICTAL XR) 50 mg tr24 ER tablet lamotrigine ER 50 mg tablet,extended release 24 hr   Take by oral route.  cholecalciferol, vitamin D3, (VITAMIN D3 PO) Vitamin D3    guaiFENesin-dextromethorphan SR (MUCINEX DM) 600-30 mg per tablet Take 1 Tab by mouth every twelve (12) hours as needed for Cough or Congestion.  DULoxetine (CYMBALTA) 30 mg capsule Take 30 mg by mouth daily. No current facility-administered medications for this visit.         Admission on 12/02/2019, Discharged on 12/02/2019   Component Date Value    Pregnancy test,urine (PO* 12/02/2019 NEGATIVE         XR Results (maximum last 3): Results from [facility] encounter on 09/19/18   XR ABD (KUB)    Impression IMPRESSION: Normal colonic gas pattern. Fluid-filled small bowel. Results from [place] [facility] encounter on 08/09/18   XR SPINE LUMB MIN 4 V    Impression IMPRESSION:    Mild T11-12 and T12-L1 degenerative disc disease. Mild L5-S1 facet arthropathy. The exam is otherwise unremarkable. XR SPINE THORAC 3 V    Impression IMPRESSION: Negative. CT Results (maximum last 3): Results from [facility] encounter on 07/01/18   CT HEAD WO CONT    Impression IMPRESSION: Normal study. Results from [facility] encounter on 06/15/18   CT ABD PELV W CONT    Impression IMPRESSION:  4.6 cm right ovarian cyst. No acute abnormality. Results from [facility] encounter on 05/14/15   CT ABD PELV W CONT       MRI Results (maximum last 3): Results from [facility] encounter on 08/09/18   MRI BRAIN W WO CONT    Impression IMPRESSION:   1. No discrete pituitary lesion identified, and otherwise unremarkable brain  MRI. A single new nonspecific T2/FLAIR hyperintensity in the right parietal  subcortical white matter is likely of no clinical significance. Results from [facility] encounter on 09/23/16   MRI BRAIN W WO CONT    Impression IMPRESSION:   No focal pituitary abnormalities demonstrated. Normal brain MRI. .         Nuclear Medicine Results (maximum last 3): Results from [facility] encounter on 07/12/19   NM GASTRIC EMPTY STDY    Impression IMPRESSION: Normal gastric emptying study. Results from [facility] encounter on 05/27/14   NM [address]   Results from Hospital Encounter encounter on 05/21/14   NM GASTRIC EMPTY STDY         IR Results (maximum last 3): No results found for this or any previous visit. VAS/US Results (maximum last 3):   No results found for this or any previous visit. ROS   A ten system review of constitutional, cardiovascular, respiratory, musculoskeletal, endocrine, skin, SHEENT, genitourinary, psychiatric and neurologic systems was obtained and is unremarkable with the exception of the following: States she has a rash on her shins bilaterally and is followed by PCP which was on responsive to routine therapy and has an appointment with dermatology in the future. EXAMINATION:   Visit Vitals  /76 (BP 1 Location: Left arm, BP Patient Position: Sitting)   Pulse (!) 102   Ht 5' 3\" (1.6 m)   Wt 241 lb (109.3 kg)   LMP 04/25/2019   SpO2 97%   BMI 42.69 kg/m²        General:   General appearance: Patient is an overweight female in no apparent distress. She is well dressed. Color is good. Affect is flat but otherwise appropriate    Neurological Examination:   Mental Status: AAO x3. Speech is fluent. Follows commands, has normal fund of knowledge, attention, short term recall, comprehension and insight. Cranial Nerves: Visual fields are full. PERRL, Extraocular movements are full. Facial sensation intact. Facial movement intact. Hearing intact to conversation. Palate elevates symmetrically. Shoulder shrug symmetric. Tongue midline. Motor: Strength is 5/5 in all 4 ext. Normal tone. No atrophy. Sensation: Light touch - Normal    Coordination/Cerebellar: Intact to finger-nose-finger     Gait: Romberg is negative and casual gait is normal.    DTRs: +1    Assessment and Plan  Main concern is her depressive mood and her cognitive impairment. She is seen by mental health so we will defer management of depression to them. But based on the neuropsych evaluation as detailed above under historical data and HPI I am concerned about complacency with her management versus more aggressive management.   But again I defer to mental health to steer the course of action    We did discuss some behavioral management techniques to coincide with her therapy counseling sessions to help her get through each day and are outlined in detail under patient instructions that she was given at the end of the office visit. 1 is invited to review that for details. Regarding headaches and migraines: I think it is reasonable to venture to try to be aggressive with this and get it under better control and take 1 medical issue off her plate in terms of management. To this end I think it would be appropriate to place her on CGRP intervention. Patient is in agreement. She is not a candidate for antidepressant class medications as she is already on a number of these for significant mental health disorder. She has a remote history of being tried on Elavil in the past as well. She is not a candidate for the category of antiseizure migraine prevention. Again she is on antiseizure medication to help manage her mental health so additional would be contraindicated presently    She is significantly depressed, therefore beta-blockers are not an option either. She has been tried  on a triptan for abortive therapy in the past.  Without efficacy    Finally related to her mild cognitive impairment and based on the neuropsychiatric recommendation I do think it is appropriate to start low-dose Aricept 5 mg daily. Patient is in agreement    Finally this patient cannot work. She is completely unstable from a mental health perspective. She has significant processing and cognitive difficulties. Until these issues get stabilized or resolved she will not be able to process in a work environment to any level of reasonable functional ability. To this end I have written a letter for the patient.   Problem List Items Addressed This Visit        Circulatory    Tension vascular headache    Relevant Medications    lamoTRIgine (LAMICTAL) 200 mg tablet    traZODone (DESYREL) 50 mg tablet    buPROPion SR (WELLBUTRIN SR) 100 mg SR tablet    lamoTRIgine (LAMICTAL XR) 50 mg tr24 ER tablet galcanezumab-gnlm (EMGALITY PEN) 120 mg/mL injection    donepeziL (ARICEPT) 5 mg tablet    Cerebral microvascular disease       Other    Major depressive disorder, recurrent episode, severe (HCC) - Primary    Relevant Medications    lamoTRIgine (LAMICTAL) 200 mg tablet    traZODone (DESYREL) 50 mg tablet    buPROPion SR (WELLBUTRIN SR) 100 mg SR tablet    lamoTRIgine (LAMICTAL XR) 50 mg tr24 ER tablet    galcanezumab-gnlm (EMGALITY PEN) 120 mg/mL injection    donepeziL (ARICEPT) 5 mg tablet    PTSD (post-traumatic stress disorder)    Relevant Medications    lamoTRIgine (LAMICTAL) 200 mg tablet    traZODone (DESYREL) 50 mg tablet    buPROPion SR (WELLBUTRIN SR) 100 mg SR tablet    lamoTRIgine (LAMICTAL XR) 50 mg tr24 ER tablet    galcanezumab-gnlm (EMGALITY PEN) 120 mg/mL injection    donepeziL (ARICEPT) 5 mg tablet      Other Visit Diagnoses     Mild cognitive impairment with memory loss        Relevant Medications    lamoTRIgine (LAMICTAL) 200 mg tablet    traZODone (DESYREL) 50 mg tablet    buPROPion SR (WELLBUTRIN SR) 100 mg SR tablet    lamoTRIgine (LAMICTAL XR) 50 mg tr24 ER tablet    galcanezumab-gnlm (EMGALITY PEN) 120 mg/mL injection    donepeziL (ARICEPT) 5 mg tablet           encounter. Follow-up and Dispositions    · Return in about 4 weeks (around 2/18/2020).            Geo Serrano, MS, ANP-BC, MSCN, CNRN

## 2020-01-22 ENCOUNTER — TELEPHONE (OUTPATIENT)
Dept: NEUROLOGY | Age: 35
End: 2020-01-22

## 2020-01-22 PROBLEM — I65.23 BILATERAL CAROTID ARTERY STENOSIS: Status: RESOLVED | Noted: 2018-07-30 | Resolved: 2020-01-22

## 2020-01-23 NOTE — TELEPHONE ENCOUNTER
Patient aware to have pharmacy fax over prior authorization request so I can forward it over to our prior authorization specialist.

## 2020-01-30 ENCOUNTER — TELEPHONE (OUTPATIENT)
Dept: FAMILY MEDICINE CLINIC | Age: 35
End: 2020-01-30

## 2020-01-30 NOTE — TELEPHONE ENCOUNTER
----- Message from Osiel Pierson sent at 1/30/2020  1:57 PM EST -----  Regarding: Dr. Leti Denton: [phone]    Caller's first and last name: N/A  Reason for call: N/A  Callback required yes/no and why: Yes   Best contact number(s): [phone]  Details to clarify the request: Pt can't remember the time or place for her referral that she has for a dermatologist. Pt would like a call back about information for her appt

## 2020-02-20 ENCOUNTER — TELEPHONE (OUTPATIENT)
Dept: NEUROLOGY | Age: 35
End: 2020-02-20

## 2020-02-20 DIAGNOSIS — G43.019 COMMON MIGRAINE WITH INTRACTABLE MIGRAINE: Primary | ICD-10-CM

## 2020-02-20 NOTE — TELEPHONE ENCOUNTER
Re: Emgality PA     Incoming call from 1/21/20 that needed a PA was not routed to me. Pt called today requesting PA. Attempted on CM - must call in to [phone] . It is not processed through [employer]. Called [employer] - must be faxed in manually. Ph [phone])    Pending receipt of PA form and will trudy to please expedite.

## 2020-02-20 NOTE — TELEPHONE ENCOUNTER
Emgality approval 2/20/20 - 5/20/20 by Faviola Boyle. For future use: must be called in to:  [phone]  Fax [phone]    Faxed to Christine Boyle

## 2020-02-21 ENCOUNTER — OFFICE VISIT (OUTPATIENT)
Dept: NEUROLOGY | Age: 35
End: 2020-02-21

## 2020-02-21 ENCOUNTER — TELEPHONE (OUTPATIENT)
Dept: NEUROLOGY | Age: 35
End: 2020-02-21

## 2020-02-21 VITALS
DIASTOLIC BLOOD PRESSURE: 82 MMHG | HEART RATE: 120 BPM | BODY MASS INDEX: 43.41 KG/M2 | OXYGEN SATURATION: 97 % | SYSTOLIC BLOOD PRESSURE: 118 MMHG | HEIGHT: 63 IN | WEIGHT: 245 LBS

## 2020-02-21 DIAGNOSIS — G43.019 HEADACHE, COMMON MIGRAINE, INTRACTABLE: Primary | ICD-10-CM

## 2020-02-21 DIAGNOSIS — G44.209 TENSION VASCULAR HEADACHE: ICD-10-CM

## 2020-02-21 DIAGNOSIS — F33.2 SEVERE EPISODE OF RECURRENT MAJOR DEPRESSIVE DISORDER, WITHOUT PSYCHOTIC FEATURES (HCC): ICD-10-CM

## 2020-02-21 DIAGNOSIS — G31.84 MILD COGNITIVE IMPAIRMENT WITH MEMORY LOSS: ICD-10-CM

## 2020-02-21 DIAGNOSIS — F43.10 PTSD (POST-TRAUMATIC STRESS DISORDER): ICD-10-CM

## 2020-02-21 RX ORDER — LAMOTRIGINE 100 MG/1
TABLET ORAL
COMMUNITY
Start: 2020-02-10 | End: 2020-02-21

## 2020-02-21 RX ORDER — RIZATRIPTAN BENZOATE 10 MG/1
10 TABLET ORAL
Qty: 9 TAB | Refills: 1 | Status: SHIPPED | OUTPATIENT
Start: 2020-02-21 | End: 2020-02-21

## 2020-02-21 NOTE — TELEPHONE ENCOUNTER
Prior auth only good for pre-filled syringe. Provider wrote prescription for the auto injector. PA needs to be done for auto injector. Please start process. Patient aware she can go get rx from [employer] for the pre-filled syringe and PA will be done for auto injector going forward per provider.

## 2020-02-21 NOTE — TELEPHONE ENCOUNTER
Submitted [employer] - requested to revise auth from syringe to pre filled pen. To [facility].

## 2020-02-21 NOTE — PROGRESS NOTES
Torres Cortes is a 30 y.o. female who presents today for the following:  Chief Complaint   Patient presents with    Follow-up    Headache     Patient states headaches are better with being on Emgality. Patient states medication did wear off midway through the month. SHe takes ibuprofen to help. When medication wore off she had a terrible migraine that lasted 2 days accompained with nausea/vomitting, light sensitivity, and poor appetite. HPI  Historical Data    This is a patient previously seen in our practice. Neuro diagnosis includes chronic headaches mild cognitive impairment, low back pain with radiculopathy affecting the right and left, bilateral carotid artery stenosis, Transient visual disturbance of both eyes     Other significant diagnoses include depression posttraumatic stress disorder     She recently had neuropsychiatric testing completed April 8, 2019 and was found to have PTSD severe depressive disorder without psychosis severe anxiety and somatic sensation disorder along with her mild cognitive impairment and borderline personality traits. Other comments by on the neuropsych evaluation by the neuropsychologist include the following:  She is young for dementia and there is no neurologic correlate which would explain the type of cognitive deficits she is showing. At the same time, the CURRENT profile is not consistent with a purely psychiatric issue. As such, the diagnosis here is that of Mild Cognitive Impairment with Memory loss exacerbated by severe and complex psychiatric distress      She needs intensive psychiatric intervention to include a review of her medication management for anxiety and depression as well as active and continued engagement in intensive psychotherapy. Consider EMDR. Consider TMS if medication by itself has not proven helpful for her - she would be an appropriate candidate for same.   I do believe medication for memory should be considered, as there is no other definite explanation for her impaired recognition recall, which often clears the difference between organic and functional memory loss. Any metabolic cause needs to be ruled out, and sleep issues may be a factor as well. This is concerning and very atypical changes over time, with improvement in some scores and declines in others. She remains capable of making decisions for herself., but needs reminders for meals, medications, and finances. I see no evidence of malingering here. I am concerned about driving safety issues as well, so this should be formally evaluated if deemed medically appropriate. My hope is to see her again once mood improves to better clarify, because my worry is that if cognitive problems continue to worsen despite positive psychiatric interventions, then she has dementia and the prognosis would be quite poor at that point. Stay active mentally, physically, and socially. Consult with OT and Speech, Neurocognitive Rehab. We now have baseline and updated data on her. Follow up as noted. Clinical correlation is, of course, indicated. Interim Data:   Patient was started on Emgality at last office visit. Patient is headaches have improved. She is getting small mild headaches for which she takes ibuprofen and they go away happening several times a week  She had one severe migraine that lasted 2 days that was incapacitating. She continues on 5 mg Aricept which was started January 2020. She denies side effects. No major complaints of cognitive difficulties at today's office visit. She is doing some brain Buster activities which she finds helpful    Depression and mental health issues: Meds have been adjusted through mental health. She feels as though they are helping. She is having some good days but still many bad days. She is working on better interpersonal skills with her children. No direct complaints of low back pain today.   However she was fidgety in the chair seemingly uncomfortable at times. ROS  Other than what is stated above under HPI there is no new or changing issues related to the following:  Constitutional: No recent weight change, fever,fatigue, sleep difficulties, or loss of appetite. ENT/Mouth:  No hearing loss, ringing in the ears, chronic sinus problem, nose bleeds sore throat, voice change, hoarseness, swollen glands in neck, or difficulties with chewing and swallowing. Cardiovascular:  No chest pain/angina pectoris, palpitations,swelling of feet/ankles/hands, or calf pain while walking. Respiratory: No chronic or frequent coughs, spitting up blood, shortness of breath, asthma, or wheezing. Gastrointestinal: No abdominal pain, heartburn, nausea, vomiting, constipation, frequent diarrhea, rectal bleeding, or blood in stool. Genitourinary: No frequent urination, burning or painful urination, blood in urine, incontinence or dribbling. Musculoskeletal:   No joint pain, stiffness/swelling, weakness of muscles, muscle pain/cramp, or back pain. Integument:   No rash/itching, change in skin color, change in hair/nails, or change in color/size of moles. Neurological:  No dizziness/vertigo, loss of consciousness, numbness/tingling sensation, tremors, weakness in limbs, difficulty with balance, frequent or recurring headaches, memory loss or confusion. Psychiatric:   No nervousness, depression, hallucinations, paranoia or suspiciousness. Endocrine: No excessive thirst or urination, heat or cold intolerance. Hematologic/Lymphatic: No bleeding/bruising tendency, phlebitis, or past transfusion. EXAMINATION  Visit Vitals  /82 (BP 1 Location: Right arm, BP Patient Position: Sitting)   Pulse (!) 120   Ht 5' 3\" (1.6 m)   Wt 245 lb (111.1 kg)   LMP 04/25/2019   SpO2 97%   BMI 43.40 kg/m²          General appearance: Patient is well-developed and well-nourished in no apparent distress and well groomed.     Psych/mental health:  Affect: Appropriate; more animated some smiling    PHQ  3 most recent PHQ Screens 5/26/2015   Little interest or pleasure in doing things Not at all   Feeling down, depressed, irritable, or hopeless Not at all   Total Score PHQ 2 0       HEENT: Normocephalic, without evidence of trauma  Full range of motion, no tenderness to palpation in the head or neck region     Cardiovascular: Extremities warm to touch, no swelling appreciated    Respiratory: No dyspnea on exertion noted, normal effort on casual observation    Musculoskeletal: No evidence of significant bony deformities or spinal curvature    Integumentary:  No obvious bruising, lacerations or discoloaration on casual observation. Neurological Examination:   Mental Status:        MMSE  No flowsheet data found. Formal testing was not completed    there was nothing concerning on general observation and discussion.    Alert oriented and appropriate to general conversation  Normal processing on general observation  Followed conversation and responded seemingly appropriate throughout the office visit  No word finding difficulties noted on casual observation  Able to follow directions without difficulty     Cranial Nerves:    PERRLA,   EOMs intact gaze is conjugate  No nystagmus is appreciated  No SAGAR  Facial motor and sensory intact bilaterally  Hearing intact to conversation  Voice with normal projection, no evidence of secretion pooling  Palate elevates symmetrically  Shoulder shrug intact bilaterally  No tongue deviation appreciated     Motor:   Normal tone and bulk  Fine dexterity intact bilaterally  No tremor appreciated on today's exam  No abnormal movements appreciated on today's exam  Some repositioning in the chair presumably due to back discomfort    Muscle strength testing:  Right side  Upper extremities  Deltoid 5/5  Bicep 5/5  Tricep 5/5  Hand grasp 5/5    Lower extremity  Hip flexor 5/5  Extensor 5/5  Flexor 5/5  Plantar flexion 5/5  Dorsiflexion 5/5      Left side  Deltoid 5/5  Bicep 5/5  Tricep 5/5  Hand grasp 5/5    Lower extremity  Hip flexor 5/5  Extensor 5/5  Flexor 5/5  Plantar flexion 5/5  Dorsiflexion 5/5    Sensation: Intact to light touch bilaterally    Coordination/Cerebellar:   Grossly intact    Gait: Ambulates independently    Fall risk assessment  No flowsheet data found. Reflexes: Not tested    ASSESSMENT AND PLAN  Headaches and migraines: Improved on Emgality. She is having less severe headaches less time in bed related to headaches and although she still having mild headaches several times a week they abort with 1 dose of over-the-counter medications. Patient does need better abortive therapy to this and will prescribe Maxalt to use for the more severe headaches. She can continue with over-the-counter ibuprofen intermittently for the mild to moderate headaches    Preventatively she will remain on Emgality. There was some mixup she is supposed to have the autoinjector she is getting the prefilled syringe for now she did the prefilled syringe and injection to in technique was correct I supervised by her nursing staff. We will try to get her switched to the autoinjector going forward    Cognitive impairment: As supported by neuropsych testing as discussed under historical data and HPI 1 is invited to review that for detail. She is presently on Aricept 5 mg daily she is doing brain Buster activities. There is less complaints regarding this. Me may want to increase her to 10 mg at next office visit but for today regularly run 5 mg. She has had multiple med adjustments from mental health perspective so I do not want to interfere with those adjustments presently. Severe depression/PTSD: Patient continues to be followed by mental health recent med medication adjustments. Patient is feeling some better. Still issues related to some interpersonal dynamics with her children.   We discussed some therapeutic interventions that might be helpful. And emphasized the need to remember that she needs to be the parent and not the friend. We talked about normal teenage behavior relating to pushing limits that is often part of the job and his parents it is our job to hold the line if appropriate. I have referred her to go to some reputable parenting websites to get some guidance on this as well. And of course to get some guidance and counseling by her mental health team as a primary source to help guide her given her mental health conditions. I have again discussed mindfulness and stress reduction and gave her written information as well. I will see her back in 2 months sooner if there are problems issues or concerns. ICD-10-CM ICD-9-CM    1. Headache, common migraine, intractable G43.019 346.11 rizatriptan (MAXALT) 10 mg tablet   2. Tension vascular headache G44.209 307.81    3. Mild cognitive impairment with memory loss G31.84 331.83      780.93    4. Severe episode of recurrent major depressive disorder, without psychotic features ([facility] [place] [address] F33.2 296.33    5. PTSD (post-traumatic stress disorder) F43.10 309.81            Additional pertinent medical data reviewed at today's office visit:    Flowsheets    Allergies   Allergen Reactions    Latex Rash, Itching and Swelling    Pcn [Penicillins] Other (comments)     Pt states as a child she had a reaction but does not remember specific reaction. Pt states\"mother say I stopped breathing\". Current Outpatient Medications   Medication Sig    rizatriptan (MAXALT) 10 mg tablet Take 1 Tab by mouth once as needed for Migraine for up to 1 dose. May repeat in 2 hours if needed    multivitamin with minerals (HAIR,SKIN AND NAILS PO) Hair,Skin and Nails    galcanezumab-gnlm (EMGALITY PEN) 120 mg/mL injection 1 mL by SubCUTAneous route every thirty (30) days.     guaiFENesin-dextromethorphan SR (MUCINEX DM) 600-30 mg per tablet Take 1 Tab by mouth every twelve (12) hours as needed for Cough or Congestion.  ascorbic acid, vitamin C, (VITAMIN C) 500 mg tablet Take 1,000 mg by mouth daily.  omeprazole (PRILOSEC) 40 mg capsule Take 1 Cap by mouth daily.  DULoxetine (CYMBALTA) 60 mg capsule Take 120 mg by mouth daily.  ibuprofen (MOTRIN) 200 mg tablet Take 600 mg by mouth every six (6) hours as needed for Pain.  traZODone (DESYREL) 100 mg tablet Take 100 mg by mouth nightly.  oxyCODONE-acetaminophen (PERCOCET) 5-325 mg per tablet Take 1 Tab by mouth every six (6) hours as needed for Pain.  cetirizine (ZYRTEC) 10 mg tablet Take 1 Tab by mouth nightly as needed for Allergies.  lamoTRIgine (LAMICTAL) 200 mg tablet Take 200 mg by mouth daily. Indications: Bipolar Depression    buPROPion XL (WELLBUTRIN XL) 300 mg XL tablet Take 300 mg by mouth every morning.  cholecalciferol, vitamin D3, (VITAMIN D3) 2,000 unit tab Take 1 Tab by mouth daily.  b complex vitamins (B COMPLEX 1) tablet Take 1 Tab by mouth daily.  buPROPion SR (WELLBUTRIN SR) 100 mg SR tablet Wellbutrin  mg tablet, 12 hr sustained-release   prescribed by non [facility] md    donepeziL (ARICEPT) 5 mg tablet Take 1 Tab by mouth daily. Indications: mild cognitve impairment     No current facility-administered medications for this visit.         Past Medical History:   Diagnosis Date    Anxiety     Arthritis     unsure - knees, lower back, fingers and toes    At risk for sleep apnea 09/05/2019    KATIA score 4 on PAT assessment    Bipolar 2 disorder (HCC)     Chest pain     Childhood asthma     Chronic pain     right abdomen,shooting pain groins    Depression     Endometriosis     Female bladder prolapse     GERD (gastroesophageal reflux disease)     MCI (mild cognitive impairment) with memory loss     Migraines     Prediabetes     Prolapse of anterior lip of cervix     PTSD (post-traumatic stress disorder)     borderline personality traits    Rectal prolapse        Past Surgical History: Procedure Laterality Date    COLONOSCOPY N/A 12/2/2019    COLONOSCOPY (LATEX ALLERGY) performed by Silvana Suresh MD at [facility] AMBULATORY OR    HX COLONOSCOPY      HX ENDOSCOPY      HX GYN      vaginal birth x2    HX HERNIA REPAIR  07/09/2015    Laparoscopic Incisional hernia repair with mesh.     HX LAP CHOLECYSTECTOMY  7/7/2014    HX MYRINGOTOMY Bilateral     childhood    HX PARTIAL HYSTERECTOMY  04/25/2019    laparoscopic, left ovaries       Social History     Socioeconomic History    Marital status: SINGLE     Spouse name: Not on file    Number of children: 2    Years of education: Not on file    Highest education level: Not on file   Occupational History     Employer: NOT EMPLOYED     Comment: work at home    Tobacco Use    Smoking status: Current Every Day Smoker     Packs/day: 0.50     Years: 10.00     Pack years: 5.00     Start date: 3/1/2004    Smokeless tobacco: Never Used   Substance and Sexual Activity    Alcohol use: Yes     Comment: rarely, not weeklt    Drug use: Not Currently     Types: Marijuana     Comment: 11/22/19, last use >1 year    Sexual activity: Yes     Partners: Male     Birth control/protection: None     Comment: single    Social History Narrative    Unemployed currently       Family History   Problem Relation Age of Onset    Other Mother         fibromyalgia, IBS    Diabetes Mother         Pre-DM    High Cholesterol Mother     Hypertension Mother     Liver Disease Mother         fatty liver    Cancer Mother         uterine     Heart Disease Mother         CHF    Arthritis-osteo Mother     Diabetes Father     Hypertension Father     Asthma Brother     Diabetes Paternal Aunt     Hypertension Paternal Aunt     Diabetes Paternal Uncle     Hypertension Paternal Uncle     Cancer Maternal Grandfather         esoph    Hypertension Maternal Grandfather     Stroke Maternal Grandfather     Heart Disease Paternal Grandmother     Diabetes Paternal Grandfather  Heart Attack Paternal Grandfather     Stroke Paternal Grandfather     Hypertension Paternal Grandfather     Heart Disease Maternal Grandmother     Other Sister         MTHFR (clotting D/O)          Admission on 12/02/2019, Discharged on 12/02/2019   Component Date Value    Pregnancy test,urine (PO* 12/02/2019 NEGATIVE         XR Results (maximum last 3): Results from [place] encounter on 09/19/18   XR ABD (KUB)    Impression IMPRESSION: Normal colonic gas pattern. Fluid-filled small bowel. Results from [place] encounter on 08/09/18   XR SPINE LUMB MIN 4 V    Impression IMPRESSION:    Mild T11-12 and T12-L1 degenerative disc disease. Mild L5-S1 facet arthropathy. The exam is otherwise unremarkable. XR SPINE THORAC 3 V    Impression IMPRESSION: Negative. CT Results (maximum last 3): Results from [place] encounter on 07/01/18   CT HEAD WO CONT    Impression IMPRESSION: Normal study. Results from [place] encounter on 06/15/18   CT ABD PELV W CONT    Impression IMPRESSION:  4.6 cm right ovarian cyst. No acute abnormality. Results from [place] encounter on 05/14/15   CT ABD PELV W CONT       MRI Results (maximum last 3): Results from [place] encounter on 08/09/18   MRI BRAIN W WO CONT    Impression IMPRESSION:   1. No discrete pituitary lesion identified, and otherwise unremarkable brain  MRI. A single new nonspecific T2/FLAIR hyperintensity in the right parietal  subcortical white matter is likely of no clinical significance. Results from [place] encounter on 09/23/16   MRI BRAIN W WO CONT    Impression IMPRESSION:   No focal pituitary abnormalities demonstrated. Normal brain MRI. .           Follow-up and Dispositions    · Return in about 2 months (around 4/21/2020).            Mian Welsh MS, ANP-BC, [facility]

## 2020-02-21 NOTE — TELEPHONE ENCOUNTER
Patient has called twice to check on her rx for her injection this afternoon. Please check on this and call her back.

## 2020-02-21 NOTE — PATIENT INSTRUCTIONS
MINDFULNESS and WELLNESS  focus on several things:  Mindfulness: be in the moment  Restfulness: Look at apps for meditation and white noise to help you relax and to sleep better  Mild exercise: Try to walk 10 minutes 3 times a week. At those times listen to music that may be restful for you or relaxing use your apps or perhaps podcasts  Consider adult coloring books to help reduce anxiety and improve focus     If appropriate, work with a mental health team closely to help walk you through the issues that you are struggling with to get to a better place     Be excepting of where you are right now instead of trying to fight to where you used to be. Focus on who you want to become going forward. Pat yourself  on the back each day you get out of bed     At the end of the day list all the things that you did that was positive for yourself even if it is nothing more than getting out of bed that day       Learning About Mindfulness for Stress  What are mindfulness and stress? Stress is what you feel when you have to handle more than you are used to. A lot of things can cause stress. You may feel stress when you go on a job interview, take a test, or run a race. This kind of short-term stress is normal and even useful. It can help you if you need to work hard or react quickly. Stress also can last a long time. Long-term stress is caused by stressful situations or events. Examples of long-term stress include long-term health problems, ongoing problems at work, and conflicts in your family. Long-term stress can harm your health. Mindfulness is a focus only on things happening in the present moment. It's a process of purposefully paying attention to and being aware of your surroundings, your emotions, your thoughts, and how your body feels. You are aware of these things, but you aren't judging these experiences as \"good\" or \"bad. \" Mindfulness can help you learn to calm your mind and body to help you cope with illness, pain, and stress. How does mindfulness help to relieve stress? Mindfulness can help quiet your mind and relax your body. Studies show that it can help some people sleep better, feel less anxious, and bring their blood pressure down. And it's been shown to help some people live and cope better with certain health problems like heart disease, depression, chronic pain, and cancer. How do you practice mindfulness? To be mindful is to pay attention, to be present, and to be accepting. · When you're mindful, you do just one thing and you pay close attention to that one thing. For example, you may sit quietly and notice your emotions or how your food tastes and smells. · When you're present, you focus on the things that are happening right now. You let go of your thoughts about the past and the future. When you dwell on the past or the future, you miss moments that can heal and strengthen you. You may miss moments like hearing a child laugh or seeing a friendly face when you think you're all alone. · When you're accepting, you don't  the present moment. Instead you accept your thoughts and feelings as they come. You can practice anytime, anywhere, and in any way you choose. You can practice in many ways. Here are a few ideas:  · While doing your chores, like washing the dishes, let your mind focus on what's in your hand. What does the dish feel like? Is the water warm or cold? · Go outside and take a few deep breaths. What is the air like? Is it warm or cold? · When you can, take some time at the start of your day to sit alone and think. · Take a slow walk by yourself. Count your steps while you breathe in and out. · Try yoga breathing exercises, stretches, and poses to strengthen and relax your muscles. · At work, if you can, try to stop for a few moments each hour. Note how your body feels. Let yourself regroup and let your mind settle before you return to what you were doing.   · If you struggle with anxiety or \"worry thoughts,\" imagine your mind as a blue austen and your worry thoughts as clouds. Now imagine those worry thoughts floating across your mind's austen. Just let them pass by as you watch. Follow-up care is a key part of your treatment and safety. Be sure to make and go to all appointments, and call your doctor if you are having problems. It's also a good idea to know your test results and keep a list of the medicines you take. Where can you learn more? Go to http://orin-cris.info/. Enter I436 in the search box to learn more about \"Learning About Mindfulness for Stress. \"  Current as of: April 7, 2019  Content Version: 12.2  © 6431-3431 [employer]. Care instructions adapted under license by [employer] (which disclaims liability or warranty for this information). If you have questions about a medical condition or this instruction, always ask your healthcare professional. [address] any warranty or liability for your use of this information.

## 2020-02-26 ENCOUNTER — OFFICE VISIT (OUTPATIENT)
Dept: RHEUMATOLOGY | Age: 35
End: 2020-02-26

## 2020-02-26 VITALS
OXYGEN SATURATION: 98 % | HEIGHT: 63 IN | HEART RATE: 96 BPM | WEIGHT: 247 LBS | SYSTOLIC BLOOD PRESSURE: 115 MMHG | TEMPERATURE: 98.7 F | BODY MASS INDEX: 43.77 KG/M2 | DIASTOLIC BLOOD PRESSURE: 70 MMHG | RESPIRATION RATE: 20 BRPM

## 2020-02-26 DIAGNOSIS — F43.10 PTSD (POST-TRAUMATIC STRESS DISORDER): ICD-10-CM

## 2020-02-26 DIAGNOSIS — F33.2 SEVERE EPISODE OF RECURRENT MAJOR DEPRESSIVE DISORDER, WITHOUT PSYCHOTIC FEATURES (HCC): ICD-10-CM

## 2020-02-26 DIAGNOSIS — R21 SKIN RASH: ICD-10-CM

## 2020-02-26 DIAGNOSIS — M79.7 FIBROMYALGIA: Primary | ICD-10-CM

## 2020-02-26 NOTE — PROGRESS NOTES
REASON FOR VISIT    This is the initial evaluation for Ms. Kirsten Cruz a 37 y.o.  female for question of arthralgias. The patient is referred to the [facility] at the request of PCP. HISTORY OF PRESENT ILLNESS     Previous medical records reviewed and summarized: yes    mHAQ:1  Pain scale: 9/10    This is a 37 y.o. female with hx of chronic pain, depression, migraines, PTSD, bipolar disorder, ADHD. The patient notes she has pain in feet, hands, back, knees, neck, chest/collarbone area. She has had pain for years and it is progressing. The pain is there all the time in back, hands and feet. Activity makes the pain worse. Rest helps slightly. Ibuprofen takes the edge off. Tylenol doesn't help much. She thinks ibuprofen works better than tylenol. She thinks her ankles are swollen. She thinks her feet and knees are swollen as well. She has gained a lot of weight. .  Morning stiffness for a few hours. She notes she has a rash on her shins for a few months now. She is supposed to see a dermatologist.  She is now getting patches on her arms as well. It is itchy. + depression anxiety. + blurry vision occasionally. REVIEW OF SYSTEMS    A 15 point review of systems was performed and summarized below. The questionnaire was reviewed with the patient and scanned into the patient's medical record.     General: endorses recent weight gain 60lbs, fatigue, weakness,denies  recent weight loss,  fever, drenching night sweats  Musculoskeletal: endorses joint pain, joint swelling, morning stiffness (lasting 2:30 hours), muscle pain  denies   Ears: endorses hearing loss,denies ringing in ears,  deafness  Eyes: denies pain, light sensitive, redness, blindness, double vision, blurred vision, excess tearing, dryness, foreign body sensation  Mouth: endorses dryness, increased dental caries  denies sore tongue, oral ulcers, loss of taste,   Nose: denies nosebleeds, nasal ulcers  Throat: denies food stuck when swallowing, difficulty with swallowing, hoarseness, pain in jaw while chewing  Neck: denies swollen glands, tender glands  Cardiopulmonary: endorses sudden changes in heart beat,  shortness of breath on exertion, dry cough,denies pain in chest with deep breaths, pain in chest when lying down, murmurs, wheezing,  productive cough, shortness of breath at rest,  coughing of blood  Gastrointestinal: endorses nausea, heartburn, stomach pain relieved by food, chronic constipation, denies chronic diarrhea, blood in stools, black stools  Genitourinary: denies vaginal dryness, pain or burning on urination, blood in urine, cloudy urine, vaginal ulcers, penile ulcers  Hematologic: denies anemia, bleeding tendency, blood clots, bleeding gums  Skin: endorses hair loss, rash, nodules/bumps,denies easy bruising, rash worsened after sun exposure, hives/urticaria, skin thickening, skin tightness,  color changes of hands or feet in the cold (Raynaud's)  Neurologic: endorses numbness or tingling in hands, numbness or tingling in feet, muscle weakness  denies   Psychiatric: endorses depression, excessive worries, PTSD, Bipolardenies   Sleep: endorses poor sleep (4-5 hours), difficulty falling asleep, difficulty staying asleep denies , denies snoring, apnea, daytime somnolence,     PAST MEDICAL HISTORY    Past Medical History:   Diagnosis Date    Anxiety     Arthritis     unsure - knees, lower back, fingers and toes    At risk for sleep apnea 09/05/2019    KATIA score 4 on PAT assessment    Bipolar 2 disorder (HCC)     Chest pain     Childhood asthma     Chronic pain     right abdomen,shooting pain groins    Depression     Endometriosis     Female bladder prolapse     GERD (gastroesophageal reflux disease)     MCI (mild cognitive impairment) with memory loss     Migraines     Prediabetes     Prolapse of anterior lip of cervix     PTSD (post-traumatic stress disorder)     borderline personality traits  Rectal prolapse         Past Surgical History:   Procedure Laterality Date    COLONOSCOPY N/A 12/2/2019    COLONOSCOPY (LATEX ALLERGY) performed by [facility] AMBUL[facility]ORY [facility]    HX COLONOSCOPY      HX ENDOSCOPY      HX GYN      vaginal birth x2    HX HERNIA REPAIR  07/09/2015    Laparoscopic Incisional hernia repair with mesh.     HX LAP CHOLECYSTECTOMY  7/7/2014    HX MYRINGOTOMY Bilateral     childhood    HX PARTIAL HYSTERECTOMY  04/25/2019    laparoscopic, left ovaries       FAMILY HISTORY    Family History   Problem Relation Age of Onset    Other Mother         fibromyalgia, IBS    Diabetes Mother         Pre-DM    High Cholesterol Mother     Hypertension Mother     Liver Disease Mother         fatty liver    Cancer Mother         uterine     Heart Disease Mother         CHF    Arthritis-osteo Mother     Diabetes Father     Hypertension Father     Asthma Brother     Diabetes Paternal Aunt     Hypertension Paternal Aunt     Diabetes Paternal Uncle     Hypertension Paternal Uncle     Cancer Maternal Grandfather         esoph    Hypertension Maternal Grandfather     Stroke Maternal Grandfather     Heart Disease Paternal Grandmother     Diabetes Paternal Grandfather     Heart Attack Paternal Grandfather     Stroke Paternal Grandfather     Hypertension Paternal Grandfather     Heart Disease Maternal Grandmother     Other Sister         MTHFR (clotting D/O)       SOCIAL HISTORY    Social History     Tobacco Use    Smoking status: Current Every Day Smoker     Packs/day: 0.50     Years: 10.00     Pack years: 5.00     Start date: 3/1/2004    Smokeless tobacco: Never Used   Substance Use Topics    Alcohol use: Yes     Comment: rarely, not weeklt    Drug use: Not Currently     Types: Marijuana     Comment: 11/22/19, last use >1 year       MEDICATIONS    Current Outpatient Medications   Medication Sig Dispense Refill    multivitamin with minerals (HAIR,SKIN AND NAILS PO) Hair,Skin and Nails      buPROPion SR (WELLBUTRIN SR) 100 mg SR tablet Wellbutrin  mg tablet, 12 hr sustained-release   prescribed by non [facility] md      galcanezumab-gnlm (EMGALITY PEN) 120 mg/mL injection 1 mL by SubCUTAneous route every thirty (30) days. 1 mL 11    donepeziL (ARICEPT) 5 mg tablet Take 1 Tab by mouth daily. Indications: mild cognitve impairment 30 Tab 1    guaiFENesin-dextromethorphan SR (MUCINEX DM) 600-30 mg per tablet Take 1 Tab by mouth every twelve (12) hours as needed for Cough or Congestion. 60 Tab 0    ascorbic acid, vitamin C, (VITAMIN C) 500 mg tablet Take 1,000 mg by mouth daily.  omeprazole (PRILOSEC) 40 mg capsule Take 1 Cap by mouth daily. 90 Cap 3    DULoxetine (CYMBALTA) 60 mg capsule Take 120 mg by mouth daily.  ibuprofen (MOTRIN) 200 mg tablet Take 600 mg by mouth every six (6) hours as needed for Pain.  traZODone (DESYREL) 100 mg tablet Take 100 mg by mouth nightly. 0    cetirizine (ZYRTEC) 10 mg tablet Take 1 Tab by mouth nightly as needed for Allergies. 90 Tab 3    lamoTRIgine (LAMICTAL) 200 mg tablet Take 200 mg by mouth daily. Indications: Bipolar Depression      buPROPion XL (WELLBUTRIN XL) 300 mg XL tablet Take 300 mg by mouth every morning.  cholecalciferol, vitamin D3, (VITAMIN D3) 2,000 unit tab Take 1 Tab by mouth daily.  b complex vitamins (B COMPLEX 1) tablet Take 1 Tab by mouth daily.  oxyCODONE-acetaminophen (PERCOCET) 5-325 mg per tablet Take 1 Tab by mouth every six (6) hours as needed for Pain. ALLERGIES    Allergies   Allergen Reactions    Latex Rash, Itching and Swelling    Pcn [Penicillins] Other (comments)     Pt states as a child she had a reaction but does not remember specific reaction. Pt states\"mother say I stopped breathing\".        PHYSICAL EXAMINATION    Visit Vitals  /70 (BP 1 Location: Right arm, BP Patient Position: Sitting)   Pulse 96   Temp 98.7 °F (37.1 °C) (Oral)   Resp 20   Ht 5' 3\" (1.6 m)   Wt 247 lb (112 kg)   SpO2 98%   BMI 43.75 kg/m²     Body mass index is 43.75 kg/m². General: NAD  HEENT: PERRL, anicteric, non-injected sclerae; oropharynx without ulcers, erythema, or exudate. Moist mucous membranes. Lymphatic: No cervical or axillary lymphadenopathy. Cardiovascular: S1, S2,no R/M/G  Pulmonary: CTA b/l. No wheezes/rales/rhonchi. Abdominal: Soft,NTND, + BS. Skin: on b/l forearms, patient has 1-2 round lesions with flat erythema with some scaling. She has more lesions on b/l LE L>R with some petechiae  Neuro: Alert; able to carry normal conversation    Musculoskeletal:   TTP everywhere I touch  No swollen joints    DATA REVIEW    Prior medical records were reviewed and if applicable are summarized as below:    Labs:   10/2019: ESR normal, RF negative, CRP 15    Imaging:   N/A    ASSESSMENT AND PLAN    A 37 y.o. female chronic pain, depression, migraines, PTSD, bipolar disorder, ADHD presents for evaluation of diffuse pains. The patient's symptoms are due to fibromyalgia. I explained the diagnosis to the patient in detail and answered all the questions. She has skin lesions which could be due to cutaneous lupus vs IgA vasculitis. # Skin lesions:  - ASHELY, ANCA, IgA, cryos  - to see derm in 2 weeks. Advised to get skin biopsy    # Fibromyalgia:  - PT referral  - defer to PCP    # Depression:  - on medication    # Obesity:  - advised weight loss and discussed weight loss techniques such as healthy eating habits, increase in exercise, having a weighing machine in the bathroom and checking weight daily. I also counseled the patient that it is important to lose weight at the rate of 1-2 lbs/month instead of rapid weight loss. RTC in 6 weeks    The patient voiced understanding of the aforementioned assessment and plan. Summary of plan was provided in the After Visit Summary patient instructions. I also provided education about MyChart setup and utility.     Ms. Boschstephanie Askew has a reminder for a \"due or due soon\" health maintenance. I have asked that she contact her primary care provider for follow-up on this health maintenance.     TODAY'S ORDERS    Orders Placed This Encounter    ANTINUCLEAR ANTIBODIES, IFA    IMMUNOGLOBULIN A    ANCA PANEL    CRYOGLOBULIN W/ REFLX JAH    UA WITH REFLEX MICRO AND CULTURE    REFERRAL TO PHYSICAL THERAPY       Future Appointments   Date Time Provider Annamaria Horta   4/8/2020  9:40 AM Evelyn Leal MD [address] [facility]   4/24/2020  3:00 PM Mackenzie Curtis NP [address] [place] [address]   5/11/2020  9:00 AM Keisha CASTILLO MD [address]    Adult Rheumatology   [facility], [address] [place] [address] Road   Phone [phone]  Fax [phone]

## 2020-02-26 NOTE — PATIENT INSTRUCTIONS
Please fill out your [address] you will receive after your visit in the mail or via [address]

## 2020-02-26 NOTE — PROGRESS NOTES
Chief Complaint   Patient presents with    Joint Pain     back, feet, legs, hands, head, neck knees

## 2020-03-01 LAB
ANA TITR SER IF: NEGATIVE {TITER}
APPEARANCE UR: CLEAR
BACTERIA #/AREA URNS HPF: NORMAL /[HPF]
BILIRUB UR QL STRIP: NEGATIVE
C-ANCA TITR SER IF: NORMAL TITER
CASTS URNS QL MICRO: NORMAL /LPF
COLOR UR: YELLOW
CRYOGLOB SER QL 1D COLD INC: NORMAL
EPI CELLS #/AREA URNS HPF: NORMAL /HPF (ref 0–10)
GLUCOSE UR QL: NEGATIVE
HGB UR QL STRIP: NEGATIVE
IGA SERPL-MCNC: 248 MG/DL (ref 87–352)
KETONES UR QL STRIP: NEGATIVE
LEUKOCYTE ESTERASE UR QL STRIP: NEGATIVE
MICRO URNS: NORMAL
MICRO URNS: NORMAL
MUCOUS THREADS URNS QL MICRO: PRESENT
MYELOPEROXIDASE AB SER IA-ACNC: <9 U/ML (ref 0–9)
NITRITE UR QL STRIP: NEGATIVE
P-ANCA ATYPICAL TITR SER IF: NORMAL TITER
P-ANCA TITR SER IF: NORMAL TITER
PH UR STRIP: 6.5 [PH] (ref 5–7.5)
PROT UR QL STRIP: NEGATIVE
PROTEINASE3 AB SER IA-ACNC: <3.5 U/ML (ref 0–3.5)
RBC #/AREA URNS HPF: NORMAL /HPF (ref 0–2)
SP GR UR: 1.01 (ref 1–1.03)
URINALYSIS REFLEX, 377202: NORMAL
UROBILINOGEN UR STRIP-MCNC: 0.2 MG/DL (ref 0.2–1)
WBC #/AREA URNS HPF: NORMAL /HPF (ref 0–5)

## 2020-03-09 ENCOUNTER — APPOINTMENT (OUTPATIENT)
Dept: CT IMAGING | Age: 35
End: 2020-03-09
Attending: NURSE PRACTITIONER
Payer: MEDICAID

## 2020-03-09 ENCOUNTER — HOSPITAL ENCOUNTER (EMERGENCY)
Age: 35
Discharge: HOME | End: 2020-03-09
Attending: EMERGENCY MEDICINE
Payer: MEDICAID

## 2020-03-09 VITALS
BODY MASS INDEX: 41.95 KG/M2 | OXYGEN SATURATION: 96 % | SYSTOLIC BLOOD PRESSURE: 111 MMHG | HEART RATE: 100 BPM | WEIGHT: 236.77 LBS | DIASTOLIC BLOOD PRESSURE: 63 MMHG | HEIGHT: 63 IN | RESPIRATION RATE: 18 BRPM | TEMPERATURE: 99.3 F

## 2020-03-09 DIAGNOSIS — N30.00 ACUTE CYSTITIS WITHOUT HEMATURIA: ICD-10-CM

## 2020-03-09 DIAGNOSIS — M54.2 NECK PAIN: ICD-10-CM

## 2020-03-09 DIAGNOSIS — K52.9 NONINFECTIOUS GASTROENTERITIS, UNSPECIFIED TYPE: Primary | ICD-10-CM

## 2020-03-09 LAB
ALBUMIN SERPL-MCNC: 3.3 G/DL (ref 3.5–5)
ALBUMIN/GLOB SERPL: 0.8 {RATIO} (ref 1.1–2.2)
ALP SERPL-CCNC: 94 U/L (ref 45–117)
ALT SERPL-CCNC: 34 U/L (ref 12–78)
ANION GAP SERPL CALC-SCNC: 8 MMOL/L (ref 5–15)
APPEARANCE UR: ABNORMAL
AST SERPL-CCNC: 33 U/L (ref 15–37)
BACTERIA URNS QL MICRO: ABNORMAL /HPF
BASOPHILS # BLD: 0.1 K/UL (ref 0–0.1)
BASOPHILS NFR BLD: 1 % (ref 0–1)
BILIRUB SERPL-MCNC: 0.6 MG/DL (ref 0.2–1)
BILIRUB UR QL CFM: NEGATIVE
BUN SERPL-MCNC: 6 MG/DL (ref 6–20)
BUN/CREAT SERPL: 6 (ref 12–20)
CALCIUM SERPL-MCNC: 8.7 MG/DL (ref 8.5–10.1)
CHLORIDE SERPL-SCNC: 104 MMOL/L (ref 97–108)
CO2 SERPL-SCNC: 22 MMOL/L (ref 21–32)
COLOR UR: ABNORMAL
CREAT SERPL-MCNC: 0.93 MG/DL (ref 0.55–1.02)
DIFFERENTIAL METHOD BLD: ABNORMAL
EOSINOPHIL # BLD: 0.1 K/UL (ref 0–0.4)
EOSINOPHIL NFR BLD: 1 % (ref 0–7)
EPITH CASTS URNS QL MICRO: ABNORMAL /LPF
ERYTHROCYTE [DISTWIDTH] IN BLOOD BY AUTOMATED COUNT: 14.4 % (ref 11.5–14.5)
GLOBULIN SER CALC-MCNC: 4.4 G/DL (ref 2–4)
GLUCOSE SERPL-MCNC: 103 MG/DL (ref 65–100)
GLUCOSE UR STRIP.AUTO-MCNC: NEGATIVE MG/DL
HCT VFR BLD AUTO: 43.1 % (ref 35–47)
HGB BLD-MCNC: 14.6 G/DL (ref 11.5–16)
HGB UR QL STRIP: ABNORMAL
IMM GRANULOCYTES # BLD AUTO: 0.1 K/UL (ref 0–0.04)
IMM GRANULOCYTES NFR BLD AUTO: 1 % (ref 0–0.5)
KETONES UR QL STRIP.AUTO: 15 MG/DL
LEUKOCYTE ESTERASE UR QL STRIP.AUTO: ABNORMAL
LIPASE SERPL-CCNC: 65 U/L (ref 73–393)
LYMPHOCYTES # BLD: 1.9 K/UL (ref 0.8–3.5)
LYMPHOCYTES NFR BLD: 18 % (ref 12–49)
MCH RBC QN AUTO: 28 PG (ref 26–34)
MCHC RBC AUTO-ENTMCNC: 33.9 G/DL (ref 30–36.5)
MCV RBC AUTO: 82.6 FL (ref 80–99)
MONOCYTES # BLD: 0.9 K/UL (ref 0–1)
MONOCYTES NFR BLD: 9 % (ref 5–13)
NEUTS SEG # BLD: 7.2 K/UL (ref 1.8–8)
NEUTS SEG NFR BLD: 70 % (ref 32–75)
NITRITE UR QL STRIP.AUTO: POSITIVE
NRBC # BLD: 0 K/UL (ref 0–0.01)
NRBC BLD-RTO: 0 PER 100 WBC
PH UR STRIP: 6 [PH] (ref 5–8)
PLATELET # BLD AUTO: 302 K/UL (ref 150–400)
PMV BLD AUTO: 9.7 FL (ref 8.9–12.9)
POTASSIUM SERPL-SCNC: 3.7 MMOL/L (ref 3.5–5.1)
PROT SERPL-MCNC: 7.7 G/DL (ref 6.4–8.2)
PROT UR STRIP-MCNC: 100 MG/DL
RBC # BLD AUTO: 5.22 M/UL (ref 3.8–5.2)
RBC #/AREA URNS HPF: ABNORMAL /HPF (ref 0–5)
SODIUM SERPL-SCNC: 134 MMOL/L (ref 136–145)
SP GR UR REFRACTOMETRY: 1.02 (ref 1–1.03)
TSH SERPL DL<=0.05 MIU/L-ACNC: 3.28 UIU/ML (ref 0.36–3.74)
UA: UC IF INDICATED,UAUC: ABNORMAL
UROBILINOGEN UR QL STRIP.AUTO: 1 EU/DL (ref 0.2–1)
WBC # BLD AUTO: 10.2 K/UL (ref 3.6–11)
WBC URNS QL MICRO: ABNORMAL /HPF (ref 0–4)

## 2020-03-09 PROCEDURE — 81001 URINALYSIS AUTO W/SCOPE: CPT

## 2020-03-09 PROCEDURE — 96375 TX/PRO/DX INJ NEW DRUG ADDON: CPT

## 2020-03-09 PROCEDURE — 93005 ELECTROCARDIOGRAM TRACING: CPT

## 2020-03-09 PROCEDURE — 83690 ASSAY OF LIPASE: CPT

## 2020-03-09 PROCEDURE — 80053 COMPREHEN METABOLIC PANEL: CPT

## 2020-03-09 PROCEDURE — 99284 EMERGENCY DEPT VISIT MOD MDM: CPT

## 2020-03-09 PROCEDURE — 74011250636 HC RX REV CODE- 250/636: Performed by: EMERGENCY MEDICINE

## 2020-03-09 PROCEDURE — 74011000258 HC RX REV CODE- 258: Performed by: EMERGENCY MEDICINE

## 2020-03-09 PROCEDURE — 74011250636 HC RX REV CODE- 250/636: Performed by: NURSE PRACTITIONER

## 2020-03-09 PROCEDURE — 96365 THER/PROPH/DIAG IV INF INIT: CPT

## 2020-03-09 PROCEDURE — 74011000258 HC RX REV CODE- 258: Performed by: NURSE PRACTITIONER

## 2020-03-09 PROCEDURE — 74011636320 HC RX REV CODE- 636/320: Performed by: EMERGENCY MEDICINE

## 2020-03-09 PROCEDURE — 85025 COMPLETE CBC W/AUTO DIFF WBC: CPT

## 2020-03-09 PROCEDURE — 36415 COLL VENOUS BLD VENIPUNCTURE: CPT

## 2020-03-09 PROCEDURE — 70491 CT SOFT TISSUE NECK W/DYE: CPT

## 2020-03-09 PROCEDURE — 87086 URINE CULTURE/COLONY COUNT: CPT

## 2020-03-09 PROCEDURE — 74177 CT ABD & PELVIS W/CONTRAST: CPT

## 2020-03-09 PROCEDURE — 84443 ASSAY THYROID STIM HORMONE: CPT

## 2020-03-09 RX ORDER — MORPHINE SULFATE 10 MG/ML
4 INJECTION, SOLUTION INTRAMUSCULAR; INTRAVENOUS
Status: COMPLETED | OUTPATIENT
Start: 2020-03-09 | End: 2020-03-09

## 2020-03-09 RX ORDER — METRONIDAZOLE 500 MG/1
500 TABLET ORAL 2 TIMES DAILY
Qty: 20 TAB | Refills: 0 | Status: SHIPPED | OUTPATIENT
Start: 2020-03-09 | End: 2020-03-19

## 2020-03-09 RX ORDER — ONDANSETRON 2 MG/ML
4 INJECTION INTRAMUSCULAR; INTRAVENOUS
Status: COMPLETED | OUTPATIENT
Start: 2020-03-09 | End: 2020-03-09

## 2020-03-09 RX ORDER — PROMETHAZINE HYDROCHLORIDE 25 MG/1
25 TABLET ORAL
Qty: 12 TAB | Refills: 0 | Status: SHIPPED | OUTPATIENT
Start: 2020-03-09 | End: 2020-12-11 | Stop reason: SDUPTHER

## 2020-03-09 RX ORDER — SODIUM CHLORIDE 0.9 % (FLUSH) 0.9 %
10 SYRINGE (ML) INJECTION
Status: COMPLETED | OUTPATIENT
Start: 2020-03-09 | End: 2020-03-09

## 2020-03-09 RX ORDER — CIPROFLOXACIN 500 MG/1
500 TABLET ORAL 2 TIMES DAILY
Qty: 20 TAB | Refills: 0 | Status: SHIPPED | OUTPATIENT
Start: 2020-03-09 | End: 2020-03-19

## 2020-03-09 RX ADMIN — CEFTRIAXONE 1 G: 1 INJECTION, POWDER, FOR SOLUTION INTRAMUSCULAR; INTRAVENOUS at 19:37

## 2020-03-09 RX ADMIN — Medication 10 ML: at 17:50

## 2020-03-09 RX ADMIN — ONDANSETRON 4 MG: 2 INJECTION INTRAMUSCULAR; INTRAVENOUS at 16:40

## 2020-03-09 RX ADMIN — SODIUM CHLORIDE 1000 ML: 900 INJECTION, SOLUTION INTRAVENOUS at 16:40

## 2020-03-09 RX ADMIN — IOPAMIDOL 100 ML: 755 INJECTION, SOLUTION INTRAVENOUS at 17:50

## 2020-03-09 RX ADMIN — MORPHINE SULFATE 4 MG: 10 INJECTION INTRAVENOUS at 18:10

## 2020-03-09 RX ADMIN — SODIUM CHLORIDE 25 MG: 900 INJECTION, SOLUTION INTRAVENOUS at 22:03

## 2020-03-09 NOTE — ED NOTES
Bedside and Verbal shift change report given to Johny Rhodes (oncoming nurse) by Roxianne Moritz (offgoing nurse). Report included the following information SBAR, Kardex, ED Summary, Intake/Output, MAR and Recent Results.

## 2020-03-09 NOTE — ED NOTES
Pt arrives to the ED AAOX4 with a c/c of generalized abd pain associated with n/v/d onset Friday. Pt is noted in stable condition, now in ED room with side rail up, bed to lowest position and call light within reach. Will continue to monitor.

## 2020-03-09 NOTE — DISCHARGE INSTRUCTIONS
Patient Education     Colitis: Care Instructions  Your Care Instructions  Colitis is the medical term for swelling (inflammation) of the intestine. It can be caused by different things, such as an infection or loss of blood flow in the intestine. Other causes are problems like Crohn's disease or ulcerative colitis. Symptoms may include fever, diarrhea that may be bloody, or belly pain. Sometimes symptoms go away without treatment. But you may need treatment or more tests, such as blood tests or a stool test. Or you may need imaging tests like a CT scan or a colonoscopy. In some cases, the doctor may want to test a sample of tissue from the intestine. This test is called a biopsy. The doctor has checked you carefully, but problems can develop later. If you notice any problems or new symptoms, get medical treatment right away. Follow-up care is a key part of your treatment and safety. Be sure to make and go to all appointments, and call your doctor if you are having problems. It's also a good idea to know your test results and keep a list of the medicines you take. How can you care for yourself at home? · Rest until you feel better. · Your doctor may recommend that you eat bland foods. These include rice, dry toast or crackers, bananas, and applesauce. · To prevent dehydration, drink plenty of fluids. Choose water and other caffeine-free clear liquids until you feel better. If you have kidney, heart, or liver disease and have to limit fluids, talk with your doctor before you increase the amount of fluids you drink. · Be safe with medicines. Take your medicines exactly as prescribed. Call your doctor if you think you are having a problem with your medicine. You will get more details on the specific medicines your doctor prescribes. When should you call for help? Call 911 anytime you think you may need emergency care. For example, call if:  · You passed out (lost consciousness).   · You vomit blood or what looks like coffee grounds. · Your stools are maroon or very bloody. Call your doctor now or seek immediate medical care if:  · You have new or worse pain. · You have a new or higher fever. · You have new or worse symptoms. · You cannot keep fluids or medicines down. Watch closely for changes in your health, and be sure to contact your doctor if:  · You do not get better as expected. Where can you learn more? Go to Livescribe.be  Enter [id number] in the search box to learn more about \"Colitis: Care Instructions. \"   © 9343-9183 [employer], [employer]. Care instructions adapted under license by Jose Coello (which disclaims liability or warranty for this information). This care instruction is for use with your licensed healthcare professional. If you have questions about a medical condition or this instruction, always ask your healthcare professional. [address] any warranty or liability for your use of this information. Content Version: 77.5.462344; Current as of: November 20, 2015           Patient Education        Food Poisoning: Care Instructions  Your Care Instructions    Food poisoning occurs when you eat foods that contain harmful germs. Food can be contaminated while it is growing, during processing, or when it is prepared. Fresh fruits and vegetables also can be contaminated if they are washed in contaminated water. You may have become ill after eating undercooked meat or eggs or other unsafe foods. Cooking foods thoroughly and storing them properly can help prevent food poisoning. There are many types of food poisoning. Your symptoms depend on the type of food poisoning you have. You will probably begin to feel better in 1 or 2 days. In the meantime, get plenty of rest and make sure that you do not become dehydrated. Follow-up care is a key part of your treatment and safety.  Be sure to make and go to all appointments, and call your doctor if you are having problems. It's also a good idea to know your test results and keep a list of the medicines you take. How can you care for yourself at home? · To prevent dehydration, drink plenty of fluids. Choose water and other caffeine-free clear liquids until you feel better. If you have kidney, heart, or liver disease and have to limit fluids, talk with your doctor before you increase the amount of fluids you drink. · Begin eating small amounts of mild, low-fat foods, depending on how you feel. Try foods like rice, dry crackers, bananas, and applesauce. ? Avoid spicy foods, alcohol, and coffee until 48 hours after all symptoms have disappeared. ? Avoid chewing gum that contains sorbitol. ? Avoid dairy products for 3 days after symptoms disappear. · Take your medicines as prescribed. Call your doctor if you think you are having a problem with your medicine. You will get more details on the specific medicines your doctor prescribes. To prevent food poisoning  · Keep hot foods hot and cold foods cold. · Do not eat meats, dressings, salads, or other foods that have been kept at room temperature for more than 2 hours. · Use a thermometer to check your refrigerator. It should be between 34°F and 40°F.  · Defrost meats in the refrigerator or microwave, not on the kitchen counter. · Keep your hands and your kitchen clean. Wash your hands, cutting boards, and countertops with hot, soapy water. If you use the same cutting board for chopping vegetables and preparing raw meat, be sure to wash the cutting board with hot, soapy water between each use. · Cook meat until it is well done. · Do not eat raw eggs or uncooked dough or sauces made with raw eggs. · Do not take chances. If you think food looks or tastes spoiled, throw it out. When should you call for help? Call 911 anytime you think you may need emergency care.  For example, call if:    · You passed out (lost consciousness).    Call your doctor now or seek immediate medical care if:    · You have new or worse belly pain.     · You have a new or higher fever.     · You are dizzy or lightheaded, or you feel like you may faint.     · You have symptoms of dehydration, such as:  ? Dry eyes and a dry mouth. ? Passing only a little urine. ? Feeling thirstier than normal.     · You cannot keep down medicine or fluids.     · You have new or more blood in stools.     · You have new or worse vomiting or diarrhea.    Watch closely for changes in your health, and be sure to contact your doctor if:    · You do not get better as expected. Where can you learn more? Go to http://orin-cris.info/. Enter P680 in the search box to learn more about \"Food Poisoning: Care Instructions. \"  Current as of: June 9, 2019  Content Version: 12.2  © 8839-5422 [employer]. Care instructions adapted under license by Violet Grey (which disclaims liability or warranty for this information). If you have questions about a medical condition or this instruction, always ask your healthcare professional. [address] any warranty or liability for your use of this information.

## 2020-03-09 NOTE — ED NOTES
Call received from Alyssa Patrick in Lab who states Occult Blood needs to be re-collected as the sample card was open on the back and was already tested, not an acceptable card. ED PA notified.

## 2020-03-10 LAB
ATRIAL RATE: 99 BPM
CALCULATED P AXIS, ECG09: 14 DEGREES
CALCULATED R AXIS, ECG10: 34 DEGREES
CALCULATED T AXIS, ECG11: 33 DEGREES
DIAGNOSIS, 93000: NORMAL
P-R INTERVAL, ECG05: 148 MS
Q-T INTERVAL, ECG07: 356 MS
QRS DURATION, ECG06: 68 MS
QTC CALCULATION (BEZET), ECG08: 456 MS
VENTRICULAR RATE, ECG03: 99 BPM

## 2020-03-10 NOTE — ED NOTES
Phenergan infusion is completed patient is now ready for D/C. Patient states that she has no questions. Patient is getting dressed and leaving in care with her .

## 2020-03-10 NOTE — ED PROVIDER NOTES
EMERGENCY DEPARTMENT HISTORY AND PHYSICAL EXAM      Date: 3/9/2020  Patient Name: James Escalera    History of Presenting Illness     Chief Complaint   Patient presents with    Abdominal Pain     Pain at mid abd since Friday with diarrhea; bloody/dark with brown stool reported. Cdiff r/o out GI office today  sent for IVF and antiemetics    Dehydration       History Provided By: Patient and Patient's Mother    HPI: James Escalera, 37 y.o. female with PMHx significant for GERD, diabetes type 2, depression, PTSD, cholecystectomy, presents by POV to the ED with cc of abdominal pain. Patient has been experiencing nausea, vomiting, diarrhea since Friday patient has reported several variety of stool samples including coffee ground, white, gray, dark color. Patient was seen at the gastroenterologist where they ruled out C. difficile prior to arrival.  Patient was also given prescription for 7 days of metronidazole and Zofran to help with symptoms. Patient was sent by GI office today to help with dehydration symptoms and further investigation of the abdominal pain. She was also given 2 cups to collect stool samples by the GI specialist.  The patient subsequently complains of anterior neck pain and swelling, along with sore throat. There are no other complaints, changes, or physical findings at this time. PCP: Valerie Rhoades MD    No current facility-administered medications on file prior to encounter. Current Outpatient Medications on File Prior to Encounter   Medication Sig Dispense Refill    multivitamin with minerals (HAIR,SKIN AND NAILS PO) Hair,Skin and Nails      buPROPion SR (WELLBUTRIN SR) 100 mg SR tablet Wellbutrin  mg tablet, 12 hr sustained-release   prescribed by non [facility] md      galcanezumab-gnlm (EMGALITY PEN) 120 mg/mL injection 1 mL by SubCUTAneous route every thirty (30) days. 1 mL 11    donepeziL (ARICEPT) 5 mg tablet Take 1 Tab by mouth daily.  Indications: mild cognitve impairment 30 Tab 1    guaiFENesin-dextromethorphan SR (MUCINEX DM) 600-30 mg per tablet Take 1 Tab by mouth every twelve (12) hours as needed for Cough or Congestion. 60 Tab 0    ascorbic acid, vitamin C, (VITAMIN C) 500 mg tablet Take 1,000 mg by mouth daily.  omeprazole (PRILOSEC) 40 mg capsule Take 1 Cap by mouth daily. 90 Cap 3    DULoxetine (CYMBALTA) 60 mg capsule Take 120 mg by mouth daily.  ibuprofen (MOTRIN) 200 mg tablet Take 600 mg by mouth every six (6) hours as needed for Pain.  traZODone (DESYREL) 100 mg tablet Take 100 mg by mouth nightly. 0    oxyCODONE-acetaminophen (PERCOCET) 5-325 mg per tablet Take 1 Tab by mouth every six (6) hours as needed for Pain.  cetirizine (ZYRTEC) 10 mg tablet Take 1 Tab by mouth nightly as needed for Allergies. 90 Tab 3    lamoTRIgine (LAMICTAL) 200 mg tablet Take 200 mg by mouth daily. Indications: Bipolar Depression      buPROPion XL (WELLBUTRIN XL) 300 mg XL tablet Take 300 mg by mouth every morning.  cholecalciferol, vitamin D3, (VITAMIN D3) 2,000 unit tab Take 1 Tab by mouth daily.  b complex vitamins (B COMPLEX 1) tablet Take 1 Tab by mouth daily.          Past History     Past Medical History:  Past Medical History:   Diagnosis Date    Anxiety     Arthritis     unsure - knees, lower back, fingers and toes    At risk for sleep apnea 09/05/2019    KATIA score 4 on PAT assessment    Bipolar 2 disorder (HCC)     Chest pain     Childhood asthma     Chronic pain     right abdomen,shooting pain groins    Depression     Endometriosis     Female bladder prolapse     GERD (gastroesophageal reflux disease)     MCI (mild cognitive impairment) with memory loss     Migraines     Prediabetes     Prolapse of anterior lip of cervix     PTSD (post-traumatic stress disorder)     borderline personality traits    Rectal prolapse        Past Surgical History:  Past Surgical History:   Procedure Laterality Date    COLONOSCOPY N/A 12/2/2019    COLONOSCOPY (LATEX ALLERGY) performed by Los Howell MD at [facility] AMBULATORY OR    HX COLONOSCOPY      HX ENDOSCOPY      HX GYN      vaginal birth x2    HX HERNIA REPAIR  07/09/2015    Laparoscopic Incisional hernia repair with mesh.  HX LAP CHOLECYSTECTOMY  7/7/2014    HX MYRINGOTOMY Bilateral     childhood    HX PARTIAL HYSTERECTOMY  04/25/2019    laparoscopic, left ovaries       Family History:  Family History   Problem Relation Age of Onset    Other Mother         fibromyalgia, IBS    Diabetes Mother         Pre-DM    High Cholesterol Mother     Hypertension Mother     Liver Disease Mother         fatty liver    Cancer Mother         uterine     Heart Disease Mother         CHF    Arthritis-osteo Mother     Diabetes Father     Hypertension Father     Asthma Brother     Diabetes Paternal Aunt     Hypertension Paternal Aunt     Diabetes Paternal Uncle     Hypertension Paternal Uncle     Cancer Maternal Grandfather         esoph    Hypertension Maternal Grandfather     Stroke Maternal Grandfather     Heart Disease Paternal Grandmother     Diabetes Paternal Grandfather     Heart Attack Paternal Grandfather     Stroke Paternal Grandfather     Hypertension Paternal Grandfather     Heart Disease Maternal Grandmother     Other Sister         MTHFR (clotting D/O)       Social History:  Social History     Tobacco Use    Smoking status: Current Every Day Smoker     Packs/day: 0.50     Years: 10.00     Pack years: 5.00     Start date: 3/1/2004    Smokeless tobacco: Never Used   Substance Use Topics    Alcohol use: Yes     Comment: rarely, not weeklt    Drug use: Not Currently     Types: Marijuana     Comment: 11/22/19, last use >1 year       Allergies: Allergies   Allergen Reactions    Latex Rash, Itching and Swelling    Pcn [Penicillins] Other (comments)     Pt states as a child she had a reaction but does not remember specific reaction. Pt states\"mother say I stopped breathing\". Review of Systems   Review of Systems   Constitutional: Negative for chills and fever. HENT: Negative for congestion, rhinorrhea and sore throat. Respiratory: Negative for cough and shortness of breath. Cardiovascular: Negative for chest pain. Gastrointestinal: Positive for abdominal distention, abdominal pain, diarrhea, nausea and vomiting. Negative for blood in stool. Endocrine: Negative for polyuria. Genitourinary: Negative for dysuria, frequency, urgency, vaginal bleeding and vaginal pain. Musculoskeletal: Negative for arthralgias and myalgias. Skin: Negative for rash. Neurological: Negative for dizziness, weakness and headaches. All other systems reviewed and are negative. Physical Exam   Physical Exam  Vitals signs and nursing note reviewed. Constitutional:       General: She is not in acute distress. Appearance: She is well-developed. She is not diaphoretic. Comments: 37 y.o. female   HENT:      Head: Normocephalic and atraumatic. Mouth/Throat:      Mouth: Mucous membranes are moist.      Pharynx: Oropharynx is clear. No pharyngeal swelling or oropharyngeal exudate. Eyes:      General: No scleral icterus. Extraocular Movements: Extraocular movements intact. Conjunctiva/sclera: Conjunctivae normal.      Pupils: Pupils are equal, round, and reactive to light. Neck:      Musculoskeletal: Normal range of motion. Cardiovascular:      Rate and Rhythm: Normal rate and regular rhythm. Heart sounds: Normal heart sounds. No murmur. No friction rub. No gallop. Pulmonary:      Effort: Pulmonary effort is normal. No respiratory distress. Breath sounds: Normal breath sounds. No stridor. No wheezing, rhonchi or rales. Chest:      Chest wall: No tenderness. Abdominal:      General: Bowel sounds are increased. There is distension. There is no abdominal bruit. There are no signs of injury.       Palpations: Abdomen is soft. There is no shifting dullness, fluid wave, hepatomegaly, splenomegaly, mass or pulsatile mass. Tenderness: There is generalized abdominal tenderness. There is guarding. There is no rebound. Negative signs include Landrum's sign, Rovsing's sign, McBurney's sign, psoas sign and obturator sign. Hernia: No hernia is present. Skin:     General: Skin is warm and dry. Capillary Refill: Capillary refill takes less than 2 seconds. Neurological:      General: No focal deficit present. Mental Status: She is alert and oriented to person, place, and time. Psychiatric:         Mood and Affect: Mood normal.         Behavior: Behavior normal.         Diagnostic Study Results     Labs -     Recent Results (from the past 12 hour(s))   CBC WITH AUTOMATED DIFF    Collection Time: 03/09/20  4:33 PM   Result Value Ref Range    WBC 10.2 3.6 - 11.0 K/uL    RBC 5.22 (H) 3.80 - 5.20 M/uL    HGB 14.6 11.5 - 16.0 g/dL    HCT 43.1 35.0 - 47.0 %    MCV 82.6 80.0 - 99.0 FL    MCH 28.0 26.0 - 34.0 PG    MCHC 33.9 30.0 - 36.5 g/dL    RDW 14.4 11.5 - 14.5 %    PLATELET 477 702 - 744 K/uL    MPV 9.7 8.9 - 12.9 FL    NRBC 0.0 0  WBC    ABSOLUTE NRBC 0.00 0.00 - 0.01 K/uL    NEUTROPHILS 70 32 - 75 %    LYMPHOCYTES 18 12 - 49 %    MONOCYTES 9 5 - 13 %    EOSINOPHILS 1 0 - 7 %    BASOPHILS 1 0 - 1 %    IMMATURE GRANULOCYTES 1 (H) 0.0 - 0.5 %    ABS. NEUTROPHILS 7.2 1.8 - 8.0 K/UL    ABS. LYMPHOCYTES 1.9 0.8 - 3.5 K/UL    ABS. MONOCYTES 0.9 0.0 - 1.0 K/UL    ABS. EOSINOPHILS 0.1 0.0 - 0.4 K/UL    ABS. BASOPHILS 0.1 0.0 - 0.1 K/UL    ABS. IMM.  GRANS. 0.1 (H) 0.00 - 0.04 K/UL    DF AUTOMATED     METABOLIC PANEL, COMPREHENSIVE    Collection Time: 03/09/20  4:33 PM   Result Value Ref Range    Sodium 134 (L) 136 - 145 mmol/L    Potassium 3.7 3.5 - 5.1 mmol/L    Chloride 104 97 - 108 mmol/L    CO2 22 21 - 32 mmol/L    Anion gap 8 5 - 15 mmol/L    Glucose 103 (H) 65 - 100 mg/dL    BUN 6 6 - 20 MG/DL    Creatinine 0. 93 0.55 - 1.02 MG/DL    BUN/Creatinine ratio 6 (L) 12 - 20      GFR est AA >60 >60 ml/min/1.73m2    GFR est non-AA >60 >60 ml/min/1.73m2    Calcium 8.7 8.5 - 10.1 MG/DL    Bilirubin, total 0.6 0.2 - 1.0 MG/DL    ALT (SGPT) 34 12 - 78 U/L    AST (SGOT) 33 15 - 37 U/L    Alk.  phosphatase 94 45 - 117 U/L    Protein, total 7.7 6.4 - 8.2 g/dL    Albumin 3.3 (L) 3.5 - 5.0 g/dL    Globulin 4.4 (H) 2.0 - 4.0 g/dL    A-G Ratio 0.8 (L) 1.1 - 2.2     LIPASE    Collection Time: 03/09/20  4:33 PM   Result Value Ref Range    Lipase 65 (L) 73 - 393 U/L   URINALYSIS W/ REFLEX CULTURE    Collection Time: 03/09/20  5:19 PM   Result Value Ref Range    Color EDVIN      Appearance CLOUDY (A) CLEAR      Specific gravity 1.025 1.003 - 1.030      pH (UA) 6.0 5.0 - 8.0      Protein 100 (A) NEG mg/dL    Glucose NEGATIVE  NEG mg/dL    Ketone 15 (A) NEG mg/dL    Blood SMALL (A) NEG      Urobilinogen 1.0 0.2 - 1.0 EU/dL    Nitrites POSITIVE (A) NEG      Leukocyte Esterase MODERATE (A) NEG      WBC 0-4 0 - 4 /hpf    RBC 5-10 0 - 5 /hpf    Epithelial cells MANY (A) FEW /lpf    Bacteria 1+ (A) NEG /hpf    UA:UC IF INDICATED URINE CULTURE ORDERED (A) CNI     BILIRUBIN, CONFIRM    Collection Time: 03/09/20  5:19 PM   Result Value Ref Range    Bilirubin UA, confirm NEGATIVE  NEG     TSH 3RD GENERATION    Collection Time: 03/09/20  5:36 PM   Result Value Ref Range    TSH 3.28 0.36 - 3.74 uIU/mL   EKG, 12 LEAD, INITIAL    Collection Time: 03/09/20  7:47 PM   Result Value Ref Range    Ventricular Rate 99 BPM    Atrial Rate 99 BPM    P-R Interval 148 ms    QRS Duration 68 ms    Q-T Interval 356 ms    QTC Calculation (Bezet) 456 ms    Calculated P Axis 14 degrees    Calculated R Axis 34 degrees    Calculated T Axis 33 degrees    Diagnosis       Sinus rhythm with occasional premature ventricular complexes  Low voltage QRS  Septal infarct , age undetermined  ST & T wave abnormality, consider anterior ischemia  When compared with ECG of 14-JUL-2015 23:31,  premature ventricular complexes are now present  Septal infarct is now present  Nonspecific T wave abnormality now evident in Lateral leads         Radiologic Studies -   CT ABD PELV W CONT   Final Result   IMPRESSION:   Compatible mild colitis. CT NECK SOFT TISSUE W CONT   Final Result    impression: Prominent nonhomogeneous thyroid gland without significant pressure   on the airway. CT Results  (Last 48 hours)               03/09/20 1751  CT ABD PELV W CONT Final result    Impression:  IMPRESSION:   Compatible mild colitis. Narrative:  EXAM: CT ABD PELV W CONT       INDICATION: generalized abd pain       COMPARISON: Vanessa 15, 2018        CONTRAST: 100 mL of Isovue-370. TECHNIQUE:    Following the uneventful intravenous administration of contrast, thin axial   images were obtained through the abdomen and pelvis. Coronal and sagittal   reconstructions were generated. Oral contrast was not administered. CT dose   reduction was achieved through use of a standardized protocol tailored for this   examination and automatic exposure control for dose modulation. FINDINGS: Delayed cystectomy   LUNG BASES: Clear. INCIDENTALLY IMAGED HEART AND MEDIASTINUM: Unremarkable. LIVER: No mass or biliary dilatation. GALLBLADDER: Unremarkable. SPLEEN: No mass. PANCREAS: No mass or ductal dilatation. ADRENALS: Unremarkable. KIDNEYS: No mass, calculus, or hydronephrosis. STOMACH: Unremarkable. SMALL BOWEL: No dilatation or wall thickening. COLON: Possible mild diffuse colonic wall thickening compatible with colitis. APPENDIX: Unremarkable. PERITONEUM: No ascites or pneumoperitoneum. RETROPERITONEUM: No lymphadenopathy or aortic aneurysm. REPRODUCTIVE ORGANS: Hysterectomy oophorectomy   URINARY BLADDER: Not filled cannot be evaluated. BONES: No destructive bone lesion.    ADDITIONAL COMMENTS: N/A           03/09/20 1751  CT NECK SOFT TISSUE W CONT Final result Impression:   impression: Prominent nonhomogeneous thyroid gland without significant pressure   on the airway. Narrative:  Clinical indication: Neck pain and swelling. Axial CT scan of the neck obtained after intravenous injection of 100 cc of   Isovue-370, coronal and sagittal reconstructions. No prior. CT dose reduction   was achieved through the use of a standardized protocol tailored for this   examination and automatic exposure control for dose modulation . Qi Grace The thyroid mainly on the right appear slightly nonhomogeneous with small low   density nodule. No significant airway compromise. No adenopathy. Major vessels   are patent. Salivary glands appear symmetrical. There is no compromise of the   airway. Medical Decision Making   I am the first provider for this patient. I reviewed the vital signs, available nursing notes, past medical history, past surgical history, family history and social history. Vital Signs-Reviewed the patient's vital signs. Patient Vitals for the past 12 hrs:   Temp Pulse Resp BP SpO2   03/09/20 1950  99  111/63 96 %   03/09/20 1930  95  (!) 142/91 94 %   03/09/20 1920  (!) 105  158/89 96 %   03/09/20 1900  96  116/72 98 %   03/09/20 1845  95  117/78 94 %   03/09/20 1830  90  112/69 99 %   03/09/20 1815  99  119/73 96 %   03/09/20 1800    134/84    03/09/20 1715  (!) 103  (!) 133/96 98 %   03/09/20 1700  99  127/65 96 %   03/09/20 1645  95  122/77 94 %   03/09/20 1630  (!) 104  119/80 97 %   03/09/20 1608 98.5 °F (36.9 °C) (!) 118 18 (!) 140/91 97 %       Records Reviewed: Nursing Notes, Old Medical Records, Previous Radiology Studies and Previous Laboratory Studies    Provider Notes (Medical Decision Making):   Differential diagnosis include gastritis, enterocolitis, pancreatitis, appendicitis. ED Course:   Initial assessment performed.  The patients presenting problems have been discussed, and they are in agreement with the care plan formulated and outlined with them. I have encouraged them to ask questions as they arise throughout their visit. Discussed CT of abdomen and pelvis with the patient and her family member at the bedside. CT results show she has mild colitis and could explain her abdominal pain and discomfort. Patient was given a prescription for a 3-day supply of metronidazole to be combined with her 7-day prescription of metronidazole in addition to ciprofloxacin 10 days and Phenergan 25 mg tablets to be taken to help decrease nausea symptoms. Patient also takes Cymbalta for depression. Consulted with Dr. Israel Hernandez on this treatment method on combination use with Cymbalta and ciprofloxacin. Patient received a baseline EKG to rule out any underlying arrhythmias and to discuss risk of combination therapy. She advised that if the patient does not have any type of underlying arrhythmias or cardiac issues that ciprofloxacin in combination would be most appropriate. Patient has an allergy to penicillin therefore Augmentin would not be appropriate therapy. She received 1 g of Rocephin IV and 25 mg of Phenergan IV during visit. I discussed with the patient this combination therapy and advised her to follow-up with her primary care provider in 3 to 5 days and to monitor symptoms. Advised her to discontinue the medication and come back to the emergency department if she started experiencing chest pain, nausea and vomiting, shortness of breath, lightheaded or dizziness or any other worsening signs and symptoms. Otherwise she may follow-up with her primary care provider and or psychotherapist.  She should continue with all her medications. Critical Care Time: None    Disposition:  DISCHARGE NOTE:  8:39 PM  The pt is ready for discharge. The pt's signs, symptoms, diagnosis, and discharge instructions have been discussed and pt has conveyed their understanding.  The pt is to follow up as recommended or return to ER should their symptoms worsen. Plan has been discussed and pt is in agreement. Selma Baker NP  3/9/2020      PLAN:  1. Current Discharge Medication List      START taking these medications    Details   ciprofloxacin HCl (CIPRO) 500 mg tablet Take 1 Tab by mouth two (2) times a day for 10 days. Qty: 20 Tab, Refills: 0      metroNIDAZOLE (FLAGYL) 500 mg tablet Take 1 Tab by mouth two (2) times a day for 10 days. Qty: 20 Tab, Refills: 0      promethazine (PHENERGAN) 25 mg tablet Take 1 Tab by mouth every six (6) hours as needed for Nausea. Qty: 12 Tab, Refills: 0         CONTINUE these medications which have NOT CHANGED    Details   multivitamin with minerals (HAIR,SKIN AND NAILS PO) Hair,Skin and Nails      buPROPion SR (WELLBUTRIN SR) 100 mg SR tablet Wellbutrin  mg tablet, 12 hr sustained-release   prescribed by non [facility] md      galcanezumab-gnlm (EMGALITY PEN) 120 mg/mL injection 1 mL by SubCUTAneous route every thirty (30) days. Qty: 1 mL, Refills: 11      donepeziL (ARICEPT) 5 mg tablet Take 1 Tab by mouth daily. Indications: mild cognitve impairment  Qty: 30 Tab, Refills: 1      guaiFENesin-dextromethorphan SR (MUCINEX DM) 600-30 mg per tablet Take 1 Tab by mouth every twelve (12) hours as needed for Cough or Congestion. Qty: 60 Tab, Refills: 0    Associated Diagnoses: Upper respiratory tract infection, unspecified type      ascorbic acid, vitamin C, (VITAMIN C) 500 mg tablet Take 1,000 mg by mouth daily. omeprazole (PRILOSEC) 40 mg capsule Take 1 Cap by mouth daily. Qty: 90 Cap, Refills: 3      DULoxetine (CYMBALTA) 60 mg capsule Take 120 mg by mouth daily. ibuprofen (MOTRIN) 200 mg tablet Take 600 mg by mouth every six (6) hours as needed for Pain. traZODone (DESYREL) 100 mg tablet Take 100 mg by mouth nightly. Refills: 0      oxyCODONE-acetaminophen (PERCOCET) 5-325 mg per tablet Take 1 Tab by mouth every six (6) hours as needed for Pain.       cetirizine (ZYRTEC) 10 mg tablet Take 1 Tab by mouth nightly as needed for Allergies. Qty: 90 Tab, Refills: 3    Associated Diagnoses: Allergic rhinitis, unspecified seasonality, unspecified trigger      lamoTRIgine (LAMICTAL) 200 mg tablet Take 200 mg by mouth daily. Indications: Bipolar Depression      buPROPion XL (WELLBUTRIN XL) 300 mg XL tablet Take 300 mg by mouth every morning. cholecalciferol, vitamin D3, (VITAMIN D3) 2,000 unit tab Take 1 Tab by mouth daily. b complex vitamins (B COMPLEX 1) tablet Take 1 Tab by mouth daily. 2.   Follow-up Information     Follow up With Specialties Details Why Contact Info    [facility] EMERGENCY DEPT Emergency Medicine Go in 1 week As needed, If symptoms worsen [address]    Rukhsana Stevenson MD Family Practice Schedule an appointment as soon as possible for a visit in 3 days As needed, If symptoms worsen [address] [address] [phone]          Return to ED if worse     Diagnosis     Clinical Impression:   1. Noninfectious gastroenteritis, unspecified type    2. Neck pain    3. Acute cystitis without hematuria          Please note that this dictation was completed with [email or web address], the computer voice recognition software. Quite often unanticipated grammatical, syntax, homophones, and other interpretive errors are inadvertently transcribed by the computer software. Please disregards these errors. Please excuse any errors that have escaped final proofreading. This note will not be viewable in [address]

## 2020-03-11 LAB
BACTERIA SPEC CULT: NORMAL
CC UR VC: NORMAL
SERVICE CMNT-IMP: NORMAL

## 2020-03-13 ENCOUNTER — HOSPITAL ENCOUNTER (EMERGENCY)
Age: 35
Discharge: HOME | End: 2020-03-14
Attending: EMERGENCY MEDICINE | Admitting: EMERGENCY MEDICINE
Payer: MEDICAID

## 2020-03-13 DIAGNOSIS — R19.7 NAUSEA VOMITING AND DIARRHEA: Primary | ICD-10-CM

## 2020-03-13 DIAGNOSIS — R11.2 NAUSEA VOMITING AND DIARRHEA: Primary | ICD-10-CM

## 2020-03-13 LAB
ALBUMIN SERPL-MCNC: 3.6 G/DL (ref 3.5–5)
ALBUMIN/GLOB SERPL: 0.9 {RATIO} (ref 1.1–2.2)
ALP SERPL-CCNC: 85 U/L (ref 45–117)
ALT SERPL-CCNC: 62 U/L (ref 12–78)
ANION GAP SERPL CALC-SCNC: 5 MMOL/L (ref 5–15)
APPEARANCE UR: CLEAR
AST SERPL-CCNC: 52 U/L (ref 15–37)
BACTERIA URNS QL MICRO: ABNORMAL /HPF
BASOPHILS # BLD: 0.1 K/UL (ref 0–0.1)
BASOPHILS NFR BLD: 1 % (ref 0–1)
BILIRUB SERPL-MCNC: 0.5 MG/DL (ref 0.2–1)
BILIRUB UR QL CFM: NEGATIVE
BUN SERPL-MCNC: 5 MG/DL (ref 6–20)
BUN/CREAT SERPL: 5 (ref 12–20)
CALCIUM SERPL-MCNC: 9 MG/DL (ref 8.5–10.1)
CHLORIDE SERPL-SCNC: 102 MMOL/L (ref 97–108)
CO2 SERPL-SCNC: 29 MMOL/L (ref 21–32)
COLOR UR: YELLOW
CREAT SERPL-MCNC: 1.03 MG/DL (ref 0.55–1.02)
DIFFERENTIAL METHOD BLD: ABNORMAL
EOSINOPHIL # BLD: 0.2 K/UL (ref 0–0.4)
EOSINOPHIL NFR BLD: 2 % (ref 0–7)
EPITH CASTS URNS QL MICRO: ABNORMAL /LPF
ERYTHROCYTE [DISTWIDTH] IN BLOOD BY AUTOMATED COUNT: 14.3 % (ref 11.5–14.5)
ERYTHROCYTE [DISTWIDTH] IN BLOOD BY AUTOMATED COUNT: 14.4 % (ref 11.5–14.5)
GLOBULIN SER CALC-MCNC: 4.2 G/DL (ref 2–4)
GLUCOSE SERPL-MCNC: 111 MG/DL (ref 65–100)
GLUCOSE UR STRIP.AUTO-MCNC: NEGATIVE MG/DL
HCT VFR BLD AUTO: 40.2 % (ref 35–47)
HCT VFR BLD AUTO: 44.6 % (ref 35–47)
HGB BLD-MCNC: 13.6 G/DL (ref 11.5–16)
HGB BLD-MCNC: 15.2 G/DL (ref 11.5–16)
HGB UR QL STRIP: NEGATIVE
IMM GRANULOCYTES # BLD AUTO: 0.1 K/UL (ref 0–0.04)
IMM GRANULOCYTES NFR BLD AUTO: 1 % (ref 0–0.5)
KETONES UR QL STRIP.AUTO: 15 MG/DL
LEUKOCYTE ESTERASE UR QL STRIP.AUTO: ABNORMAL
LIPASE SERPL-CCNC: 207 U/L (ref 73–393)
LYMPHOCYTES # BLD: 2.3 K/UL (ref 0.8–3.5)
LYMPHOCYTES NFR BLD: 26 % (ref 12–49)
MCH RBC QN AUTO: 28 PG (ref 26–34)
MCH RBC QN AUTO: 28.1 PG (ref 26–34)
MCHC RBC AUTO-ENTMCNC: 33.8 G/DL (ref 30–36.5)
MCHC RBC AUTO-ENTMCNC: 34.1 G/DL (ref 30–36.5)
MCV RBC AUTO: 82.4 FL (ref 80–99)
MCV RBC AUTO: 82.7 FL (ref 80–99)
MONOCYTES # BLD: 0.7 K/UL (ref 0–1)
MONOCYTES NFR BLD: 8 % (ref 5–13)
NEUTS SEG # BLD: 5.4 K/UL (ref 1.8–8)
NEUTS SEG NFR BLD: 62 % (ref 32–75)
NITRITE UR QL STRIP.AUTO: POSITIVE
NRBC # BLD: 0 K/UL (ref 0–0.01)
NRBC # BLD: 0 K/UL (ref 0–0.01)
NRBC BLD-RTO: 0 PER 100 WBC
NRBC BLD-RTO: 0 PER 100 WBC
PH UR STRIP: 6 [PH] (ref 5–8)
PLATELET # BLD AUTO: 321 K/UL (ref 150–400)
PLATELET # BLD AUTO: 375 K/UL (ref 150–400)
PMV BLD AUTO: 9.7 FL (ref 8.9–12.9)
PMV BLD AUTO: 9.8 FL (ref 8.9–12.9)
POTASSIUM SERPL-SCNC: 3.1 MMOL/L (ref 3.5–5.1)
PROT SERPL-MCNC: 7.8 G/DL (ref 6.4–8.2)
PROT UR STRIP-MCNC: 100 MG/DL
RBC # BLD AUTO: 4.86 M/UL (ref 3.8–5.2)
RBC # BLD AUTO: 5.41 M/UL (ref 3.8–5.2)
RBC #/AREA URNS HPF: ABNORMAL /HPF (ref 0–5)
SODIUM SERPL-SCNC: 136 MMOL/L (ref 136–145)
SP GR UR REFRACTOMETRY: 1.03 (ref 1–1.03)
UROBILINOGEN UR QL STRIP.AUTO: 1 EU/DL (ref 0.2–1)
WBC # BLD AUTO: 8.7 K/UL (ref 3.6–11)
WBC # BLD AUTO: 9.6 K/UL (ref 3.6–11)
WBC URNS QL MICRO: ABNORMAL /HPF (ref 0–4)

## 2020-03-13 PROCEDURE — 74011250637 HC RX REV CODE- 250/637: Performed by: EMERGENCY MEDICINE

## 2020-03-13 PROCEDURE — 80053 COMPREHEN METABOLIC PANEL: CPT

## 2020-03-13 PROCEDURE — 81001 URINALYSIS AUTO W/SCOPE: CPT

## 2020-03-13 PROCEDURE — 99285 EMERGENCY DEPT VISIT HI MDM: CPT

## 2020-03-13 PROCEDURE — 83690 ASSAY OF LIPASE: CPT

## 2020-03-13 PROCEDURE — 96366 THER/PROPH/DIAG IV INF ADDON: CPT

## 2020-03-13 PROCEDURE — 96365 THER/PROPH/DIAG IV INF INIT: CPT

## 2020-03-13 PROCEDURE — 96368 THER/DIAG CONCURRENT INF: CPT

## 2020-03-13 PROCEDURE — 96361 HYDRATE IV INFUSION ADD-ON: CPT

## 2020-03-13 PROCEDURE — 74011250636 HC RX REV CODE- 250/636: Performed by: EMERGENCY MEDICINE

## 2020-03-13 PROCEDURE — 85025 COMPLETE CBC W/AUTO DIFF WBC: CPT

## 2020-03-13 PROCEDURE — 96375 TX/PRO/DX INJ NEW DRUG ADDON: CPT

## 2020-03-13 PROCEDURE — 36415 COLL VENOUS BLD VENIPUNCTURE: CPT

## 2020-03-13 PROCEDURE — 85027 COMPLETE CBC AUTOMATED: CPT

## 2020-03-13 RX ORDER — MAGNESIUM SULFATE 1 G/100ML
1 INJECTION INTRAVENOUS ONCE
Status: COMPLETED | OUTPATIENT
Start: 2020-03-13 | End: 2020-03-13

## 2020-03-13 RX ORDER — ONDANSETRON 2 MG/ML
4 INJECTION INTRAMUSCULAR; INTRAVENOUS
Status: COMPLETED | OUTPATIENT
Start: 2020-03-13 | End: 2020-03-13

## 2020-03-13 RX ORDER — POTASSIUM CHLORIDE 7.45 MG/ML
10 INJECTION INTRAVENOUS
Status: COMPLETED | OUTPATIENT
Start: 2020-03-13 | End: 2020-03-13

## 2020-03-13 RX ORDER — POTASSIUM CHLORIDE 20 MEQ/1
40 TABLET, EXTENDED RELEASE ORAL
Status: COMPLETED | OUTPATIENT
Start: 2020-03-13 | End: 2020-03-13

## 2020-03-13 RX ADMIN — POTASSIUM CHLORIDE 10 MEQ: 10 INJECTION, SOLUTION INTRAVENOUS at 21:22

## 2020-03-13 RX ADMIN — POTASSIUM CHLORIDE 40 MEQ: 1500 TABLET, EXTENDED RELEASE ORAL at 21:23

## 2020-03-13 RX ADMIN — ONDANSETRON 4 MG: 2 INJECTION INTRAMUSCULAR; INTRAVENOUS at 19:38

## 2020-03-13 RX ADMIN — SODIUM CHLORIDE 1000 ML: 900 INJECTION, SOLUTION INTRAVENOUS at 19:38

## 2020-03-13 RX ADMIN — SODIUM CHLORIDE 1000 ML: 900 INJECTION, SOLUTION INTRAVENOUS at 23:07

## 2020-03-13 RX ADMIN — MAGNESIUM SULFATE HEPTAHYDRATE 1 G: 1 INJECTION, SOLUTION INTRAVENOUS at 21:22

## 2020-03-13 RX ADMIN — SODIUM CHLORIDE 1000 ML: 900 INJECTION, SOLUTION INTRAVENOUS at 21:23

## 2020-03-13 NOTE — ED PROVIDER NOTES
EMERGENCY DEPARTMENT HISTORY AND PHYSICAL EXAM      Date: 3/13/2020  Patient Name: Roy Sandifer    History of Presenting Illness     Chief Complaint   Patient presents with    Vomiting     pt was seen here earlier this week. pt said after vomiting got a little better. yesterday pt says she was throwing up blood. called GI today and referred her here. pt states she is unable to keep anything down. History Provided By: Patient    HPI: Roy Sandifer, 37 y.o. female presents to the ED with cc of nausea, vomiting, diarrhea. Symptoms have been present for the past 4 to 5 days. Patient was seen in the ED 3/9/2019 with similar symptoms she did have abdominal pain at that time and CT scan demonstrated mild colitis. She was also found to have urinary tract infection. She was started on Cipro and Flagyl and has been taking these medications. She has been taking Phenergan and Zofran as needed for nausea but continues to have nausea and reports of 2 episodes of emesis today. She denies any abdominal pain except for generalized soreness when she is vomiting. Denies any fevers, chills, sick contacts. She does feel that the abdominal pain has improved. There are no other complaints, changes, or physical findings at this time. PCP: Cleve Alejo MD    No current facility-administered medications on file prior to encounter. Current Outpatient Medications on File Prior to Encounter   Medication Sig Dispense Refill    ciprofloxacin HCl (CIPRO) 500 mg tablet Take 1 Tab by mouth two (2) times a day for 10 days. 20 Tab 0    metroNIDAZOLE (FLAGYL) 500 mg tablet Take 1 Tab by mouth two (2) times a day for 10 days. 20 Tab 0    promethazine (PHENERGAN) 25 mg tablet Take 1 Tab by mouth every six (6) hours as needed for Nausea.  12 Tab 0    multivitamin with minerals (HAIR,SKIN AND NAILS PO) Hair,Skin and Nails      buPROPion SR (WELLBUTRIN SR) 100 mg SR tablet Wellbutrin  mg tablet, 12 hr sustained-release   prescribed by non [facility] md      galcanezumab-gnlm (EMGALITY PEN) 120 mg/mL injection 1 mL by SubCUTAneous route every thirty (30) days. 1 mL 11    donepeziL (ARICEPT) 5 mg tablet Take 1 Tab by mouth daily. Indications: mild cognitve impairment 30 Tab 1    guaiFENesin-dextromethorphan SR (MUCINEX DM) 600-30 mg per tablet Take 1 Tab by mouth every twelve (12) hours as needed for Cough or Congestion. 60 Tab 0    ascorbic acid, vitamin C, (VITAMIN C) 500 mg tablet Take 1,000 mg by mouth daily.  omeprazole (PRILOSEC) 40 mg capsule Take 1 Cap by mouth daily. 90 Cap 3    DULoxetine (CYMBALTA) 60 mg capsule Take 120 mg by mouth daily.  ibuprofen (MOTRIN) 200 mg tablet Take 600 mg by mouth every six (6) hours as needed for Pain.  traZODone (DESYREL) 100 mg tablet Take 100 mg by mouth nightly. 0    oxyCODONE-acetaminophen (PERCOCET) 5-325 mg per tablet Take 1 Tab by mouth every six (6) hours as needed for Pain.  cetirizine (ZYRTEC) 10 mg tablet Take 1 Tab by mouth nightly as needed for Allergies. 90 Tab 3    lamoTRIgine (LAMICTAL) 200 mg tablet Take 200 mg by mouth daily. Indications: Bipolar Depression      buPROPion XL (WELLBUTRIN XL) 300 mg XL tablet Take 300 mg by mouth every morning.  cholecalciferol, vitamin D3, (VITAMIN D3) 2,000 unit tab Take 1 Tab by mouth daily.  b complex vitamins (B COMPLEX 1) tablet Take 1 Tab by mouth daily.          Past History     Past Medical History:  Past Medical History:   Diagnosis Date    Anxiety     Arthritis     unsure - knees, lower back, fingers and toes    At risk for sleep apnea 09/05/2019    KATIA score 4 on PAT assessment    Bipolar 2 disorder (HCC)     Chest pain     Childhood asthma     Chronic pain     right abdomen,shooting pain groins    Depression     Endometriosis     Female bladder prolapse     GERD (gastroesophageal reflux disease)     MCI (mild cognitive impairment) with memory loss     Migraines     Prediabetes     Prolapse of anterior lip of cervix     PTSD (post-traumatic stress disorder)     borderline personality traits    Rectal prolapse        Past Surgical History:  Past Surgical History:   Procedure Laterality Date    COLONOSCOPY N/A 12/2/2019    COLONOSCOPY (LATEX ALLERGY) performed by Chris Demarco MD at [facility]    HX COLONOSCOPY      HX ENDOSCOPY      HX GYN      vaginal birth x2    HX HERNIA REPAIR  07/09/2015    Laparoscopic Incisional hernia repair with mesh.     HX LAP CHOLECYSTECTOMY  7/7/2014    HX MYRINGOTOMY Bilateral     childhood    HX PARTIAL HYSTERECTOMY  04/25/2019    laparoscopic, left ovaries       Family History:  Family History   Problem Relation Age of Onset    Other Mother         fibromyalgia, IBS    Diabetes Mother         Pre-DM    High Cholesterol Mother     Hypertension Mother     Liver Disease Mother         fatty liver    Cancer Mother         uterine     Heart Disease Mother         CHF    Arthritis-osteo Mother     Diabetes Father     Hypertension Father     Asthma Brother     Diabetes Paternal Aunt     Hypertension Paternal Aunt     Diabetes Paternal Uncle     Hypertension Paternal Uncle     Cancer Maternal Grandfather         esoph    Hypertension Maternal Grandfather     Stroke Maternal Grandfather     Heart Disease Paternal Grandmother     Diabetes Paternal Grandfather     Heart Attack Paternal Grandfather     Stroke Paternal Grandfather     Hypertension Paternal Grandfather     Heart Disease Maternal Grandmother     Other Sister         MTHFR (clotting D/O)       Social History:  Social History     Tobacco Use    Smoking status: Current Every Day Smoker     Packs/day: 0.50     Years: 10.00     Pack years: 5.00     Start date: 3/1/2004    Smokeless tobacco: Never Used   Substance Use Topics    Alcohol use: Yes     Comment: rarely, not weeklt    Drug use: Not Currently     Types: Marijuana     Comment: 11/22/19, last use >1 year       Allergies: Allergies   Allergen Reactions    Latex Rash, Itching and Swelling    Pcn [Penicillins] Other (comments)     Pt states as a child she had a reaction but does not remember specific reaction. Pt states\"mother say I stopped breathing\". Review of Systems   Review of Systems   Constitutional: Positive for appetite change. Negative for chills and fever. HENT: Negative. Eyes: Negative for visual disturbance. Respiratory: Negative for cough and shortness of breath. Cardiovascular: Negative for chest pain and leg swelling. Gastrointestinal: Positive for abdominal pain, diarrhea, nausea and vomiting. Genitourinary: Positive for dysuria. Musculoskeletal: Negative for back pain and gait problem. Skin: Negative for color change and rash. Neurological: Negative for dizziness, weakness, light-headedness and headaches. Hematological: Does not bruise/bleed easily. All other systems reviewed and are negative. Physical Exam   Physical Exam  Constitutional:       General: She is not in acute distress. Appearance: Normal appearance. She is obese. She is not ill-appearing, toxic-appearing or diaphoretic. HENT:      Head: Normocephalic and atraumatic. Cardiovascular:      Rate and Rhythm: Regular rhythm. Tachycardia present. Heart sounds: Normal heart sounds. No murmur. Pulmonary:      Effort: Pulmonary effort is normal. No respiratory distress. Breath sounds: Normal breath sounds. No wheezing. Abdominal:      Palpations: Abdomen is soft. Tenderness: There is no abdominal tenderness. There is no right CVA tenderness, left CVA tenderness, guarding or rebound. Skin:     General: Skin is warm and dry. Findings: No erythema or rash. Neurological:      General: No focal deficit present. Mental Status: She is alert and oriented to person, place, and time.          Diagnostic Study Results     Labs -   No results found for this or any previous visit (from the past 12 hour(s)). Radiologic Studies -   No orders to display     CT Results  (Last 48 hours)    None        CXR Results  (Last 48 hours)    None          Medical Decision Making   I am the first provider for this patient. I reviewed the vital signs, available nursing notes, past medical history, past surgical history, family history and social history. Vital Signs-Reviewed the patient's vital signs. Visit Vitals  /80   Pulse 91   Temp 98.7 °F (37.1 °C)   Resp 17   Ht 5' 3\" (1.6 m)   Wt 106.1 kg (233 lb 14.5 oz)   LMP 04/25/2019   SpO2 96%   BMI 41.43 kg/m²       Records Reviewed: Nursing Notes, Old Medical Records, Previous Radiology Studies and Previous Laboratory Studies    Provider Notes (Medical Decision Making):   47-year-old female here with nausea, vomiting, diarrhea. On examination patient in no acute distress. Appears clinically well and nontoxic. No active emesis in the ED. She is afebrile and vital signs stable through entire ED stay. Initially noted to be tachycardic but this improved after administration of IV fluids. On reassessment patient is feeling much improved. She feels much better after administration of Zofran IV and multiple bags of IV fluids. She does feel comfortable tolerating p.o. at this time. Lower demonstrates no significant leukocytosis, lipase within normal limits as is her metabolic panel. She does continue to demonstrate urinalysis consistent with infection however she is on appropriate regimen and will await urine culture before changing Cipro and Flagyl which should be able to treat both intra-abdominal colitis and urinary tract infection. Patient does feel comfortable with discharge home. Given strict return ED precautions and encouraged to follow-up closely with PCP. ED Course:   Initial assessment performed.  The patients presenting problems have been discussed, and they are in agreement with the care plan formulated and outlined with them. I have encouraged them to ask questions as they arise throughout their visit. Discharge Note:  The patient has been re-evaluated and is ready for discharge. Reviewed available results with patient. Counseled patient on diagnosis and care plan. Patient has expressed understanding, and all questions have been answered. Patient agrees with plan and agrees to follow up as recommended, or to return to the ED if their symptoms worsen. Discharge instructions have been provided and explained to the patient, along with reasons to return to the ED. Disposition:  home    DISCHARGE PLAN:  1. Discharge Medication List as of 3/13/2020 10:26 PM        2. Follow-up Information     Follow up With Specialties Details Why Contact Info    Rukhsana Stevenson MD Family Practice Schedule an appointment as soon as possible for a visit in 3 days If symptoms worsen, As needed [address] [place] [address]  [phone]          3. Return to ED if worse     Diagnosis     Clinical Impression:   1. Nausea vomiting and diarrhea        Attestations:    Yusra Iqbal MD    Please note that this dictation was completed with [employer], the computer voice recognition software. Quite often unanticipated grammatical, syntax, homophones, and other interpretive errors are inadvertently transcribed by the computer software. Please disregard these errors. Please excuse any errors that have escaped final proofreading. Thank you.

## 2020-03-14 VITALS
OXYGEN SATURATION: 96 % | SYSTOLIC BLOOD PRESSURE: 111 MMHG | TEMPERATURE: 98.7 F | HEIGHT: 63 IN | WEIGHT: 233.91 LBS | RESPIRATION RATE: 17 BRPM | DIASTOLIC BLOOD PRESSURE: 80 MMHG | BODY MASS INDEX: 41.45 KG/M2 | HEART RATE: 91 BPM

## 2020-03-14 NOTE — ED NOTES
Dr. Keyon Ortiz reviewed discharge instructions with the patient. The patient verbalized understanding. All questions and concerns were addressed. The patient declined a wheelchair and is discharged ambulatory in the care of family members with instructions and prescriptions in hand. Pt is alert and oriented x 4. Respirations are clear and unlabored.

## 2020-03-14 NOTE — DISCHARGE INSTRUCTIONS
You were evaluated in the emergency department for nausea, vomiting, and diarrhea. Your examination was reassuring as was your work-up including blood work and urinalysis. You do have a persistent UTI, please continue taking the full course of Cipro and Flagyl. It will be important for you to follow-up with your primary care physician in 3-5 days. If you develop worsening symptoms such as unable to eat/drink, or worsening fever or abdominal pain, please return to the emergency department immediately.

## 2020-03-25 ENCOUNTER — TELEPHONE (OUTPATIENT)
Dept: RHEUMATOLOGY | Age: 35
End: 2020-03-25

## 2020-03-25 NOTE — TELEPHONE ENCOUNTER
----- Message from Desirae Prakash MD sent at 3/25/2020  8:57 AM EDT -----  Regarding: RE: dr Rona Reynaga telephone  Blood work results were normal.   ----- Message -----  From: Khanh Caruso LPN  Sent: 2/45/8693   3:48 PM EDT  To: Desirae Prakash MD  Subject: FW: dr Rona Reynaga telephone                           ----- Message -----  From: Maribel Claire RN  Sent: 3/24/2020   3:02 PM EDT  To: Margo Craig LPN  Subject: FW: dr Rona Reynaga telephone                           ----- Message -----  From: Tj Hodges  Sent: 3/24/2020   2:57 PM EDT  To: [facility] Nurse Pool  Subject: dr Rona Reynaga telephone                             General Message/Vendor Calls    Caller's first and last name:      Reason for call: she would like her lab results       Callback required yes/no and why: yes      Best contact number(s): [phone]      Details to clarify the request:      Uday Johnson

## 2020-04-03 ENCOUNTER — TELEPHONE (OUTPATIENT)
Dept: NEUROLOGY | Age: 35
End: 2020-04-03

## 2020-04-03 ENCOUNTER — TELEPHONE (OUTPATIENT)
Dept: RHEUMATOLOGY | Age: 35
End: 2020-04-03

## 2020-04-03 NOTE — TELEPHONE ENCOUNTER
Returned patient's call, ID verified times 2. Patient stated she would like a copy of her last few clinical records. Patient made aware to come to the office to fill out a medical release form. Patient verbalized understanding.

## 2020-04-03 NOTE — TELEPHONE ENCOUNTER
----- Message from Deann Rick sent at 4/2/2020  4:06 PM EDT -----  Regarding: KATHLEEN Flores/Telephone  Pt would like a call back regarding getting a file with all her medications, tests and reason for her being seen.  Contact is [phone]

## 2020-04-03 NOTE — TELEPHONE ENCOUNTER
----- Message from Erick Hurtadowilma sent at 4/3/2020 12:39 PM EDT -----  Regarding: FORTINO/TELEPHONE  Contact: [phone]  Caller's first and last name:  Effie Pruitt  Reason for call:  Medical records and visit statement request  Callback required yes/no and why: Yes  Best contact number(s): [phone]  Details to clarify the request: Patient requesting medical records and a visit statement from 02/26/20

## 2020-04-06 ENCOUNTER — PATIENT MESSAGE (OUTPATIENT)
Dept: RHEUMATOLOGY | Age: 35
End: 2020-04-06

## 2020-04-07 ENCOUNTER — VIRTUAL VISIT (OUTPATIENT)
Dept: NEUROLOGY | Age: 35
End: 2020-04-07

## 2020-04-07 DIAGNOSIS — F43.10 PTSD (POST-TRAUMATIC STRESS DISORDER): ICD-10-CM

## 2020-04-07 DIAGNOSIS — F33.2 SEVERE EPISODE OF RECURRENT MAJOR DEPRESSIVE DISORDER, WITHOUT PSYCHOTIC FEATURES (HCC): ICD-10-CM

## 2020-04-07 DIAGNOSIS — G44.209 TENSION VASCULAR HEADACHE: ICD-10-CM

## 2020-04-07 DIAGNOSIS — G43.019 HEADACHE, COMMON MIGRAINE, INTRACTABLE: Primary | ICD-10-CM

## 2020-04-07 DIAGNOSIS — G31.84 MILD COGNITIVE IMPAIRMENT WITH MEMORY LOSS: ICD-10-CM

## 2020-04-07 RX ORDER — ONDANSETRON 4 MG/1
4 TABLET, ORALLY DISINTEGRATING ORAL
COMMUNITY
Start: 2020-03-08 | End: 2020-12-11 | Stop reason: ALTCHOICE

## 2020-04-07 RX ORDER — GALCANEZUMAB 120 MG/ML
120 INJECTION, SOLUTION SUBCUTANEOUS
Qty: 1 ML | Refills: 11 | Status: CANCELLED | OUTPATIENT
Start: 2020-04-07

## 2020-04-07 RX ORDER — RIZATRIPTAN BENZOATE 10 MG/1
10 TABLET ORAL
COMMUNITY
End: 2020-04-07 | Stop reason: SDUPTHER

## 2020-04-07 RX ORDER — HYDROXYZINE 50 MG/1
TABLET, FILM COATED ORAL
COMMUNITY
Start: 2020-03-25 | End: 2020-08-12 | Stop reason: ALTCHOICE

## 2020-04-07 RX ORDER — DONEPEZIL HYDROCHLORIDE 5 MG/1
5 TABLET, FILM COATED ORAL DAILY
Qty: 90 TAB | Refills: 3 | Status: SHIPPED | OUTPATIENT
Start: 2020-04-07 | End: 2020-07-24 | Stop reason: DRUGHIGH

## 2020-04-07 RX ORDER — RIZATRIPTAN BENZOATE 10 MG/1
10 TABLET ORAL
Qty: 9 TAB | Refills: 5 | Status: SHIPPED | OUTPATIENT
Start: 2020-04-07 | End: 2020-10-29

## 2020-04-07 NOTE — PATIENT INSTRUCTIONS
You need to put reminders on your phone to alarm on the days that you are to take the [facility] and also set a reminder about 4 days before to call the pharmacy to be able to  your medication. You have refills on this through January 2021 Regarding your depression and anxiety: Continue to follow-up with mental health regarding the management of your medication or worsening of symptoms. Also continue to focus on mindfulness as we have talked about and when your anxiety gets bad definitely open up 1 of those mindfulness meditation apps or white noise to help calm you down you could do this behavioral intervention in addition to your medication I want she did spend 10 minutes outside in the sunshine every day even if you do not feel like it Will check in in a month's time to make sure you got back on the [facility] I have sent in a prescription for your memory medicine notice Aricept or donepezil 5 mg along with the rizatriptan also known as Maxalt Continue to be mindful of CDC recommendations related to COVID 19 MINDFULNESS and WELLNESS 
focus on several things: 
Mindfulness: be in the moment Restfulness: Look at apps for meditation and white noise to help you relax and to sleep better Mild exercise: Try to walk 10 minutes 3 times a week. At those times listen to music that may be restful for you or relaxing use your apps or perhaps podcasts Consider adult coloring books to help reduce anxiety and improve focus If appropriate, work with a mental health team closely to help walk you through the issues that you are struggling with to get to a better place Be excepting of where you are right now instead of trying to fight to where you used to be. Focus on who you want to become going forward. Pat yourself  on the back each day you get out of bed At the end of the day list all the things that you did that was positive for yourself even if it is nothing more than getting out of bed that day Continue to follow the CDC recommendations and guidelines. This may be changing on a daily or hourly basis. This can be found on the web at:  DotProte[employer]gl Basic standard of care includes the following: 
 
Wash hands frequently using soap and water or hand  with at least 60% isopropyl alcohol content Social distancing of at least 6 feet If you think you have been exposed please self quarantine for 14 days as symptoms will begin anywhere between 2 to 14 days post exposure It is possible you could have been exposed to someone who is asymptomatic or not showing signs and symptoms at any point in time. Subsequently if you start developing any type of respiratory symptoms please self quarantine for up to 14 days. If you are showing signs or symptoms of any type of cough, cold, flu or even allergies please self quarantine for up to 14 days Only engage in essential functions only. This would include going out for food and living essentials. Please do not shell. The supply chain has not been broken or cut off you will be able to go out again and get more products as you use them. Avoid social gatherings of any size unless deemed essential.  This includes unfortunately, going to [employer] or visiting with family or friends. It is reasonable to go outside and walk your dog. But is recommended to stay close to your residence. It is still considered reasonable to go outside to exercise. But again solo if possible especially if running or cycling. It is still considered reasonable to go outside and play with your children but again close to your personal residence and without social gathering. Do not linger in restaurants or bars. As of now drive-through and  are the recommendations. It is best to \"sit and stay\" meaning stay where you are and do not go out unless essential and do not travel unless essential 
 
Recommendations will continue to change over time so please stay up-to-date with the latest CDC recommendations site is listed above

## 2020-04-07 NOTE — PROGRESS NOTES
Natalie Dolan is a 37 y.o. female who presents today for the following:  Chief Complaint   Patient presents with    Headache    Medication Refill     Natalie Dolan is a 37 y.o. female who was seen by synchronous (real-time) audio-video technology on 4/7/2020. HPI  Historical Data    This is a patient previously seen in our practice. Neuro diagnosis includes chronic headaches mild cognitive impairment, low back pain with radiculopathy affecting the right and left, bilateral carotid artery stenosis, Transient visual disturbance of both eyes     Other significant diagnoses include depression posttraumatic stress disorder     She recently had neuropsychiatric testing completed April 8, 2019 and was found to have PTSD severe depressive disorder without psychosis severe anxiety and somatic sensation disorder along with her mild cognitive impairment and borderline personality traits. Other comments by on the neuropsych evaluation by the neuropsychologist include the following:  She is young for dementia and there is no neurologic correlate which would explain the type of cognitive deficits she is showing. At the same time, the CURRENT profile is not consistent with a purely psychiatric issue. As such, the diagnosis here is that of Mild Cognitive Impairment with Memory loss exacerbated by severe and complex psychiatric distress      She needs intensive psychiatric intervention to include a review of her medication management for anxiety and depression as well as active and continued engagement in intensive psychotherapy. Consider EMDR. Consider TMS if medication by itself has not proven helpful for her - she would be an appropriate candidate for same. I do believe medication for memory should be considered, as there is no other definite explanation for her impaired recognition recall, which often clears the difference between organic and functional memory loss.   Any metabolic cause needs to be ruled out, and sleep issues may be a factor as well. This is concerning and very atypical changes over time, with improvement in some scores and declines in others. She remains capable of making decisions for herself., but needs reminders for meals, medications, and finances. I see no evidence of malingering here. I am concerned about driving safety issues as well, so this should be formally evaluated if deemed medically appropriate. My hope is to see her again once mood improves to better clarify, because my worry is that if cognitive problems continue to worsen despite positive psychiatric interventions, then she has dementia and the prognosis would be quite poor at that point. Stay active mentally, physically, and socially. Consult with OT and Speech, Neurocognitive Rehab. We now have baseline and updated data on her. Follow up as noted. Clinical correlation is, of course, indicated. Interim Data:   Patient was started on Emgality at January 2020  Patient is headaches have improved. She is getting small mild headaches for which she takes ibuprofen and they go away happening several times a week  She had one severe migraine that lasted 2 days that was incapacitating. Did not get emgality in March as pt forgot to  her prescription. She thought it was on auto renewal and just realized that she missed her March dosing. She called her pharmacy yesterday they are backordered on the [place] but should get it today or tomorrow. When the Emgality was in her system she had infrequent headaches and migraines but now they are coming more consistently again. She expects to  the medication by the end of today or first thing in the morning    She continues on 5 mg Aricept which was started January 2020. She denies side effects. No major complaints of cognitive difficulties at today's office visit.   She is doing some brain Buster activities which she finds helpful    Depression and mental health issues: Meds have been adjusted through mental health. She feels as though they are helping. She is having some good days but still many bad days. She is working on better interpersonal skills with her children. She continues to have complaints of anxiety and states mental health prescribed her hydroxyzine which is not helping and is wondering what else she can do    No direct complaints of low back pain today. ROS  Other than what is stated above under HPI there is no new or changing issues related to the following:  Constitutional: No recent weight change, fever,fatigue, sleep difficulties, or loss of appetite. ENT/Mouth:  No hearing loss, ringing in the ears, chronic sinus problem, nose bleeds sore throat, voice change, hoarseness, swollen glands in neck, or difficulties with chewing and swallowing. Cardiovascular:  No chest pain/angina pectoris, palpitations,swelling of feet/ankles/hands, or calf pain while walking. Respiratory: No chronic or frequent coughs, spitting up blood, shortness of breath, asthma, or wheezing. Gastrointestinal: No abdominal pain, heartburn, nausea, vomiting, constipation, frequent diarrhea, rectal bleeding, or blood in stool. Genitourinary: No frequent urination, burning or painful urination, blood in urine, incontinence or dribbling. Musculoskeletal:   No joint pain, stiffness/swelling, weakness of muscles, muscle pain/cramp, or back pain. Integument:   No rash/itching, change in skin color, change in hair/nails, or change in color/size of moles. Neurological:  No dizziness/vertigo, loss of consciousness, numbness/tingling sensation, tremors, weakness in limbs, difficulty with balance, frequent or recurring headaches, memory loss or confusion. Psychiatric:   No nervousness, depression, hallucinations, paranoia or suspiciousness. Endocrine: No excessive thirst or urination, heat or cold intolerance.    Hematologic/Lymphatic: No bleeding/bruising tendency, phlebitis, or past transfusion. EXAMINATION: Within the limitation in context of virtual visits  Visit Vitals  LMP 04/25/2019          General appearance: Patient is well-developed and well-nourished in no apparent distress and well groomed. Psych/mental health:  Affect: Rather flat    PHQ  3 most recent PHQ Screens 5/26/2015   Little interest or pleasure in doing things Not at all   Feeling down, depressed, irritable, or hopeless Not at all   Total Score PHQ 2 0       HEENT: Normocephalic, without evidence of trauma  Full range of motion     Cardiovascular: Not tested    Respiratory: No dyspnea on exertion noted, normal effort on casual observation    Musculoskeletal: No evidence of significant bony deformities or spinal curvature    Integumentary:  No obvious bruising, lacerations or discoloaration on casual observation. Neurological Examination:   Mental Status:        MMSE  No flowsheet data found. Formal testing was not completed    there was nothing concerning on general observation and discussion.    Alert oriented and appropriate to general conversation  Normal processing on general observation  Followed conversation and responded seemingly appropriate throughout the office visit  No word finding difficulties noted on casual observation  Able to follow directions without difficulty     Cranial Nerves:    PERRLA,   EOMs intact gaze is conjugate  No nystagmus is appreciated  Facial motor intact bilaterally  Hearing intact to conversation  Voice with normal projection, no evidence of secretion pooling  Shoulder shrug intact bilaterally  No tongue deviation appreciated     Motor:   Normal tone and bulk  Fine dexterity intact bilaterally  No tremor appreciated on today's exam  No abnormal movements appreciated on today's exam  Patient seemingly comfortable sitting in her chair during telehealth visit; no evidence of fidgeting or seeming uncomfortable    Sensation: Not applicable    Coordination/Cerebellar:   Grossly intact    Gait: Ambulates independently    Fall risk assessment  No flowsheet data found. Reflexes: Not tested    ASSESSMENT AND PLAN  Headaches and migraines: Improved on Emgality. She is having less severe headaches less time in bed related to headaches and although she still having mild headaches several times a week they abort with 1 dose of over-the-counter medications. However patient did forget to take her dose in March and her headaches have escalated again. We talked about ways to help improve compliance especially in the face of her severe depression. I have asked her to please set alarms on her smart phone and not just to rely on the pharmacy    Maxalt has been helpful in abortive therapy for the more severe migraines and she continues with over-the-counter ibuprofen intermittently for mild to moderate      Cognitive impairment: As supported by neuropsych testing as discussed under historical data and HPI 1 is invited to review that for detail. She is presently on Aricept 5 mg daily she is doing brain Buster activities. There is less complaints regarding this. Me may want to increase her to 10 mg at next office visit but for today regularly run 5 mg. She has had multiple med adjustments from mental health perspective so I do not want to interfere with those adjustments presently. Aricept 5 mg was renewed presently. We will increase her to 10 when things are stable from a public health perspective and I can see her in person for better assessment    Severe depression/PTSD: Patient continues to be followed by mental health recent med medication adjustments. Patient is complaining about increased anxiety and I have asked her to talk with her mental health team regarding this and any medication adjustment.   We did talk about smart phone apps for meditation, mindfulness, white noise, stress reduction and have asked her to please use these when she is feeling most anxious     I have again discussed mindfulness and stress reduction and gave her written information as well. I will see her back in 1 month sooner if there are problems issues or concerns. ICD-10-CM ICD-9-CM    1. Headache, common migraine, intractable G43.019 346.11    2. PTSD (post-traumatic stress disorder) F43.10 309.81    3. Severe episode of recurrent major depressive disorder, without psychotic features ([facility] [address] F33.2 296.33    4. Mild cognitive impairment with memory loss G31.84 331.83      780.93    5. Tension vascular headache G44.209 307.81            Additional pertinent medical data reviewed at today's office visit:    Flowsheets    Allergies   Allergen Reactions    Latex Rash, Itching and Swelling    Pcn [Penicillins] Other (comments)     Pt states as a child she had a reaction but does not remember specific reaction. Pt states\"mother say I stopped breathing\". Current Outpatient Medications   Medication Sig    rizatriptan (MAXALT) 10 mg tablet Take 10 mg by mouth. 1 tablet as needed and may repeat in 2 hours if needed    hydrOXYzine HCL (ATARAX) 50 mg tablet TK 1/2 TO 1 T PO BID PRA    ondansetron (ZOFRAN ODT) 4 mg disintegrating tablet DIS ONE T PO Q 8 H PRN NV    promethazine (PHENERGAN) 25 mg tablet Take 1 Tab by mouth every six (6) hours as needed for Nausea.  galcanezumab-gnlm (EMGALITY PEN) 120 mg/mL injection 1 mL by SubCUTAneous route every thirty (30) days.  donepeziL (ARICEPT) 5 mg tablet Take 1 Tab by mouth daily. Indications: mild cognitve impairment    ascorbic acid, vitamin C, (VITAMIN C) 500 mg tablet Take 1,000 mg by mouth daily.  omeprazole (PRILOSEC) 40 mg capsule Take 1 Cap by mouth daily.  DULoxetine (CYMBALTA) 60 mg capsule Take 120 mg by mouth daily.  ibuprofen (MOTRIN) 200 mg tablet Take 600 mg by mouth every six (6) hours as needed for Pain.  traZODone (DESYREL) 100 mg tablet Take 100 mg by mouth nightly.     oxyCODONE-acetaminophen (PERCOCET) 5-325 mg per tablet Take 1 Tab by mouth every six (6) hours as needed for Pain.  cetirizine (ZYRTEC) 10 mg tablet Take 1 Tab by mouth nightly as needed for Allergies.  lamoTRIgine (LAMICTAL) 200 mg tablet Take 200 mg by mouth daily. Indications: Bipolar Depression    buPROPion XL (WELLBUTRIN XL) 300 mg XL tablet Take 300 mg by mouth every morning.  cholecalciferol, vitamin D3, (VITAMIN D3) 2,000 unit tab Take 1 Tab by mouth daily.  b complex vitamins (B COMPLEX 1) tablet Take 1 Tab by mouth daily.  guaiFENesin-dextromethorphan SR (MUCINEX DM) 600-30 mg per tablet Take 1 Tab by mouth every twelve (12) hours as needed for Cough or Congestion. No current facility-administered medications for this visit. Past Medical History:   Diagnosis Date    Anxiety     Arthritis     unsure - knees, lower back, fingers and toes    At risk for sleep apnea 09/05/2019    KATIA score 4 on PAT assessment    Bipolar 2 disorder (HCC)     Chest pain     Childhood asthma     Chronic pain     right abdomen,shooting pain groins    Depression     Endometriosis     Female bladder prolapse     Fibromyalgia     GERD (gastroesophageal reflux disease)     MCI (mild cognitive impairment) with memory loss     Migraines     Prediabetes     Prolapse of anterior lip of cervix     PTSD (post-traumatic stress disorder)     borderline personality traits    Rectal prolapse        Past Surgical History:   Procedure Laterality Date    COLONOSCOPY N/A 12/2/2019    COLONOSCOPY (LATEX ALLERGY) performed by Debra [facility] AM[facility]    HX COLONOSCOPY      HX ENDOSCOPY      HX GYN      vaginal birth x2    HX HERNIA REPAIR  07/09/2015    Laparoscopic Incisional hernia repair with mesh.     HX LAP CHOLECYSTECTOMY  7/7/2014    HX MYRINGOTOMY Bilateral     childhood    HX PARTIAL HYSTERECTOMY  04/25/2019    laparoscopic, left ovaries       Social History Socioeconomic History    Marital status: SINGLE     Spouse name: Not on file    Number of children: 2    Years of education: Not on file    Highest education level: Not on file   Occupational History     Employer: NOT EMPLOYED     Comment: work at home    Tobacco Use    Smoking status: Current Every Day Smoker     Packs/day: 0.50     Years: 10.00     Pack years: 5.00     Start date: 3/1/2004    Smokeless tobacco: Never Used   Substance and Sexual Activity    Alcohol use: Yes     Comment: rarely, not weeklt    Drug use: Not Currently     Types: Marijuana     Comment: 11/22/19, last use >1 year    Sexual activity: Yes     Partners: Male     Birth control/protection: None     Comment: single    Social History Narrative    Unemployed currently       Family History   Problem Relation Age of Onset    Other Mother         fibromyalgia, IBS    Diabetes Mother         Pre-DM    High Cholesterol Mother     Hypertension Mother     Liver Disease Mother         fatty liver    Cancer Mother         uterine     Heart Disease Mother         CHF    Arthritis-osteo Mother     Diabetes Father     Hypertension Father     Asthma Brother     Diabetes Paternal Aunt     Hypertension Paternal Aunt     Diabetes Paternal Uncle     Hypertension Paternal Uncle     Cancer Maternal Grandfather         esoph    Hypertension Maternal Grandfather     Stroke Maternal Grandfather     Heart Disease Paternal Grandmother     Diabetes Paternal Grandfather     Heart Attack Paternal Grandfather     Stroke Paternal Grandfather     Hypertension Paternal Grandfather     Heart Disease Maternal Grandmother     Other Sister         MTHFR (clotting D/O)          Admission on 03/13/2020, Discharged on 03/14/2020   Component Date Value    WBC 03/13/2020 9.6     RBC 03/13/2020 5.41*    HGB 03/13/2020 15.2     HCT 03/13/2020 44.6     MCV 03/13/2020 82.4     MCH 03/13/2020 28.1     MCHC 03/13/2020 34.1     RDW 03/13/2020 14.4     PLATELET 67/65/4878 361     MPV 03/13/2020 9.8     NRBC 03/13/2020 0.0     ABSOLUTE NRBC 03/13/2020 0.00     Sodium 03/13/2020 136     Potassium 03/13/2020 3.1*    Chloride 03/13/2020 102     CO2 03/13/2020 29     Anion gap 03/13/2020 5     Glucose 03/13/2020 111*    BUN 03/13/2020 5*    Creatinine 03/13/2020 1.03*    BUN/Creatinine ratio 03/13/2020 5*    GFR est AA 03/13/2020 >60     GFR est non-AA 03/13/2020 >60     Calcium 03/13/2020 9.0     Bilirubin, total 03/13/2020 0.5     ALT (SGPT) 03/13/2020 62     AST (SGOT) 03/13/2020 52*    Alk. phosphatase 03/13/2020 85     Protein, total 03/13/2020 7.8     Albumin 03/13/2020 3.6     Globulin 03/13/2020 4.2*    A-G Ratio 03/13/2020 0.9*    Color 03/13/2020 YELLOW     Appearance 03/13/2020 CLEAR     Specific gravity 03/13/2020 1.030     pH (UA) 03/13/2020 6.0     Protein 03/13/2020 100*    Glucose 03/13/2020 NEGATIVE      Ketone 03/13/2020 15*    Blood 03/13/2020 NEGATIVE      Urobilinogen 03/13/2020 1.0     Nitrites 03/13/2020 POSITIVE*    Leukocyte Esterase 03/13/2020 MODERATE*    WBC 03/13/2020 0-4     RBC 03/13/2020 0-5     Epithelial cells 03/13/2020 FEW     Bacteria 03/13/2020 1+*    WBC 03/13/2020 8.7     RBC 03/13/2020 4.86     HGB 03/13/2020 13.6     HCT 03/13/2020 40.2     MCV 03/13/2020 82.7     MCH 03/13/2020 28.0     MCHC 03/13/2020 33.8     RDW 03/13/2020 14.3     PLATELET 67/89/8355 058     MPV 03/13/2020 9.7     NRBC 03/13/2020 0.0     ABSOLUTE NRBC 03/13/2020 0.00     NEUTROPHILS 03/13/2020 62     LYMPHOCYTES 03/13/2020 26     MONOCYTES 03/13/2020 8     EOSINOPHILS 03/13/2020 2     BASOPHILS 03/13/2020 1     IMMATURE GRANULOCYTES 03/13/2020 1*    ABS. NEUTROPHILS 03/13/2020 5.4     ABS. LYMPHOCYTES 03/13/2020 2.3     ABS. MONOCYTES 03/13/2020 0.7     ABS. EOSINOPHILS 03/13/2020 0.2     ABS. BASOPHILS 03/13/2020 0.1     ABS. IMM.  GRANS. 03/13/2020 0.1*    DF 03/13/2020 AUTOMATED  Lipase 03/13/2020 207     Bilirubin UA, confirm 03/13/2020 NEGATIVE     Admission on 03/09/2020, Discharged on 03/09/2020   Component Date Value    WBC 03/09/2020 10.2     RBC 03/09/2020 5.22*    HGB 03/09/2020 14.6     HCT 03/09/2020 43.1     MCV 03/09/2020 82.6     MCH 03/09/2020 28.0     MCHC 03/09/2020 33.9     RDW 03/09/2020 14.4     PLATELET 13/82/3097 157     MPV 03/09/2020 9.7     NRBC 03/09/2020 0.0     ABSOLUTE NRBC 03/09/2020 0.00     NEUTROPHILS 03/09/2020 70     LYMPHOCYTES 03/09/2020 18     MONOCYTES 03/09/2020 9     EOSINOPHILS 03/09/2020 1     BASOPHILS 03/09/2020 1     IMMATURE GRANULOCYTES 03/09/2020 1*    ABS. NEUTROPHILS 03/09/2020 7.2     ABS. LYMPHOCYTES 03/09/2020 1.9     ABS. MONOCYTES 03/09/2020 0.9     ABS. EOSINOPHILS 03/09/2020 0.1     ABS. BASOPHILS 03/09/2020 0.1     ABS. IMM. GRANS. 03/09/2020 0.1*    DF 03/09/2020 AUTOMATED     Sodium 03/09/2020 134*    Potassium 03/09/2020 3.7     Chloride 03/09/2020 104     CO2 03/09/2020 22     Anion gap 03/09/2020 8     Glucose 03/09/2020 103*    BUN 03/09/2020 6     Creatinine 03/09/2020 0.93     BUN/Creatinine ratio 03/09/2020 6*    GFR est AA 03/09/2020 >60     GFR est non-AA 03/09/2020 >60     Calcium 03/09/2020 8.7     Bilirubin, total 03/09/2020 0.6     ALT (SGPT) 03/09/2020 34     AST (SGOT) 03/09/2020 33     Alk.  phosphatase 03/09/2020 94     Protein, total 03/09/2020 7.7     Albumin 03/09/2020 3.3*    Globulin 03/09/2020 4.4*    A-G Ratio 03/09/2020 0.8*    Lipase 03/09/2020 65*    Color 03/09/2020 EDVIN     Appearance 03/09/2020 CLOUDY*    Specific gravity 03/09/2020 1.025     pH (UA) 03/09/2020 6.0     Protein 03/09/2020 100*    Glucose 03/09/2020 NEGATIVE      Ketone 03/09/2020 15*    Blood 03/09/2020 SMALL*    Urobilinogen 03/09/2020 1.0     Nitrites 03/09/2020 POSITIVE*    Leukocyte Esterase 03/09/2020 MODERATE*    WBC 03/09/2020 0-4     RBC 03/09/2020 5-10     Epithelial cells 03/09/2020 MANY*    Bacteria 03/09/2020 1+*    UA:UC IF INDICATED 03/09/2020 URINE CULTURE ORDERED*    TSH 03/09/2020 3.28     Bilirubin UA, confirm 03/09/2020 NEGATIVE      Special Requests: 03/09/2020                      Value:NO SPECIAL REQUESTS  Reflexed from [id number]      [place] Count 03/09/2020                      Value:>100,000  COLONIES/mL      Culture result: 03/09/2020 MIXED UROGENITAL MAGGY ISOLATED     Ventricular Rate 03/09/2020 99     Atrial Rate 03/09/2020 99     P-R Interval 03/09/2020 148     QRS Duration 03/09/2020 68     Q-T Interval 03/09/2020 356     QTC Calculation (Bezet) 03/09/2020 456     Calculated P Axis 03/09/2020 14     Calculated R Axis 03/09/2020 34     Calculated T Axis 03/09/2020 33     Diagnosis 03/09/2020                      Value:Sinus rhythm with occasional premature ventricular complexes  Low voltage QRS  Septal infarct , age undetermined  ST & T wave abnormality, consider anterior ischemia  When compared with ECG of 14-JUL-2015 23:31,  premature ventricular complexes are now present  Septal infarct is now present  Nonspecific T wave abnormality now evident in Lateral leads  Confirmed by [employer] ([id number]) on 3/10/2020 9:32:12 AM     Office Visit on 02/26/2020   Component Date Value    Antinuclear Abs, IFA 02/26/2020 Negative     Immunoglobulin A, Qt. 02/26/2020 248     Myeloperoxidase (MPO) Ab 02/26/2020 <9.0     Proteinase 3 (TX-3) Ab 02/26/2020 <3.5     Cytoplasmic (C-ANCA) Ab 02/26/2020 <1:20     Perinuclear (P-ANCA) 02/26/2020 <1:20     Atypical pANCA 02/26/2020 <1:20     Cryoglobulin, QL 02/26/2020 Comment     Specific Gravity 02/26/2020 1.012     pH (UA) 02/26/2020 6.5     Color 02/26/2020 Yellow     Appearance 02/26/2020 Clear     Leukocyte Esterase 02/26/2020 Negative     Protein 02/26/2020 Negative     Glucose 02/26/2020 Negative     Ketone 02/26/2020 Negative     Blood 02/26/2020 Negative     Bilirubin 02/26/2020 Negative     Urobilinogen 02/26/2020 0.2     Nitrites 02/26/2020 Negative     Microscopic Examination 02/26/2020 Comment     Microscopic exam 02/26/2020 See additional order     URINALYSIS REFLEX 02/26/2020 Comment     WBC 02/26/2020 0-5     RBC 02/26/2020 0-2     Epithelial cells 02/26/2020 0-10     Casts 02/26/2020 None seen     Mucus 02/26/2020 Present     Bacteria 02/26/2020 Few        XR Results (maximum last 3): Results from [place] encounter on 09/19/18   XR ABD (KUB)    Impression IMPRESSION: Normal colonic gas pattern. Fluid-filled small bowel. Results from [place] encounter on 08/09/18   XR SPINE LUMB MIN 4 V    Impression IMPRESSION:    Mild T11-12 and T12-L1 degenerative disc disease. Mild L5-S1 facet arthropathy. The exam is otherwise unremarkable. XR SPINE THORAC 3 V    Impression IMPRESSION: Negative. CT Results (maximum last 3): Results from [place] encounter on 03/09/20   CT NECK SOFT TISSUE W CONT    Impression  impression: Prominent nonhomogeneous thyroid gland without significant pressure  on the airway. CT ABD PELV W CONT    Impression IMPRESSION:  Compatible mild colitis. Results from [place] encounter on 07/01/18   CT HEAD WO CONT    Impression IMPRESSION: Normal study. MRI Results (maximum last 3): Results from [place] encounter on 08/09/18   MRI BRAIN W WO CONT    Impression IMPRESSION:   1. No discrete pituitary lesion identified, and otherwise unremarkable brain  MRI. A single new nonspecific T2/FLAIR hyperintensity in the right parietal  subcortical white matter is likely of no clinical significance. Results from [place] encounter on 09/23/16   MRI BRAIN W WO CONT    Impression IMPRESSION:   No focal pituitary abnormalities demonstrated. Normal brain MRI. .            Due to this being a TeleHealth evaluation, many elements of the physical examination are unable to be assessed. Pursuant to the emergency declaration under the [facility], [facility] waiver authority and the [employer] and [employer] Act, this Virtual  Visit was conducted, with patient's consent, to reduce the patient's risk of exposure to COVID-19 and provide continuity of care for an established patient. Services were provided through a video synchronous discussion virtually to substitute for in-person clinic visit.       Amanda Enrique MS, ANP-BC, [facility]

## 2020-04-08 ENCOUNTER — VIRTUAL VISIT (OUTPATIENT)
Dept: RHEUMATOLOGY | Age: 35
End: 2020-04-08

## 2020-04-08 DIAGNOSIS — M79.7 FIBROMYALGIA: Primary | ICD-10-CM

## 2020-04-08 DIAGNOSIS — R21 SKIN RASH: ICD-10-CM

## 2020-04-08 DIAGNOSIS — F33.2 SEVERE EPISODE OF RECURRENT MAJOR DEPRESSIVE DISORDER, WITHOUT PSYCHOTIC FEATURES (HCC): ICD-10-CM

## 2020-04-08 DIAGNOSIS — F43.10 PTSD (POST-TRAUMATIC STRESS DISORDER): ICD-10-CM

## 2020-04-08 NOTE — PATIENT INSTRUCTIONS
Please fill out your [address] you will receive after your visit in the mail or via [phone]!

## 2020-04-08 NOTE — PROGRESS NOTES
REASON FOR VISIT    Jarrett Taylor is a 37 y.o. female who was seen by synchronous (real-time) telephone technology on 4/8/2020. HISTORY OF PRESENT ILLNESS     Previous medical records reviewed and summarized: yes    The patient notes she has been staying at home. She has a lot of pain. She has pain in feet knee, hands and back. Activity makes the pain worse. She is using ibuprofen for the pain. She thinks she will take percocet today. She thinks her legs are swollen. She couldn't see the derm due to current situation for her rashes. The rashes are now resolved but she has some scarring.       REVIEW OF SYSTEMS    Positives as per history  Negatives as follows:  [place]:    Denies unexplained persistent fevers, weight change, chronic fatigue  HEAD/EYES:              Denies eye redness, blurry vision or sudden loss of vision, dry eyes, HA, temporal artery pain  ENT:                            Denies oral/nasal ulcers, recurrent sinus infections, dry mouth  RESPIRATORY:         No pleuritic pain, history of pleural effusions, hemoptysis, exertional dyspnea  CARDIOVASCULAR:             Denies chest pain, history of pericardial effusions  GASTRO:                    Denies heartburn, esophageal dysmotility, abdominal pain, nausea, vomiting, diarrhea, blood in the stool  HEMATOLOGIC:        No easy bruising, purpura, swollen lymph nodes  SKIN:                           Denies alopecia, ulcers, nodules, sun sensitivity, unexplained persistent rash   VASCULAR:                Denies edema, cyanosis, raynaud phenomenon  NEUROLOGIC:           Denies specific muscle weakness, paresthesias   PSYCHIATRIC:           No sleep disturbance / snoring, depression, anxiety  MSK:                           No morning stiffness >1 hour, SI joint pain, persistent joint swelling, persistent joint pain    PAST MEDICAL HISTORY    Past Medical History:   Diagnosis Date    Anxiety     Arthritis     unsure - knees, lower back, fingers and toes    At risk for sleep apnea 09/05/2019    KATIA score 4 on PAT assessment    Bipolar 2 disorder (HCC)     Chest pain     Childhood asthma     Chronic pain     right abdomen,shooting pain groins    Depression     Endometriosis     Female bladder prolapse     Fibromyalgia     GERD (gastroesophageal reflux disease)     MCI (mild cognitive impairment) with memory loss     Migraines     Prediabetes     Prolapse of anterior lip of cervix     PTSD (post-traumatic stress disorder)     borderline personality traits    Rectal prolapse         Past Surgical History:   Procedure Laterality Date    COLONOSCOPY N/A 12/2/2019    COLONOSCOPY (LATEX ALLERGY) performed by Seamus Leonardo [facility] at [facility] AMBUL[facility]    HX COLONOSCOPY      HX ENDOSCOPY      HX GYN      vaginal birth x2    HX HERNIA REPAIR  07/09/2015    Laparoscopic Incisional hernia repair with mesh.     HX LAP CHOLECYSTECTOMY  7/7/2014    HX MYRINGOTOMY Bilateral     childhood    HX PARTIAL HYSTERECTOMY  04/25/2019    laparoscopic, left ovaries       FAMILY HISTORY    Family History   Problem Relation Age of Onset    Other Mother         fibromyalgia, IBS    Diabetes Mother         Pre-DM    High Cholesterol Mother     Hypertension Mother     Liver Disease Mother         fatty liver    Cancer Mother         uterine     Heart Disease Mother         CHF    Arthritis-osteo Mother     Diabetes Father     Hypertension Father     Asthma Brother     Diabetes Paternal Aunt     Hypertension Paternal Aunt     Diabetes Paternal Uncle     Hypertension Paternal Uncle     Cancer Maternal Grandfather         esoph    Hypertension Maternal Grandfather     Stroke Maternal Grandfather     Heart Disease Paternal Grandmother     Diabetes Paternal Grandfather     Heart Attack Paternal Grandfather     Stroke Paternal Grandfather     Hypertension Paternal Grandfather     Heart Disease Maternal Grandmother     Other Sister MTHFR (clotting D/O)       SOCIAL HISTORY    Social History     Tobacco Use    Smoking status: Current Every Day Smoker     Packs/day: 0.50     Years: 10.00     Pack years: 5.00     Start date: 3/1/2004    Smokeless tobacco: Never Used   Substance Use Topics    Alcohol use: Yes     Comment: rarely, not weeklt    Drug use: Not Currently     Types: Marijuana     Comment: 11/22/19, last use >1 year       MEDICATIONS    Current Outpatient Medications   Medication Sig Dispense Refill    donepeziL (ARICEPT) 5 mg tablet Take 1 Tab by mouth daily. Indications: mild cognitve impairment 90 Tab 3    rizatriptan (MAXALT) 10 mg tablet Take 1 Tab by mouth two (2) times daily as needed for Migraine. 1 tablet as needed and may repeat in 2 hours if needed  Indications: a migraine headache 9 Tab 5    hydrOXYzine HCL (ATARAX) 50 mg tablet TK 1/2 TO 1 T PO BID PRA      ondansetron (ZOFRAN ODT) 4 mg disintegrating tablet DIS ONE T PO Q 8 H PRN NV      promethazine (PHENERGAN) 25 mg tablet Take 1 Tab by mouth every six (6) hours as needed for Nausea. 12 Tab 0    galcanezumab-gnlm (EMGALITY PEN) 120 mg/mL injection 1 mL by SubCUTAneous route every thirty (30) days. 1 mL 11    ascorbic acid, vitamin C, (VITAMIN C) 500 mg tablet Take 1,000 mg by mouth daily.  omeprazole (PRILOSEC) 40 mg capsule Take 1 Cap by mouth daily. 90 Cap 3    DULoxetine (CYMBALTA) 60 mg capsule Take 120 mg by mouth daily.  ibuprofen (MOTRIN) 200 mg tablet Take 600 mg by mouth every six (6) hours as needed for Pain.  traZODone (DESYREL) 100 mg tablet Take 100 mg by mouth nightly. 0    oxyCODONE-acetaminophen (PERCOCET) 5-325 mg per tablet Take 1 Tab by mouth every six (6) hours as needed for Pain.  cetirizine (ZYRTEC) 10 mg tablet Take 1 Tab by mouth nightly as needed for Allergies. 90 Tab 3    lamoTRIgine (LAMICTAL) 200 mg tablet Take 200 mg by mouth daily.  Indications: Bipolar Depression      buPROPion XL (WELLBUTRIN XL) 300 mg XL tablet Take 300 mg by mouth every morning.  cholecalciferol, vitamin D3, (VITAMIN D3) 2,000 unit tab Take 1 Tab by mouth daily.  b complex vitamins (B COMPLEX 1) tablet Take 1 Tab by mouth daily.  guaiFENesin-dextromethorphan SR (MUCINEX DM) 600-30 mg per tablet Take 1 Tab by mouth every twelve (12) hours as needed for Cough or Congestion. 60 Tab 0       ALLERGIES    Allergies   Allergen Reactions    Latex Rash, Itching and Swelling    Pcn [Penicillins] Other (comments)     Pt states as a child she had a reaction but does not remember specific reaction. Pt states\"mother say I stopped breathing\". PHYSICAL EXAMINATION    Conversing normally    Due to this being a TeleHealth evaluation, many elements of the physical examination are unable to be assessed. DATA REVIEW    Prior medical records were reviewed and if applicable are summarized as below:    Labs:   2/2020: ASHELY negative, IgA negative, ANCA negative, Cryo, urinalysis negative  10/2019: ESR normal, RF negative, CRP 15    Imaging:   N/A    ASSESSMENT AND PLAN    A 37 y.o. female chronic pain, depression, migraines, PTSD, bipolar disorder, ADHD presents a follow up visit. Her symptoms are due to fibromyalgia. I explained the diagnosis to the patient in detail and answered all the questions. # Skin lesions:  - resolving  - to see dermatology soon    # Fibromyalgia:  - discussed need for regular low impact exercise  - defer medication changes to PCP    # Depression:  - on medication    # Obesity:  - advised weight loss and discussed weight loss techniques such as healthy eating habits, increase in exercise, having a weighing machine in the bathroom and checking weight daily. I also counseled the patient that it is important to lose weight at the rate of 1-2 lbs/month instead of rapid weight loss. RTC in PRN    The patient voiced understanding of the aforementioned assessment and plan.  Summary of plan was provided in the After Visit Summary patient instructions. I also provided education about MyChart setup and utility. Ms. Michelle Shell has a reminder for a \"due or due soon\" health maintenance. I have asked that she contact her primary care provider for follow-up on this health maintenance. TODAY'S ORDERS    No orders of the defined types were placed in this encounter. Future Appointments   Date Time Provider Annamaria Rula   5/5/2020 10:00 AM Aroldo Hernandez  [facility]   5/11/2020  9:00 AM Shobha Schilling MD [address]     We discussed the expected course, resolution and complications of the diagnosis(es) in detail. Medication risks, benefits, costs, interactions, and alternatives were discussed as indicated. I advised her to contact the office if her condition worsens, changes or fails to improve as anticipated. She expressed understanding with the diagnosis(es) and plan. Pursuant to the emergency declaration under the [facility] Critical access hospital waiver authority and the [employer], this Virtual  Visit was conducted, with patient's consent, to reduce the patient's risk of exposure to COVID-19 and provide continuity of care for an established patient. Services were provided through an audio synchronous discussion virtually to substitute for in-person clinic visit.       Alanis Andrews MD    Adult Rheumatology   [facility]  A Part of [address]   Phone [phone]  Fax [phone]

## 2020-05-05 ENCOUNTER — VIRTUAL VISIT (OUTPATIENT)
Dept: NEUROLOGY | Age: 35
End: 2020-05-05

## 2020-05-05 DIAGNOSIS — G43.711 INTRACTABLE CHRONIC MIGRAINE WITHOUT AURA AND WITH STATUS MIGRAINOSUS: ICD-10-CM

## 2020-05-05 DIAGNOSIS — G31.84 MILD COGNITIVE IMPAIRMENT: ICD-10-CM

## 2020-05-05 DIAGNOSIS — F33.2 SEVERE EPISODE OF RECURRENT MAJOR DEPRESSIVE DISORDER, WITHOUT PSYCHOTIC FEATURES (HCC): ICD-10-CM

## 2020-05-05 DIAGNOSIS — F43.10 PTSD (POST-TRAUMATIC STRESS DISORDER): ICD-10-CM

## 2020-05-05 DIAGNOSIS — F41.9 SEVERE ANXIETY: ICD-10-CM

## 2020-05-05 DIAGNOSIS — M79.7 FIBROMYALGIA: ICD-10-CM

## 2020-05-05 DIAGNOSIS — G43.019 COMMON MIGRAINE WITH INTRACTABLE MIGRAINE: Primary | ICD-10-CM

## 2020-05-05 DIAGNOSIS — G44.209 TENSION VASCULAR HEADACHE: ICD-10-CM

## 2020-05-05 RX ORDER — GABAPENTIN 100 MG/1
CAPSULE ORAL
Qty: 240 CAP | Refills: 2 | Status: SHIPPED | OUTPATIENT
Start: 2020-05-05 | End: 2020-07-24 | Stop reason: DRUGHIGH

## 2020-05-05 NOTE — PROGRESS NOTES
Mahnaz Frank is a 37 y.o. female who presents today for the following:  Chief Complaint   Patient presents with    Follow-up     pain level 4    Headache         HPI  Historical Data    This is a patient previously seen in our practice.     Neuro diagnosis includes chronic headaches mild cognitive impairment, low back pain with radiculopathy affecting the right and left, bilateral carotid artery stenosis, Transient visual disturbance of both eyes     Other significant diagnoses include depression posttraumatic stress disorder     She recently had neuropsychiatric testing completed April 8, 2019 and was found to have PTSD severe depressive disorder without psychosis, severe anxiety and somatic sensation disorder along with her mild cognitive impairment and borderline personality traits.     Other comments by on the neuropsych evaluation by the neuropsychologist include the following:  She is young for dementia and there is no neurologic correlate which would explain the type of cognitive deficits she is showing.  At the same time, the CURRENT profile is not consistent with a purely psychiatric issue.  As such, the diagnosis here is that of Mild Cognitive Impairment with Memory loss exacerbated by severe and complex psychiatric distress      She needs intensive psychiatric intervention to include a review of her medication management for anxiety and depression as well as active and continued engagement in intensive psychotherapy.  Consider EMDR.  Consider TMS if medication by itself has not proven helpful for her - she would be an appropriate candidate for same.  I do believe medication for memory should be considered, as there is no other definite explanation for her impaired recognition recall, which often clears the difference between organic and functional memory loss.  Any metabolic cause needs to be ruled out, and sleep issues may be a factor as well.  This is concerning and very atypical changes over time, with improvement in some scores and declines in others.  She remains capable of making decisions for herself., but needs reminders for meals, medications, and finances. I see no evidence of malingering here.  I am concerned about driving safety issues as well, so this should be formally evaluated if deemed medically appropriate.  My hope is to see her again once mood improves to better clarify, because my worry is that if cognitive problems continue to worsen despite positive psychiatric interventions, then she has dementia and the prognosis would be quite poor at that point.  Stay active mentally, physically, and socially. Consult with OT and Speech, Neurocognitive Rehab.   We now have baseline and updated data on her.  Follow up as noted. Henrieville Massa correlation is, of course, indicated.          Patient was started on Emgality at January 2020: Initially she had significant improvement with infrequent headaches and when she got them they were only mild and needed a single dose of over-the-counter abortive therapy although she would occasionally have a severe migraine lasting 48 hours and incapacitating    Patient missed her dose in March 2020 and headaches escalated again. Bipin Oliveira  She continues on 5 mg Aricept which was started January 2020. She denies side effects. No major complaints of cognitive difficulties at today's office visit. She is doing some brain Buster activities which she finds helpful     Depression and mental health issues: Meds have been adjusted through mental health. She feels as though they are helping. She is having some good days but still many bad days. She is working on better interpersonal skills with her children.   She continues to have complaints of anxiety and states mental health prescribed her hydroxyzine which is not helping and is wondering what else she can do     History of chronic low back pain     Interim Data:     Headaches and migraines: Back on Emgality; last shot week of 4/6/2020; Headaches 5-6 day per week; aborts w Maxalt 3-4x/week for more severe migraines and Ibuprofen for more mild for the past 2 weeks; about 2 weeks ago : intermittent only 1-2x/week and not as severe  +P/T  Temporal and frontal  +N/-V  + light and sound    Cognitive impairment: still have trouble but can still function and uses reminders; continues on Aricept 5mg /day    Low back pain: daily, saw Rheumatologist and Dx w FMPS    Severe depression/PTSD: continues w virtual calls every 2 weeks      Allergies   Allergen Reactions    Latex Rash, Itching and Swelling    Pcn [Penicillins] Other (comments)     Pt states as a child she had a reaction but does not remember specific reaction. Pt states\"mother say I stopped breathing\". Current Outpatient Medications   Medication Sig    donepeziL (ARICEPT) 5 mg tablet Take 1 Tab by mouth daily. Indications: mild cognitve impairment    rizatriptan (MAXALT) 10 mg tablet Take 1 Tab by mouth two (2) times daily as needed for Migraine. 1 tablet as needed and may repeat in 2 hours if needed  Indications: a migraine headache    hydrOXYzine HCL (ATARAX) 50 mg tablet TK 1/2 TO 1 T PO BID PRA    ondansetron (ZOFRAN ODT) 4 mg disintegrating tablet DIS ONE T PO Q 8 H PRN NV    promethazine (PHENERGAN) 25 mg tablet Take 1 Tab by mouth every six (6) hours as needed for Nausea.  galcanezumab-gnlm (EMGALITY PEN) 120 mg/mL injection 1 mL by SubCUTAneous route every thirty (30) days.  guaiFENesin-dextromethorphan SR (MUCINEX DM) 600-30 mg per tablet Take 1 Tab by mouth every twelve (12) hours as needed for Cough or Congestion.  ascorbic acid, vitamin C, (VITAMIN C) 500 mg tablet Take 1,000 mg by mouth daily.  omeprazole (PRILOSEC) 40 mg capsule Take 1 Cap by mouth daily.  DULoxetine (CYMBALTA) 60 mg capsule Take 120 mg by mouth daily.  ibuprofen (MOTRIN) 200 mg tablet Take 600 mg by mouth every six (6) hours as needed for Pain.     traZODone (DESYREL) 100 mg tablet Take 100 mg by mouth nightly.  oxyCODONE-acetaminophen (PERCOCET) 5-325 mg per tablet Take 1 Tab by mouth every six (6) hours as needed for Pain.  cetirizine (ZYRTEC) 10 mg tablet Take 1 Tab by mouth nightly as needed for Allergies.  lamoTRIgine (LAMICTAL) 200 mg tablet Take 200 mg by mouth daily. Indications: Bipolar Depression    buPROPion XL (WELLBUTRIN XL) 300 mg XL tablet Take 300 mg by mouth every morning.  cholecalciferol, vitamin D3, (VITAMIN D3) 2,000 unit tab Take 1 Tab by mouth daily.  b complex vitamins (B COMPLEX 1) tablet Take 1 Tab by mouth daily. No current facility-administered medications for this visit. Past Surgical History:   Procedure Laterality Date    COLONOSCOPY N/A 12/2/2019    COLONOSCOPY (LATEX ALLERGY) performed by Gerardo Carlin [facility] at [facility] AMBUL[facility]    HX COLONOSCOPY      HX ENDOSCOPY      HX GYN      vaginal birth x2    HX HERNIA REPAIR  07/09/2015    Laparoscopic Incisional hernia repair with mesh.     HX LAP CHOLECYSTECTOMY  7/7/2014    HX MYRINGOTOMY Bilateral     childhood    HX PARTIAL HYSTERECTOMY  04/25/2019    laparoscopic, left ovaries       Family History   Problem Relation Age of Onset    Other Mother         fibromyalgia, IBS    Diabetes Mother         Pre-DM    High Cholesterol Mother     Hypertension Mother     Liver Disease Mother         fatty liver    Cancer Mother         uterine     Heart Disease Mother         CHF    Arthritis-osteo Mother     Diabetes Father     Hypertension Father     Asthma Brother     Diabetes Paternal Aunt     Hypertension Paternal Aunt     Diabetes Paternal Uncle     Hypertension Paternal Uncle     Cancer Maternal Grandfather         esoph    Hypertension Maternal Grandfather     Stroke Maternal Grandfather     Heart Disease Paternal Grandmother     Diabetes Paternal Grandfather     Heart Attack Paternal Grandfather     Stroke Paternal Grandfather     Hypertension Paternal Grandfather     Heart Disease Maternal Grandmother     Other Sister         MTHFR (clotting D/O)       Social History     Socioeconomic History    Marital status: SINGLE     Spouse name: Not on file    Number of children: 2    Years of education: Not on file    Highest education level: Not on file   Occupational History     Employer: NOT EMPLOYED     Comment: work at home    Tobacco Use    Smoking status: Current Every Day Smoker     Packs/day: 0.50     Years: 10.00     Pack years: 5.00     Start date: 3/1/2004    Smokeless tobacco: Never Used   Substance and Sexual Activity    Alcohol use: Yes     Comment: rarely, not weeklt    Drug use: Not Currently     Types: Marijuana     Comment: 11/22/19, last use >1 year    Sexual activity: Yes     Partners: Male     Birth control/protection: None     Comment: single    Social History Narrative    Unemployed currently         ROS  Other than what is stated above under HPI there is no new or changing issues related to the following:  Constitutional: No recent weight change, fever,fatigue, sleep difficulties, or loss of appetite. ENT/Mouth:  No hearing loss, ringing in the ears, chronic sinus problem, nose bleeds sore throat, voice change, hoarseness, swollen glands in neck, or difficulties with chewing and swallowing. Cardiovascular:  No chest pain/angina pectoris, palpitations,swelling of feet/ankles/hands, or calf pain while walking. Respiratory: No chronic or frequent coughs, spitting up blood, shortness of breath, asthma, or wheezing. Gastrointestinal: No abdominal pain, heartburn, nausea, vomiting, constipation, frequent diarrhea, rectal bleeding, or blood in stool. Genitourinary: No frequent urination, burning or painful urination, blood in urine, incontinence or dribbling. Musculoskeletal:   No joint pain, stiffness/swelling, weakness of muscles, muscle pain/cramp, or back pain.    Integument:   No rash/itching, change in skin color, change in hair/nails, or change in color/size of moles. Neurological:  No dizziness/vertigo, loss of consciousness, numbness/tingling sensation, tremors, weakness in limbs, difficulty with balance, frequent or recurring headaches, memory loss or confusion. Psychiatric:   No nervousness, depression, hallucinations, paranoia or suspiciousness. Endocrine: No excessive thirst or urination, heat or cold intolerance. Hematologic/Lymphatic: No bleeding/bruising tendency, phlebitis, or past transfusion. EXAMINATION: Within the context and limitations of a telephone encounter      General appearance: Not testable    Psych/mental health:  Affect: Appropriate    PHQ  3 most recent PHQ Screens 5/26/2015   Little interest or pleasure in doing things Not at all   Feeling down, depressed, irritable, or hopeless Not at all   Total Score PHQ 2 0       HEENT: Not testable    Cardiovascular: Not testable    Respiratory: No dyspnea, wheezes or stridors audible through conversation    Musculoskeletal: Not testable    Integumentary: Not testable      Neurological Examination:   Mental Status:        MMSE  No flowsheet data found. Formal testing was not completed    there was nothing concerning on general observation and discussion. Alert oriented and appropriate to general conversation  Normal processing on general observation  Followed conversation and responded seemingly appropriate throughout the office visit  No word finding difficulties noted on casual observation  Able to follow directions without difficulty     Cranial Nerves:    Hearing intact to conversation  Voice with normal projection  Otherwise not testable      Motor: Not testable    Sensation: Not testable    Coordination/Cerebellar:   Not testable    Gait: Not testable    Fall risk assessment  No flowsheet data found. Reflexes: Not testable      ASSESSMENT AND PLAN  Headaches and migraines: Overall good response to Emgality.   However the patient under is a great deal of stress and she is now back and migraine status probably triggered by medication overuse. So we need to break this for her. She is to stop all over-the-counter analgesics for headache and migraine management. She is to start gabapentin 100 mg 1 to 2 tablets 4 times daily as needed. And she can continue to use the Maxalt but no more than 2 days in a 7-day cycle. The gabapentin will also be beneficial for the management of her chronic pain related to her new diagnosis of fibromyalgia. The patient knows she needs to get a fibromyalgia specialist.  As the rheumatologist she saw does not specialize in this. My guess is she will need to go to pain management or physiatry    I advised her to get online to the [employer]romy[employer] to look for behavioral interventions to help manage this. And I have recommended stretching and yoga at this time. Cognitive difficulties: Continue on Aricept 5 mg daily will consider increasing to 10 mg at a later point in time. And she is to continue to work on mental health issues that are affecting cognition and I agree with her continuing to use behavioral modifications assess such as notes and reminders which seem to be working effectively. The good news is functionally she is still able to get through her day pay her bills by her groceries etc.    Depression PTSD: Certainly affects other domains of her overall health and wellbeing to include headaches and pain and have encouraged the patient to continue to follow closely with her mental health team.    ICD-10-CM ICD-9-CM    1. Common migraine with intractable migraine G43.019 346.11    2. Tension vascular headache G44.209 307.81    3. Mild cognitive impairment G31.84 331.83    4. Severe episode of recurrent major depressive disorder, without psychotic features ([facility] [address] F33.2 296.33    5. PTSD (post-traumatic stress disorder) F43.10 309.81    6. Severe anxiety F41.9 300.00    7.  Fibromyalgia M79.7 729.1    8. Intractable chronic migraine without aura and with status migrainosus G43.711 346.73            Additional pertinent medical data reviewed at today's office visit:       Admission on 03/13/2020, Discharged on 03/14/2020   Component Date Value    WBC 03/13/2020 9.6     RBC 03/13/2020 5.41*    HGB 03/13/2020 15.2     HCT 03/13/2020 44.6     MCV 03/13/2020 82.4     MCH 03/13/2020 28.1     MCHC 03/13/2020 34.1     RDW 03/13/2020 14.4     PLATELET 85/70/4541 221     MPV 03/13/2020 9.8     NRBC 03/13/2020 0.0     ABSOLUTE NRBC 03/13/2020 0.00     Sodium 03/13/2020 136     Potassium 03/13/2020 3.1*    Chloride 03/13/2020 102     CO2 03/13/2020 29     Anion gap 03/13/2020 5     Glucose 03/13/2020 111*    BUN 03/13/2020 5*    Creatinine 03/13/2020 1.03*    BUN/Creatinine ratio 03/13/2020 5*    GFR est AA 03/13/2020 >60     GFR est non-AA 03/13/2020 >60     Calcium 03/13/2020 9.0     Bilirubin, total 03/13/2020 0.5     ALT (SGPT) 03/13/2020 62     AST (SGOT) 03/13/2020 52*    Alk.  phosphatase 03/13/2020 85     Protein, total 03/13/2020 7.8     Albumin 03/13/2020 3.6     Globulin 03/13/2020 4.2*    A-G Ratio 03/13/2020 0.9*    Color 03/13/2020 YELLOW     Appearance 03/13/2020 CLEAR     Specific gravity 03/13/2020 1.030     pH (UA) 03/13/2020 6.0     Protein 03/13/2020 100*    Glucose 03/13/2020 NEGATIVE      Ketone 03/13/2020 15*    Blood 03/13/2020 NEGATIVE      Urobilinogen 03/13/2020 1.0     Nitrites 03/13/2020 POSITIVE*    Leukocyte Esterase 03/13/2020 MODERATE*    WBC 03/13/2020 0-4     RBC 03/13/2020 0-5     Epithelial cells 03/13/2020 FEW     Bacteria 03/13/2020 1+*    WBC 03/13/2020 8.7     RBC 03/13/2020 4.86     HGB 03/13/2020 13.6     HCT 03/13/2020 40.2     MCV 03/13/2020 82.7     MCH 03/13/2020 28.0     MCHC 03/13/2020 33.8     RDW 03/13/2020 14.3     PLATELET 42/19/2141 314     MPV 03/13/2020 9.7     NRBC 03/13/2020 0.0     ABSOLUTE NRBC 03/13/2020 0.00     NEUTROPHILS 03/13/2020 62     LYMPHOCYTES 03/13/2020 26     MONOCYTES 03/13/2020 8     EOSINOPHILS 03/13/2020 2     BASOPHILS 03/13/2020 1     IMMATURE GRANULOCYTES 03/13/2020 1*    ABS. NEUTROPHILS 03/13/2020 5.4     ABS. LYMPHOCYTES 03/13/2020 2.3     ABS. MONOCYTES 03/13/2020 0.7     ABS. EOSINOPHILS 03/13/2020 0.2     ABS. BASOPHILS 03/13/2020 0.1     ABS. IMM. GRANS. 03/13/2020 0.1*    DF 03/13/2020 AUTOMATED     Lipase 03/13/2020 207     Bilirubin UA, confirm 03/13/2020 NEGATIVE     Admission on 03/09/2020, Discharged on 03/09/2020   Component Date Value    WBC 03/09/2020 10.2     RBC 03/09/2020 5.22*    HGB 03/09/2020 14.6     HCT 03/09/2020 43.1     MCV 03/09/2020 82.6     MCH 03/09/2020 28.0     MCHC 03/09/2020 33.9     RDW 03/09/2020 14.4     PLATELET 44/98/0911 036     MPV 03/09/2020 9.7     NRBC 03/09/2020 0.0     ABSOLUTE NRBC 03/09/2020 0.00     NEUTROPHILS 03/09/2020 70     LYMPHOCYTES 03/09/2020 18     MONOCYTES 03/09/2020 9     EOSINOPHILS 03/09/2020 1     BASOPHILS 03/09/2020 1     IMMATURE GRANULOCYTES 03/09/2020 1*    ABS. NEUTROPHILS 03/09/2020 7.2     ABS. LYMPHOCYTES 03/09/2020 1.9     ABS. MONOCYTES 03/09/2020 0.9     ABS. EOSINOPHILS 03/09/2020 0.1     ABS. BASOPHILS 03/09/2020 0.1     ABS. IMM. GRANS. 03/09/2020 0.1*    DF 03/09/2020 AUTOMATED     Sodium 03/09/2020 134*    Potassium 03/09/2020 3.7     Chloride 03/09/2020 104     CO2 03/09/2020 22     Anion gap 03/09/2020 8     Glucose 03/09/2020 103*    BUN 03/09/2020 6     Creatinine 03/09/2020 0.93     BUN/Creatinine ratio 03/09/2020 6*    GFR est AA 03/09/2020 >60     GFR est non-AA 03/09/2020 >60     Calcium 03/09/2020 8.7     Bilirubin, total 03/09/2020 0.6     ALT (SGPT) 03/09/2020 34     AST (SGOT) 03/09/2020 33     Alk.  phosphatase 03/09/2020 94     Protein, total 03/09/2020 7.7     Albumin 03/09/2020 3.3*    Globulin 03/09/2020 4.4*    A-G Ratio 03/09/2020 0.8*    Lipase 03/09/2020 65*    Color 03/09/2020 EDVIN     Appearance 03/09/2020 CLOUDY*    Specific gravity 03/09/2020 1.025     pH (UA) 03/09/2020 6.0     Protein 03/09/2020 100*    Glucose 03/09/2020 NEGATIVE      Ketone 03/09/2020 15*    Blood 03/09/2020 SMALL*    Urobilinogen 03/09/2020 1.0     Nitrites 03/09/2020 POSITIVE*    Leukocyte Esterase 03/09/2020 MODERATE*    WBC 03/09/2020 0-4     RBC 03/09/2020 5-10     Epithelial cells 03/09/2020 MANY*    Bacteria 03/09/2020 1+*    UA:UC IF INDICATED 03/09/2020 URINE CULTURE ORDERED*    TSH 03/09/2020 3.28     Bilirubin UA, confirm 03/09/2020 NEGATIVE      Special Requests: 03/09/2020                      Value:NO SPECIAL REQUESTS  Reflexed from [id number]      [place] Count 03/09/2020                      Value:>100,000  COLONIES/mL      Culture result: 03/09/2020 MIXED UROGENITAL MAGGY ISOLATED     Ventricular Rate 03/09/2020 99     Atrial Rate 03/09/2020 99     P-R Interval 03/09/2020 148     QRS Duration 03/09/2020 68     Q-T Interval 03/09/2020 356     QTC Calculation (Bezet) 03/09/2020 456     Calculated P Axis 03/09/2020 14     Calculated R Axis 03/09/2020 34     Calculated T Axis 03/09/2020 33     Diagnosis 03/09/2020                      Value:Sinus rhythm with occasional premature ventricular complexes  Low voltage QRS  Septal infarct , age undetermined  ST & T wave abnormality, consider anterior ischemia  When compared with ECG of 14-JUL-2015 23:31,  premature ventricular complexes are now present  Septal infarct is now present  Nonspecific T wave abnormality now evident in Lateral leads  Confirmed by Kiran Booker ([id number]) on 3/10/2020 9:32:12 AM     Office Visit on 02/26/2020   Component Date Value    Antinuclear Abs, IFA 02/26/2020 Negative     Immunoglobulin A, Qt. 02/26/2020 248     Myeloperoxidase (MPO) Ab 02/26/2020 <9.0     Proteinase 3 (AL-3) Ab 02/26/2020 <3.5     Cytoplasmic (C-ANCA) Ab 02/26/2020 <1:20     Perinuclear (P-ANCA) 02/26/2020 <1:20     Atypical pANCA 02/26/2020 <1:20     Cryoglobulin, QL 02/26/2020 Comment     Specific Gravity 02/26/2020 1.012     pH (UA) 02/26/2020 6.5     Color 02/26/2020 Yellow     Appearance 02/26/2020 Clear     Leukocyte Esterase 02/26/2020 Negative     Protein 02/26/2020 Negative     Glucose 02/26/2020 Negative     Ketone 02/26/2020 Negative     Blood 02/26/2020 Negative     Bilirubin 02/26/2020 Negative     Urobilinogen 02/26/2020 0.2     Nitrites 02/26/2020 Negative     Microscopic Examination 02/26/2020 Comment     Microscopic exam 02/26/2020 See additional order     URINALYSIS REFLEX 02/26/2020 Comment     WBC 02/26/2020 0-5     RBC 02/26/2020 0-2     Epithelial cells 02/26/2020 0-10     Casts 02/26/2020 None seen     Mucus 02/26/2020 Present     Bacteria 02/26/2020 Few        XR Results (maximum last 3): Results from [place] encounter on 09/19/18   XR ABD (KUB)    Impression IMPRESSION: Normal colonic gas pattern. Fluid-filled small bowel. Results from [place] encounter on 08/09/18   XR SPINE LUMB MIN 4 V    Impression IMPRESSION:    Mild T11-12 and T12-L1 degenerative disc disease. Mild L5-S1 facet arthropathy. The exam is otherwise unremarkable. XR SPINE THORAC 3 V    Impression IMPRESSION: Negative. CT Results (maximum last 3): Results from [place] encounter on 03/09/20   CT NECK SOFT TISSUE W CONT    Impression  impression: Prominent nonhomogeneous thyroid gland without significant pressure  on the airway. CT ABD PELV W CONT    Impression IMPRESSION:  Compatible mild colitis. Results from [place] encounter on 07/01/18   CT HEAD WO CONT    Impression IMPRESSION: Normal study. MRI Results (maximum last 3): Results from [place] encounter on 08/09/18   MRI BRAIN W WO CONT    Impression IMPRESSION:   1.  No discrete pituitary lesion identified, and otherwise unremarkable brain  MRI. A single new nonspecific T2/FLAIR hyperintensity in the right parietal  subcortical white matter is likely of no clinical significance. Results from [place] encounter on 09/23/16   MRI BRAIN W WO CONT    Impression IMPRESSION:   No focal pituitary abnormalities demonstrated. Normal brain MRI. .           Follow-up and Dispositions    · Return in about 2 weeks (around 5/19/2020). This was a telephone visit as patient did not have good Internet connection.   Length of time was 16 minutes and 30 seconds    Cinthya Means MS, ANP-BC, [facility]

## 2020-05-05 NOTE — PATIENT INSTRUCTIONS
Office Policies · Phone calls/patient messages: Please allow up to 24 hours for someone in the office to contact you about your call or message. Be mindful your provider may be out of the office or your message may require further review. We encourage you to use [facility] for your messages as this is a faster, more efficient way to communicate with our office · Medication Refills: 
Prescription medications require up to 48 business hours to process. We encourage you to use [facility] for your refills. For controlled medications: Please allow up to 72 business hours to process. Certain medications may require you to  a written prescription at our office. NO narcotic/controlled medications will be prescribed after 4pm Monday through Friday or on weekends · Form/Paperwork Completion: 
Please note there is a $25 fee for all paperwork completed by our providers. We ask that you allow 7-14 business days. Pre-payment is due prior to picking up/faxing the completed form. You may also download your forms to [facility] to have your doctor print off. Per discussion today on the telephone, we need to get your headaches back to just episodic. I want you to stop all over-the-counter medications for headache and migraine management. In place of this I want you to use gabapentin 100 mg 1 or 2 tablets up to 4 times a day as needed. For the more severe headaches you can continue to use the rizatriptan also known as Maxalt. You can have 1 tablet at onset of severe migraine and repeat every 2 hours up to a total of 3 tablets in a 24-hour cycle. However I do not want to using this more than 2 days in a 7-day cycle. Does not matter if you use 1 tablet or use all 3 do not use it more than 2 days/week. The gabapentin will also give you some pain relief from a fibromyalgia standpoint I do want you to look up the fibromyalgia National Association and investigate behavioral interventions to help with this. Stretching is very very important in managing fibromyalgia. So please try to do that daily for at least 10 to 15 minutes and work your way up to 30 to 45 minutes Yoga is also been beneficial in this arena. Continue follow-up closely with mental health as you have got a lot going on We will continue to work together to get you to a better state of health Regarding all the issues with the cognition this is multifactorial in nature mostly related to your stress and depression and will be beneficial for you to continue on Aricept 5 mg daily and at one point we will get you up to 10 mg but for right now we have a lot of other issues going on and I do not want to make any further medication adjustments unless necessary Headache: Care Instructions Your Care Instructions Headaches have many possible causes. Most headaches aren't a sign of a more serious problem, and they will get better on their own. Home treatment may help you feel better faster. The doctor has checked you carefully, but problems can develop later. If you notice any problems or new symptoms, get medical treatment right away. Follow-up care is a key part of your treatment and safety. Be sure to make and go to all appointments, and call your doctor if you are having problems. It's also a good idea to know your test results and keep a list of the medicines you take. How can you care for yourself at home? · Do not drive if you have taken a prescription pain medicine. · Rest in a quiet, dark room until your headache is gone. Close your eyes and try to relax or go to sleep. Don't watch TV or read. · Put a cold, moist cloth or cold pack on the painful area for 10 to 20 minutes at a time. Put a thin cloth between the cold pack and your skin. · Use a warm, moist towel or a heating pad set on low to relax tight shoulder and neck muscles. · Have someone gently massage your neck and shoulders. · Take pain medicines exactly as directed. ? If the doctor gave you a prescription medicine for pain, take it as prescribed. ? If you are not taking a prescription pain medicine, ask your doctor if you can take an over-the-counter medicine. · Be careful not to take pain medicine more often than the instructions allow, because you may get worse or more frequent headaches when the medicine wears off. · Do not ignore new symptoms that occur with a headache, such as a fever, weakness or numbness, vision changes, or confusion. These may be signs of a more serious problem. To prevent headaches · Keep a headache diary so you can figure out what triggers your headaches. Avoiding triggers may help you prevent headaches. Record when each headache began, how long it lasted, and what the pain was like (throbbing, aching, stabbing, or dull). Write down any other symptoms you had with the headache, such as nausea, flashing lights or dark spots, or sensitivity to bright light or loud noise. Note if the headache occurred near your period. List anything that might have triggered the headache, such as certain foods (chocolate, cheese, wine) or odors, smoke, bright light, stress, or lack of sleep. · Find healthy ways to deal with stress. Headaches are most common during or right after stressful times. Take time to relax before and after you do something that has caused a headache in the past. 
· Try to keep your muscles relaxed by keeping good posture. Check your jaw, face, neck, and shoulder muscles for tension, and try relaxing them. When sitting at a desk, change positions often, and stretch for 30 seconds each hour. · Get plenty of sleep and exercise. · Eat regularly and well. Long periods without food can trigger a headache. · Treat yourself to a massage. Some people find that regular massages are very helpful in relieving tension. · Limit caffeine by not drinking too much coffee, tea, or soda. But don't quit caffeine suddenly, because that can also give you headaches. · Reduce eyestrain from computers by blinking frequently and looking away from the computer screen every so often. Make sure you have proper eyewear and that your monitor is set up properly, about an arm's length away. · Seek help if you have depression or anxiety. Your headaches may be linked to these conditions. Treatment can both prevent headaches and help with symptoms of anxiety or depression. When should you call for help? Call 911 anytime you think you may need emergency care. For example, call if: 
  · You have signs of a stroke. These may include: 
? Sudden numbness, paralysis, or weakness in your face, arm, or leg, especially on only one side of your body. ? Sudden vision changes. ? Sudden trouble speaking. ? Sudden confusion or trouble understanding simple statements. ? Sudden problems with walking or balance. ? A sudden, severe headache that is different from past headaches.  
 Call your doctor now or seek immediate medical care if: 
  · You have a new or worse headache.  
  · Your headache gets much worse. Where can you learn more? Go to http://orin-cris.info/ Enter M271 in the search box to learn more about \"Headache: Care Instructions. \" Current as of: November 19, 2019Content Version: 12.4 © 1394-0076 Healthwise, Incorporated. Care instructions adapted under license by [employer] (which disclaims liability or warranty for this information). If you have questions about a medical condition or this instruction, always ask your healthcare professional. [place] [address] any warranty or liability for your use of this information. MINDFULNESS and WELLNESS 
focus on several things: 
Mindfulness: be in the moment Restfulness: Look at apps for meditation and white noise to help you relax and to sleep better Mild exercise: Try to walk 10 minutes 3 times a week. At those times listen to music that may be restful for you or relaxing use your apps or perhaps podcasts Consider adult coloring books to help reduce anxiety and improve focus If appropriate, work with a mental health team closely to help walk you through the issues that you are struggling with to get to a better place Be excepting of where you are right now instead of trying to fight to where you used to be. Focus on who you want to become going forward. Pat yourself  on the back each day you get out of bed At the end of the day list all the things that you did that was positive for yourself even if it is nothing more than getting out of bed that day Fibromyalgia: Care Instructions Your Care Instructions Fibromyalgia is a painful condition that is not completely understood by medical experts. The cause of fibromyalgia is not known. It can make you feel tired and ache all over. It causes tender spots at specific points of the body that hurt only when you press on them. You may have trouble sleeping, as well as other symptoms. These problems can upset your work and home life. Symptoms tend to come and go, although they may never go away completely. Fibromyalgia does not harm your muscles, joints, or organs. Follow-up care is a key part of your treatment and safety. Be sure to make and go to all appointments, and call your doctor if you are having problems. It's also a good idea to know your test results and keep a list of the medicines you take. How can you care for yourself at home? · Exercise often. Walk, swim, or bike to help with pain and sleep problems and to make you feel better. · Try to get a good night's sleep. Go to bed and get up at the same time each day, whether you feel rested or not. Make sure you have a good mattress and pillow. · Reduce stress. Avoid things that cause you stress, if you can. If not, work at making them less stressful. Learn to use biofeedback, guided imagery, meditation, or other methods to relax. · Make healthy changes. Eat a balanced diet, quit smoking, and limit alcohol and caffeine. · Use a heating pad set on low or take warm baths or showers for pain. Using cold packs for up to 20 minutes at a time can also relieve pain. Put a thin cloth between the cold pack and your skin. A gentle massage might help too. · Be safe with medicines. Take your medicines exactly as prescribed. Call your doctor if you think you are having a problem with your medicine. Your doctor may talk to you about taking antidepressant medicines. These medicines may improve sleep, relieve pain, and in some cases treat depression. · Learn about fibromyalgia. This makes coping easier. Then, take an active role in your treatment. · Think about joining a support group with others who have fibromyalgia to learn more and get support. When should you call for help? Watch closely for changes in your health, and be sure to contact your doctor if: 
  · You feel sad, helpless, or hopeless; lose interest in things you used to enjoy; or have other symptoms of depression.  
  · Your fibromyalgia symptoms get worse. Where can you learn more? Go to http://orin-cris.info/ Enter V003 in the search box to learn more about \"Fibromyalgia: Care Instructions. \" Current as of: November 19, 2019Content Version: 12.4 © 0738-3854 Healthwise, Incorporated. Care instructions adapted under license by [employer] (which disclaims liability or warranty for this information). If you have questions about a medical condition or this instruction, always ask your healthcare professional. [address] any warranty or liability for your use of this information.

## 2020-05-21 ENCOUNTER — VIRTUAL VISIT (OUTPATIENT)
Dept: NEUROLOGY | Age: 35
End: 2020-05-21

## 2020-05-21 VITALS — BODY MASS INDEX: 41.29 KG/M2 | WEIGHT: 233 LBS | HEIGHT: 63 IN

## 2020-05-21 DIAGNOSIS — F33.2 SEVERE EPISODE OF RECURRENT MAJOR DEPRESSIVE DISORDER, WITHOUT PSYCHOTIC FEATURES (HCC): ICD-10-CM

## 2020-05-21 DIAGNOSIS — M79.7 FIBROMYALGIA: ICD-10-CM

## 2020-05-21 DIAGNOSIS — F41.9 SEVERE ANXIETY: ICD-10-CM

## 2020-05-21 DIAGNOSIS — G43.019 COMMON MIGRAINE WITH INTRACTABLE MIGRAINE: Primary | ICD-10-CM

## 2020-05-21 DIAGNOSIS — G44.209 TENSION VASCULAR HEADACHE: ICD-10-CM

## 2020-05-21 DIAGNOSIS — R41.3 DISTURBANCE OF MEMORY: ICD-10-CM

## 2020-05-21 DIAGNOSIS — G43.711 INTRACTABLE CHRONIC MIGRAINE WITHOUT AURA AND WITH STATUS MIGRAINOSUS: ICD-10-CM

## 2020-05-21 DIAGNOSIS — G31.84 MILD COGNITIVE IMPAIRMENT WITH MEMORY LOSS: ICD-10-CM

## 2020-05-21 DIAGNOSIS — F43.10 PTSD (POST-TRAUMATIC STRESS DISORDER): ICD-10-CM

## 2020-05-21 NOTE — PATIENT INSTRUCTIONS
A Healthy Lifestyle: Care Instructions Your Care Instructions A healthy lifestyle can help you feel good, stay at a healthy weight, and have plenty of energy for both work and play. A healthy lifestyle is something you can share with your whole family. A healthy lifestyle also can lower your risk for serious health problems, such as high blood pressure, heart disease, and diabetes. You can follow a few steps listed below to improve your health and the health of your family. Follow-up care is a key part of your treatment and safety. Be sure to make and go to all appointments, and call your doctor if you are having problems. It's also a good idea to know your test results and keep a list of the medicines you take. How can you care for yourself at home? · Do not eat too much sugar, fat, or fast foods. You can still have dessert and treats now and then. The goal is moderation. · Start small to improve your eating habits. Pay attention to portion sizes, drink less juice and soda pop, and eat more fruits and vegetables. ? Eat a healthy amount of food. A 3-ounce serving of meat, for example, is about the size of a deck of cards. Fill the rest of your plate with vegetables and whole grains. ? Limit the amount of soda and sports drinks you have every day. Drink more water when you are thirsty. ? Eat at least 5 servings of fruits and vegetables every day. It may seem like a lot, but it is not hard to reach this goal. A serving or helping is 1 piece of fruit, 1 cup of vegetables, or 2 cups of leafy, raw vegetables. Have an apple or some carrot sticks as an afternoon snack instead of a candy bar. Try to have fruits and/or vegetables at every meal. 
· Make exercise part of your daily routine. You may want to start with simple activities, such as walking, bicycling, or slow swimming. Try to be active 30 to 60 minutes every day.  You do not need to do all 30 to 60 minutes all at once. For example, you can exercise 3 times a day for 10 or 20 minutes. Moderate exercise is safe for most people, but it is always a good idea to talk to your doctor before starting an exercise program. 
· Keep moving. Arvil Paddy the lawn, work in the garden, or "[employer]. Take the stairs instead of the elevator at work. · If you smoke, quit. People who smoke have an increased risk for heart attack, stroke, cancer, and other lung illnesses. Quitting is hard, but there are ways to boost your chance of quitting tobacco for good. ? Use nicotine gum, patches, or lozenges. ? Ask your doctor about stop-smoking programs and medicines. ? Keep trying. In addition to reducing your risk of diseases in the future, you will notice some benefits soon after you stop using tobacco. If you have shortness of breath or asthma symptoms, they will likely get better within a few weeks after you quit. · Limit how much alcohol you drink. Moderate amounts of alcohol (up to 2 drinks a day for men, 1 drink a day for women) are okay. But drinking too much can lead to liver problems, high blood pressure, and other health problems. Family health If you have a family, there are many things you can do together to improve your health. · Eat meals together as a family as often as possible. · Eat healthy foods. This includes fruits, vegetables, lean meats and dairy, and whole grains. · Include your family in your fitness plan. Most people think of activities such as jogging or tennis as the way to fitness, but there are many ways you and your family can be more active. Anything that makes you breathe hard and gets your heart pumping is exercise. Here are some tips: 
? Walk to do errands or to take your child to school or the bus. 
? Go for a family bike ride after dinner instead of watching TV. Where can you learn more? Go to http://orin-cris.info/ Enter K510 in the search box to learn more about \"A Healthy Lifestyle: Care Instructions. \" Current as of: May 28, 2019Content Version: 12.4 © 1607-6309 Healthwise, Incorporated. Care instructions adapted under license by [employer] (which disclaims liability or warranty for this information). If you have questions about a medical condition or this instruction, always ask your healthcare professional. [address] any warranty or liability for your use of this information. A Healthy Lifestyle: Care Instructions Your Care Instructions A healthy lifestyle can help you feel good, stay at a healthy weight, and have plenty of energy for both work and play. A healthy lifestyle is something you can share with your whole family. A healthy lifestyle also can lower your risk for serious health problems, such as high blood pressure, heart disease, and diabetes. You can follow a few steps listed below to improve your health and the health of your family. Follow-up care is a key part of your treatment and safety. Be sure to make and go to all appointments, and call your doctor if you are having problems. It's also a good idea to know your test results and keep a list of the medicines you take. How can you care for yourself at home? · Do not eat too much sugar, fat, or fast foods. You can still have dessert and treats now and then. The goal is moderation. · Start small to improve your eating habits. Pay attention to portion sizes, drink less juice and soda pop, and eat more fruits and vegetables. ? Eat a healthy amount of food. A 3-ounce serving of meat, for example, is about the size of a deck of cards. Fill the rest of your plate with vegetables and whole grains. ? Limit the amount of soda and sports drinks you have every day. Drink more water when you are thirsty. ? Eat at least 5 servings of fruits and vegetables every day.  It may seem like a lot, but it is not hard to reach this goal. A serving or helping is 1 piece of fruit, 1 cup of vegetables, or 2 cups of leafy, raw vegetables. Have an apple or some carrot sticks as an afternoon snack instead of a candy bar. Try to have fruits and/or vegetables at every meal. 
· Make exercise part of your daily routine. You may want to start with simple activities, such as walking, bicycling, or slow swimming. Try to be active 30 to 60 minutes every day. You do not need to do all 30 to 60 minutes all at once. For example, you can exercise 3 times a day for 10 or 20 minutes. Moderate exercise is safe for most people, but it is always a good idea to talk to your doctor before starting an exercise program. 
· Keep moving. Betty Pandeyk the lawn, work in the garden, or [employer]. Take the stairs instead of the elevator at work. · If you smoke, quit. People who smoke have an increased risk for heart attack, stroke, cancer, and other lung illnesses. Quitting is hard, but there are ways to boost your chance of quitting tobacco for good. ? Use nicotine gum, patches, or lozenges. ? Ask your doctor about stop-smoking programs and medicines. ? Keep trying. In addition to reducing your risk of diseases in the future, you will notice some benefits soon after you stop using tobacco. If you have shortness of breath or asthma symptoms, they will likely get better within a few weeks after you quit. · Limit how much alcohol you drink. Moderate amounts of alcohol (up to 2 drinks a day for men, 1 drink a day for women) are okay. But drinking too much can lead to liver problems, high blood pressure, and other health problems. Family health If you have a family, there are many things you can do together to improve your health. · Eat meals together as a family as often as possible. · Eat healthy foods. This includes fruits, vegetables, lean meats and dairy, and whole grains. · Include your family in your fitness plan. Most people think of activities such as jogging or tennis as the way to fitness, but there are many ways you and your family can be more active. Anything that makes you breathe hard and gets your heart pumping is exercise. Here are some tips: 
? Walk to do errands or to take your child to school or the bus. 
? Go for a family bike ride after dinner instead of watching TV. Where can you learn more? Go to http://orin-cris.info/ Enter O593 in the search box to learn more about \"A Healthy Lifestyle: Care Instructions. \" Current as of: May 28, 2019Content Version: 12.4 © 1011-5405 Healthwise, Incorporated. Care instructions adapted under license by [employer] (which disclaims liability or warranty for this information). If you have questions about a medical condition or this instruction, always ask your healthcare professional. [address] any warranty or liability for your use of this information. Office Policies 
 
o Phone calls/patient messages: Please allow up to 24 hours for someone in the office to contact you about your call or message. Be mindful your provider may be out of the office or your message may require further review. We encourage you to use [employer] for your messages as this is a faster, more efficient way to communicate with our office 
 
o Medication Refills: 
Prescription medications require up to 48 business hours to process. We encourage you to use [facility] for your refills. For controlled medications: Please allow up to 72 business hours to process. Certain medications may require you to  a written prescription at our office. NO narcotic/controlled medications will be prescribed after 4pm Monday through Friday or on weekends 
 
o Form/Paperwork Completion: We ask that you allow 7-14 business days. You may also download your forms to [facility] to have your doctor print off. Glad you are doing better from a headache perspective as well as a fibromyalgia perspective. Please call your pharmacy to find out when you are due for your next [facility] dose and clearly market on your calendar so it is easy to find. You can continue on the gabapentin maximum 2 tablets 4 times a day. This is to help with headaches as well as your fibromyalgia. For right now your headaches are under good control see you probably only needed for your fibromyalgia at this time I would like you to get to the lowest dose necessary to maintain your fibromyalgia but will start to wean when you are ready and you can reduce by 1 tablet weekly into you get to the lowest dose necessary to manage your fibromyalgia pain. Always keep in mind that if you have a bad couple of days related to fibromyalgia or headaches you can increase the dose again but you cannot go over 8 tablets daily in a 24-hour. Continue to use the rizatriptan for the more severe migraines Set up a follow-up appointment in 2 months unclear whether or be virtual or in the office we will figure that out closer to the date In the meantime continue to follow-up with your other healthcare providers especially your mental health team on a regular basis Stay safe stay healthy stay sane and continue to follow CDC guidelines related to COVID 19

## 2020-05-21 NOTE — PROGRESS NOTES
Julián Vincent is a 37 y.o. female who presents today for the following:  Chief Complaint   Patient presents with    Follow-up    Migraine         HPI  Historical Data    This is a patient previously seen in our practice.     Neuro diagnosis includes chronic headaches mild cognitive impairment, low back pain with radiculopathy affecting the right and left, bilateral carotid artery stenosis, Transient visual disturbance of both eyes     Other significant diagnoses include depression posttraumatic stress disorder     She recently had neuropsychiatric testing completed April 8, 2019 and was found to have PTSD severe depressive disorder without psychosis, severe anxiety and somatic sensation disorder along with her mild cognitive impairment and borderline personality traits.     Other comments by on the neuropsych evaluation by the neuropsychologist include the following:  She is young for dementia and there is no neurologic correlate which would explain the type of cognitive deficits she is showing.  At the same time, the CURRENT profile is not consistent with a purely psychiatric issue.  As such, the diagnosis here is that of Mild Cognitive Impairment with Memory loss exacerbated by severe and complex psychiatric distress      She needs intensive psychiatric intervention to include a review of her medication management for anxiety and depression as well as active and continued engagement in intensive psychotherapy.  Consider EMDR.  Consider TMS if medication by itself has not proven helpful for her - she would be an appropriate candidate for same.  I do believe medication for memory should be considered, as there is no other definite explanation for her impaired recognition recall, which often clears the difference between organic and functional memory loss.  Any metabolic cause needs to be ruled out, and sleep issues may be a factor as well.  This is concerning and very atypical changes over time, with improvement in some scores and declines in others.  She remains capable of making decisions for herself., but needs reminders for meals, medications, and finances. I see no evidence of malingering here.  I am concerned about driving safety issues as well, so this should be formally evaluated if deemed medically appropriate.  My hope is to see her again once mood improves to better clarify, because my worry is that if cognitive problems continue to worsen despite positive psychiatric interventions, then she has dementia and the prognosis would be quite poor at that point.  Stay active mentally, physically, and socially. Consult with OT and Speech, Neurocognitive Rehab.   We now have baseline and updated data on her.  Follow up as noted. Ellie Oquendo correlation is, of course, indicated.          Patient was started on Emgality at January 2020: Initially she had significant improvement with infrequent headaches and when she got them they were only mild and needed a single dose of over-the-counter abortive therapy although she would occasionally have a severe migraine lasting 48 hours and incapacitating    Patient missed her dose in March 2020 and headaches escalated again. Isis Fam  She continues on 5 mg Aricept which was started January 2020. She denies side effects. No major complaints of cognitive difficulties at today's office visit. She is doing some brain Buster activities which she finds helpful     Depression and mental health issues: Meds have been adjusted through mental health. She feels as though they are helping. She is having some good days but still many bad days. She is working on better interpersonal skills with her children.   She continues to have complaints of anxiety and states mental health prescribed her hydroxyzine which is not helping and is wondering what else she can do     History of chronic low back pain     Interim Data:   Headaches and migraines  Was treated for migraine status with medication overuse 2 weeks ago with the use of gabapentin 3 TID  Continues on Emgality monthly and Maxalt for rescue for severe migraines  Migraines now episodic    +P/T  Temporal and frontal  +N/-V  + light and sound    Cognitive impairment: still have trouble but can still function and uses reminders; continues on Aricept 5mg /day    Low back pain: daily, saw Rheumatologist and Dx w FMPS    Severe depression/PTSD: continues w virtual calls every 2 weeks      Allergies   Allergen Reactions    Latex Rash, Itching and Swelling    Pcn [Penicillins] Other (comments)     Pt states as a child she had a reaction but does not remember specific reaction. Pt states\"mother say I stopped breathing\". Current Outpatient Medications   Medication Sig    gabapentin (NEURONTIN) 100 mg capsule 1 to 2 tablets 4 times daily as needed headache    donepeziL (ARICEPT) 5 mg tablet Take 1 Tab by mouth daily. Indications: mild cognitve impairment    rizatriptan (MAXALT) 10 mg tablet Take 1 Tab by mouth two (2) times daily as needed for Migraine. 1 tablet as needed and may repeat in 2 hours if needed  Indications: a migraine headache    hydrOXYzine HCL (ATARAX) 50 mg tablet TK 1/2 TO 1 T PO BID PRA    ondansetron (ZOFRAN ODT) 4 mg disintegrating tablet DIS ONE T PO Q 8 H PRN NV    promethazine (PHENERGAN) 25 mg tablet Take 1 Tab by mouth every six (6) hours as needed for Nausea.  galcanezumab-gnlm (EMGALITY PEN) 120 mg/mL injection 1 mL by SubCUTAneous route every thirty (30) days.  ascorbic acid, vitamin C, (VITAMIN C) 500 mg tablet Take 1,000 mg by mouth daily.  omeprazole (PRILOSEC) 40 mg capsule Take 1 Cap by mouth daily.  DULoxetine (CYMBALTA) 60 mg capsule Take 120 mg by mouth daily.  ibuprofen (MOTRIN) 200 mg tablet Take 600 mg by mouth every six (6) hours as needed for Pain.  traZODone (DESYREL) 100 mg tablet Take 100 mg by mouth nightly.     cetirizine (ZYRTEC) 10 mg tablet Take 1 Tab by mouth nightly as needed for Allergies.  lamoTRIgine (LAMICTAL) 200 mg tablet Take 200 mg by mouth daily. Indications: Bipolar Depression    buPROPion XL (WELLBUTRIN XL) 300 mg XL tablet Take 300 mg by mouth every morning.  cholecalciferol, vitamin D3, (VITAMIN D3) 2,000 unit tab Take 1 Tab by mouth daily.  b complex vitamins (B COMPLEX 1) tablet Take 1 Tab by mouth daily.  guaiFENesin-dextromethorphan SR (MUCINEX DM) 600-30 mg per tablet Take 1 Tab by mouth every twelve (12) hours as needed for Cough or Congestion.  oxyCODONE-acetaminophen (PERCOCET) 5-325 mg per tablet Take 1 Tab by mouth every six (6) hours as needed for Pain. No current facility-administered medications for this visit. Past Surgical History:   Procedure Laterality Date    COLONOSCOPY N/A 12/2/2019    COLONOSCOPY (LATEX ALLERGY) performed by [facility]ond Reid [facility] at [facility] AM[facility]UL[facility]    HX COLONOSCOPY      HX ENDOSCOPY      HX GYN      vaginal birth x2    HX HERNIA REPAIR  07/09/2015    Laparoscopic Incisional hernia repair with mesh.     HX LAP CHOLECYSTECTOMY  7/7/2014    HX MYRINGOTOMY Bilateral     childhood    HX PARTIAL HYSTERECTOMY  04/25/2019    laparoscopic, left ovaries       Family History   Problem Relation Age of Onset    Other Mother         fibromyalgia, IBS    Diabetes Mother         Pre-DM    High Cholesterol Mother     Hypertension Mother     Liver Disease Mother         fatty liver    Cancer Mother         uterine     Heart Disease Mother         CHF    Arthritis-osteo Mother     Diabetes Father     Hypertension Father     Asthma Brother     Diabetes Paternal Aunt     Hypertension Paternal Aunt     Diabetes Paternal Uncle     Hypertension Paternal Uncle     Cancer Maternal Grandfather         esoph    Hypertension Maternal Grandfather     Stroke Maternal Grandfather     Heart Disease Paternal Grandmother     Diabetes Paternal Grandfather     Heart Attack Paternal Ladona Lone Stroke Paternal Grandfather     Hypertension Paternal Grandfather     Heart Disease Maternal Grandmother     Other Sister         MTHFR (clotting D/O)       Social History     Socioeconomic History    Marital status: SINGLE     Spouse name: Not on file    Number of children: 2    Years of education: Not on file    Highest education level: Not on file   Occupational History     Employer: NOT EMPLOYED     Comment: work at home    Tobacco Use    Smoking status: Current Every Day Smoker     Packs/day: 0.50     Years: 10.00     Pack years: 5.00     Start date: 3/1/2004    Smokeless tobacco: Never Used   Substance and Sexual Activity    Alcohol use: Yes     Comment: rarely, not weeklt    Drug use: Not Currently     Types: Marijuana     Comment: 11/22/19, last use >1 year    Sexual activity: Yes     Partners: Male     Birth control/protection: None     Comment: single    Social History Narrative    Unemployed currently         ROS  Other than what is stated above under HPI there is no new or changing issues related to the following:  Constitutional: No recent weight change, fever,fatigue, sleep difficulties, or loss of appetite. ENT/Mouth:  No hearing loss, ringing in the ears, chronic sinus problem, nose bleeds sore throat, voice change, hoarseness, swollen glands in neck, or difficulties with chewing and swallowing. Cardiovascular:  No chest pain/angina pectoris, palpitations,swelling of feet/ankles/hands, or calf pain while walking. Respiratory: No chronic or frequent coughs, spitting up blood, shortness of breath, asthma, or wheezing. Gastrointestinal: No abdominal pain, heartburn, nausea, vomiting, constipation, frequent diarrhea, rectal bleeding, or blood in stool. Genitourinary: No frequent urination, burning or painful urination, blood in urine, incontinence or dribbling. Musculoskeletal:   No joint pain, stiffness/swelling, weakness of muscles, muscle pain/cramp, or back pain.    Integument:   No rash/itching, change in skin color, change in hair/nails, or change in color/size of moles. Neurological:  No dizziness/vertigo, loss of consciousness, numbness/tingling sensation, tremors, weakness in limbs, difficulty with balance, frequent or recurring headaches, memory loss or confusion. Psychiatric:   No nervousness, depression, hallucinations, paranoia or suspiciousness. Endocrine: No excessive thirst or urination, heat or cold intolerance. Hematologic/Lymphatic: No bleeding/bruising tendency, phlebitis, or past transfusion. EXAMINATION: Within the context and limitations of a telephone encounter      General appearance: Not testable    Psych/mental health:  Affect: Appropriate    PHQ  3 most recent PHQ Screens 5/26/2015   Little interest or pleasure in doing things Not at all   Feeling down, depressed, irritable, or hopeless Not at all   Total Score PHQ 2 0       HEENT: Not testable    Cardiovascular: Not testable    Respiratory: No dyspnea, wheezes or stridors audible through conversation    Musculoskeletal: Not testable    Integumentary: Not testable      Neurological Examination:   Mental Status:        MMSE  No flowsheet data found. Formal testing was not completed    there was nothing concerning on general observation and discussion. Alert oriented and appropriate to general conversation  Normal processing on general observation  Followed conversation and responded seemingly appropriate throughout the office visit  No word finding difficulties noted on casual observation  Able to follow directions without difficulty     Cranial Nerves:    Hearing intact to conversation  Voice with normal projection  Otherwise not testable      Motor: Not testable    Sensation: Not testable    Coordination/Cerebellar:   Not testable    Gait: Not testable    Fall risk assessment  No flowsheet data found.     Reflexes: Not testable      ASSESSMENT AND PLAN  Headaches and migraines: Overall good response to [facility]. She did have about a migraine status with probable migraine overuse as well. With the use the gabapentin in place of OTCs her headaches are now back to episodic. In 2 weeks she is needed to use Maxalt 3 times and she has use no over-the-counter medications  She is currently taking gabapentin 2 tablets 3 times daily routinely and she finds that this helps her fibromyalgia pain significantly. It is reasonable for her to continue on the gabapentin to help manage the fibromyalgia pain but I would like her on the lowest dose necessary to manage that pain and I have asked her, when she is ready, to reduce the gabapentin by 1 tablet every week to the lowest level possible to manage her pain. When she finds that level she is to maintain that but can continue to increase the gabapentin if she is having a couple of bad days related to fibromyalgia and or low-grade headache and migraine but she is never to exceed more than 8 tablets in a 24-hour. At some point she may need to be referred to pain management or physiatry but for right now she seems to be doing reasonably well regarding the fibromyalgia  I advised her to get online to the National fibromyalgia Association to look for behavioral interventions to help manage this. And I have recommended stretching and yoga at this time. Cognitive difficulties: Continue on Aricept 5 mg daily will consider increasing to 10 mg at a later point in time. And she is to continue to work on mental health issues that are affecting cognition and I agree with her continuing to use behavioral modifications assess such as notes and reminders which seem to be working effectively.   The good news is functionally she is still able to get through her day pay her bills by her groceries etc.    Depression PTSD: Certainly affects other domains of her overall health and wellbeing to include headaches and pain and have encouraged the patient to continue to follow closely with her mental health team.    ICD-10-CM ICD-9-CM    1. Common migraine with intractable migraine G43.019 346.11    2. Intractable chronic migraine without aura and with status migrainosus G43.711 346.73    3. Tension vascular headache G44.209 307.81    4. Severe episode of recurrent major depressive disorder, without psychotic features ([facility] [address] F33.2 296.33    5. PTSD (post-traumatic stress disorder) F43.10 309.81    6. Severe anxiety F41.9 300.00    7. Fibromyalgia M79.7 729.1    8. Mild cognitive impairment with memory loss G31.84 331.83    9. Disturbance of memory R41.3 780.93            Additional pertinent medical data reviewed at today's office visit:       Admission on 03/13/2020, Discharged on 03/14/2020   Component Date Value    WBC 03/13/2020 9.6     RBC 03/13/2020 5.41*    HGB 03/13/2020 15.2     HCT 03/13/2020 44.6     MCV 03/13/2020 82.4     MCH 03/13/2020 28.1     MCHC 03/13/2020 34.1     RDW 03/13/2020 14.4     PLATELET 47/85/1712 971     MPV 03/13/2020 9.8     NRBC 03/13/2020 0.0     ABSOLUTE NRBC 03/13/2020 0.00     Sodium 03/13/2020 136     Potassium 03/13/2020 3.1*    Chloride 03/13/2020 102     CO2 03/13/2020 29     Anion gap 03/13/2020 5     Glucose 03/13/2020 111*    BUN 03/13/2020 5*    Creatinine 03/13/2020 1.03*    BUN/Creatinine ratio 03/13/2020 5*    GFR est AA 03/13/2020 >60     GFR est non-AA 03/13/2020 >60     Calcium 03/13/2020 9.0     Bilirubin, total 03/13/2020 0.5     ALT (SGPT) 03/13/2020 62     AST (SGOT) 03/13/2020 52*    Alk.  phosphatase 03/13/2020 85     Protein, total 03/13/2020 7.8     Albumin 03/13/2020 3.6     Globulin 03/13/2020 4.2*    A-G Ratio 03/13/2020 0.9*    Color 03/13/2020 YELLOW     Appearance 03/13/2020 CLEAR     Specific gravity 03/13/2020 1.030     pH (UA) 03/13/2020 6.0     Protein 03/13/2020 100*    Glucose 03/13/2020 NEGATIVE      Ketone 03/13/2020 15*    Blood 03/13/2020 NEGATIVE      Urobilinogen 03/13/2020 1.0     Nitrites 03/13/2020 POSITIVE*    Leukocyte Esterase 03/13/2020 MODERATE*    WBC 03/13/2020 0-4     RBC 03/13/2020 0-5     Epithelial cells 03/13/2020 FEW     Bacteria 03/13/2020 1+*    WBC 03/13/2020 8.7     RBC 03/13/2020 4.86     HGB 03/13/2020 13.6     HCT 03/13/2020 40.2     MCV 03/13/2020 82.7     MCH 03/13/2020 28.0     MCHC 03/13/2020 33.8     RDW 03/13/2020 14.3     PLATELET 02/82/7156 475     MPV 03/13/2020 9.7     NRBC 03/13/2020 0.0     ABSOLUTE NRBC 03/13/2020 0.00     NEUTROPHILS 03/13/2020 62     LYMPHOCYTES 03/13/2020 26     MONOCYTES 03/13/2020 8     EOSINOPHILS 03/13/2020 2     BASOPHILS 03/13/2020 1     IMMATURE GRANULOCYTES 03/13/2020 1*    ABS. NEUTROPHILS 03/13/2020 5.4     ABS. LYMPHOCYTES 03/13/2020 2.3     ABS. MONOCYTES 03/13/2020 0.7     ABS. EOSINOPHILS 03/13/2020 0.2     ABS. BASOPHILS 03/13/2020 0.1     ABS. IMM. GRANS. 03/13/2020 0.1*    DF 03/13/2020 AUTOMATED     Lipase 03/13/2020 207     Bilirubin UA, confirm 03/13/2020 NEGATIVE     Admission on 03/09/2020, Discharged on 03/09/2020   Component Date Value    WBC 03/09/2020 10.2     RBC 03/09/2020 5.22*    HGB 03/09/2020 14.6     HCT 03/09/2020 43.1     MCV 03/09/2020 82.6     MCH 03/09/2020 28.0     MCHC 03/09/2020 33.9     RDW 03/09/2020 14.4     PLATELET 55/58/9537 220     MPV 03/09/2020 9.7     NRBC 03/09/2020 0.0     ABSOLUTE NRBC 03/09/2020 0.00     NEUTROPHILS 03/09/2020 70     LYMPHOCYTES 03/09/2020 18     MONOCYTES 03/09/2020 9     EOSINOPHILS 03/09/2020 1     BASOPHILS 03/09/2020 1     IMMATURE GRANULOCYTES 03/09/2020 1*    ABS. NEUTROPHILS 03/09/2020 7.2     ABS. LYMPHOCYTES 03/09/2020 1.9     ABS. MONOCYTES 03/09/2020 0.9     ABS. EOSINOPHILS 03/09/2020 0.1     ABS. BASOPHILS 03/09/2020 0.1     ABS. IMM.  GRANS. 03/09/2020 0.1*    DF 03/09/2020 AUTOMATED     Sodium 03/09/2020 134*    Potassium 03/09/2020 3.7     Chloride 03/09/2020 104     CO2 03/09/2020 22     Anion gap 03/09/2020 8     Glucose 03/09/2020 103*    BUN 03/09/2020 6     Creatinine 03/09/2020 0.93     BUN/Creatinine ratio 03/09/2020 6*    GFR est AA 03/09/2020 >60     GFR est non-AA 03/09/2020 >60     Calcium 03/09/2020 8.7     Bilirubin, total 03/09/2020 0.6     ALT (SGPT) 03/09/2020 34     AST (SGOT) 03/09/2020 33     Alk.  phosphatase 03/09/2020 94     Protein, total 03/09/2020 7.7     Albumin 03/09/2020 3.3*    Globulin 03/09/2020 4.4*    A-G Ratio 03/09/2020 0.8*    Lipase 03/09/2020 65*    Color 03/09/2020 EDVIN     Appearance 03/09/2020 CLOUDY*    Specific gravity 03/09/2020 1.025     pH (UA) 03/09/2020 6.0     Protein 03/09/2020 100*    Glucose 03/09/2020 NEGATIVE      Ketone 03/09/2020 15*    Blood 03/09/2020 SMALL*    Urobilinogen 03/09/2020 1.0     Nitrites 03/09/2020 POSITIVE*    Leukocyte Esterase 03/09/2020 MODERATE*    WBC 03/09/2020 0-4     RBC 03/09/2020 5-10     Epithelial cells 03/09/2020 MANY*    Bacteria 03/09/2020 1+*    UA:UC IF INDICATED 03/09/2020 URINE CULTURE ORDERED*    TSH 03/09/2020 3.28     Bilirubin UA, confirm 03/09/2020 NEGATIVE      Special Requests: 03/09/2020                      Value:NO SPECIAL REQUESTS  Reflexed from [id number]      [place] Count 03/09/2020                      Value:>100,000  COLONIES/mL      Culture result: 03/09/2020 MIXED UROGENITAL MAGGY ISOLATED     Ventricular Rate 03/09/2020 99     Atrial Rate 03/09/2020 99     P-R Interval 03/09/2020 148     QRS Duration 03/09/2020 68     Q-T Interval 03/09/2020 356     QTC Calculation (Bezet) 03/09/2020 456     Calculated P Axis 03/09/2020 14     Calculated R Axis 03/09/2020 34     Calculated T Axis 03/09/2020 33     Diagnosis 03/09/2020                      Value:Sinus rhythm with occasional premature ventricular complexes  Low voltage QRS  Septal infarct , age undetermined  ST & T wave abnormality, consider anterior ischemia  When compared with ECG of 14-JUL-2015 23:31,  premature ventricular complexes are now present  Septal infarct is now present  Nonspecific T wave abnormality now evident in Lateral leads  Confirmed by Farzaneh Roland ([id number]) on 3/10/2020 9:32:12 AM     Office Visit on 02/26/2020   Component Date Value    Antinuclear Abs, IFA 02/26/2020 Negative     Immunoglobulin A, Qt. 02/26/2020 248     Myeloperoxidase (MPO) Ab 02/26/2020 <9.0     Proteinase 3 (WA-3) Ab 02/26/2020 <3.5     Cytoplasmic (C-ANCA) Ab 02/26/2020 <1:20     Perinuclear (P-ANCA) 02/26/2020 <1:20     Atypical pANCA 02/26/2020 <1:20     Cryoglobulin, QL 02/26/2020 Comment     Specific Gravity 02/26/2020 1.012     pH (UA) 02/26/2020 6.5     Color 02/26/2020 Yellow     Appearance 02/26/2020 Clear     Leukocyte Esterase 02/26/2020 Negative     Protein 02/26/2020 Negative     Glucose 02/26/2020 Negative     Ketone 02/26/2020 Negative     Blood 02/26/2020 Negative     Bilirubin 02/26/2020 Negative     Urobilinogen 02/26/2020 0.2     Nitrites 02/26/2020 Negative     Microscopic Examination 02/26/2020 Comment     Microscopic exam 02/26/2020 See additional order     URINALYSIS REFLEX 02/26/2020 Comment     WBC 02/26/2020 0-5     RBC 02/26/2020 0-2     Epithelial cells 02/26/2020 0-10     Casts 02/26/2020 None seen     Mucus 02/26/2020 Present     Bacteria 02/26/2020 Few        XR Results (maximum last 3): Results from [place] encounter on 09/19/18   XR ABD (KUB)    Impression IMPRESSION: Normal colonic gas pattern. Fluid-filled small bowel. Results from [place] encounter on 08/09/18   XR SPINE LUMB MIN 4 V    Impression IMPRESSION:    Mild T11-12 and T12-L1 degenerative disc disease. Mild L5-S1 facet arthropathy. The exam is otherwise unremarkable. XR SPINE THORAC 3 V    Impression IMPRESSION: Negative. CT Results (maximum last 3):   Results from [place] encounter on 03/09/20   CT NECK SOFT TISSUE W CONT    Impression impression: Prominent nonhomogeneous thyroid gland without significant pressure  on the airway. CT ABD PELV W CONT    Impression IMPRESSION:  Compatible mild colitis. Results from [place] encounter on 07/01/18   CT HEAD WO CONT    Impression IMPRESSION: Normal study. MRI Results (maximum last 3): Results from [place] encounter on 08/09/18   MRI BRAIN W WO CONT    Impression IMPRESSION:   1. No discrete pituitary lesion identified, and otherwise unremarkable brain  MRI. A single new nonspecific T2/FLAIR hyperintensity in the right parietal  subcortical white matter is likely of no clinical significance. Results from [place] encounter on 09/23/16   MRI BRAIN W WO CONT    Impression IMPRESSION:   No focal pituitary abnormalities demonstrated. Normal brain MRI. Mert Hilt This was a telephone visit as patient did not have good Internet connection.   Length of time related to this office visit was 21 minutes  Soumya Thomas MS, ANP-BC, [facility]

## 2020-07-07 ENCOUNTER — TELEPHONE (OUTPATIENT)
Dept: RHEUMATOLOGY | Age: 35
End: 2020-07-07

## 2020-07-16 ENCOUNTER — TELEPHONE (OUTPATIENT)
Dept: FAMILY MEDICINE CLINIC | Age: 35
End: 2020-07-16

## 2020-07-16 DIAGNOSIS — M79.7 FIBROMYALGIA: Primary | ICD-10-CM

## 2020-07-16 NOTE — TELEPHONE ENCOUNTER
Caller is Gilda [facility] case manger with [employer] [facility]#[phone]/  she is trying to get the pt a Rx for a shower bench ,Rx for Rollater and a Rx for a  grab bar for shower. They will cover for her , pt just needs scripts and order sent to a Medical supply store of our choice.  Please follow up with patient when these scripts are ready

## 2020-07-20 NOTE — TELEPHONE ENCOUNTER
Per VO new order for shower bench, rollator and grab bar have been placed.  Patient called as per request. Order left at  for

## 2020-07-24 ENCOUNTER — OFFICE VISIT (OUTPATIENT)
Dept: NEUROLOGY | Age: 35
End: 2020-07-24

## 2020-07-24 DIAGNOSIS — F43.10 PTSD (POST-TRAUMATIC STRESS DISORDER): ICD-10-CM

## 2020-07-24 DIAGNOSIS — F41.9 SEVERE ANXIETY: ICD-10-CM

## 2020-07-24 DIAGNOSIS — G31.84 MILD COGNITIVE IMPAIRMENT WITH MEMORY LOSS: ICD-10-CM

## 2020-07-24 DIAGNOSIS — G43.711 INTRACTABLE CHRONIC MIGRAINE WITHOUT AURA AND WITH STATUS MIGRAINOSUS: ICD-10-CM

## 2020-07-24 DIAGNOSIS — F33.2 SEVERE EPISODE OF RECURRENT MAJOR DEPRESSIVE DISORDER, WITHOUT PSYCHOTIC FEATURES (HCC): ICD-10-CM

## 2020-07-24 DIAGNOSIS — G44.209 TENSION VASCULAR HEADACHE: ICD-10-CM

## 2020-07-24 DIAGNOSIS — G43.019 COMMON MIGRAINE WITH INTRACTABLE MIGRAINE: Primary | ICD-10-CM

## 2020-07-24 DIAGNOSIS — M79.7 FIBROMYALGIA: ICD-10-CM

## 2020-07-24 RX ORDER — DONEPEZIL HYDROCHLORIDE 10 MG/1
10 TABLET, FILM COATED ORAL
Qty: 90 TAB | Refills: 3 | Status: SHIPPED | OUTPATIENT
Start: 2020-07-24 | End: 2021-09-21

## 2020-07-24 RX ORDER — GABAPENTIN 300 MG/1
300 CAPSULE ORAL 3 TIMES DAILY
Qty: 270 CAP | Refills: 3 | Status: SHIPPED | OUTPATIENT
Start: 2020-07-24 | End: 2020-12-11 | Stop reason: SINTOL

## 2020-07-24 NOTE — PROGRESS NOTES
Shubham Covington is a 37 y.o. female who presents today for the following:  Chief Complaint   Patient presents with    Follow-up    Memory Loss    Migraine         HPI  Historical Data    This is a patient previously seen in our practice.     Neuro diagnosis includes chronic headaches mild cognitive impairment, low back pain with radiculopathy affecting the right and left, bilateral carotid artery stenosis, Transient visual disturbance of both eyes     Other significant diagnoses include depression posttraumatic stress disorder     She recently had neuropsychiatric testing completed April 8, 2019 and was found to have PTSD severe depressive disorder without psychosis, severe anxiety and somatic sensation disorder along with her mild cognitive impairment and borderline personality traits.     Other comments by on the neuropsych evaluation by the neuropsychologist include the following:  She is young for dementia and there is no neurologic correlate which would explain the type of cognitive deficits she is showing.  At the same time, the CURRENT profile is not consistent with a purely psychiatric issue.  As such, the diagnosis here is that of Mild Cognitive Impairment with Memory loss exacerbated by severe and complex psychiatric distress      She needs intensive psychiatric intervention to include a review of her medication management for anxiety and depression as well as active and continued engagement in intensive psychotherapy.  Consider EMDR.  Consider TMS if medication by itself has not proven helpful for her - she would be an appropriate candidate for same.  I do believe medication for memory should be considered, as there is no other definite explanation for her impaired recognition recall, which often clears the difference between organic and functional memory loss.  Any metabolic cause needs to be ruled out, and sleep issues may be a factor as well.  This is concerning and very atypical changes over time, with improvement in some scores and declines in others.  She remains capable of making decisions for herself., but needs reminders for meals, medications, and finances. I see no evidence of malingering here.  I am concerned about driving safety issues as well, so this should be formally evaluated if deemed medically appropriate.  My hope is to see her again once mood improves to better clarify, because my worry is that if cognitive problems continue to worsen despite positive psychiatric interventions, then she has dementia and the prognosis would be quite poor at that point.  Stay active mentally, physically, and socially. Consult with OT and Speech, Neurocognitive Rehab.   We now have baseline and updated data on her.  Follow up as noted. Chelly Marcus correlation is, of course, indicated.          Patient was started on Emgality at January 2020: Initially she had significant improvement with infrequent headaches and when she got them they were only mild and needed a single dose of over-the-counter abortive therapy although she would occasionally have a severe migraine lasting 48 hours and incapacitating     Patient missed her dose in March 2020 and headaches escalated again. Lencho Mobley  She continues on 5 mg Aricept which was started January 2020.  She denies side effects.  No major complaints of cognitive difficulties at today's office visit. Sandy Cisneros is doing some brain Buster activities which she finds helpful     Depression and mental health issues: Meds have been adjusted through mental health.  She feels as though they are helping. Sandy Cisneros is having some good days but still many bad days. Sandy Cisneros is working on better interpersonal skills with her children.  She continues to have complaints of anxiety and states mental health prescribed her hydroxyzine which is not helping and is wondering what else she can do     History of chronic low back pain     Interim Data:     Headaches and migraines  Emgality: First dose January 2020  Patient is now down to having 1-2 migraines per week she takes rizatriptan and it completely aborts her headache she does get some light and sound sensitivity but for the most part she is not debilitated and is able to get through her day  She denies side effects to the Emgality to include local injection site reactions    Cognitive impairment:  Currently on Aricept 5 mg since January 2020  She still complains of significant cognitive difficulties  She is using her calendar on her smart phone  She tries to use notes but then she forgets where she places her notes  She is doing brain Buster type activities  And she continues to follow-up closely with mental health    Depression/mental health:  Patient does continue to see mental health on a regular basis and follows through with recommended medication adjustments    Fibromyalgia: Continues to use gabapentin 100 mg 2 tablets 3 times a day on a routine basis and then on occasion takes an additional 1 to 2 tablets for up to 8 tablets for pain management. She is not really using the gabapentin for headaches and migraines anymore      Allergies   Allergen Reactions    Latex Rash, Itching and Swelling    Pcn [Penicillins] Other (comments)     Pt states as a child she had a reaction but does not remember specific reaction. Pt states\"mother say I stopped breathing\". Current Outpatient Medications   Medication Sig    donepeziL (ARICEPT) 10 mg tablet Take 1 Tab by mouth nightly. Indications: mild to moderate Alzheimer's type dementia    gabapentin (NEURONTIN) 300 mg capsule Take 1 Cap by mouth three (3) times daily. Max Daily Amount: 900 mg.    rizatriptan (MAXALT) 10 mg tablet Take 1 Tab by mouth two (2) times daily as needed for Migraine.  1 tablet as needed and may repeat in 2 hours if needed  Indications: a migraine headache    hydrOXYzine HCL (ATARAX) 50 mg tablet TK 1/2 TO 1 T PO BID PRA    ondansetron (ZOFRAN ODT) 4 mg disintegrating tablet DIS ONE T PO Q 8 H PRN NV    promethazine (PHENERGAN) 25 mg tablet Take 1 Tab by mouth every six (6) hours as needed for Nausea.  galcanezumab-gnlm (EMGALITY PEN) 120 mg/mL injection 1 mL by SubCUTAneous route every thirty (30) days.  ascorbic acid, vitamin C, (VITAMIN C) 500 mg tablet Take 1,000 mg by mouth daily.  omeprazole (PRILOSEC) 40 mg capsule Take 1 Cap by mouth daily.  DULoxetine (CYMBALTA) 60 mg capsule Take 120 mg by mouth daily.  ibuprofen (MOTRIN) 200 mg tablet Take 600 mg by mouth every six (6) hours as needed for Pain.  traZODone (DESYREL) 100 mg tablet Take 100 mg by mouth nightly.  oxyCODONE-acetaminophen (PERCOCET) 5-325 mg per tablet Take 1 Tab by mouth every six (6) hours as needed for Pain.  cetirizine (ZYRTEC) 10 mg tablet Take 1 Tab by mouth nightly as needed for Allergies.  lamoTRIgine (LAMICTAL) 200 mg tablet Take 200 mg by mouth daily. Indications: Bipolar Depression    buPROPion XL (WELLBUTRIN XL) 300 mg XL tablet Take 300 mg by mouth every morning.  cholecalciferol, vitamin D3, (VITAMIN D3) 2,000 unit tab Take 1 Tab by mouth daily.  b complex vitamins (B COMPLEX 1) tablet Take 1 Tab by mouth daily. No current facility-administered medications for this visit. Past Surgical History:   Procedure Laterality Date    COLONOSCOPY N/A 12/2/2019    COLONOSCOPY (LATEX ALLERGY) performed by Matheus Daniels MD at [facility] AMBULATORY OR    HX COLONOSCOPY      HX ENDOSCOPY      HX GYN      vaginal birth x2    HX HERNIA REPAIR  07/09/2015    Laparoscopic Incisional hernia repair with mesh.     HX LAP CHOLECYSTECTOMY  7/7/2014    HX MYRINGOTOMY Bilateral     childhood    HX PARTIAL HYSTERECTOMY  04/25/2019    laparoscopic, left ovaries       Family History   Problem Relation Age of Onset    Other Mother         fibromyalgia, IBS    Diabetes Mother         Pre-DM    High Cholesterol Mother     Hypertension Mother     Liver Disease Mother         fatty liver  Cancer Mother         uterine     Heart Disease Mother         [facility]   [address] [facility] [address] Arthritis-osteo Mother     Diabetes Father     Hypertension Father     Asthma Brother     Diabetes Paternal Aunt     Hypertension Paternal Aunt     Diabetes Paternal Uncle     Hypertension Paternal Uncle     Cancer Maternal Grandfather         esoph    Hypertension Maternal Grandfather     Stroke Maternal Grandfather     Heart Disease Paternal Grandmother     Diabetes Paternal Grandfather     Heart Attack Paternal Grandfather     Stroke Paternal Grandfather     Hypertension Paternal Grandfather     Heart Disease Maternal Grandmother     Other Sister         MTHFR (clotting D/O)       Social History     Socioeconomic History    Marital status: SINGLE     Spouse name: Not on file    Number of children: 2    Years of education: Not on file    Highest education level: Not on file   Occupational History     Employer: NOT EMPLOYED     Comment: work at home    Tobacco Use    Smoking status: Current Every Day Smoker     Packs/day: 0.50     Years: 10.00     Pack years: 5.00     Start date: 3/1/2004    Smokeless tobacco: Never Used   Substance and Sexual Activity    Alcohol use: Yes     Comment: rarely, not weeklt    Drug use: Not Currently     Types: Marijuana     Comment: 11/22/19, last use >1 year    Sexual activity: Yes     Partners: Male     Birth control/protection: None     Comment: single    Social History Narrative    Unemployed currently         ROS    A ten system review of constitutional, cardiovascular, respiratory, musculoskeletal, endocrine, skin, SHEENT, genitourinary, psychiatric and neurologic systems was obtained and is unremarkable with the except as stated under HPI      EXAMINATION: Within the context and limitations of a telephone encounter      General appearance: Not testable    Psych/mental health:  Affect: Appropriate    PHQ  3 most recent PHQ Screens 5/26/2015   Little interest or pleasure in doing things Not at all   Feeling down, depressed, irritable, or hopeless Not at all   Total Score PHQ 2 0       HEENT: Not testable    Cardiovascular: Not testable    Respiratory: No dyspnea, wheezes or stridors audible through conversation    Musculoskeletal: Not testable    Integumentary: Not testable      Neurological Examination:   Mental Status:        MMSE  No flowsheet data found. Formal testing was not completed    there was nothing concerning on general  discussion. Alert and appropriate to general conversation  Normal processing on general observation  Followed conversation and responded seemingly appropriate throughout the visit  No word finding difficulties noted on casual observation      Cranial Nerves:    Hearing intact to conversation  Voice with normal projection  Otherwise not testable      Motor: Not testable    Sensation: Not testable    Coordination/Cerebellar:   Not testable    Gait: Not testable    Fall risk assessment  No flowsheet data found. Reflexes: Not testable    ASSESSMENT AND PLAN  Intractable headaches and migraines: Much improved using the Emgality. Additionally she gets a good response from her abortive medication which is rizatriptan.   She is not losing any days to headaches and migraines she is using much less rescue medications and her associated symptoms are reduced    Mild cognitive impairment with memory loss: Probably reasonable to increase the Aricept to 10 mg daily  We reviewed techniques to help improve behavioral interventions such as putting her notes in the notes section of her smart phone rather than on actual pieces of paper and using reminders more aggressively on her phone  She has been encouraged to continue with her brain Lonne Oddi activities  She has been encouraged to follow-up with mental health which she has been doing    Depression/anxiety/PTSD: Playing into above cognitive issues continue under mental health direction    Fibromyalgia: Getting good response from gabapentin but I like to streamline her medications a bit so we are going to switch her to gabapentin 300 mg 1 in the AM 1 at dinner and she can have an extra 1 midday if needed. The 100 mg dose will be discontinued  I would also recommend splitting the Cymbalta which she takes a full 120 mg at 1 time in the morning to using 60 in the morning and 60 at dinner    Follow-up in 1 month sooner if needed      ICD-10-CM ICD-9-CM    1. Common migraine with intractable migraine  G43.019 346.11    2. Intractable chronic migraine without aura and with status migrainosus  G43.711 346.73    3. Tension vascular headache  G44.209 307.81    4. PTSD (post-traumatic stress disorder)  F43.10 309.81    5. Severe episode of recurrent major depressive disorder, without psychotic features ([facility] [address]  F33.2 296.33    6. Severe anxiety  F41.9 300.00    7. Mild cognitive impairment with memory loss  G31.84 331.83 donepeziL (ARICEPT) 10 mg tablet   8. Fibromyalgia  M79.7 729.1 gabapentin (NEURONTIN) 300 mg capsule           Additional pertinent medical data reviewed at today's office visit:       No visits with results within 3 Month(s) from this visit.    Latest known visit with results is:   Admission on 03/13/2020, Discharged on 03/14/2020   Component Date Value    WBC 03/13/2020 9.6     RBC 03/13/2020 5.41*    HGB 03/13/2020 15.2     HCT 03/13/2020 44.6     MCV 03/13/2020 82.4     MCH 03/13/2020 28.1     MCHC 03/13/2020 34.1     RDW 03/13/2020 14.4     PLATELET 29/95/4955 194     MPV 03/13/2020 9.8     NRBC 03/13/2020 0.0     ABSOLUTE NRBC 03/13/2020 0.00     Sodium 03/13/2020 136     Potassium 03/13/2020 3.1*    Chloride 03/13/2020 102     CO2 03/13/2020 29     Anion gap 03/13/2020 5     Glucose 03/13/2020 111*    BUN 03/13/2020 5*    Creatinine 03/13/2020 1.03*    BUN/Creatinine ratio 03/13/2020 5*    GFR est AA 03/13/2020 >60     GFR est non-AA 03/13/2020 >60     Calcium 03/13/2020 9.0     Bilirubin, total 03/13/2020 0.5     ALT (SGPT) 03/13/2020 62     AST (SGOT) 03/13/2020 52*    Alk. phosphatase 03/13/2020 85     Protein, total 03/13/2020 7.8     Albumin 03/13/2020 3.6     Globulin 03/13/2020 4.2*    A-G Ratio 03/13/2020 0.9*    Color 03/13/2020 YELLOW     Appearance 03/13/2020 CLEAR     Specific gravity 03/13/2020 1.030     pH (UA) 03/13/2020 6.0     Protein 03/13/2020 100*    Glucose 03/13/2020 NEGATIVE      Ketone 03/13/2020 15*    Blood 03/13/2020 NEGATIVE      Urobilinogen 03/13/2020 1.0     Nitrites 03/13/2020 POSITIVE*    Leukocyte Esterase 03/13/2020 MODERATE*    WBC 03/13/2020 0-4     RBC 03/13/2020 0-5     Epithelial cells 03/13/2020 FEW     Bacteria 03/13/2020 1+*    WBC 03/13/2020 8.7     RBC 03/13/2020 4.86     HGB 03/13/2020 13.6     HCT 03/13/2020 40.2     MCV 03/13/2020 82.7     MCH 03/13/2020 28.0     MCHC 03/13/2020 33.8     RDW 03/13/2020 14.3     PLATELET 69/20/4114 159     MPV 03/13/2020 9.7     NRBC 03/13/2020 0.0     ABSOLUTE NRBC 03/13/2020 0.00     NEUTROPHILS 03/13/2020 62     LYMPHOCYTES 03/13/2020 26     MONOCYTES 03/13/2020 8     EOSINOPHILS 03/13/2020 2     BASOPHILS 03/13/2020 1     IMMATURE GRANULOCYTES 03/13/2020 1*    ABS. NEUTROPHILS 03/13/2020 5.4     ABS. LYMPHOCYTES 03/13/2020 2.3     ABS. MONOCYTES 03/13/2020 0.7     ABS. EOSINOPHILS 03/13/2020 0.2     ABS. BASOPHILS 03/13/2020 0.1     ABS. IMM. GRANS. 03/13/2020 0.1*    DF 03/13/2020 AUTOMATED     Lipase 03/13/2020 207     Bilirubin UA, confirm 03/13/2020 NEGATIVE         XR Results (maximum last 3): Results from [place] encounter on 09/19/18   XR ABD (KUB)    Impression IMPRESSION: Normal colonic gas pattern. Fluid-filled small bowel. Results from [place] encounter on 08/09/18   XR SPINE LUMB MIN 4 V    Impression IMPRESSION:    Mild T11-12 and T12-L1 degenerative disc disease. Mild L5-S1 facet arthropathy. The exam is otherwise unremarkable. XR SPINE THORAC 3 V    Impression IMPRESSION: Negative. CT Results (maximum last 3): Results from [place] encounter on 03/09/20   CT NECK SOFT TISSUE W CONT    Impression  impression: Prominent nonhomogeneous thyroid gland without significant pressure  on the airway. CT ABD PELV W CONT    Impression IMPRESSION:  Compatible mild colitis. Results from [place] encounter on 07/01/18   CT HEAD WO CONT    Impression IMPRESSION: Normal study. MRI Results (maximum last 3): Results from [place] encounter on 08/09/18   MRI BRAIN W WO CONT    Impression IMPRESSION:   1. No discrete pituitary lesion identified, and otherwise unremarkable brain  MRI. A single new nonspecific T2/FLAIR hyperintensity in the right parietal  subcortical white matter is likely of no clinical significance. Results from [place] encounter on 09/23/16   MRI BRAIN W WO CONT    Impression IMPRESSION:   No focal pituitary abnormalities demonstrated. Normal brain MRI. Blake Escoto            This was a telephone visit review of above data with the patient via the phone conversation lasted greater than 20 minutes      Geoffrey Huitron MS, ANP-BC, [facility]

## 2020-07-24 NOTE — PATIENT INSTRUCTIONS
Per our discussion we are going to adjust her gabapentin working to discontinue the 100 mg and switch you to 300 mg tablets to take 1 in the morning and 1 at dinner if needed you can take 1 midday  Additionally I want you to split the 120 mg of the Cymbalta to 60 mg in the morning 60 mg at dinner  Both of these should help improve fibromyalgia control    Regarding headaches and migraines continue on the Emgality and continue to use the rizatriptan for rescue medication    Working to increase the Aricept to 10 mg/day to help with the cognitive aspects of things continue with your brain Buster exercise and continue to use your smart phone more aggressively regarding notes and reminders and not just the calendar section  Continue with your brain Janeece Rich activities    And continue to follow-up with mental health closely  Use the pillbox as you have been doing to help maintain medication adherence    Follow-up telephone visit in 1 month

## 2020-07-31 ENCOUNTER — TELEPHONE (OUTPATIENT)
Dept: FAMILY MEDICINE CLINIC | Age: 35
End: 2020-07-31

## 2020-07-31 NOTE — TELEPHONE ENCOUNTER
Faxed order for shower bench, rollator and grab bar to [employer] at [phone]    Patient notified.

## 2020-07-31 NOTE — TELEPHONE ENCOUNTER
Caller is [place] Kendall irizarryer with [facility] U#[phone]/  she is trying to get the pt a Rx for a shower bench ,Rx for Rollater and a Rx for a  grab bar for shower. They will cover for her , pt just needs scripts and order sent to a Medical supply store of our choice.  Please follow up with patient when these scripts are ready    She wants it sent to a medical supply not patient  order     pls advise

## 2020-08-06 ENCOUNTER — OFFICE VISIT (OUTPATIENT)
Dept: NEUROLOGY | Age: 35
End: 2020-08-06
Payer: MEDICAID

## 2020-08-06 VITALS
HEIGHT: 63 IN | HEART RATE: 138 BPM | DIASTOLIC BLOOD PRESSURE: 82 MMHG | SYSTOLIC BLOOD PRESSURE: 120 MMHG | BODY MASS INDEX: 44.3 KG/M2 | WEIGHT: 250 LBS

## 2020-08-06 DIAGNOSIS — F43.10 PTSD (POST-TRAUMATIC STRESS DISORDER): ICD-10-CM

## 2020-08-06 DIAGNOSIS — G43.711 INTRACTABLE CHRONIC MIGRAINE WITHOUT AURA AND WITH STATUS MIGRAINOSUS: ICD-10-CM

## 2020-08-06 DIAGNOSIS — G31.9 NEURODEGENERATIVE COGNITIVE IMPAIRMENT (HCC): Primary | ICD-10-CM

## 2020-08-06 DIAGNOSIS — F33.2 SEVERE EPISODE OF RECURRENT MAJOR DEPRESSIVE DISORDER, WITHOUT PSYCHOTIC FEATURES (HCC): ICD-10-CM

## 2020-08-06 DIAGNOSIS — G43.019 COMMON MIGRAINE WITH INTRACTABLE MIGRAINE: ICD-10-CM

## 2020-08-06 DIAGNOSIS — M79.7 FIBROMYALGIA: ICD-10-CM

## 2020-08-06 DIAGNOSIS — F41.9 SEVERE ANXIETY: ICD-10-CM

## 2020-08-06 PROCEDURE — 99215 OFFICE O/P EST HI 40 MIN: CPT | Performed by: PSYCHIATRY & NEUROLOGY

## 2020-08-06 RX ORDER — DULOXETIN HYDROCHLORIDE 30 MG/1
30 CAPSULE, DELAYED RELEASE ORAL DAILY
COMMUNITY
End: 2020-12-11 | Stop reason: ALTCHOICE

## 2020-08-06 NOTE — PATIENT INSTRUCTIONS
Regarding headaches migraines: Continue with the Emgality and rescue protocol as you have been doing    Cognitive impairment continue on Aricept 10 mg daily. And continue to follow-up with mental health for the severe anxiety and depression and PTSD that also accentuates the cognitive issues. A letter has been written for you to give to disability for your disability  from a neurologic perspective and I suggest that you the your  or you get a letter from your mental health team describing your mental health capacity in relationship to a working environment    I have given you prescription for physical therapy to help with the fibromyalgia release. Take it to the physical therapy center of your choice    If you think about it try to split your dose for the Cymbalta taking at least 30 mg in the evening even if you continue to take the 120 in the morning.   Perhaps you can consider taking 90 mg that would be the 60+ the 30 mg in the morning and then 60 mg around dinnertime    Continue with the gabapentin 300 mg twice a day with an additional dose midday if needed    I will see you back in the office in 3 months sooner if needed

## 2020-08-06 NOTE — PROGRESS NOTES
Tato Kaiser is a 37 y.o. female who presents today for the following:  Chief Complaint   Patient presents with    Follow-up     needs letter explaining conditions regarding disability    Memory Loss    Headache       This is an in office visit    HPI  Historical Data    This is a patient previously seen in our practice.     Neuro diagnosis includes chronic headaches mild cognitive impairment, low back pain with radiculopathy affecting the right and left, bilateral carotid artery stenosis, Transient visual disturbance of both eyes     Other significant diagnoses include depression posttraumatic stress disorder     She recently had neuropsychiatric testing completed April 8, 2019 and was found to have PTSD severe depressive disorder without psychosis, severe anxiety and somatic sensation disorder along with her mild cognitive impairment and borderline personality traits.     Other comments by on the neuropsych evaluation by the neuropsychologist include the following:  She is young for dementia and there is no neurologic correlate which would explain the type of cognitive deficits she is showing.  At the same time, the CURRENT profile is not consistent with a purely psychiatric issue.  As such, the diagnosis here is that of Mild Cognitive Impairment with Memory loss exacerbated by severe and complex psychiatric distress      She needs intensive psychiatric intervention to include a review of her medication management for anxiety and depression as well as active and continued engagement in intensive psychotherapy.  Consider EMDR.  Consider TMS if medication by itself has not proven helpful for her - she would be an appropriate candidate for same.  I do believe medication for memory should be considered, as there is no other definite explanation for her impaired recognition recall, which often clears the difference between organic and functional memory loss.  Any metabolic cause needs to be ruled out, and sleep issues may be a factor as well.  This is concerning and very atypical changes over time, with improvement in some scores and declines in others.  She remains capable of making decisions for herself., but needs reminders for meals, medications, and finances. I see no evidence of malingering here.  I am concerned about driving safety issues as well, so this should be formally evaluated if deemed medically appropriate.  My hope is to see her again once mood improves to better clarify, because my worry is that if cognitive problems continue to worsen despite positive psychiatric interventions, then she has dementia and the prognosis would be quite poor at that point.  Stay active mentally, physically, and socially. Consult with OT and Speech, Neurocognitive Rehab.   We now have baseline and updated data on her.  Follow up as noted. Birder Flood correlation is, of course, indicated.          Patient was started on Emgality at January 2020: Initially she had significant improvement with infrequent headaches and when she got them they were only mild and needed a single dose of over-the-counter abortive therapy although she would occasionally have a severe migraine lasting 48 hours and incapacitating     Patient missed her dose in March 2020 and headaches escalated again. Jenna Garcia  She continues on 5 mg Aricept which was started January 2020.  She denies side effects.  No major complaints of cognitive difficulties at today's office visit. Tonya Avitia is doing some brain Buster activities which she finds helpful     Depression and mental health issues: Meds have been adjusted through mental health.  She feels as though they are helping. Tonya Avitia is having some good days but still many bad days. Tonya Avitia is working on better interpersonal skills with her children.  She continues to have complaints of anxiety and states mental health prescribed her hydroxyzine which is not helping and is wondering what else she can do     History of chronic low back pain     Interim Data:     Headaches and migraines  Emgality: First dose January 2020  Patient is now down to having 1-2 migraines per week she takes rizatriptan and it completely aborts her headache she does get some light and sound sensitivity but for the most part she is not debilitated and is able to get through her day  She denies side effects to the Emgality to include local injection site reactions     Cognitive impairment:  Currently on Aricept 5 mg since January 2020  She still complains of significant cognitive difficulties  She is using her calendar on her smart phone  She tries to use notes but then she forgets where she places her notes  She is doing brain Buster type activities  And she continues to follow-up closely with mental health  Increased aricept to 10mg OV 7/24/2020  She still becomes very easily stressed when asked for normal speed processing more complicated tasks it takes her anxiety to extreme levels and cannot function further  Remains independent with ADLs       Depression/mental health:  Patient does continue to see mental health on a regular basis and follows through with recommended medication adjustment   Cymbalta has been recently increased by 30 mg for total of 150 mg     Fibromyalgia: Gabapentin adjusted at office visit on July 24, 2020: 300 mg in the a.m. and in the evening and can take 1 extra 1 during the day if needed  Additionally adjusted Cymbalta from 120 mg daily to 60 mg twice daily   Forgot to split the dose so still taking 120mg at one time; mental health added an additonal 30mg/day        Allergies   Allergen Reactions    Latex Rash, Itching and Swelling    Pcn [Penicillins] Other (comments)     Pt states as a child she had a reaction but does not remember specific reaction. Pt states\"mother say I stopped breathing\". Current Outpatient Medications   Medication Sig    DULoxetine (Cymbalta) 30 mg capsule Take 30 mg by mouth daily.     donepeziL (ARICEPT) 10 mg tablet Take 1 Tab by mouth nightly. Indications: mild to moderate Alzheimer's type dementia    gabapentin (NEURONTIN) 300 mg capsule Take 1 Cap by mouth three (3) times daily. Max Daily Amount: 900 mg.    rizatriptan (MAXALT) 10 mg tablet Take 1 Tab by mouth two (2) times daily as needed for Migraine. 1 tablet as needed and may repeat in 2 hours if needed  Indications: a migraine headache    hydrOXYzine HCL (ATARAX) 50 mg tablet TK 1/2 TO 1 T PO BID PRA    ondansetron (ZOFRAN ODT) 4 mg disintegrating tablet DIS ONE T PO Q 8 H PRN NV    promethazine (PHENERGAN) 25 mg tablet Take 1 Tab by mouth every six (6) hours as needed for Nausea.  galcanezumab-gnlm (EMGALITY PEN) 120 mg/mL injection 1 mL by SubCUTAneous route every thirty (30) days.  ascorbic acid, vitamin C, (VITAMIN C) 500 mg tablet Take 1,000 mg by mouth daily.  omeprazole (PRILOSEC) 40 mg capsule Take 1 Cap by mouth daily.  DULoxetine (CYMBALTA) 60 mg capsule Take 120 mg by mouth daily. Taking 2 tabs daily    ibuprofen (MOTRIN) 200 mg tablet Take 600 mg by mouth every six (6) hours as needed for Pain.  traZODone (DESYREL) 100 mg tablet Take 100 mg by mouth nightly.  oxyCODONE-acetaminophen (PERCOCET) 5-325 mg per tablet Take 1 Tab by mouth every six (6) hours as needed for Pain.  cetirizine (ZYRTEC) 10 mg tablet Take 1 Tab by mouth nightly as needed for Allergies.  lamoTRIgine (LAMICTAL) 200 mg tablet Take 200 mg by mouth daily. Indications: Bipolar Depression    buPROPion XL (WELLBUTRIN XL) 300 mg XL tablet Take 300 mg by mouth every morning.  cholecalciferol, vitamin D3, (VITAMIN D3) 2,000 unit tab Take 1 Tab by mouth daily.  b complex vitamins (B COMPLEX 1) tablet Take 1 Tab by mouth daily. No current facility-administered medications for this visit.         Past Medical History:   Diagnosis Date    Anal prolapse 9/19/2012    Anxiety     Arthritis     unsure - knees, lower back, fingers and toes    Asthma 1/19/2012    At risk for sleep apnea 09/05/2019    KATIA score 4 on PAT assessment    Bipolar 2 disorder (HCC)     Chest pain     Childhood asthma     Chronic pain     right abdomen,shooting pain groins    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    Depression     Disturbance of memory 7/30/2018    Endometriosis     Female bladder prolapse     Fibromyalgia     GERD (gastroesophageal reflux disease)     IGT (impaired glucose tolerance) 11/14/2012    Intractable nausea and vomiting 6/15/2018    Lumbar back pain with radiculopathy affecting left lower extremity 7/30/2018    Lumbar back pain with radiculopathy affecting right lower extremity 7/30/2018    Major depressive disorder, recurrent episode, severe ([address] 8/31/2015    MCI (mild cognitive impairment) with memory loss     Migraines     Other chest pain 7/5/2018    Prediabetes     Prolapse of anterior lip of cervix     PTSD (post-traumatic stress disorder)     borderline personality traits    Rectal prolapse     Severe obesity ([address] 4/18/2019    Tension vascular headache 7/30/2018    Transient vision disturbance of both eyes 7/30/2018    Uterine prolapse 9/19/2012       Past Surgical History:   Procedure Laterality Date    COLONOSCOPY N/A 12/2/2019    COLONOSCOPY (LATEX ALLERGY) performed by [facility] D[facility] at [facility] AM[facility]    HX COLONOSCOPY      HX ENDOSCOPY      HX GYN      vaginal birth x2    HX HERNIA REPAIR  07/09/2015    Laparoscopic Incisional hernia repair with mesh.     HX LAP CHOLECYSTECTOMY  7/7/2014    HX MYRINGOTOMY Bilateral     childhood    HX PARTIAL HYSTERECTOMY  04/25/2019    laparoscopic, left ovaries       Family History   Problem Relation Age of Onset    Other Mother         fibromyalgia, IBS    Diabetes Mother         Pre-DM    High Cholesterol Mother     Hypertension Mother     Liver Disease Mother         fatty liver    Cancer Mother         uterine     Heart Disease Mother         CHF    Arthritis-osteo Mother  Diabetes Father     Hypertension Father     Asthma Brother     Diabetes Paternal Aunt     Hypertension Paternal Aunt     Diabetes Paternal Uncle     Hypertension Paternal Uncle     Cancer Maternal Grandfather         esoph    Hypertension Maternal Grandfather     Stroke Maternal Grandfather     Heart Disease Paternal Grandmother     Diabetes Paternal Grandfather     Heart Attack Paternal Grandfather     Stroke Paternal Grandfather     Hypertension Paternal Grandfather     Heart Disease Maternal Grandmother     Other Sister         MTHFR (clotting D/O)       Social History     Socioeconomic History    Marital status: SINGLE     Spouse name: Not on file    Number of children: 2    Years of education: Not on file    Highest education level: Not on file   Occupational History     Employer: NOT EMPLOYED     Comment: work at home    Tobacco Use    Smoking status: Current Every Day Smoker     Packs/day: 0.50     Years: 10.00     Pack years: 5.00     Start date: 3/1/2004    Smokeless tobacco: Never Used   Substance and Sexual Activity    Alcohol use: Yes     Comment: rarely, not weeklt    Drug use: Not Currently     Types: Marijuana     Comment: 11/22/19, last use >1 year    Sexual activity: Yes     Partners: Male     Birth control/protection: None     Comment: single    Social History Narrative    Unemployed currently         ROS  Other than what is stated above under HPI there is no new or changing issues related to the following:  Constitutional: No recent weight change, fever,fatigue, sleep difficulties, or loss of appetite. ENT/Mouth:  No hearing loss, ringing in the ears, chronic sinus problem, nose bleeds sore throat, voice change, hoarseness, swollen glands in neck, or difficulties with chewing and swallowing. Cardiovascular:  No chest pain/angina pectoris, palpitations,swelling of feet/ankles/hands, or calf pain while walking.    Respiratory: No chronic or frequent coughs, spitting up blood, shortness of breath, asthma, or wheezing. Gastrointestinal: No abdominal pain, heartburn, nausea, vomiting, constipation, frequent diarrhea, rectal bleeding, or blood in stool. Genitourinary: No frequent urination, burning or painful urination, blood in urine, incontinence or dribbling. Musculoskeletal:   No joint pain, stiffness/swelling, weakness of muscles, muscle pain/cramp, or back pain. Integument:   No rash/itching, change in skin color, change in hair/nails, or change in color/size of moles. Neurological:  No dizziness/vertigo, loss of consciousness, numbness/tingling sensation, tremors, weakness in limbs, difficulty with balance, frequent or recurring headaches, memory loss or confusion. Psychiatric:   No nervousness, depression, hallucinations, paranoia or suspiciousness. Endocrine: No excessive thirst or urination, heat or cold intolerance. Hematologic/Lymphatic: No bleeding/bruising tendency, phlebitis, or past transfusion. EXAMINATION  Visit Vitals  /82   Pulse (!) 138   Ht 5' 3\" (1.6 m)   Wt 250 lb (113.4 kg)   LMP 04/25/2019   BMI 44.29 kg/m²            General appearance: Patient is well-developed and well-nourished in no apparent distress and well groomed. Psych/mental health:  Affect: Patient is extremely anxious and tearful in the office. She states she is just come from an encounter at [employer] while trying to make some copies which very much frustrated her and flustered her because of difficulty in figuring out how to process all of it.   Otherwise affect appropriate    PHQ  Nothing has been completed since 2015    HEENT: Normocephalic, without evidence of trauma  Full range of motion, no tenderness to palpation in the head or neck region     Cardiovascular: Extremities warm to touch, no swelling appreciated    Respiratory: No dyspnea on exertion noted, normal effort on casual observation    Musculoskeletal: No evidence of significant bony deformities or spinal curvature; + + axial tenderness consistent with fibromyalgia along with point tenderness in multiple areas    Integumentary:  No obvious bruising, lacerations or discoloaration on casual observation. Neurological Examination:   Mental Status:        MMSE  No flowsheet data found. Formal testing was not completed    there was nothing concerning on general observation and discussion. Alert oriented and appropriate to general conversation  Normal processing on general observation  Followed conversation and responded seemingly appropriate throughout the office visit  No word finding difficulties noted on casual observation  Able to follow directions without difficulty     Cranial Nerves:    PERRLA,   EOMs intact gaze is conjugate  No nystagmus is appreciated  No SAGAR  Facial motor and sensory intact bilaterally  Hearing intact to conversation  Voice with normal projection, no evidence of secretion pooling  Palate elevates symmetrically  Shoulder shrug intact bilaterally  No tongue deviation appreciated     Motor:   Normal tone and bulk  Fine dexterity intact bilaterally  No tremor appreciated on today's exam  No abnormal movements appreciated on today's exam    Muscle strength testing:  Right side  Upper extremities  Deltoid 5/5  Bicep 5/5  Tricep 5/5  Hand grasp 5/5    Lower extremity  Hip flexor 5/5  Extensor 5/5  Flexor 5/5  Plantar flexion 5/5  Dorsiflexion 5/5      Left side  Upper extremity  Deltoid 5/5  Bicep 5/5  Tricep 5/5  Hand grasp 5/5    Lower extremity  Hip flexor 5/5  Extensor 5/5  Flexor 5/5  Plantar flexion 5/5  Dorsiflexion 5/5    Sensation: Intact to light touch bilaterally    Coordination/Cerebellar:   Grossly intact    Gait: Ambulates independently    Fall risk assessment  No flowsheet data found.     Reflexes: Not tested    ASSESSMENT AND PLAN  Neurodegenerative cognitive impairment: Multifactorial in nature to include significant impact from mental health arena however neuropsych testing supports a baseline underlying process independent of this suggestive of probable early dementia. Patient is presently taking Aricept 10 mg daily tolerating it well  Higher level cognitive functioning is what seems to be impaired which would make it difficult for her to be employed. She would have difficulty maintaining normal work load and work speed and would be nonproductive to an employer. A letter has been written today on her behalf regarding the neurologic component of her diagnosis related to her ability to function in a work environment    Severe depression anxiety and PTSD: Playing into the neurocognitive issues but this is separate from the independent neurologic process. However I think it would be difficult for her to maintain employment based situation due to your mental health issue and she could not handle normal stresses related to the work environment but her mental health team would be better suited to comment on this  Patient is continue to work with the mental health team closely    Headaches and migraines: Reasonably well-controlled for the patient continue on current protocol of Emgality and rescue as needed    Fibromyalgia: Continue on gabapentin and Cymbalta [place] is being used from a mental health arena but will give her secondary benefit for fibromyalgia] additionally we will get her started on physical therapy for fibromyalgia release techniques    Follow-up in the office in 3 months sooner if needed        ICD-10-CM ICD-9-CM    1. Neurodegenerative cognitive impairment (HCC)  G31.9 331.9    2. Fibromyalgia  M79.7 729.1 REFERRAL TO PHYSICAL THERAPY   3. Common migraine with intractable migraine  G43.019 346.11    4. Intractable chronic migraine without aura and with status migrainosus  G43.711 346.73    5. Severe episode of recurrent major depressive disorder, without psychotic features ([facility]  F33.2 296.33    6. Severe anxiety  F41.9 300.00    7.  PTSD (post-traumatic stress disorder)  F43.10 309.81            Additional pertinent medical data reviewed at today's office visit:         No visits with results within 3 Month(s) from this visit. Latest known visit with results is:   Admission on 03/13/2020, Discharged on 03/14/2020   Component Date Value    WBC 03/13/2020 9.6     RBC 03/13/2020 5.41*    HGB 03/13/2020 15.2     HCT 03/13/2020 44.6     MCV 03/13/2020 82.4     MCH 03/13/2020 28.1     MCHC 03/13/2020 34.1     RDW 03/13/2020 14.4     PLATELET 38/47/0449 765     MPV 03/13/2020 9.8     NRBC 03/13/2020 0.0     ABSOLUTE NRBC 03/13/2020 0.00     Sodium 03/13/2020 136     Potassium 03/13/2020 3.1*    Chloride 03/13/2020 102     CO2 03/13/2020 29     Anion gap 03/13/2020 5     Glucose 03/13/2020 111*    BUN 03/13/2020 5*    Creatinine 03/13/2020 1.03*    BUN/Creatinine ratio 03/13/2020 5*    GFR est AA 03/13/2020 >60     GFR est non-AA 03/13/2020 >60     Calcium 03/13/2020 9.0     Bilirubin, total 03/13/2020 0.5     ALT (SGPT) 03/13/2020 62     AST (SGOT) 03/13/2020 52*    Alk.  phosphatase 03/13/2020 85     Protein, total 03/13/2020 7.8     Albumin 03/13/2020 3.6     Globulin 03/13/2020 4.2*    A-G Ratio 03/13/2020 0.9*    Color 03/13/2020 YELLOW     Appearance 03/13/2020 CLEAR     Specific gravity 03/13/2020 1.030     pH (UA) 03/13/2020 6.0     Protein 03/13/2020 100*    Glucose 03/13/2020 NEGATIVE      Ketone 03/13/2020 15*    Blood 03/13/2020 NEGATIVE      Urobilinogen 03/13/2020 1.0     Nitrites 03/13/2020 POSITIVE*    Leukocyte Esterase 03/13/2020 MODERATE*    WBC 03/13/2020 0-4     RBC 03/13/2020 0-5     Epithelial cells 03/13/2020 FEW     Bacteria 03/13/2020 1+*    WBC 03/13/2020 8.7     RBC 03/13/2020 4.86     HGB 03/13/2020 13.6     HCT 03/13/2020 40.2     MCV 03/13/2020 82.7     MCH 03/13/2020 28.0     MCHC 03/13/2020 33.8     RDW 03/13/2020 14.3     PLATELET 41/81/0585 752     MPV 03/13/2020 9.7     NRBC 03/13/2020 0.0     ABSOLUTE NRBC 03/13/2020 0.00     NEUTROPHILS 03/13/2020 62     LYMPHOCYTES 03/13/2020 26     MONOCYTES 03/13/2020 8     EOSINOPHILS 03/13/2020 2     BASOPHILS 03/13/2020 1     IMMATURE GRANULOCYTES 03/13/2020 1*    ABS. NEUTROPHILS 03/13/2020 5.4     ABS. LYMPHOCYTES 03/13/2020 2.3     ABS. MONOCYTES 03/13/2020 0.7     ABS. EOSINOPHILS 03/13/2020 0.2     ABS. BASOPHILS 03/13/2020 0.1     ABS. IMM. GRANS. 03/13/2020 0.1*    DF 03/13/2020 AUTOMATED     Lipase 03/13/2020 207     Bilirubin UA, confirm 03/13/2020 NEGATIVE         XR Results (maximum last 3): Results from [place] encounter on 09/19/18   XR ABD (KUB)    Impression IMPRESSION: Normal colonic gas pattern. Fluid-filled small bowel. Results from [place] encounter on 08/09/18   XR SPINE LUMB MIN 4 V    Impression IMPRESSION:    Mild T11-12 and T12-L1 degenerative disc disease. Mild L5-S1 facet arthropathy. The exam is otherwise unremarkable. XR SPINE THORAC 3 V    Impression IMPRESSION: Negative. CT Results (maximum last 3): Results from [place] encounter on 03/09/20   CT NECK SOFT TISSUE W CONT    Impression  impression: Prominent nonhomogeneous thyroid gland without significant pressure  on the airway. CT ABD PELV W CONT    Impression IMPRESSION:  Compatible mild colitis. Results from [place] encounter on 07/01/18   CT HEAD WO CONT    Impression IMPRESSION: Normal study. MRI Results (maximum last 3): Results from [place] encounter on 08/09/18   MRI BRAIN W WO CONT    Impression IMPRESSION:   1. No discrete pituitary lesion identified, and otherwise unremarkable brain  MRI. A single new nonspecific T2/FLAIR hyperintensity in the right parietal  subcortical white matter is likely of no clinical significance.    Results from [place] encounter on 09/23/16   MRI BRAIN W WO CONT    Impression IMPRESSION:   No focal pituitary abnormalities demonstrated. Normal brain MRI. .           Follow-up and Dispositions    · Return in about 3 months (around 11/6/2020) for In office appointment.            Judy Watson MS, ANP-BC, [facility]

## 2020-08-12 ENCOUNTER — OFFICE VISIT (OUTPATIENT)
Dept: FAMILY MEDICINE CLINIC | Age: 35
End: 2020-08-12
Payer: MEDICAID

## 2020-08-12 VITALS
OXYGEN SATURATION: 96 % | DIASTOLIC BLOOD PRESSURE: 87 MMHG | HEIGHT: 63 IN | SYSTOLIC BLOOD PRESSURE: 126 MMHG | TEMPERATURE: 97.5 F | WEIGHT: 252.6 LBS | HEART RATE: 77 BPM | RESPIRATION RATE: 18 BRPM | BODY MASS INDEX: 44.76 KG/M2

## 2020-08-12 DIAGNOSIS — Z23 ENCOUNTER FOR IMMUNIZATION: ICD-10-CM

## 2020-08-12 DIAGNOSIS — Z79.899 ENCOUNTER FOR LONG-TERM (CURRENT) USE OF MEDICATIONS: ICD-10-CM

## 2020-08-12 DIAGNOSIS — G89.29 CHRONIC ABDOMINAL PAIN: ICD-10-CM

## 2020-08-12 DIAGNOSIS — E11.9 DIABETES MELLITUS TYPE 2, DIET-CONTROLLED (HCC): ICD-10-CM

## 2020-08-12 DIAGNOSIS — R10.9 CHRONIC ABDOMINAL PAIN: ICD-10-CM

## 2020-08-12 DIAGNOSIS — K21.9 GASTROESOPHAGEAL REFLUX DISEASE, ESOPHAGITIS PRESENCE NOT SPECIFIED: ICD-10-CM

## 2020-08-12 DIAGNOSIS — M25.50 ARTHRALGIA OF MULTIPLE SITES: ICD-10-CM

## 2020-08-12 DIAGNOSIS — M79.7 FIBROMYALGIA: ICD-10-CM

## 2020-08-12 DIAGNOSIS — F33.1 MODERATE EPISODE OF RECURRENT MAJOR DEPRESSIVE DISORDER (HCC): Primary | ICD-10-CM

## 2020-08-12 DIAGNOSIS — E55.9 HYPOVITAMINOSIS D: ICD-10-CM

## 2020-08-12 DIAGNOSIS — E53.8 LOW VITAMIN B12 LEVEL: ICD-10-CM

## 2020-08-12 DIAGNOSIS — F43.12 CHRONIC POST-TRAUMATIC STRESS DISORDER (PTSD): ICD-10-CM

## 2020-08-12 LAB
GLUCOSE POC: 88 MG/DL
HBA1C MFR BLD HPLC: 5.9 %

## 2020-08-12 PROCEDURE — 90732 PPSV23 VACC 2 YRS+ SUBQ/IM: CPT | Performed by: FAMILY MEDICINE

## 2020-08-12 PROCEDURE — 83036 HEMOGLOBIN GLYCOSYLATED A1C: CPT | Performed by: FAMILY MEDICINE

## 2020-08-12 PROCEDURE — 82947 ASSAY GLUCOSE BLOOD QUANT: CPT | Performed by: FAMILY MEDICINE

## 2020-08-12 PROCEDURE — 99214 OFFICE O/P EST MOD 30 MIN: CPT | Performed by: FAMILY MEDICINE
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
## 2020-08-12 NOTE — PROGRESS NOTES
Chief Complaint   Patient presents with    Follow-up           Patient has been seen by neurologist for migraines and memory issues and  Rheumatologist for joint pain. Patient states she was diagnosed with fibromyalgia. Verbal order received per Dr. Raffi Back 23 IM for need for immunization-VORB. Pt received Pneumococcal 23 IM in left deltoid without any difficulty. 1. Have you been to the ER, urgent care clinic since your last visit? Hospitalized since your last visit? no    2. Have you seen or consulted any other health care providers outside of the [address] [place] [address] since your last visit? Include any pap smears or colon screening.  no

## 2020-08-13 LAB
25(OH)D3+25(OH)D2 SERPL-MCNC: 22.7 NG/ML (ref 30–100)
ALBUMIN SERPL-MCNC: 4.5 G/DL (ref 3.8–4.8)
ALBUMIN/GLOB SERPL: 1.7 {RATIO} (ref 1.2–2.2)
ALP SERPL-CCNC: 115 IU/L (ref 39–117)
ALT SERPL-CCNC: 21 IU/L (ref 0–32)
AST SERPL-CCNC: 15 IU/L (ref 0–40)
BILIRUB SERPL-MCNC: <0.2 MG/DL (ref 0–1.2)
BUN SERPL-MCNC: 8 MG/DL (ref 6–20)
BUN/CREAT SERPL: 9 (ref 9–23)
CALCIUM SERPL-MCNC: 9.6 MG/DL (ref 8.7–10.2)
CHLORIDE SERPL-SCNC: 101 MMOL/L (ref 96–106)
CHOLEST SERPL-MCNC: 170 MG/DL (ref 100–199)
CO2 SERPL-SCNC: 25 MMOL/L (ref 20–29)
CREAT SERPL-MCNC: 0.86 MG/DL (ref 0.57–1)
ERYTHROCYTE [DISTWIDTH] IN BLOOD BY AUTOMATED COUNT: 14.7 % (ref 11.7–15.4)
GLOBULIN SER CALC-MCNC: 2.6 G/DL (ref 1.5–4.5)
GLUCOSE SERPL-MCNC: 84 MG/DL (ref 65–99)
HCT VFR BLD AUTO: 43.7 % (ref 34–46.6)
HDLC SERPL-MCNC: 51 MG/DL
HGB BLD-MCNC: 14 G/DL (ref 11.1–15.9)
INTERPRETATION, 910389: NORMAL
LDLC SERPL CALC-MCNC: 88 MG/DL (ref 0–99)
Lab: NORMAL
MCH RBC QN AUTO: 28.3 PG (ref 26.6–33)
MCHC RBC AUTO-ENTMCNC: 32 G/DL (ref 31.5–35.7)
MCV RBC AUTO: 89 FL (ref 79–97)
PLATELET # BLD AUTO: 352 X10E3/UL (ref 150–450)
POTASSIUM SERPL-SCNC: 5.2 MMOL/L (ref 3.5–5.2)
PROT SERPL-MCNC: 7.1 G/DL (ref 6–8.5)
RBC # BLD AUTO: 4.94 X10E6/UL (ref 3.77–5.28)
SODIUM SERPL-SCNC: 142 MMOL/L (ref 134–144)
TRIGL SERPL-MCNC: 154 MG/DL (ref 0–149)
VLDLC SERPL CALC-MCNC: 31 MG/DL (ref 5–40)
WBC # BLD AUTO: 8.9 X10E3/UL (ref 3.4–10.8)

## 2020-08-13 RX ORDER — ERGOCALCIFEROL 1.25 MG/1
50000 CAPSULE ORAL
Qty: 5 CAP | Refills: 6 | Status: SHIPPED | OUTPATIENT
Start: 2020-08-13 | End: 2020-12-11 | Stop reason: SDDI

## 2020-08-15 LAB
SPECIMEN STATUS REPORT, ROLRST: NORMAL
VIT B12 SERPL-MCNC: 287 PG/ML (ref 232–1245)

## 2020-09-02 ENCOUNTER — TELEPHONE (OUTPATIENT)
Dept: NEUROLOGY | Age: 35
End: 2020-09-02

## 2020-09-02 NOTE — TELEPHONE ENCOUNTER
Received paperwork from patient   It was called     Headaches medical source statement   Nurse had Wolf Cedillo NP look over and she stated she cannot feel this paper work out as she will  not send paperwork to   Patient was called and message relayed from Enma Gallagher will be mailed back to patient

## 2020-09-09 ENCOUNTER — TELEPHONE (OUTPATIENT)
Dept: NEUROLOGY | Age: 35
End: 2020-09-09

## 2020-09-09 ENCOUNTER — TELEPHONE (OUTPATIENT)
Dept: FAMILY MEDICINE CLINIC | Age: 35
End: 2020-09-09

## 2020-09-09 NOTE — TELEPHONE ENCOUNTER
Sent Emgality renewal to [facility] via [facility] JALEN w/ Aug 2020 visit. Status pending.     Key: Mackenzie Griffin

## 2020-09-09 NOTE — TELEPHONE ENCOUNTER
Spoke with patient and states her neurologist will not fill out paperwork  To send to a . Informed patient Dr. Fry will not feel out paperwork as well and if  can request for medical records. Patient verbalized understanding.

## 2020-09-10 ENCOUNTER — TELEPHONE (OUTPATIENT)
Dept: NEUROLOGY | Facility: CLINIC | Age: 35
End: 2020-09-10

## 2020-09-10 NOTE — TELEPHONE ENCOUNTER
Re: Emgality     PA request unable to be processed through [address]. PA form and clinicals faxed to [address] . Fax rec'd from [phone]  cancelling request.  Medication has already been approved 02/20/20-06/20/21.

## 2020-09-21 RX ORDER — OMEPRAZOLE 40 MG/1
40 CAPSULE, DELAYED RELEASE ORAL DAILY
Qty: 90 CAP | Refills: 3 | OUTPATIENT
Start: 2020-09-21

## 2020-09-22 RX ORDER — OMEPRAZOLE 40 MG/1
40 CAPSULE, DELAYED RELEASE ORAL DAILY
Qty: 90 CAP | Refills: 3 | Status: SHIPPED | OUTPATIENT
Start: 2020-09-22 | End: 2021-09-16 | Stop reason: SDUPTHER

## 2020-10-08 ENCOUNTER — HOSPITAL ENCOUNTER (OUTPATIENT)
Dept: MAMMOGRAPHY | Age: 35
Discharge: HOME | End: 2020-10-08
Attending: FAMILY MEDICINE
Payer: MEDICAID

## 2020-10-08 DIAGNOSIS — Z12.31 VISIT FOR SCREENING MAMMOGRAM: ICD-10-CM

## 2020-10-08 PROCEDURE — 77063 BREAST TOMOSYNTHESIS BI: CPT

## 2020-10-29 DIAGNOSIS — G43.019 HEADACHE, COMMON MIGRAINE, INTRACTABLE: ICD-10-CM

## 2020-10-29 RX ORDER — RIZATRIPTAN BENZOATE 10 MG/1
TABLET ORAL
Qty: 9 TAB | Refills: 5 | Status: SHIPPED | OUTPATIENT
Start: 2020-10-29 | End: 2021-02-16 | Stop reason: SDUPTHER

## 2020-11-09 ENCOUNTER — OFFICE VISIT (OUTPATIENT)
Dept: NEUROLOGY | Age: 35
End: 2020-11-09
Payer: MEDICAID

## 2020-11-09 VITALS
SYSTOLIC BLOOD PRESSURE: 118 MMHG | HEART RATE: 100 BPM | WEIGHT: 252 LBS | OXYGEN SATURATION: 98 % | BODY MASS INDEX: 44.65 KG/M2 | DIASTOLIC BLOOD PRESSURE: 73 MMHG | HEIGHT: 63 IN

## 2020-11-09 DIAGNOSIS — M79.7 FIBROMYALGIA: ICD-10-CM

## 2020-11-09 DIAGNOSIS — R93.89 ABNORMAL CT SCAN, NECK: ICD-10-CM

## 2020-11-09 DIAGNOSIS — G43.019 HEADACHE, COMMON MIGRAINE, INTRACTABLE: ICD-10-CM

## 2020-11-09 DIAGNOSIS — F43.10 PTSD (POST-TRAUMATIC STRESS DISORDER): ICD-10-CM

## 2020-11-09 DIAGNOSIS — R93.0 ABNORMAL MRI OF HEAD: ICD-10-CM

## 2020-11-09 DIAGNOSIS — G43.019 COMMON MIGRAINE WITH INTRACTABLE MIGRAINE: ICD-10-CM

## 2020-11-09 DIAGNOSIS — G31.9 NEURODEGENERATIVE COGNITIVE IMPAIRMENT (HCC): Primary | ICD-10-CM

## 2020-11-09 DIAGNOSIS — F41.9 SEVERE ANXIETY: ICD-10-CM

## 2020-11-09 DIAGNOSIS — F33.2 SEVERE EPISODE OF RECURRENT MAJOR DEPRESSIVE DISORDER, WITHOUT PSYCHOTIC FEATURES (HCC): ICD-10-CM

## 2020-11-09 PROCEDURE — 99215 OFFICE O/P EST HI 40 MIN: CPT | Performed by: PSYCHIATRY & NEUROLOGY

## 2020-11-09 NOTE — PROGRESS NOTES
Colonel Fletcher is a 37 y.o. female who presents today for the following:  Chief Complaint   Patient presents with    Follow-up    Headache    Memory Loss       This is an in office visit    HPI  Historical Data    This is a patient previously seen in our practice.     Neuro diagnosis includes chronic headaches mild cognitive impairment, low back pain with radiculopathy affecting the right and left, bilateral carotid artery stenosis, Transient visual disturbance of both eyes     Other significant diagnoses include depression posttraumatic stress disorder     She had neuropsychiatric testing completed April 8, 2019 and was found to have PTSD severe depressive disorder without psychosis, severe anxiety and somatic sensation disorder along with her mild cognitive impairment and borderline personality traits.     Other comments by on the neuropsych evaluation by the neuropsychologist include the following:  She is young for dementia and there is no neurologic correlate which would explain the type of cognitive deficits she is showing.  At the same time, the CURRENT profile is not consistent with a purely psychiatric issue.  As such, the diagnosis here is that of Mild Cognitive Impairment with Memory loss exacerbated by severe and complex psychiatric distress      She needs intensive psychiatric intervention to include a review of her medication management for anxiety and depression as well as active and continued engagement in intensive psychotherapy.  Consider EMDR.  Consider TMS if medication by itself has not proven helpful for her - she would be an appropriate candidate for same.  I do believe medication for memory should be considered, as there is no other definite explanation for her impaired recognition recall, which often clears the difference between organic and functional memory loss.  Any metabolic cause needs to be ruled out, and sleep issues may be a factor as well.  This is concerning and very atypical changes over time, with improvement in some scores and declines in others.  She remains capable of making decisions for herself., but needs reminders for meals, medications, and finances. I see no evidence of malingering here.  I am concerned about driving safety issues as well, so this should be formally evaluated if deemed medically appropriate.  My hope is to see her again once mood improves to better clarify, because my worry is that if cognitive problems continue to worsen despite positive psychiatric interventions, then she has dementia and the prognosis would be quite poor at that point.  Stay active mentally, physically, and socially. Consult with OT and Speech, Neurocognitive Rehab.   We now have baseline and updated data on her.  Follow up as noted. Betina Leonardo correlation is, of course, indicated.          Patient was started on Emgality at January 2020: Initially she had significant improvement with infrequent headaches and when she got them they were only mild and needed a single dose of over-the-counter abortive therapy although she would occasionally have a severe migraine lasting 48 hours and incapacitating     Patient missed her dose in March 2020 and headaches escalated again. Lefty Almodovar  She continues on 5 mg Aricept which was started January 2020.  She denies side effects.  No major complaints of cognitive difficulties at today's office visit. Og Garza is doing some brain Buster activities which she finds helpful     Depression and mental health issues: Meds have been adjusted through mental health.  She feels as though they are helping. Og Garza is having some good days but still many bad days. Og Garza is working on better interpersonal skills with her children.  She continues to have complaints of anxiety and states mental health prescribed her hydroxyzine which is not helping and is wondering what else she can do     History of chronic low back pain     Interim Data:     Headaches and migraines  Emgality: First dose January 2020  Patient is now down to having 1-2 migraines per week she takes rizatriptan and it completely aborts her headache she does get some light and sound sensitivity but for the most part she is not debilitated and is able to get through her day  She denies side effects to the Emgality to include local injection site reactions    OV 11/9/2020: 2-3 / week but stress also higher  She reports rizatriptan is being beneficial to abort her headache she usually just takes 1 tablet if it does not abort her headaches she does not redose       Cognitive impairment:  Currently on Aricept 5 mg since January 2020  She still complains of significant cognitive difficulties  She is using her calendar on her smart phone  She tries to use notes but then she forgets where she places her notes  She is doing brain Buster type activities  And she continues to follow-up closely with mental health  Increased aricept to 10mg OV 7/24/2020  She still becomes very easily stressed when asked for normal speed processing more complicated tasks it takes her anxiety to extreme levels and cannot function further  Remains independent with ADLs  Office visit November 9, 2020: Continues Aricept 10 mg daily still continues with cognitive complaints as discussed above       Depression/mental health:  Patient does continue to see mental health on a regular basis and follows through with recommended medication adjustment and behavioral recommendations  Still finds she is extremely stressed       Fibromyalgia: Getting physical therapy and finding it some helpful and remains on medications such as Cymbalta and gabapentin        Allergies   Allergen Reactions    Latex Rash, Itching and Swelling    Pcn [Penicillins] Other (comments)     Pt states as a child she had a reaction but does not remember specific reaction. Pt states\"mother say I stopped breathing\".        Current Outpatient Medications   Medication Sig    rizatriptan (MAXALT) 10 mg tablet TAKE 1 TABLET BY MOUTH 1 TIME FOR UP TO 1 DOSE AS NEEDED FOR MIGRAINE. MAY REPEAT IN 2 HOURS AS NEEDED    omeprazole (PRILOSEC) 40 mg capsule Take 1 Cap by mouth daily.  ergocalciferol (Vitamin D2) 1,250 mcg (50,000 unit) capsule Take 1 Cap by mouth every seven (7) days.  donepeziL (ARICEPT) 10 mg tablet Take 1 Tab by mouth nightly. Indications: mild to moderate Alzheimer's type dementia    ondansetron (ZOFRAN ODT) 4 mg disintegrating tablet Take 4 mg by mouth every eight (8) hours as needed.  promethazine (PHENERGAN) 25 mg tablet Take 1 Tab by mouth every six (6) hours as needed for Nausea.  galcanezumab-gnlm (EMGALITY PEN) 120 mg/mL injection 1 mL by SubCUTAneous route every thirty (30) days.  ascorbic acid, vitamin C, (VITAMIN C) 500 mg tablet Take 1,000 mg by mouth daily.  DULoxetine (CYMBALTA) 60 mg capsule Take 120 mg by mouth daily. Taking 2 tabs daily along with Cymbalta  30mg to total 150mg    ibuprofen (MOTRIN) 200 mg tablet Take 600 mg by mouth every six (6) hours as needed for Pain.  traZODone (DESYREL) 100 mg tablet Take 100 mg by mouth nightly.  oxyCODONE-acetaminophen (PERCOCET) 5-325 mg per tablet Take 1 Tab by mouth every six (6) hours as needed for Pain.  cetirizine (ZYRTEC) 10 mg tablet Take 1 Tab by mouth nightly as needed for Allergies.  lamoTRIgine (LAMICTAL) 200 mg tablet Take 200 mg by mouth daily. Indications: Bipolar Depression    buPROPion XL (WELLBUTRIN XL) 300 mg XL tablet Take 300 mg by mouth every morning.  cholecalciferol, vitamin D3, (VITAMIN D3) 2,000 unit tab Take 1 Tab by mouth daily.  b complex vitamins (B COMPLEX 1) tablet Take 1 Tab by mouth daily.  DULoxetine (Cymbalta) 30 mg capsule Take 30 mg by mouth daily.  gabapentin (NEURONTIN) 300 mg capsule Take 1 Cap by mouth three (3) times daily. Max Daily Amount: 900 mg. No current facility-administered medications for this visit.         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    Depression     Disturbance of memory 7/30/2018    Endometriosis     Female bladder prolapse     Fibromyalgia     Fibromyalgia 02/2020    GERD (gastroesophageal reflux disease)     IGT (impaired glucose tolerance) 11/14/2012    Intractable nausea and vomiting 6/15/2018    Lumbar back pain with radiculopathy affecting left lower extremity 7/30/2018    Lumbar back pain with radiculopathy affecting right lower extremity 7/30/2018    Major depressive disorder, recurrent episode, severe ([address] 8/31/2015    MCI (mild cognitive impairment) with memory loss     Migraines     Other chest pain 7/5/2018    Prediabetes     Prolapse of anterior lip of cervix     PTSD (post-traumatic stress disorder)     borderline personality traits    Rectal prolapse     Severe obesity ([address] 4/18/2019    Tension vascular headache 7/30/2018    Transient vision disturbance of both eyes 7/30/2018    Uterine prolapse 9/19/2012       Past Surgical History:   Procedure Laterality Date    COLONOSCOPY N/A 12/2/2019    COLONOSCOPY (LATEX ALLERGY) performed by Charito De Leon [facility] AM[facility]    HX COLONOSCOPY      HX ENDOSCOPY      HX GYN      vaginal birth x2    HX HERNIA REPAIR  07/09/2015    Laparoscopic Incisional hernia repair with mesh.     HX LAP CHOLECYSTECTOMY  7/7/2014    HX MYRINGOTOMY Bilateral     childhood    HX PARTIAL HYSTERECTOMY  04/25/2019    laparoscopic, left ovaries       Family History   Problem Relation Age of Onset    Other Mother         fibromyalgia, IBS    Diabetes Mother         Pre-DM    High Cholesterol Mother     Hypertension Mother    [facility] Liver Disease Mother         fatty liver    Cancer Mother         uterine     Heart Disease Mother         CHF   Zadie Bud Arthritis-osteo Mother     Diabetes Father     Hypertension Father     Asthma Brother     Diabetes Paternal Aunt     Hypertension Paternal Aunt     Diabetes Paternal Uncle     Hypertension Paternal Uncle     Cancer Maternal Grandfather         esoph    Hypertension Maternal Grandfather     Stroke Maternal Grandfather     Heart Disease Paternal Grandmother     Diabetes Paternal Grandfather     Heart Attack Paternal Grandfather     Stroke Paternal Grandfather     Hypertension Paternal Grandfather     Heart Disease Maternal Grandmother     Other Sister         MTHFR (clotting D/O)       Social History     Socioeconomic History    Marital status: SINGLE     Spouse name: Not on file    Number of children: 2    Years of education: Not on file    Highest education level: Not on file   Occupational History     Employer: NOT EMPLOYED     Comment: work at home    Tobacco Use    Smoking status: Current Every Day Smoker     Packs/day: 0.75     Years: 10.00     Pack years: 7.50     Start date: 3/1/2004    Smokeless tobacco: Never Used   Substance and Sexual Activity    Alcohol use: Yes     Comment: rarely,     Drug use: Not Currently     Types: Marijuana     Comment: 11/22/19, last use >1 year    Sexual activity: Yes     Partners: Male     Birth control/protection: None     Comment: single    Social History Narrative    Unemployed currently         ROS  Other than what is stated above under HPI there is no new or changing issues related to the following:  Constitutional: No recent weight change, fever,fatigue, sleep difficulties, or loss of appetite. ENT/Mouth:  No hearing loss, ringing in the ears, chronic sinus problem, nose bleeds sore throat, voice change, hoarseness, swollen glands in neck, or difficulties with chewing and swallowing.    Cardiovascular:  No chest pain/angina pectoris, palpitations,swelling of feet/ankles/hands, or calf pain while walking. Respiratory: No chronic or frequent coughs, spitting up blood, shortness of breath, asthma, or wheezing. Gastrointestinal: No abdominal pain, heartburn, nausea, vomiting, constipation, frequent diarrhea, rectal bleeding, or blood in stool. Genitourinary: No frequent urination, burning or painful urination, blood in urine, incontinence or dribbling. Musculoskeletal:   No joint pain, stiffness/swelling, weakness of muscles, muscle pain/cramp, or back pain. Integument:   No rash/itching, change in skin color, change in hair/nails, or change in color/size of moles. Neurological:  No dizziness/vertigo, loss of consciousness, numbness/tingling sensation, tremors, weakness in limbs, difficulty with balance, frequent or recurring headaches, memory loss or confusion. Psychiatric:   No nervousness, depression, hallucinations, paranoia or suspiciousness. Endocrine: No excessive thirst or urination, heat or cold intolerance. Hematologic/Lymphatic: No bleeding/bruising tendency, phlebitis, or past transfusion. EXAMINATION  Visit Vitals  /73   Pulse 100   Ht 5' 3\" (1.6 m)   Wt 252 lb (114.3 kg)   LMP 04/25/2019   SpO2 98%   BMI 44.64 kg/m²            General appearance: Patient is well-developed and well-nourished in no apparent distress and well groomed. Psych/mental health:  Affect: Patient is extremely anxious and tearful in the office. She states she is just come from an encounter at [employer] while trying to make some copies which very much frustrated her and flustered her because of difficulty in figuring out how to process all of it.   Otherwise affect appropriate    PHQ      3 most recent PHQ Screens 11/9/2020   Little interest or pleasure in doing things More than half the days   Feeling down, depressed, irritable, or hopeless More than half the days   Total Score PHQ 2 4   Trouble falling or staying asleep, or sleeping too much Nearly every day   Feeling tired or having little energy Several days   Poor appetite, weight loss, or overeating Several days   Feeling bad about yourself - or that you are a failure or have let yourself or your family down Nearly every day   Trouble concentrating on things such as school, work, reading, or watching TV Nearly every day   Moving or speaking so slowly that other people could have noticed; or the opposite being so fidgety that others notice Not at all   Thoughts of being better off dead, or hurting yourself in some way Several days   PHQ 9 Score 16   How difficult have these problems made it for you to do your work, take care of your home and get along with others Very difficult         HEENT: Normocephalic, without evidence of trauma  Full range of motion, no tenderness to palpation in the head or neck region     Cardiovascular: Extremities warm to touch, no swelling appreciated    Respiratory: No dyspnea on exertion noted, normal effort on casual observation    Musculoskeletal: No evidence of significant bony deformities or spinal curvature; + + axial tenderness consistent with fibromyalgia along with point tenderness in multiple areas    Integumentary:  No obvious bruising, lacerations or discoloaration on casual observation. Neurological Examination:   Mental Status:        MMSE  No flowsheet data found.      Formal testing was not completed    She is able to articulate her concerns  She does use notes and reminders  When asked for specifics regarding dates times or details she often replies: I really do not recall\"    Cranial Nerves:    PERRLA,   EOMs intact gaze is conjugate  No nystagmus is appreciated  No SAGAR  Facial motor and sensory intact bilaterally  Hearing intact to conversation  Voice with normal projection, no evidence of secretion pooling  Palate elevates symmetrically  Shoulder shrug intact bilaterally  No tongue deviation appreciated     Motor: Normal tone and bulk  Fine dexterity intact bilaterally  No tremor appreciated on today's exam  No abnormal movements appreciated on today's exam    Muscle strength testing:  Right side  Upper extremities  Deltoid 5/5  Bicep 5/5  Tricep 5/5  Hand grasp 5/5    Lower extremity  Hip flexor 5/5  Extensor 5/5  Flexor 5/5  Plantar flexion 5/5  Dorsiflexion 5/5      Left side  Upper extremity  Deltoid 5/5  Bicep 5/5  Tricep 5/5  Hand grasp 5/5    Lower extremity  Hip flexor 5/5  Extensor 5/5  Flexor 5/5  Plantar flexion 5/5  Dorsiflexion 5/5    Sensation: Intact to light touch bilaterally    Coordination/Cerebellar:   Grossly intact    Gait: Ambulates independently    Fall risk assessment  No flowsheet data found. Reflexes: Not tested    ASSESSMENT AND PLAN  Neurodegenerative cognitive impairment: Multifactorial in nature to include significant impact from mental health arena however neuropsych testing supports a baseline underlying process independent of this suggestive of probable early dementia. Patient is presently taking Aricept 10 mg daily tolerating it well  Higher level cognitive functioning is what seems to be impaired which would make it difficult for her to be employed. She would have difficulty maintaining normal work load and work speed and would be nonproductive to an employer. A letter has been written today on her behalf regarding the neurologic component of her diagnosis related to her ability to function in a work environment    Severe depression anxiety and PTSD: Playing into the neurocognitive issues but this is separate from the independent neurologic process.   However I think it would be difficult for her to maintain employment based situation due to your mental health issue and she could not handle normal stresses related to the work environment but her mental health team would be better suited to comment on this  Patient is continue to work with the mental health team closely    The patient needs to have repeat neurocognitive testing to better assess cognitive changes as it relates to primary neurocognitive degenerative changes versus mental health issues  We will repeat MRI scanning  And she also needs a repeat CT scan of her neck as some abnormalities were noted on her thyroid   Patient cannot recall whether this was followed up on or not I cannot see anything in epic that would suggest it was    Headaches and migraines: Reasonably well-controlled for the patient continue on current protocol of Emgality and rescue as needed  I have encouraged her to only dose with the rizatriptan if needed    Fibromyalgia: Continue with physical therapy along with Cymbalta and gabapentin     Patient is applying for disability. Her  wants us to fill paperwork regarding her headaches and migraines inform the patient I do not do legal cases. She has seen Dr. Maxwell Collet in the past we will get her set up to see him back in 3 months and she can address the issue of the legal paperwork at that time            ICD-10-CM ICD-9-CM    1. Neurodegenerative cognitive impairment (HCC)  G31.9 331.9 REFERRAL TO NEUROPSYCHOLOGY   2. Abnormal CT scan, neck  R93.89 793.99 CT NECK SOFT TISSUE W CONT   3. Abnormal MRI of head  R93.0 793.0 MRI BRAIN W WO CONT   4. Headache, common migraine, intractable  G43.019 346.11    5. Common migraine with intractable migraine  G43.019 346.11    6. Fibromyalgia  M79.7 729.1    7. Severe episode of recurrent major depressive disorder, without psychotic features ([facility]  F33.2 296.33    8. Severe anxiety  F41.9 300.00    9.  PTSD (post-traumatic stress disorder)  F43.10 309.81            Additional pertinent medical data reviewed at today's office visit:         Office Visit on 08/12/2020   Component Date Value    WBC 08/12/2020 8.9     RBC 08/12/2020 4.94     HGB 08/12/2020 14.0     HCT 08/12/2020 43.7     MCV 08/12/2020 89     MCH 08/12/2020 28.3     MCHC 08/12/2020 32.0     RDW 08/12/2020 14.7     PLATELET 02/39/2965 962     Cholesterol, total 08/12/2020 170     Triglyceride 08/12/2020 154*    HDL Cholesterol 08/12/2020 51     VLDL, calculated 08/12/2020 31     LDL, calculated 08/12/2020 88     Glucose 08/12/2020 84     BUN 08/12/2020 8     Creatinine 08/12/2020 0.86     GFR est non-AA 08/12/2020 89     GFR est AA 08/12/2020 103     BUN/Creatinine ratio 08/12/2020 9     Sodium 08/12/2020 142     Potassium 08/12/2020 5.2     Chloride 08/12/2020 101     CO2 08/12/2020 25     Calcium 08/12/2020 9.6     Protein, total 08/12/2020 7.1     Albumin 08/12/2020 4.5     GLOBULIN, TOTAL 08/12/2020 2.6     A-G Ratio 08/12/2020 1.7     Bilirubin, total 08/12/2020 <0.2     Alk. phosphatase 08/12/2020 115     AST (SGOT) 08/12/2020 15     ALT (SGPT) 08/12/2020 21     Hemoglobin A1c (POC) 08/12/2020 5.9     Glucose POC 08/12/2020 88     VITAMIN D, 25-HYDROXY 08/12/2020 22.7*    INTERPRETATION 08/12/2020 Note     PDF Image 70/89/3269 Not applicable     Vitamin B12 08/12/2020 287     SPECIMEN STATUS REPORT 08/12/2020 COMMENT        XR Results (maximum last 3): Results from [place] encounter on 09/19/18   XR ABD (KUB)    Impression IMPRESSION: Normal colonic gas pattern. Fluid-filled small bowel. Results from [place] encounter on 08/09/18   XR SPINE LUMB MIN 4 V    Impression IMPRESSION:    Mild T11-12 and T12-L1 degenerative disc disease. Mild L5-S1 facet arthropathy. The exam is otherwise unremarkable. XR SPINE THORAC 3 V    Impression IMPRESSION: Negative. CT Results (maximum last 3): Results from [place] encounter on 03/09/20   CT NECK SOFT TISSUE W CONT    Impression  impression: Prominent nonhomogeneous thyroid gland without significant pressure  on the airway. CT ABD PELV W CONT    Impression IMPRESSION:  Compatible mild colitis.    Results from [place] encounter on 07/01/18   CT HEAD WO CONT    Impression IMPRESSION: Normal study. MRI Results (maximum last 3): Results from [place] encounter on 08/09/18   MRI BRAIN W WO CONT    Impression IMPRESSION:   1. No discrete pituitary lesion identified, and otherwise unremarkable brain  MRI. A single new nonspecific T2/FLAIR hyperintensity in the right parietal  subcortical white matter is likely of no clinical significance. Results from [place] encounter on 09/23/16   MRI BRAIN W WO CONT    Impression IMPRESSION:   No focal pituitary abnormalities demonstrated. Normal brain MRI. .           Follow-up and Dispositions    · Return in about 3 months (around 2/9/2021) for In office appointment w Dr Melanie Dumont.            Migdalia Tadeo, MS, ANP-BC, [facility]

## 2020-11-09 NOTE — PATIENT INSTRUCTIONS
We can refer you back to Dr. Marlen Sood for reevaluation regarding the neuropsych testing MRI of the brain has been ordered along with a CT scan of the neck to evaluate for possible thyroid abnormality Continue on the Emgality as prevention for headache migraine Take rizatriptan at the onset of headache or migraine and may repeat in 2 hours if not completely aborted Continue to work with your mental health providers regarding stress and anxiety And continue to follow-up with all your other specialist as appropriate We will have you follow-up with Dr. Minh Santos due to disability concerns as I do not fill out legal paperwork regarding disability claims

## 2020-11-20 ENCOUNTER — HOSPITAL ENCOUNTER (OUTPATIENT)
Dept: CT IMAGING | Age: 35
Discharge: HOME | End: 2020-11-20
Payer: MEDICAID

## 2020-11-20 ENCOUNTER — HOSPITAL ENCOUNTER (OUTPATIENT)
Dept: MRI IMAGING | Age: 35
Discharge: HOME | End: 2020-11-20
Payer: MEDICAID

## 2020-11-20 DIAGNOSIS — R93.89 ABNORMAL CT SCAN, NECK: ICD-10-CM

## 2020-11-20 DIAGNOSIS — R93.0 ABNORMAL MRI OF HEAD: ICD-10-CM

## 2020-11-20 PROCEDURE — 74011250636 HC RX REV CODE- 250/636

## 2020-11-20 PROCEDURE — 70491 CT SOFT TISSUE NECK W/DYE: CPT

## 2020-11-20 PROCEDURE — 74011000636 HC RX REV CODE- 636: Performed by: PSYCHIATRY & NEUROLOGY

## 2020-11-20 PROCEDURE — A9575 INJ GADOTERATE MEGLUMI 0.1ML: HCPCS

## 2020-11-20 PROCEDURE — 70553 MRI BRAIN STEM W/O & W/DYE: CPT

## 2020-11-20 RX ORDER — GADOTERATE MEGLUMINE 376.9 MG/ML
20 INJECTION INTRAVENOUS
Status: COMPLETED | OUTPATIENT
Start: 2020-11-20 | End: 2020-11-20

## 2020-11-20 RX ORDER — SODIUM CHLORIDE 0.9 % (FLUSH) 0.9 %
10 SYRINGE (ML) INJECTION
Status: COMPLETED | OUTPATIENT
Start: 2020-11-20 | End: 2020-11-20

## 2020-11-20 RX ADMIN — IOPAMIDOL 100 ML: 755 INJECTION, SOLUTION INTRAVENOUS at 14:48

## 2020-11-20 RX ADMIN — GADOTERATE MEGLUMINE 20 ML: 376.9 INJECTION INTRAVENOUS at 16:15

## 2020-11-20 RX ADMIN — Medication 10 ML: at 14:48

## 2020-11-24 ENCOUNTER — DOCUMENTATION ONLY (OUTPATIENT)
Dept: FAMILY MEDICINE CLINIC | Age: 35
End: 2020-11-24

## 2020-11-25 ENCOUNTER — TELEPHONE (OUTPATIENT)
Dept: NEUROLOGY | Age: 35
End: 2020-11-25

## 2020-12-01 NOTE — TELEPHONE ENCOUNTER
Marielle Hutchinson, KATHLEEN  You 18 minutes ago (2:22 PM)       Please let the patient know that her MRI of the brain was completely normal     The CT scan of her neck shows a benign nodule on her thyroid nothing further needs to be done to address this     If she has further questions or concerns please set her up with a virtual visit with me at my next available appointment

## 2020-12-11 ENCOUNTER — OFFICE VISIT (OUTPATIENT)
Dept: FAMILY MEDICINE CLINIC | Age: 35
End: 2020-12-11
Payer: MEDICAID

## 2020-12-11 VITALS
SYSTOLIC BLOOD PRESSURE: 118 MMHG | HEIGHT: 63 IN | WEIGHT: 236.2 LBS | TEMPERATURE: 97.1 F | HEART RATE: 89 BPM | DIASTOLIC BLOOD PRESSURE: 86 MMHG | RESPIRATION RATE: 12 BRPM | BODY MASS INDEX: 41.85 KG/M2 | OXYGEN SATURATION: 99 %

## 2020-12-11 DIAGNOSIS — F33.1 MODERATE EPISODE OF RECURRENT MAJOR DEPRESSIVE DISORDER (HCC): Primary | ICD-10-CM

## 2020-12-11 DIAGNOSIS — E11.9 DIABETES MELLITUS TYPE 2, DIET-CONTROLLED (HCC): ICD-10-CM

## 2020-12-11 DIAGNOSIS — E55.9 HYPOVITAMINOSIS D: ICD-10-CM

## 2020-12-11 DIAGNOSIS — F43.12 CHRONIC POST-TRAUMATIC STRESS DISORDER (PTSD): ICD-10-CM

## 2020-12-11 DIAGNOSIS — M25.50 ARTHRALGIA OF MULTIPLE SITES: ICD-10-CM

## 2020-12-11 DIAGNOSIS — E53.8 LOW VITAMIN B12 LEVEL: ICD-10-CM

## 2020-12-11 DIAGNOSIS — G89.29 CHRONIC ABDOMINAL PAIN: ICD-10-CM

## 2020-12-11 DIAGNOSIS — Z23 NEEDS FLU SHOT: ICD-10-CM

## 2020-12-11 DIAGNOSIS — M79.7 FIBROMYALGIA: ICD-10-CM

## 2020-12-11 DIAGNOSIS — Z79.899 ENCOUNTER FOR LONG-TERM (CURRENT) USE OF MEDICATIONS: ICD-10-CM

## 2020-12-11 DIAGNOSIS — R10.9 CHRONIC ABDOMINAL PAIN: ICD-10-CM

## 2020-12-11 PROCEDURE — 90686 IIV4 VACC NO PRSV 0.5 ML IM: CPT | Performed by: FAMILY MEDICINE

## 2020-12-11 PROCEDURE — 90471 IMMUNIZATION ADMIN: CPT | Performed by: FAMILY MEDICINE

## 2020-12-11 PROCEDURE — 99214 OFFICE O/P EST MOD 30 MIN: CPT | Performed by: FAMILY MEDICINE

## 2020-12-11 RX ORDER — ERGOCALCIFEROL 1.25 MG/1
50000 CAPSULE ORAL
Qty: 5 CAP | Refills: 6 | Status: SHIPPED | OUTPATIENT
Start: 2020-12-11 | End: 2021-09-16

## 2020-12-11 RX ORDER — ERGOCALCIFEROL 1.25 MG/1
50000 CAPSULE ORAL
Qty: 5 CAP | Refills: 6 | Status: SHIPPED | OUTPATIENT
Start: 2020-12-11 | End: 2020-12-11 | Stop reason: SDUPTHER

## 2020-12-11 RX ORDER — DICLOFENAC SODIUM 10 MG/G
GEL TOPICAL
COMMUNITY
Start: 2020-11-05 | End: 2022-03-25

## 2020-12-11 RX ORDER — PROMETHAZINE HYDROCHLORIDE 25 MG/1
25 TABLET ORAL
Qty: 12 TAB | Refills: 0 | Status: SHIPPED | OUTPATIENT
Start: 2020-12-11 | End: 2021-09-24

## 2020-12-11 NOTE — PATIENT INSTRUCTIONS
Vaccine Information Statement Influenza (Flu) Vaccine (Inactivated or Recombinant): What You Need to Know Many Vaccine Information Statements are available in Urdu and other languages. See www.immunize.org/vis Hojas de información sobre vacunas están disponibles en español y en muchos otros idiomas. Visite www.immunize.org/vis 1. Why get vaccinated? Influenza vaccine can prevent influenza (flu). Flu is a contagious disease that spreads around the [facility] every year, usually between October and May. Anyone can get the flu, but it is more dangerous for some people. Infants and young children, people 72years of age and older, pregnant women, and people with certain health conditions or a weakened immune system are at greatest risk of flu complications. Pneumonia, bronchitis, sinus infections and ear infections are examples of flu-related complications. If you have a medical condition, such as heart disease, cancer or diabetes, flu can make it worse. Flu can cause fever and chills, sore throat, muscle aches, fatigue, cough, headache, and runny or stuffy nose. Some people may have vomiting and diarrhea, though this is more common in children than adults. Each year thousands of people in the [facility] die from flu, and many more are hospitalized. Flu vaccine prevents millions of illnesses and flu-related visits to the doctor each year. 2. Influenza vaccines CDC recommends everyone 10months of age and older get vaccinated every flu season. Children 6 months through 6years of age may need 2 doses during a single flu season. Everyone else needs only 1 dose each flu season. It takes about 2 weeks for protection to develop after vaccination. There are many flu viruses, and they are always changing. Each year a new flu vaccine is made to protect against three or four viruses that are likely to cause disease in the upcoming flu season.  Even when the vaccine doesnt exactly match these viruses, it may still provide some protection. Influenza vaccine does not cause flu. Influenza vaccine may be given at the same time as other vaccines. 3. Talk with your health care provider Tell your vaccine provider if the person getting the vaccine: 
 Has had an allergic reaction after a previous dose of influenza vaccine, or has any severe, life-threatening allergies.  Has ever had Guillain-Barré Syndrome (also called GBS). In some cases, your health care provider may decide to postpone influenza vaccination to a future visit. People with minor illnesses, such as a cold, may be vaccinated. People who are moderately or severely ill should usually wait until they recover before getting influenza vaccine. Your health care provider can give you more information. 4. Risks of a reaction  Soreness, redness, and swelling where shot is given, fever, muscle aches, and headache can happen after influenza vaccine.  There may be a very small increased risk of Guillain-Barré Syndrome (GBS) after inactivated influenza vaccine (the flu shot). [place] children who get the flu shot along with pneumococcal vaccine (PCV13), and/or DTaP vaccine at the same time might be slightly more likely to have a seizure caused by fever. Tell your health care provider if a child who is getting flu vaccine has ever had a seizure. People sometimes faint after medical procedures, including vaccination. Tell your provider if you feel dizzy or have vision changes or ringing in the ears. As with any medicine, there is a very remote chance of a vaccine causing a severe allergic reaction, other serious injury, or death. 5. What if there is a serious problem? An allergic reaction could occur after the vaccinated person leaves the clinic.  If you see signs of a severe allergic reaction (hives, swelling of the face and throat, difficulty breathing, a fast heartbeat, dizziness, or weakness), call 9-1-1 and get the person to the nearest hospital. 
 
For other signs that concern you, call your health care provider. Adverse reactions should be reported to the Vaccine Adverse Event Reporting System (VAERS). Your health care provider will usually file this report, or you can do it yourself. Visit the VAERS website at www.vaers. hhs.gov or call 1-918.914.6610. VAERS is only for reporting reactions, and VAERS staff do not give medical advice. 6. The National Vaccine Injury Compensation Program 
 
The [facility] Vaccine Injury Compensation Program (VICP) is a federal program that was created to compensate people who may have been injured by certain vaccines. Visit the VICP website at www.hrsa.gov/vaccinecompensation or call 4-674.491.9476 to learn about the program and about filing a claim. There is a time limit to file a claim for compensation. 7. How can I learn more?  Ask your health care provider.  Call your local or state health department.  Contact the Centers for Disease Control and Prevention (CDC): 
- Call 0-458.715.9664 (1-427-GMV-INFO) or 
- Visit CDCs influenza website at www.cdc.gov/flu Vaccine Information Statement (Interim) Inactivated Influenza Vaccine 8/15/2019 
[address] ANAB[address] Huang [id number] Department of [facility] and [place] Centers for Disease Control and Prevention Office Use Only

## 2020-12-11 NOTE — PROGRESS NOTES
Order placed for Flu injection 0.5 ml , per Verbal Order from [address] [place] [address] on 12/11/2020 due to need    Flu injection 0.5 ml given in left arm IM, no reaction noted. Roe Robles

## 2020-12-11 NOTE — PROGRESS NOTES
Chief Complaint   Patient presents with    Diabetes    Depression       1. Have you been to the ER, urgent care clinic since your last visit? Hospitalized since your last visit? No    2. Have you seen or consulted any other health care providers outside of the [address] [place] [address] since your last visit? Include any pap smears or colon screening.  No     Eye Exam - Pt aware overdue  Flu shot - Pt accepts     Health Maintenance Due   Topic Date Due    Foot Exam Q1  02/25/1997    Eye Exam Retinal or Dilated  02/25/1997    Flu Vaccine (1) 09/01/2020
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
## 2021-01-26 ENCOUNTER — TELEPHONE (OUTPATIENT)
Dept: NEUROLOGY | Age: 36
End: 2021-01-26

## 2021-01-28 DIAGNOSIS — G43.019 COMMON MIGRAINE WITH INTRACTABLE MIGRAINE: Primary | ICD-10-CM

## 2021-01-28 RX ORDER — GALCANEZUMAB 120 MG/ML
120 INJECTION, SOLUTION SUBCUTANEOUS
Qty: 1 ML | Refills: 11 | Status: SHIPPED | OUTPATIENT
Start: 2021-01-28 | End: 2022-03-25

## 2021-02-02 ENCOUNTER — TELEPHONE (OUTPATIENT)
Dept: NEUROLOGY | Age: 36
End: 2021-02-02

## 2021-02-16 ENCOUNTER — VIRTUAL VISIT (OUTPATIENT)
Dept: NEUROLOGY | Age: 36
End: 2021-02-16
Payer: MEDICAID

## 2021-02-16 DIAGNOSIS — R41.3 DISTURBANCE OF MEMORY: ICD-10-CM

## 2021-02-16 DIAGNOSIS — G43.711 INTRACTABLE CHRONIC MIGRAINE WITHOUT AURA AND WITH STATUS MIGRAINOSUS: ICD-10-CM

## 2021-02-16 DIAGNOSIS — G31.9 NEURODEGENERATIVE COGNITIVE IMPAIRMENT (HCC): ICD-10-CM

## 2021-02-16 DIAGNOSIS — F43.10 PTSD (POST-TRAUMATIC STRESS DISORDER): ICD-10-CM

## 2021-02-16 DIAGNOSIS — G43.019 HEADACHE, COMMON MIGRAINE, INTRACTABLE: ICD-10-CM

## 2021-02-16 DIAGNOSIS — R41.82 ACUTE ALTERATION IN MENTAL STATUS: ICD-10-CM

## 2021-02-16 DIAGNOSIS — G44.209 TENSION VASCULAR HEADACHE: ICD-10-CM

## 2021-02-16 DIAGNOSIS — M79.7 FIBROMYALGIA: ICD-10-CM

## 2021-02-16 DIAGNOSIS — G43.019 COMMON MIGRAINE WITH INTRACTABLE MIGRAINE: ICD-10-CM

## 2021-02-16 DIAGNOSIS — F33.2 SEVERE EPISODE OF RECURRENT MAJOR DEPRESSIVE DISORDER, WITHOUT PSYCHOTIC FEATURES (HCC): Primary | ICD-10-CM

## 2021-02-16 PROCEDURE — 99214 OFFICE O/P EST MOD 30 MIN: CPT | Performed by: PSYCHIATRY & NEUROLOGY

## 2021-02-16 RX ORDER — RIZATRIPTAN BENZOATE 10 MG/1
10 TABLET ORAL
Qty: 9 TAB | Refills: 11 | Status: SHIPPED | OUTPATIENT
Start: 2021-02-16 | End: 2022-03-25

## 2021-02-16 RX ORDER — ARIPIPRAZOLE 2 MG/1
2 TABLET ORAL DAILY
COMMUNITY
End: 2022-03-25

## 2021-02-16 NOTE — LETTER
2/16/2021 7:28 PM 
 
Patient:  Usman Amanda YOB: 1985 Date of Visit: 2/16/2021 Dear No Recipients: Thank you for referring Ms. Kole Amaya to me for evaluation/treatment. Below are the relevant portions of my assessment and plan of care. Consult Usman Amanda is a 37 y.o. female who was seen by synchronous (real-time) audio-video technology on 2/16/2021. REFERRED BY: 
Jonathan Kevin MD 
 
CHIEF COMPLAINT: Memory loss, and difficulty with cognitive function. Subjective: Roland Chávez is a 37 y.o. right-handed  female we are seeing at the request of Dr. Belinda Elizabeth for evaluation of memory loss and cognitive difficulties and diffuse pain and progressive headaches that have gotten worse recently because her Emgality took time getting approved again and she had a flareup in headaches but now is a little bit better with the Emgality starting again. She also takes Maxalt as needed for the headaches. She had a previous history of some prolactinemia and possible pituitary abnormality on MRI scan but her more recent MRI scans have all been normal even with thin overlapping views of the pituitary and her last one was of November 2020 just done. MRI scan was perfectly normal.  The patient is trying for disability, and needs repeat neuropsych testing because her repeat one done in 2019 showed some possible progression of mild cognitive impairment but at the same time she had marked increase in psychiatric component which makes that interpretation a little questionable. She had normal carotid Doppler studies done in 2018. Her headaches are described as pressure type headaches, with stabbing ice pick pains associate with nausea and vomiting at times. She suffers from bipolar disorder and is severely depressed. She has had fibromyalgia and constantly complains of muscle pain and tenderness diffusely and sees a rheumatologist.  She complains of lower back pain and thoracic pain, but x-rays of the thoracic and lumbar spine showed mild arthritis only. She has had some episodic visual loss at times also.   She is on Wellbutrin 300 mg a day, Cymbalta 120 mg a day, Aricept 10 mg a day, Abilify 2 mg a day, Lamictal 200 mg a day, trazodone 100 mg a day, Motrin and Maxalt for the headaches, and we encouraged her to take a multivitamin every day and continue her vitamin D 50,000 units a week given to her by her PCP, and also encouraged her to take a multivitamin every day because her B12 level was borderline low normal.  She has not had any new weakness or loss of feeling, no unusual fever, trauma, meningismus, recent head injuries, or any other cause for her symptoms. She has been seeing the nurse practitioner the last 2 years. We have not seen her for 2 years. Her medical problems are arthritis, asthma, anxiety, bipolar disorder, tension vascular headaches, prediabetic condition, lower back pain, and she probably has COPD from smoking but does not drink or use drugs. Brittney Vegas Past Medical History:  
Diagnosis Date  Anal prolapse 9/19/2012  Anxiety  Arthritis   
 unsure - knees, lower back, fingers and toes  Asthma 1/19/2012  At risk for sleep apnea 09/05/2019 KATIA score 4 on PAT assessment  Bipolar 2 disorder ([address]  Carpal tunnel syndrome  Chest pain  Childhood asthma  Chronic pain   
 right abdomen,shooting pain groins  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  Depression  Disturbance of memory 7/30/2018  Endometriosis  Female bladder prolapse  Fibromyalgia  Fibromyalgia 02/2020  GERD (gastroesophageal reflux disease)  IGT (impaired glucose tolerance) 11/14/2012  Intractable nausea and vomiting 6/15/2018  Lumbar back pain with radiculopathy affecting left lower extremity 7/30/2018  Lumbar back pain with radiculopathy affecting right lower extremity 7/30/2018  Major depressive disorder, recurrent episode, severe ([address] 8/31/2015  MCI (mild cognitive impairment) with memory loss  Migraines  Other chest pain 7/5/2018  Prediabetes  Prolapse of anterior lip of cervix  PTSD (post-traumatic stress disorder) borderline personality traits  Rectal prolapse  Severe obesity ([address] 4/18/2019  Tension vascular headache 7/30/2018  Transient vision disturbance of both eyes 7/30/2018  Uterine prolapse 9/19/2012 Past Surgical History:  
Procedure Laterality Date  COLONOSCOPY N/A 12/2/2019 COLONOSCOPY (LATEX ALLERGY) performed by Carla Gilmore MD at [facility] AMBULATORY OR  
 HX COLONOSCOPY    
 HX ENDOSCOPY    
 HX GYN    
 vaginal birth x2  
 HX HERNIA REPAIR  07/09/2015 Laparoscopic Incisional hernia repair with mesh.  HX LAP CHOLECYSTECTOMY  7/7/2014  HX MYRINGOTOMY Bilateral   
 childhood  HX PARTIAL HYSTERECTOMY  04/25/2019  
 laparoscopic, left ovaries Family History Problem Relation Age of Onset  Other Mother   
     fibromyalgia, IBS  Diabetes Mother Pre-DM  High Cholesterol Mother  Hypertension Mother  Liver Disease Mother   
     fatty liver  Cancer Mother   
     uterine  Heart Disease Mother CHF Kruger Arthritis-osteo Mother  Diabetes Father  Hypertension Father  Asthma Brother  Diabetes Paternal Aunt  Hypertension Paternal Aunt  Diabetes Paternal Uncle  Hypertension Paternal Uncle  Cancer Maternal Grandfather   
     esoph  Hypertension Maternal Grandfather  Stroke Maternal Grandfather  Heart Disease Paternal Grandmother  Diabetes Paternal Grandfather  Heart Attack Paternal Grandfather  Stroke Paternal Grandfather  Hypertension Paternal Grandfather  Heart Disease Maternal Grandmother  Other Sister MTHFR (clotting D/O) Social History Tobacco Use  Smoking status: Current Every Day Smoker Packs/day: 0.75 Years: 10.00 Pack years: 7.50 Start date: 3/1/2004  Smokeless tobacco: Never Used Substance Use Topics  Alcohol use: Yes Comment: rarely,   
   
 
Current Outpatient Medications:   ARIPiprazole (Abilify) 2 mg tablet, Take 2 mg by mouth daily. , Disp: , Rfl:  
  rizatriptan (MAXALT) 10 mg tablet, Take 1 Tab by mouth every eight (8) hours as needed for Migraine.  May repeat in 2 hours if needed, Disp: 9 Tab, Rfl: 11 
   galcanezumab-gnlm (Emgality Pen) 120 mg/mL injection, 1 mL by SubCUTAneous route every thirty (30) days. , Disp: 1 mL, Rfl: 11 
  diclofenac (Voltaren) 1 % gel, Apply 2 grams (about a pea-sized drop) to the affected area 3-4 times daily. , Disp: , Rfl:  
  promethazine (PHENERGAN) 25 mg tablet, Take 1 Tab by mouth every six (6) hours as needed for Nausea., Disp: 12 Tab, Rfl: 0 
  ergocalciferol (Vitamin D2) 1,250 mcg (50,000 unit) capsule, Take 1 Cap by mouth every seven (7) days. , Disp: 5 Cap, Rfl: 6 
  omeprazole (PRILOSEC) 40 mg capsule, Take 1 Cap by mouth daily. , Disp: 90 Cap, Rfl: 3 
  donepeziL (ARICEPT) 10 mg tablet, Take 1 Tab by mouth nightly. Indications: mild to moderate Alzheimer's type dementia, Disp: 90 Tab, Rfl: 3 
  ascorbic acid, vitamin C, (VITAMIN C) 500 mg tablet, Take 1,000 mg by mouth daily. , Disp: , Rfl:  
  DULoxetine (CYMBALTA) 60 mg capsule, Take 120 mg by mouth daily. Taking 2 tabs daily, Disp: , Rfl:  
  ibuprofen (MOTRIN) 200 mg tablet, Take 600 mg by mouth every six (6) hours as needed for Pain., Disp: , Rfl:  
  traZODone (DESYREL) 100 mg tablet, Take 100 mg by mouth nightly., Disp: , Rfl: 0 
  cetirizine (ZYRTEC) 10 mg tablet, Take 1 Tab by mouth nightly as needed for Allergies. , Disp: 90 Tab, Rfl: 3 
  lamoTRIgine (LAMICTAL) 200 mg tablet, Take 200 mg by mouth daily. Indications: Bipolar Depression, Disp: , Rfl:  
  buPROPion XL (WELLBUTRIN XL) 300 mg XL tablet, Take 300 mg by mouth every morning., Disp: , Rfl:  
  b complex vitamins (B COMPLEX 1) tablet, Take 1 Tab by mouth daily. , Disp: , Rfl:  
 
 
 
Allergies Allergen Reactions  Latex Rash, Itching and Swelling  Pcn [Penicillins] Other (comments) Pt states as a child she had a reaction but does not remember specific reaction. Pt states\"mother say I stopped breathing\".  Gabapentin Other (comments) Foggy in the head. Did not feel right.     
  
MRI Results (most recent): 
 Results from [facility] [place] Encounter encounter on 11/20/20 MRI BRAIN W WO CONT Narrative EXAM: MRI BRAIN W WO CONT 
 
TECHNIQUE: Brain images including sagittal and axial T1-weighted, axial FLAIR, T2-weighted, diffusion weighted, gradient echo,  precontrast. Thin section pre 
and postcontrast T1-weighted images of the pituitary gland. Axial postcontrast 
images of the head. IV CONTRAST:  20 cc Dotarem INDICATION:  Abnormal findings on diagnostic imaging of skull and head, not 
elsewhere classified / Please compare to prior COMPARISON: brain and pituitary MRI of 8/9/2018 and 9/23/2016, both of which 
included Pituitary sequences and neither of which demonstrated a focal pituitary 
abnormality. FINDINGS: 
BRAIN PARENCHYMA:  No significant white matter disease. No masses or abnormal 
enhancement. No acute or chronic infarct. Normal for age. INTRACRANIAL HEMORRHAGE: None. CSF SPACES:  Normal in size and morphology for the patient's age. BASAL CISTERNS:  Patent. MIDLINE SHIFT: None. VASCULAR SYSTEM:  Normal flow voids. PARANASAL SINUSES AND MASTOID AIR CELLS:  No significant opacification. VISUALIZED ORBITS:  No significant abnormalities. VISUALIZED UPPER CERVICAL SPINE:  No significant abnormalities. SELLA:  Thin section images obtained. Normal pituitary size and configuration. Normal posterior pituitary bright spot. No focal abnormalities. No suprasellar 
mass. Infundibulum midline. SKULL BASE:  No significant abnormalities. Cerebellar tonsils in normal 
position. CALVARIUM:  Intact. Impression IMPRESSION:  No significant abnormalities on MRI of the brain and pituitary 
gland. Results from [facility] [place] Encounter encounter on 11/20/20 MRI BRAIN W WO CONT Narrative EXAM: MRI BRAIN W WO CONT 
 
TECHNIQUE: Brain images including sagittal and axial T1-weighted, axial FLAIR, T2-weighted, diffusion weighted, gradient echo,  precontrast. Thin section pre and postcontrast T1-weighted images of the pituitary gland. Axial postcontrast 
images of the head. IV CONTRAST:  20 cc Dotarem INDICATION:  Abnormal findings on diagnostic imaging of skull and head, not 
elsewhere classified / Please compare to prior COMPARISON: brain and pituitary MRI of 8/9/2018 and 9/23/2016, both of which 
included Pituitary sequences and neither of which demonstrated a focal pituitary 
abnormality. FINDINGS: 
BRAIN PARENCHYMA:  No significant white matter disease. No masses or abnormal 
enhancement. No acute or chronic infarct. Normal for age. INTRACRANIAL HEMORRHAGE: None. CSF SPACES:  Normal in size and morphology for the patient's age. BASAL CISTERNS:  Patent. MIDLINE SHIFT: None. VASCULAR SYSTEM:  Normal flow voids. PARANASAL SINUSES AND MASTOID AIR CELLS:  No significant opacification. VISUALIZED ORBITS:  No significant abnormalities. VISUALIZED UPPER CERVICAL SPINE:  No significant abnormalities. SELLA:  Thin section images obtained. Normal pituitary size and configuration. Normal posterior pituitary bright spot. No focal abnormalities. No suprasellar 
mass. Infundibulum midline. SKULL BASE:  No significant abnormalities. Cerebellar tonsils in normal 
position. CALVARIUM:  Intact. Impression IMPRESSION:  No significant abnormalities on MRI of the brain and pituitary 
gland. Review of Systems: 
{Ros - complete:[id number]} There were no vitals filed for this visit. Objective: I 
 
 
NEUROLOGICAL EXAM: 
 
Appearance: The patient is well developed, well nourished, provides a coherent history and is in no acute distress, but appears very depressed and a little bit disheveled. Mental Status: Oriented to time, place and person, and the president, cognitive function is questionably normal and speech is fluent and no aphasia or dysarthria. Mood and affect appropriate, but severely depressed. Cranial Nerves:   Intact visual fields. Fundi are not testable. LEEANN, EOM's full, no nystagmus, no ptosis. Facial sensation is normal to touch. Corneal reflexes are not tested. Facial movement is symmetric. Hearing is normal bilaterally. Palate is midline with normal sternocleidomastoid and trapezius muscles are normal. Tongue is midline. Neck without meningismus Temporal arteries are not tender or enlarged TMJ areas are not tender on palpation Motor:  5/5 strength in upper and lower proximal and distal muscles. Normal bulk and tone. No fasciculations. Rapid alternating movement is symmetric and intact bilaterally Reflexes:   Deep tendon reflexes, Babinski and clonus could not be tested Sensory:   Normal to touch, pinprick and vibration and temperature and double simultaneous stimulation cannot be tested. Gait:  Normal gait for patient's age. Tremor:   No tremor noted. Cerebellar:  No abnormal cerebellar signs present on Romberg and tandem testing and finger-nose-finger exam.  
Neurovascular:  Cardiac examination and carotid examination and peripheral arterial examination could not be tested Assessment: ICD-10-CM ICD-9-CM 1. Severe episode of recurrent major depressive disorder, without psychotic features ([facility]  F33.2 296.33 REFERRAL TO NEUROPSYCHOLOGY  
   rizatriptan (MAXALT) 10 mg tablet 2. Headache, common migraine, intractable  G43.019 346.11 REFERRAL TO NEUROPSYCHOLOGY  
   rizatriptan (MAXALT) 10 mg tablet 3. Neurodegenerative cognitive impairment (HCC)  G31.9 331.9 REFERRAL TO NEUROPSYCHOLOGY  
   rizatriptan (MAXALT) 10 mg tablet 4. Intractable chronic migraine without aura and with status migrainosus  G43.711 346.73 REFERRAL TO NEUROPSYCHOLOGY  
   rizatriptan (MAXALT) 10 mg tablet 5. Common migraine with intractable migraine  G43.019 346.11 REFERRAL TO NEUROPSYCHOLOGY  
   rizatriptan (MAXALT) 10 mg tablet 6. Tension vascular headache  G44.209 307.81 REFERRAL TO NEUROPSYCHOLOGY  
   rizatriptan (MAXALT) 10 mg tablet 7. Fibromyalgia  M79.7 729.1 REFERRAL TO NEUROPSYCHOLOGY  
   rizatriptan (MAXALT) 10 mg tablet 8. Disturbance of memory  R41.3 780.93 REFERRAL TO NEUROPSYCHOLOGY  
   rizatriptan (MAXALT) 10 mg tablet 9. Acute alteration in mental status  R41.82 780.97 REFERRAL TO NEUROPSYCHOLOGY  
   rizatriptan (MAXALT) 10 mg tablet 10. PTSD (post-traumatic stress disorder)  F43.10 309.81 Active Problems: * No active hospital problems. * 
 
 
Plan:  
 
Patient with progressive memory loss and cognitive issues, to the point that she feels disabled, and needs repeat neuropsych testing to figure out whether this is some type of primary neurologic degenerative condition, but with a normal exam and a normal MRI of the brain I tend to doubt that. Most likely she is severely depressed as a cause of her cognitive issues. We will check neuropsych testing however to make sure there is nothing we are missing, and she just had MRI scan done November 2020 that was completely normal. 
Encourage her to take a multivitamin every day and her vitamin D every day and try to stay physically mentally active. Further treatment depends on results of her clinical course, she will continue the Aricept which seems to be helping her for now, another treatment will depend on results of her course and response to therapy and treatment. She is encouraged to stay with her psychiatrist as the most important treatment for her problems now. Follow-up in 3 months time or earlier as needed in her Maxalt was renewed for her today the rest of her medications apparently are all adequately supply. 35-minute spent with the patient, going over her history, going over the nurse practitioner's note, going over the past testing she is done, reviewing all her records, and discussing her current problems, and treatment options, and diagnostic testing needed, and review the MRI scan on the PACS system personally myself. Donis Ibanez Signed By: Irby Dancer, MD   
 February 16, 2021 Pursuant to the emergency declaration under the [address] [place] [address] waiver authority and the [employer] Act, this Virtual  Visit was conducted, with patient's consent, to reduce the patient's risk of exposure to COVID-19 and provide continuity of care for an established patient. Services were provided through a video synchronous discussion virtually to substitute for in-person clinic visit. CC: Janice Rodriguez MD 
FAX: [phone] If you have questions, please do not hesitate to call me. I look forward to following Ms. Bard Harden along with you. Sincerely, Irby Dancer, MD

## 2021-02-17 NOTE — PROGRESS NOTES
Consult    Doc Naidu is a 37 y.o. female who was seen by synchronous (real-time) audio-video technology on 2/16/2021. REFERRED BY:  Alissa James MD    CHIEF COMPLAINT: Memory loss, and difficulty with cognitive function. Subjective:     Doc Naidu is a 37 y.o. right-handed  female we are seeing at the request of Dr. John Mcrae for evaluation of memory loss and cognitive difficulties and diffuse pain and progressive headaches that have gotten worse recently because her Emgality took time getting approved again and she had a flareup in headaches but now is a little bit better with the Emgality starting again. She also takes Maxalt as needed for the headaches. She had a previous history of some prolactinemia and possible pituitary abnormality on MRI scan but her more recent MRI scans have all been normal even with thin overlapping views of the pituitary and her last one was of November 2020 just done. MRI scan was perfectly normal.  The patient is trying for disability, and needs repeat neuropsych testing because her repeat one done in 2019 showed some possible progression of mild cognitive impairment but at the same time she had marked increase in psychiatric component which makes that interpretation a little questionable. She had normal carotid Doppler studies done in 2018. Her headaches are described as pressure type headaches, with stabbing ice pick pains associate with nausea and vomiting at times. She suffers from bipolar disorder and is severely depressed. She has had fibromyalgia and constantly complains of muscle pain and tenderness diffusely and sees a rheumatologist.  She complains of lower back pain and thoracic pain, but x-rays of the thoracic and lumbar spine showed mild arthritis only. She has had some episodic visual loss at times also.   She is on Wellbutrin 300 mg a day, Cymbalta 120 mg a day, Aricept 10 mg a day, Abilify 2 mg a day, Lamictal 200 mg a day, trazodone 100 mg a day, Motrin and Maxalt for the headaches, and we encouraged her to take a multivitamin every day and continue her vitamin D 50,000 units a week given to her by her PCP, and also encouraged her to take a multivitamin every day because her B12 level was borderline low normal.  She has not had any new weakness or loss of feeling, no unusual fever, trauma, meningismus, recent head injuries, or any other cause for her symptoms. She has been seeing the nurse practitioner the last 2 years. We have not seen her for 2 years. Her medical problems are arthritis, asthma, anxiety, bipolar disorder, tension vascular headaches, prediabetic condition, lower back pain, and she probably has COPD from smoking but does not drink or use drugs. .    Past Medical History:   Diagnosis Date    Anal prolapse 9/19/2012    Anxiety     Arthritis     unsure - knees, lower back, fingers and toes    Asthma 1/19/2012    At risk for sleep apnea 09/05/2019    KATIA score 4 on PAT assessment    Bipolar 2 disorder (HCC)     Carpal tunnel syndrome     Chest pain     Childhood asthma     Chronic pain     right abdomen,shooting pain groins    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    Depression     Disturbance of memory 7/30/2018    Endometriosis     Female bladder prolapse     Fibromyalgia     Fibromyalgia 02/2020    GERD (gastroesophageal reflux disease)     IGT (impaired glucose tolerance) 11/14/2012    Intractable nausea and vomiting 6/15/2018    Lumbar back pain with radiculopathy affecting left lower extremity 7/30/2018    Lumbar back pain with radiculopathy affecting right lower extremity 7/30/2018    Major depressive disorder, recurrent episode, severe ([address] 8/31/2015    MCI (mild cognitive impairment) with memory loss     Migraines     Other chest pain 7/5/2018    Prediabetes     Prolapse of anterior lip of cervix     PTSD (post-traumatic stress disorder)     borderline personality traits    Rectal prolapse     Severe obesity ([facility] 4/18/2019    Tension vascular headache 7/30/2018    Transient vision disturbance of both eyes 7/30/2018    Uterine prolapse 9/19/2012      Past Surgical History:   Procedure Laterality Date    COLONOSCOPY N/A 12/2/2019    COLONOSCOPY (LATEX ALLERGY) performed by Seamus Leonardo MD at \[facility]"" AMBULATORY OR    HX COLONOSCOPY      HX ENDOSCOPY      HX GYN      vaginal birth x2    HX HERNIA REPAIR  07/09/2015    Laparoscopic Incisional hernia repair with mesh.  HX LAP CHOLECYSTECTOMY  7/7/2014    HX MYRINGOTOMY Bilateral     childhood    HX PARTIAL HYSTERECTOMY  04/25/2019    laparoscopic, left ovaries     Family History   Problem Relation Age of Onset    Other Mother         fibromyalgia, IBS    Diabetes Mother         Pre-DM    High Cholesterol Mother     Hypertension Mother     Liver Disease Mother         fatty liver    Cancer Mother         uterine     Heart Disease Mother         CHF    Arthritis-osteo Mother     Diabetes Father     Hypertension Father     Asthma Brother     Diabetes Paternal Aunt     Hypertension Paternal Aunt     Diabetes Paternal Uncle     Hypertension Paternal Uncle     Cancer Maternal Grandfather         esoph    Hypertension Maternal Grandfather     Stroke Maternal Grandfather     Heart Disease Paternal Grandmother     Diabetes Paternal Grandfather     Heart Attack Paternal Grandfather     Stroke Paternal Grandfather     Hypertension Paternal Grandfather     Heart Disease Maternal Grandmother     Other Sister         MTHFR (clotting D/O)      Social History     Tobacco Use    Smoking status: Current Every Day Smoker     Packs/day: 0.75     Years: 10.00     Pack years: 7.50     Start date: 3/1/2004    Smokeless tobacco: Never Used   Substance Use Topics    Alcohol use: Yes     Comment: rarely,          Current Outpatient Medications:     ARIPiprazole (Abilify) 2 mg tablet, Take 2 mg by mouth daily. , Disp: , Rfl:   rizatriptan (MAXALT) 10 mg tablet, Take 1 Tab by mouth every eight (8) hours as needed for Migraine. May repeat in 2 hours if needed, Disp: 9 Tab, Rfl: 11    galcanezumab-gnlm (Emgality Pen) 120 mg/mL injection, 1 mL by SubCUTAneous route every thirty (30) days. , Disp: 1 mL, Rfl: 11    diclofenac (Voltaren) 1 % gel, Apply 2 grams (about a pea-sized drop) to the affected area 3-4 times daily. , Disp: , Rfl:     promethazine (PHENERGAN) 25 mg tablet, Take 1 Tab by mouth every six (6) hours as needed for Nausea., Disp: 12 Tab, Rfl: 0    ergocalciferol (Vitamin D2) 1,250 mcg (50,000 unit) capsule, Take 1 Cap by mouth every seven (7) days. , Disp: 5 Cap, Rfl: 6    omeprazole (PRILOSEC) 40 mg capsule, Take 1 Cap by mouth daily. , Disp: 90 Cap, Rfl: 3    donepeziL (ARICEPT) 10 mg tablet, Take 1 Tab by mouth nightly. Indications: mild to moderate Alzheimer's type dementia, Disp: 90 Tab, Rfl: 3    ascorbic acid, vitamin C, (VITAMIN C) 500 mg tablet, Take 1,000 mg by mouth daily. , Disp: , Rfl:     DULoxetine (CYMBALTA) 60 mg capsule, Take 120 mg by mouth daily. Taking 2 tabs daily, Disp: , Rfl:     ibuprofen (MOTRIN) 200 mg tablet, Take 600 mg by mouth every six (6) hours as needed for Pain., Disp: , Rfl:     traZODone (DESYREL) 100 mg tablet, Take 100 mg by mouth nightly., Disp: , Rfl: 0    cetirizine (ZYRTEC) 10 mg tablet, Take 1 Tab by mouth nightly as needed for Allergies. , Disp: 90 Tab, Rfl: 3    lamoTRIgine (LAMICTAL) 200 mg tablet, Take 200 mg by mouth daily. Indications: Bipolar Depression, Disp: , Rfl:     buPROPion XL (WELLBUTRIN XL) 300 mg XL tablet, Take 300 mg by mouth every morning., Disp: , Rfl:     b complex vitamins (B COMPLEX 1) tablet, Take 1 Tab by mouth daily. , Disp: , Rfl:         Allergies   Allergen Reactions    Latex Rash, Itching and Swelling    Pcn [Penicillins] Other (comments)     Pt states as a child she had a reaction but does not remember specific reaction. Pt states\"mother say I stopped breathing\".  Gabapentin Other (comments)     Foggy in the head. Did not feel right. MRI Results (most recent):  Results from [place] encounter on 11/20/20   MRI BRAIN W WO CONT    Narrative EXAM: MRI BRAIN W WO CONT    TECHNIQUE: Brain images including sagittal and axial T1-weighted, axial FLAIR,  T2-weighted, diffusion weighted, gradient echo,  precontrast. Thin section pre  and postcontrast T1-weighted images of the pituitary gland. Axial postcontrast  images of the head. IV CONTRAST:  20 cc Dotarem    INDICATION:  Abnormal findings on diagnostic imaging of skull and head, not  elsewhere classified / Please compare to prior    COMPARISON: brain and pituitary MRI of 8/9/2018 and 9/23/2016, both of which  included Pituitary sequences and neither of which demonstrated a focal pituitary  abnormality. FINDINGS:  BRAIN PARENCHYMA:  No significant white matter disease. No masses or abnormal  enhancement. No acute or chronic infarct. Normal for age. INTRACRANIAL HEMORRHAGE: None. CSF SPACES:  Normal in size and morphology for the patient's age. BASAL CISTERNS:  Patent. MIDLINE SHIFT: None. VASCULAR SYSTEM:  Normal flow voids. PARANASAL SINUSES AND MASTOID AIR CELLS:  No significant opacification. VISUALIZED ORBITS:  No significant abnormalities. VISUALIZED UPPER CERVICAL SPINE:  No significant abnormalities. SELLA:  Thin section images obtained. Normal pituitary size and configuration. Normal posterior pituitary bright spot. No focal abnormalities. No suprasellar  mass. Infundibulum midline. SKULL BASE:  No significant abnormalities. Cerebellar tonsils in normal  position. CALVARIUM:  Intact. Impression IMPRESSION:  No significant abnormalities on MRI of the brain and pituitary  gland.          Results from [place] encounter on 11/20/20   MRI BRAIN W WO CONT    Narrative EXAM: MRI BRAIN W WO CONT    TECHNIQUE: Brain images including sagittal and axial T1-weighted, axial FLAIR,  T2-weighted, diffusion weighted, gradient echo,  precontrast. Thin section pre  and postcontrast T1-weighted images of the pituitary gland. Axial postcontrast  images of the head. IV CONTRAST:  20 cc Dotarem    INDICATION:  Abnormal findings on diagnostic imaging of skull and head, not  elsewhere classified / Please compare to prior    COMPARISON: brain and pituitary MRI of 8/9/2018 and 9/23/2016, both of which  included Pituitary sequences and neither of which demonstrated a focal pituitary  abnormality. FINDINGS:  BRAIN PARENCHYMA:  No significant white matter disease. No masses or abnormal  enhancement. No acute or chronic infarct. Normal for age. INTRACRANIAL HEMORRHAGE: None. CSF SPACES:  Normal in size and morphology for the patient's age. BASAL CISTERNS:  Patent. MIDLINE SHIFT: None. VASCULAR SYSTEM:  Normal flow voids. PARANASAL SINUSES AND MASTOID AIR CELLS:  No significant opacification. VISUALIZED ORBITS:  No significant abnormalities. VISUALIZED UPPER CERVICAL SPINE:  No significant abnormalities. SELLA:  Thin section images obtained. Normal pituitary size and configuration. Normal posterior pituitary bright spot. No focal abnormalities. No suprasellar  mass. Infundibulum midline. SKULL BASE:  No significant abnormalities. Cerebellar tonsils in normal  position. CALVARIUM:  Intact. Impression IMPRESSION:  No significant abnormalities on MRI of the brain and pituitary  gland. Review of Systems:  A comprehensive review of systems was negative except for: Constitutional: positive for fatigue and malaise  Eyes: positive for visual disturbance  Musculoskeletal: positive for myalgias, arthralgias, stiff joints and back pain  Neurological: positive for headaches, memory problems and weakness  Behvioral/Psych: positive for anxiety and depression   There were no vitals filed for this visit.   Objective:     I      NEUROLOGICAL EXAM:    Appearance: The patient is well developed, well nourished, provides a coherent history and is in no acute distress, but appears very depressed and a little bit disheveled. Mental Status: Oriented to time, place and person, and the president, cognitive function is questionably normal and speech is fluent and no aphasia or dysarthria. Mood and affect appropriate, but severely depressed. Cranial Nerves:   Intact visual fields. Fundi are not testable. LEEANN, EOM's full, no nystagmus, no ptosis. Facial sensation is normal to touch. Corneal reflexes are not tested. Facial movement is symmetric. Hearing is normal bilaterally. Palate is midline with normal sternocleidomastoid and trapezius muscles are normal. Tongue is midline. Neck without meningismus   Temporal arteries are not tender or enlarged  TMJ areas are not tender on palpation   Motor:  5/5 strength in upper and lower proximal and distal muscles. Normal bulk and tone. No fasciculations. Rapid alternating movement is symmetric and intact bilaterally   Reflexes:   Deep tendon reflexes, Babinski and clonus could not be tested   Sensory:   Normal to touch, pinprick and vibration and temperature and double simultaneous stimulation cannot be tested. Gait:  Normal gait for patient's age. Tremor:   No tremor noted. Cerebellar:  No abnormal cerebellar signs present on Romberg and tandem testing and finger-nose-finger exam.   Neurovascular:  Cardiac examination and carotid examination and peripheral arterial examination could not be tested           Assessment:       ICD-10-CM ICD-9-CM    1. Severe episode of recurrent major depressive disorder, without psychotic features ([facility]  F33.2 296.33 REFERRAL TO NEUROPSYCHOLOGY      rizatriptan (MAXALT) 10 mg tablet   2. Headache, common migraine, intractable  G43.019 346.11 REFERRAL TO NEUROPSYCHOLOGY      rizatriptan (MAXALT) 10 mg tablet   3.  Neurodegenerative cognitive impairment ([facility]  G31.9 331.9 REFERRAL TO NEUROPSYCHOLOGY      rizatriptan (MAXALT) 10 mg tablet   4. Intractable chronic migraine without aura and with status migrainosus  G43.711 346.73 REFERRAL TO NEUROPSYCHOLOGY      rizatriptan (MAXALT) 10 mg tablet   5. Common migraine with intractable migraine  G43.019 346.11 REFERRAL TO NEUROPSYCHOLOGY      rizatriptan (MAXALT) 10 mg tablet   6. Tension vascular headache  G44.209 307.81 REFERRAL TO NEUROPSYCHOLOGY      rizatriptan (MAXALT) 10 mg tablet   7. Fibromyalgia  M79.7 729.1 REFERRAL TO NEUROPSYCHOLOGY      rizatriptan (MAXALT) 10 mg tablet   8. Disturbance of memory  R41.3 780.93 REFERRAL TO NEUROPSYCHOLOGY      rizatriptan (MAXALT) 10 mg tablet   9. Acute alteration in mental status  R41.82 780.97 REFERRAL TO NEUROPSYCHOLOGY      rizatriptan (MAXALT) 10 mg tablet   10. PTSD (post-traumatic stress disorder)  F43.10 309.81      Active Problems:    * No active hospital problems. *      Plan:     Patient with progressive memory loss and cognitive issues, to the point that she feels disabled, and needs repeat neuropsych testing to figure out whether this is some type of primary neurologic degenerative condition, but with a normal exam and a normal MRI of the brain I tend to doubt that. Most likely she is severely depressed as a cause of her cognitive issues. We will check neuropsych testing however to make sure there is nothing we are missing, and she just had MRI scan done November 2020 that was completely normal.  Encourage her to take a multivitamin every day and her vitamin D every day and try to stay physically mentally active. Further treatment depends on results of her clinical course, she will continue the Aricept which seems to be helping her for now, another treatment will depend on results of her course and response to therapy and treatment. She is encouraged to stay with her psychiatrist as the most important treatment for her problems now.   Follow-up in 3 months time or earlier as needed in her Maxalt was renewed for her today the rest of her medications apparently are all adequately supply. 35-minute spent with the patient, going over her history, going over the nurse practitioner's note, going over the past testing she is done, reviewing all her records, and discussing her current problems, and treatment options, and diagnostic testing needed, and review the MRI scan on the PACS system personally myself. .    Signed By: Bridget Joyner MD     February 16, 2021       Pursuant to the emergency declaration under the [address] [place] [address], [facility] waiver authority and the [employer] Act, this Virtual  Visit was conducted, with patient's consent, to reduce the patient's risk of exposure to COVID-19 and provide continuity of care for an established patient. Services were provided through a video synchronous discussion virtually to substitute for in-person clinic visit.         CC: Erendira Ann MD  FAX: [phone]

## 2021-03-16 ENCOUNTER — OFFICE VISIT (OUTPATIENT)
Dept: NEUROLOGY | Age: 36
End: 2021-03-16
Payer: MEDICAID

## 2021-03-16 DIAGNOSIS — F43.12 CHRONIC POST-TRAUMATIC STRESS DISORDER (PTSD): ICD-10-CM

## 2021-03-16 DIAGNOSIS — F33.1 MODERATE EPISODE OF RECURRENT MAJOR DEPRESSIVE DISORDER (HCC): Primary | ICD-10-CM

## 2021-03-16 DIAGNOSIS — R41.3 DISTURBANCE OF MEMORY: ICD-10-CM

## 2021-03-16 DIAGNOSIS — G89.4 CHRONIC PAIN DISORDER: ICD-10-CM

## 2021-03-16 DIAGNOSIS — F41.1 GENERALIZED ANXIETY DISORDER: ICD-10-CM

## 2021-03-16 DIAGNOSIS — Z87.898 HISTORY OF LEARNING DISABILITY: ICD-10-CM

## 2021-03-16 PROCEDURE — 96116 NUBHVL XM PHYS/QHP 1ST HR: CPT | Performed by: PSYCHOLOGIST
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
## 2021-03-17 ENCOUNTER — OFFICE VISIT (OUTPATIENT)
Dept: NEUROLOGY | Age: 36
End: 2021-03-17

## 2021-03-17 DIAGNOSIS — F48.9 NO DIAGNOSIS ON AXIS I: Primary | ICD-10-CM

## 2021-03-18 ENCOUNTER — OFFICE VISIT (OUTPATIENT)
Dept: NEUROLOGY | Age: 36
End: 2021-03-18
Payer: MEDICAID

## 2021-03-18 DIAGNOSIS — F33.2 SEVERE EPISODE OF RECURRENT MAJOR DEPRESSIVE DISORDER, WITHOUT PSYCHOTIC FEATURES (HCC): ICD-10-CM

## 2021-03-18 DIAGNOSIS — F43.10 PTSD (POST-TRAUMATIC STRESS DISORDER): ICD-10-CM

## 2021-03-18 DIAGNOSIS — Z87.898 HISTORY OF LEARNING DISABILITY: ICD-10-CM

## 2021-03-18 DIAGNOSIS — F90.0 ATTENTION DEFICIT HYPERACTIVITY DISORDER (ADHD), PREDOMINANTLY INATTENTIVE TYPE: Primary | ICD-10-CM

## 2021-03-18 DIAGNOSIS — F45.0 SOMATIZATION DISORDER: ICD-10-CM

## 2021-03-18 DIAGNOSIS — G31.84 MILD COGNITIVE IMPAIRMENT WITH MEMORY LOSS: ICD-10-CM

## 2021-03-18 PROCEDURE — 96133 NRPSYC TST EVAL PHYS/QHP EA: CPT | Performed by: PSYCHOLOGIST

## 2021-03-18 PROCEDURE — 96139 PSYCL/NRPSYC TST TECH EA: CPT | Performed by: PSYCHOLOGIST

## 2021-03-18 PROCEDURE — 96137 PSYCL/NRPSYC TST PHY/QHP EA: CPT | Performed by: PSYCHOLOGIST

## 2021-03-18 PROCEDURE — 96136 PSYCL/NRPSYC TST PHY/QHP 1ST: CPT | Performed by: PSYCHOLOGIST

## 2021-03-18 PROCEDURE — 96132 NRPSYC TST EVAL PHYS/QHP 1ST: CPT | Performed by: PSYCHOLOGIST

## 2021-03-18 PROCEDURE — 96138 PSYCL/NRPSYC TECH 1ST: CPT | Performed by: PSYCHOLOGIST
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
## 2021-04-01 ENCOUNTER — OFFICE VISIT (OUTPATIENT)
Dept: NEUROLOGY | Age: 36
End: 2021-04-01
Payer: MEDICAID

## 2021-04-01 DIAGNOSIS — F43.10 PTSD (POST-TRAUMATIC STRESS DISORDER): ICD-10-CM

## 2021-04-01 DIAGNOSIS — F33.2 SEVERE EPISODE OF RECURRENT MAJOR DEPRESSIVE DISORDER, WITHOUT PSYCHOTIC FEATURES (HCC): ICD-10-CM

## 2021-04-01 DIAGNOSIS — F45.0 SOMATIZATION DISORDER: ICD-10-CM

## 2021-04-01 DIAGNOSIS — G31.84 MILD COGNITIVE IMPAIRMENT WITH MEMORY LOSS: ICD-10-CM

## 2021-04-01 DIAGNOSIS — F90.0 ATTENTION DEFICIT HYPERACTIVITY DISORDER (ADHD), PREDOMINANTLY INATTENTIVE TYPE: Primary | ICD-10-CM

## 2021-04-01 DIAGNOSIS — Z87.898 HISTORY OF LEARNING DISABILITY: ICD-10-CM

## 2021-04-01 PROCEDURE — 90832 PSYTX W PT 30 MINUTES: CPT | Performed by: PSYCHOLOGIST

## 2021-04-01 NOTE — PROGRESS NOTES
[facility] at [facility], [facility] [address]    Office:  [phone]  Fax: [phone]                 Follow-up Session    Patient Name: Erin Rojas  Age: 36 y.o. Gender: female   Occupation: Unemployed  Handedness: right handed   Presenting Concern: Memory loss  Primary Care Physician: Sunday Mata MD  Referring Omi Mulligan MD    Bradley Connors is a 31 y.o. female who presents today for feedback following recent neuropsychological testing. The results were reviewed of the recent neuropsychological evaluation, including discussing individual tests as well as the patient's areas of neurocognitive strength versus their weaknesses. Patient became tearful and supportive counseling was provided in an attempt to instill a sense of hope. Education was provided regarding my diagnostic impressions, and we discussed next steps for further evaluation down the road. I all also answered numerous questions related to the clinical findings, including discussing various methods to improve cognitive cognition and mood when relevant. The patient is encouraged to follow-up with the referring provider. DIAGNOSTIC IMPRESSIONS:    ICD-10-CM ICD-9-CM    1. Attention deficit hyperactivity disorder (ADHD), predominantly inattentive type  F90.0 314.00    2. Mild cognitive impairment with memory loss  G31.84 331.83    3. Severe episode of recurrent major depressive disorder, without psychotic features ([facility] [address]  F33.2 296.33    4. PTSD (post-traumatic stress disorder)  F43.10 309.81    5. Somatization disorder  F45.0 300.81    6. History of learning disability  Z87.898 V40.0        Time spent with patient in face to face conversation: 30 mins.     92038 30 minutes x 1    Anjum Arnold, PHD

## 2021-04-12 ENCOUNTER — OFFICE VISIT (OUTPATIENT)
Dept: FAMILY MEDICINE CLINIC | Age: 36
End: 2021-04-12
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
## 2021-05-14 ENCOUNTER — OFFICE VISIT (OUTPATIENT)
Dept: FAMILY MEDICINE CLINIC | Age: 36
End: 2021-05-14
Payer: MEDICAID

## 2021-05-14 VITALS
DIASTOLIC BLOOD PRESSURE: 86 MMHG | OXYGEN SATURATION: 97 % | WEIGHT: 229.4 LBS | RESPIRATION RATE: 16 BRPM | SYSTOLIC BLOOD PRESSURE: 110 MMHG | TEMPERATURE: 98.2 F | HEIGHT: 63 IN | HEART RATE: 90 BPM | BODY MASS INDEX: 40.64 KG/M2

## 2021-05-14 DIAGNOSIS — J30.9 ALLERGIC RHINITIS, UNSPECIFIED SEASONALITY, UNSPECIFIED TRIGGER: ICD-10-CM

## 2021-05-14 DIAGNOSIS — Z79.899 ENCOUNTER FOR LONG-TERM (CURRENT) USE OF MEDICATIONS: ICD-10-CM

## 2021-05-14 DIAGNOSIS — G89.29 CHRONIC ABDOMINAL PAIN: ICD-10-CM

## 2021-05-14 DIAGNOSIS — E53.8 LOW VITAMIN B12 LEVEL: ICD-10-CM

## 2021-05-14 DIAGNOSIS — M25.50 ARTHRALGIA OF MULTIPLE SITES: Primary | ICD-10-CM

## 2021-05-14 DIAGNOSIS — E55.9 HYPOVITAMINOSIS D: ICD-10-CM

## 2021-05-14 DIAGNOSIS — E11.9 DIABETES MELLITUS TYPE 2, DIET-CONTROLLED (HCC): ICD-10-CM

## 2021-05-14 DIAGNOSIS — R10.9 CHRONIC ABDOMINAL PAIN: ICD-10-CM

## 2021-05-14 DIAGNOSIS — F33.1 MODERATE EPISODE OF RECURRENT MAJOR DEPRESSIVE DISORDER (HCC): ICD-10-CM

## 2021-05-14 DIAGNOSIS — M79.7 FIBROMYALGIA: ICD-10-CM

## 2021-05-14 PROCEDURE — 99214 OFFICE O/P EST MOD 30 MIN: CPT | Performed by: FAMILY MEDICINE

## 2021-05-14 RX ORDER — IBUPROFEN 800 MG/1
800 TABLET ORAL
Qty: 30 TAB | Refills: 0 | Status: SHIPPED | OUTPATIENT
Start: 2021-05-14 | End: 2022-03-25

## 2021-05-14 RX ORDER — IBUPROFEN 800 MG/1
800 TABLET ORAL
COMMUNITY
Start: 2021-03-26 | End: 2021-05-14 | Stop reason: SDUPTHER

## 2021-05-14 RX ORDER — CETIRIZINE HCL 10 MG
10 TABLET ORAL
Qty: 90 TAB | Refills: 3 | Status: SHIPPED | OUTPATIENT
Start: 2021-05-14 | End: 2022-03-25
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
## 2021-05-14 NOTE — PROGRESS NOTES
Chief Complaint   Patient presents with    Diabetes    Depression             1. Have you been to the ER, urgent care clinic since your last visit? Hospitalized since your last visit? no    2. Have you seen or consulted any other health care providers outside of the [address] [place] [address] since your last visit? Include any pap smears or colon screening.  no

## 2021-05-15 LAB
25(OH)D3 SERPL-MCNC: 30.4 NG/ML (ref 30–100)
ANION GAP SERPL CALC-SCNC: 7 MMOL/L (ref 5–15)
BUN SERPL-MCNC: 10 MG/DL (ref 6–20)
BUN/CREAT SERPL: 13 (ref 12–20)
CALCIUM SERPL-MCNC: 9.1 MG/DL (ref 8.5–10.1)
CHLORIDE SERPL-SCNC: 102 MMOL/L (ref 97–108)
CO2 SERPL-SCNC: 26 MMOL/L (ref 21–32)
CREAT SERPL-MCNC: 0.76 MG/DL (ref 0.55–1.02)
EST. AVERAGE GLUCOSE BLD GHB EST-MCNC: 120 MG/DL
GLUCOSE SERPL-MCNC: 74 MG/DL (ref 65–100)
HBA1C MFR BLD: 5.8 % (ref 4–5.6)
POTASSIUM SERPL-SCNC: 4.4 MMOL/L (ref 3.5–5.1)
SODIUM SERPL-SCNC: 135 MMOL/L (ref 136–145)
VIT B12 SERPL-MCNC: 286 PG/ML (ref 193–986)

## 2021-07-07 ENCOUNTER — TRANSCRIBE ORDER (OUTPATIENT)
Dept: REGISTRATION | Age: 36
End: 2021-07-07

## 2021-07-07 ENCOUNTER — HOSPITAL ENCOUNTER (OUTPATIENT)
Dept: GENERAL RADIOLOGY | Age: 36
Discharge: HOME | End: 2021-07-07
Payer: MEDICAID

## 2021-07-07 DIAGNOSIS — Z83.79 FAMILY HISTORY OF OTHER DIGESTIVE DISORDERS: ICD-10-CM

## 2021-07-07 DIAGNOSIS — R10.12 LEFT UPPER QUADRANT PAIN: ICD-10-CM

## 2021-07-07 DIAGNOSIS — F32.A DEPRESSION: ICD-10-CM

## 2021-07-07 DIAGNOSIS — F31.9 BIPOLAR DISORDER, UNSPECIFIED (HCC): ICD-10-CM

## 2021-07-07 DIAGNOSIS — F41.9 ANXIETY: ICD-10-CM

## 2021-07-07 DIAGNOSIS — R10.12 LEFT UPPER QUADRANT PAIN: Primary | ICD-10-CM

## 2021-07-07 PROCEDURE — 74018 RADEX ABDOMEN 1 VIEW: CPT

## 2021-07-16 ENCOUNTER — HOSPITAL ENCOUNTER (OUTPATIENT)
Dept: PREADMISSION TESTING | Age: 36
Discharge: HOME | End: 2021-07-16
Payer: MEDICAID

## 2021-07-16 PROCEDURE — U0005 INFEC AGEN DETEC AMPLI PROBE: HCPCS

## 2021-07-18 LAB
SARS-COV-2, XPLCVT: NOT DETECTED
SOURCE, COVRS: NORMAL

## 2021-07-19 ENCOUNTER — HOSPITAL ENCOUNTER (OUTPATIENT)
Age: 36
Setting detail: OUTPATIENT SURGERY
Discharge: HOME | End: 2021-07-19
Attending: INTERNAL MEDICINE | Admitting: INTERNAL MEDICINE
Payer: MEDICAID

## 2021-07-19 VITALS
RESPIRATION RATE: 22 BRPM | SYSTOLIC BLOOD PRESSURE: 138 MMHG | DIASTOLIC BLOOD PRESSURE: 93 MMHG | HEART RATE: 107 BPM | OXYGEN SATURATION: 98 %

## 2021-07-19 PROCEDURE — 77030040810 HC CATH BLLN ANORECT EXPULSN MUIS -B: Performed by: INTERNAL MEDICINE

## 2021-07-19 PROCEDURE — 76040000007: Performed by: INTERNAL MEDICINE

## 2021-07-19 PROCEDURE — 2709999900 HC NON-CHARGEABLE SUPPLY: Performed by: INTERNAL MEDICINE

## 2021-07-19 NOTE — DISCHARGE INSTRUCTIONS
Trina Cuevas  [id number]  1985      MANOMETRY DISCHARGE INSTRUCTION    You may resume your regular diet as tolerated. You may resume your normal daily activities. Call your Physician if you have any complications or questions. [employer] Activation    Thank you for requesting access to [employer]. Please follow the instructions below to securely access and download your online medical record. [employer] allows you to send messages to your doctor, view your test results, renew your prescriptions, schedule appointments, and more. How Do I Sign Up? 1. In your internet browser, go to [email or web address]  2. Click on the First Time User? Click Here link in the Sign In box. You will be redirect to the New Member Sign Up page. 3. Enter your [employer] Access Code exactly as it appears below. You will not need to use this code after youve completed the sign-up process. If you do not sign up before the expiration date, you must request a new code. [employer] Access Code: Activation code not generated  Current [employer] Status: Active (This is the date your [employer] access code will )    4. Enter the last four digits of your Social Security Number (xxxx) and Date of Birth (mm/dd/yyyy) as indicated and click Submit. You will be taken to the next sign-up page. 5. Create a [employer] ID. This will be your [employer] login ID and cannot be changed, so think of one that is secure and easy to remember. 6. Create a [employer] password. You can change your password at any time. 7. Enter your Password Reset Question and Answer. This can be used at a later time if you forget your password. 8. Enter your e-mail address. You will receive e-mail notification when new information is available in [address] 9. Click Sign Up. You can now view and download portions of your medical record. 10. Click the Download Summary menu link to download a portable copy of your medical information.     Additional Information    If you have questions, please visit the Frequently Asked Questions section of the [employer] website at [email or web address] Remember, [employer] is NOT to be used for urgent needs. For medical emergencies, dial 911.

## 2021-07-19 NOTE — PROGRESS NOTES
Rectal exam done by Isai Rm RN. Anal manometry catheter inserted into rectum. Manometry procedure complete. Catheter inserted into rectum. Balloon filled with 40cc of luke warm H2O, and pt escorted to bathroom for 5 min expulsion test.  Pt was not able to expel balloon. Balloon deflated and catheter removed.    Pt tolerated procedures well. (mother at patient bedside during procedure)

## 2021-08-02 NOTE — OP NOTES
[address]  OPERATIVE REPORT    Name:  Dasha Smith  MR#:  [id number]  :  1985  ACCOUNT #:  [de-identified]  DATE OF SERVICE:  2021    PREOPERATIVE DIAGNOSIS:  Constipation. POSTOPERATIVE DIAGNOSES:  1. Normal resting pressures. 2.  Satisfactory squeeze pressures and ability to sustain squeeze. 3.  Some relaxation with push maneuver. 4.  Balloon expulsion is failed. 5.  Rectoanal inhibitory reflex is present. 6.  Mild abnormalities in sensory findings. PROCEDURE PERFORMED:  High resolution anorectal manometry  Assessment of anorectal function with balloon    SURGEON:  Tesfaye Alanis MD    ASSISTANT:  Ying Glaser RN    ANESTHESIA:  None. COMPLICATIONS:  None. SPECIMENS REMOVED:  None. IMPLANTS:  none    ESTIMATED BLOOD LOSS:  None. COMMENT:  All the findings on this study are mild except the inability to expel the balloon. Consider MR defecography as next step. DESCRIPTION OF PROCEDURE:  High-resolution anorectal manometry was performed by the nursing staff with subsequent interpretation by Dr. Kae Isaac. Sphincter pressures are measured, rectal and abdominal reference mean and max. Normals are more than 30 mmHg or greater. Mean rectal reference 98, max rectal reference 111, mean abdominal reference 110, max abdominal reference 123.4. The patient is then asked to voluntarily squeeze. Normals will increase squeeze by more than 30 mmHg and sustain for more than 10 seconds. She augments rectal reference squeeze to 134.4 and abdominal reference squeeze to 145.9. She sustained squeeze for 10 seconds, which is borderline, but normal.    She is then asked to push or bear down. Residual anal pressure by abdominal reference decreases to 86.1, this is scored as a 27% relaxation. Intrarectal pressure at this time is 71 for rectoanal pressure differential of -16. 1. The balloon is utilized. Balloon expulsion is failed.   The first sensation to rectal filling is at 50 mL. Normal should be about 20 mL. Urge to defecate is at 110 mL. Normal should be . Maximum discomfort is at 170 mL. Normal should be about 180. There may be some stool in the vault or a loss of afferent sensation. If MRI is negative, consider transanal ultrasound. Dafne Hassan MD      [id number]  D:  08/01/2021 19:34  T:  08/01/2021 20:52  JOB #:  [id number]  CC:  MD Lawrence Bello MD

## 2021-09-16 RX ORDER — ERGOCALCIFEROL 1.25 MG/1
CAPSULE ORAL
Qty: 5 CAPSULE | Refills: 6 | Status: SHIPPED | OUTPATIENT
Start: 2021-09-16 | End: 2022-03-25

## 2021-09-16 RX ORDER — ERGOCALCIFEROL 1.25 MG/1
CAPSULE ORAL
Qty: 5 CAPSULE | Refills: 6 | Status: SHIPPED | OUTPATIENT
Start: 2021-09-16 | End: 2021-10-12 | Stop reason: SDUPTHER

## 2021-09-17 RX ORDER — LAMOTRIGINE 200 MG/1
200 TABLET ORAL DAILY
Qty: 30 TABLET | Refills: 11 | Status: SHIPPED | OUTPATIENT
Start: 2021-09-17 | End: 2022-03-25

## 2021-09-17 RX ORDER — BUPROPION HYDROCHLORIDE 300 MG/1
300 TABLET ORAL
Qty: 30 TABLET | Refills: 11 | Status: SHIPPED | OUTPATIENT
Start: 2021-09-17 | End: 2022-03-25

## 2021-09-17 RX ORDER — OMEPRAZOLE 40 MG/1
40 CAPSULE, DELAYED RELEASE ORAL DAILY
Qty: 90 CAPSULE | Refills: 3 | Status: SHIPPED | OUTPATIENT
Start: 2021-09-17 | End: 2022-03-25

## 2021-09-17 RX ORDER — DULOXETIN HYDROCHLORIDE 60 MG/1
120 CAPSULE, DELAYED RELEASE ORAL DAILY
Qty: 60 CAPSULE | Refills: 11 | Status: SHIPPED | OUTPATIENT
Start: 2021-09-17 | End: 2022-03-25

## 2021-09-21 DIAGNOSIS — G31.84 MILD COGNITIVE IMPAIRMENT WITH MEMORY LOSS: ICD-10-CM

## 2021-09-21 RX ORDER — DONEPEZIL HYDROCHLORIDE 10 MG/1
TABLET, FILM COATED ORAL
Qty: 90 TABLET | Refills: 3 | Status: SHIPPED | OUTPATIENT
Start: 2021-09-21 | End: 2022-03-25

## 2021-09-24 RX ORDER — PROMETHAZINE HYDROCHLORIDE 25 MG/1
TABLET ORAL
Qty: 12 TABLET | Refills: 0 | Status: SHIPPED | OUTPATIENT
Start: 2021-09-24 | End: 2022-03-25

## 2021-10-12 ENCOUNTER — VIRTUAL VISIT (OUTPATIENT)
Dept: NEUROLOGY | Age: 36
End: 2021-10-12
Payer: MEDICAID

## 2021-10-12 DIAGNOSIS — G43.711 INTRACTABLE CHRONIC MIGRAINE WITHOUT AURA AND WITH STATUS MIGRAINOSUS: Primary | ICD-10-CM

## 2021-10-12 DIAGNOSIS — G31.84 MILD COGNITIVE IMPAIRMENT WITH MEMORY LOSS: ICD-10-CM

## 2021-10-12 DIAGNOSIS — M79.7 FIBROMYALGIA: ICD-10-CM

## 2021-10-12 DIAGNOSIS — F33.2 SEVERE EPISODE OF RECURRENT MAJOR DEPRESSIVE DISORDER, WITHOUT PSYCHOTIC FEATURES (HCC): ICD-10-CM

## 2021-10-12 PROCEDURE — 99215 OFFICE O/P EST HI 40 MIN: CPT | Performed by: PSYCHIATRY & NEUROLOGY

## 2021-10-12 RX ORDER — LINACLOTIDE 145 UG/1
CAPSULE, GELATIN COATED ORAL
COMMUNITY
Start: 2021-07-12 | End: 2022-03-25

## 2021-10-12 RX ORDER — LACTULOSE 10 G/15ML
SOLUTION ORAL; RECTAL
COMMUNITY
Start: 2021-07-13 | End: 2022-03-25

## 2021-10-12 NOTE — ASSESSMENT & PLAN NOTE
With escalation due to interruption of Emgality    Normally however when she is on her CGRP therapy she has significant reduction in frequency intensity and duration of her headaches and migraines along with reduced use of rescue medications    Today's the first day that we were made aware that her prior authorization is past due.  I have contacted my PA specialist to get this started ASAP    In the meantime I would like her to make sure she stays off of all over-the-counter medications as much as possible and especially to stay off all NSAIDs secondary to her current GI irritation    She can use rizatriptan 1/2 to 1 tablet as needed for more severe migraine and I would prefer not to take any rescue medicine for the more mild to moderate headaches    I have no doubt once the Emgality is initiated again her headaches will down regulate without any significant difficulty as they have in the past

## 2021-10-12 NOTE — ASSESSMENT & PLAN NOTE
Continue on Aricept 10 mg daily    At some point I would like to repeat the neuropsych testing but for right now she is having other issues that need to be addressed and once those are stabilized we can look at reordering the neuropsych testing    There is a heavy component of overlay related to her cognitive impairment due to mental health issues and she has been encouraged to continue to stay in close contact with mental health as she has been doing  We can reassess this when we see her back next year

## 2021-10-12 NOTE — PROGRESS NOTES
[facility]  [facility]LE[facility]ALTH Virtual FOLLOW-UP VISIT        Kd Velez is a 31 y.o. female who was seen by synchronous (real-time) audio-video technology on 10/12/2021. Kd Velez is a 31 y.o. female who presents today for the following:        ASSESSMENT AND PLAN      1. Intractable chronic migraine without aura and with status migrainosus  Assessment & Plan: With escalation due to interruption of Emgality    Normally however when she is on her CGRP therapy she has significant reduction in frequency intensity and duration of her headaches and migraines along with reduced use of rescue medications    Today's the first day that we were made aware that her prior authorization is past due. I have contacted my PA specialist to get this started ASAP    In the meantime I would like her to make sure she stays off of all over-the-counter medications as much as possible and especially to stay off all NSAIDs secondary to her current GI irritation    She can use rizatriptan 1/2 to 1 tablet as needed for more severe migraine and I would prefer not to take any rescue medicine for the more mild to moderate headaches    I have no doubt once the Emgality is initiated again her headaches will down regulate without any significant difficulty as they have in the past  2. Mild cognitive impairment with memory loss  Assessment & Plan:   Continue on Aricept 10 mg daily    At some point I would like to repeat the neuropsych testing but for right now she is having other issues that need to be addressed and once those are stabilized we can look at reordering the neuropsych testing    There is a heavy component of overlay related to her cognitive impairment due to mental health issues and she has been encouraged to continue to stay in close contact with mental health as she has been doing  We can reassess this when we see her back next year  3.  Fibromyalgia  Assessment & Plan:   Continue on duloxetine  4. Severe episode of recurrent major depressive disorder, without psychotic features ([facility] [address]  Assessment & Plan:  A significant contributor her cognitive issues. As under problem #2      Patient and/or family was given time to ask questions and voice concerns. I believe all questions concerns were adequately addressed at this  office visit. Patient and/or family also verbalized agreement and understanding of the above-stated plan    Patient remains a complex patient secondary to polypharmacy, significant comorbid conditions, and use of high-risk medications which complicate the decision making process related to patient's neurologic diagnosis    Follow-up and Dispositions    · Return in about 5 months (around 3/12/2022) for Virtual visit. ICD-10-CM ICD-9-CM    1. Intractable chronic migraine without aura and with status migrainosus  G43.711 346.73    2. Mild cognitive impairment with memory loss  G31.84 331.83    3. Fibromyalgia  M79.7 729.1    4. Severe episode of recurrent major depressive disorder, without psychotic features ([facility] [address]  F33.2 296.33            I attest that 40 minutes was spent on today's visit reviewing medical records and diagnostic testing deemed pertinent to this patient's care, along with direct time spent at patient's visit including the history, physical assessment and plan, discussing diagnosis and management along with documentation.       HPI  Historical Data  This is a patient previously seen in our practice.     Neuro diagnosis  chronic headaches   mild cognitive impairment,   low back pain with radiculopathy affecting the right and left,   bilateral carotid artery stenosis,   Transient visual disturbance of both eyes     Other significant diagnoses include depression posttraumatic stress disorder     She had neuropsychiatric testing completed April 8, 2019 and was found to have PTSD severe depressive disorder without psychosis, severe anxiety and somatic sensation disorder along with her mild cognitive impairment and borderline personality traits.     Other comments by on the neuropsych evaluation by the neuropsychologist include the following:  She is young for dementia and there is no neurologic correlate which would explain the type of cognitive deficits she is showing.  At the same time, the CURRENT profile is not consistent with a purely psychiatric issue.  As such, the diagnosis here is that of Mild Cognitive Impairment with Memory loss exacerbated by severe and complex psychiatric distress      She needs intensive psychiatric intervention to include a review of her medication management for anxiety and depression as well as active and continued engagement in intensive psychotherapy.  Consider EMDR. Consider TMS if medication by itself has not proven helpful for her - she would be an appropriate candidate for same.  I do believe medication for memory should be considered, as there is no other definite explanation for her impaired recognition recall, which often clears the difference between organic and functional memory loss.  Any metabolic cause needs to be ruled out, and sleep issues may be a factor as well.  This is concerning and very atypical changes over time, with improvement in some scores and declines in others.  She remains capable of making decisions for herself., but needs reminders for meals, medications, and finances. I see no evidence of malingering here.  I am concerned about driving safety issues as well, so this should be formally evaluated if deemed medically appropriate.  My hope is to see her again once mood improves to better clarify, because my worry is that if cognitive problems continue to worsen despite positive psychiatric interventions, then she has dementia and the prognosis would be quite poor at that point.  Stay active mentally, physically, and socially.  Consult with OT and Speech, Neurocognitive Rehab.   We now have baseline and updated data on her.  Follow up as noted. Prerna Lira correlation is, of course, indicated.           Interim Data:   Headaches and migraines  Emgality: First dose 2020  Patient is now down to having 1-2 migraines per week she takes rizatriptan and it completely aborts her headache she does get some light and sound sensitivity but for the most part she is not debilitated and is able to get through her day  She denies side effects to the Emgality to include local injection site reactions    Presently having more migraines secondary fact that her prior authorization for Emgality has  and she has not had an injection in over a month. Cognitive impairment:  Currently on Aricept 10 mg since 2020  She still complains of significant cognitive difficulties  She is using her calendar on her smart phone  She tries to use notes but then she forgets where she places her notes  She is doing brain Buster type activities  And she continues to follow-up closely with mental health  She still becomes very easily stressed when asked for normal speed processing more complicated tasks it takes her anxiety to extreme levels and cannot function further  Remains independent with ADLs          Depression/mental health:  Patient does continue to see mental health on a regular basis and follows through with recommended medication adjustment and behavioral recommendations  Still finds she is extremely stressed        Fibromyalgia: Getting physical therapy and finding it some helpful and remains on medications such as Cymbalta   Cannot tolerate gabapentin secondary to side effects    Other:  Past month GI issues with UGI and colonoscopy planned      Results from [place] encounter on 20    MRI BRAIN W WO CONT    Impression  IMPRESSION:  No significant abnormalities on MRI of the brain and pituitary  gland.       Results from [place] encounter on 18    MRI BRAIN W WO CONT    Impression  IMPRESSION:  1. No discrete pituitary lesion identified, and otherwise unremarkable brain  MRI. A single new nonspecific T2/FLAIR hyperintensity in the right parietal  subcortical white matter is likely of no clinical significance. Results from [facility] encounter on 09/23/16    MRI BRAIN W WO CONT    Impression  IMPRESSION:  No focal pituitary abnormalities demonstrated. Normal brain MRI. [facility] Allergies   Allergen Reactions    Latex Rash, Itching and Swelling    Pcn [Penicillins] Other (comments)     Pt states as a child she had a reaction but does not remember specific reaction. Pt states\"mother say I stopped breathing\".  Gabapentin Other (comments)     Foggy in the head. Did not feel right. 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 Taking 2 tabs daily    buPROPion XL (Wellbutrin XL) 300 mg XL tablet Take 1 Tablet by mouth every morning.  omeprazole (PRILOSEC) 40 mg capsule Take 1 Capsule by mouth daily.  lamoTRIgine (LaMICtal) 200 mg tablet Take 1 Tablet by mouth daily. Indications: bipolar depression    ergocalciferol (ERGOCALCIFEROL) 1,250 mcg (50,000 unit) capsule TAKE 1 CAPSULE BY MOUTH EVERY 7 DAYS    ibuprofen (MOTRIN) 800 mg tablet Take 1 Tab by mouth every six (6) hours as needed for Pain.  cetirizine (ZYRTEC) 10 mg tablet Take 1 Tab by mouth nightly as needed for Allergies.  ARIPiprazole (Abilify) 2 mg tablet Take 2 mg by mouth daily.  rizatriptan (MAXALT) 10 mg tablet Take 1 Tab by mouth every eight (8) hours as needed for Migraine.  May repeat in 2 hours if needed    galcanezumab-gnlm (Emgality Pen) 120 mg/mL injection 1 mL by SubCUTAneous route every thirty (30) days.  diclofenac (Voltaren) 1 % gel Apply 2 grams (about a pea-sized drop) to the affected area 3-4 times daily.  ascorbic acid, vitamin C, (VITAMIN C) 500 mg tablet Take 1,000 mg by mouth daily.  traZODone (DESYREL) 100 mg tablet Take 100 mg by mouth nightly.  b complex vitamins (B COMPLEX 1) tablet Take 1 Tab by mouth daily.  lactulose (CHRONULAC) 10 gram/15 mL solution TAKE 30 ML BY MOUTH TWICE DAILY    Linzess 145 mcg cap capsule TAKE 1 CAPSULE BY MOUTH EVERY DAY BEFORE MEALS     No current facility-administered medications for this visit. Past medical history/surgical history, family history, and social history have been reviewed for today's visit      ROS    A ten system review of constitutional, cardiovascular, respiratory, musculoskeletal, endocrine, skin, SHEENT, genitourinary, psychiatric and neurologic systems was obtained and is unremarkable except as mentioned under HPI          EXAMINATION: Within the context of virtual telehealth visit:    General appearance: Patient is well-developed and well-nourished in no apparent distress and well groomed.     Psych/mental health:  Affect: Appropriate    PHQ  3 most recent PHQ Screens 12/11/2020   PHQ Not Done Active Diagnosis of Depression or Bipolar Disorder   Little interest or pleasure in doing things -   Feeling down, depressed, irritable, or hopeless -   Total Score PHQ 2 -   Trouble falling or staying asleep, or sleeping too much -   Feeling tired or having little energy -   Poor appetite, weight loss, or overeating -   Feeling bad about yourself - or that you are a failure or have let yourself or your family down -   Trouble concentrating on things such as school, work, reading, or watching TV -   Moving or speaking so slowly that other people could have noticed; or the opposite being so fidgety that others notice -   Thoughts of being better off dead, or hurting yourself in some way -   PHQ 9 Score -   How difficult have these problems made it for you to do your work, take care of your home and get along with others -       HEENT:   WNL      Cardiovascular: WNL    Respiratory:   WNL    Musculoskeletal:   WNL    Integumentary:    WNL      Neurological Examination:   Mental Status:        MMSE  No flowsheet data found. Formal testing was not completed    there was nothing concerning on general observation and discussion. Alert oriented and appropriate to general conversation  Normal processing on general observation  Followed conversation and responded seemingly appropriate throughout the office visit  No word finding difficulties noted on casual observation  Able to follow directions without difficulty     Cranial Nerves:    Grossly intact    Motor:   Normal bulk  No tremor appreciated on today's exam  No abnormal movements appreciated on today's exam  Moves extremities spontaneously and with purpose      Sensation: Not tested    Coordination/Cerebellar:   Grossly intact    Gait: Not tested    Fall risk assessment  No flowsheet data found. Due to this being a TeleHealth evaluation, many elements of the physical examination are unable to be assessed. Pursuant to the emergency declaration under the [facility] [place] [address] [facility] authority and the [employer] and [employer] Act, this Virtual  Visit was conducted, with patient's consent, to reduce the patient's risk of exposure to COVID-19 and provide continuity of care for an established patient. Services were provided through a video synchronous discussion virtually to substitute for in-person clinic visit.           Jake Bueno MS, ANP-BC, [facility]

## 2021-10-12 NOTE — PATIENT INSTRUCTIONS
As we talked about we are going to hold the ibuprofen and Aleve Advil Excedrin BC powders etc.  Those can be very irritating to the stomach    I want to use the rizatriptan and half a tablet as needed for severe migraine you can use a full tablet if needed    It is okay to use Tylenol but I would prefer you not to use it on a routine basis because too much rescue medication can actually aggravate HAs in the long run    Will be working on getting your prior authorization we hoping to have it by the end of the week  If you do not hear from us by the end of the week should me know through my chart to remind me to follow-up on the prior authorization    Office Policies    o Phone calls/patient messages:  Please allow up to 24 hours for someone in the office to contact you about your call or message. Be mindful your provider may be out of the office or your message may require further review. We encourage you to use [employer] for your messages as this is a faster, more efficient way to communicate with our office    o Medication Refills:  Prescription medications require up to 48 business hours to process. We encourage you to use [employer] for your refills. For controlled medications: Please allow up to 72 business hours to process. Certain medications may require you to  a written prescription at our office. NO narcotic/controlled medications will be prescribed after 4pm Monday through Friday or on weekends    o Form/Paperwork Completion:  We ask that you allow 7-14 business days. You may also download your forms to [employer] to have your doctor print off.

## 2021-10-19 ENCOUNTER — OFFICE VISIT (OUTPATIENT)
Dept: FAMILY MEDICINE CLINIC | Age: 36
End: 2021-10-19
Payer: MEDICAID

## 2021-10-19 VITALS
DIASTOLIC BLOOD PRESSURE: 84 MMHG | HEART RATE: 89 BPM | HEIGHT: 63 IN | SYSTOLIC BLOOD PRESSURE: 120 MMHG | RESPIRATION RATE: 16 BRPM | OXYGEN SATURATION: 98 % | WEIGHT: 238.4 LBS | BODY MASS INDEX: 42.24 KG/M2 | TEMPERATURE: 98 F

## 2021-10-19 DIAGNOSIS — F33.1 MODERATE EPISODE OF RECURRENT MAJOR DEPRESSIVE DISORDER (HCC): ICD-10-CM

## 2021-10-19 DIAGNOSIS — Z11.59 NEED FOR HEPATITIS C SCREENING TEST: ICD-10-CM

## 2021-10-19 DIAGNOSIS — E11.9 DIABETES MELLITUS TYPE 2, DIET-CONTROLLED (HCC): Primary | ICD-10-CM

## 2021-10-19 DIAGNOSIS — Z28.21 COVID-19 VACCINATION DECLINED: ICD-10-CM

## 2021-10-19 DIAGNOSIS — M25.50 ARTHRALGIA OF MULTIPLE SITES: ICD-10-CM

## 2021-10-19 DIAGNOSIS — M79.7 FIBROMYALGIA: ICD-10-CM

## 2021-10-19 DIAGNOSIS — G89.29 CHRONIC ABDOMINAL PAIN: ICD-10-CM

## 2021-10-19 DIAGNOSIS — Z79.899 ENCOUNTER FOR LONG-TERM (CURRENT) USE OF MEDICATIONS: ICD-10-CM

## 2021-10-19 DIAGNOSIS — R10.9 CHRONIC ABDOMINAL PAIN: ICD-10-CM

## 2021-10-19 DIAGNOSIS — Z72.0 TOBACCO USE: ICD-10-CM

## 2021-10-19 DIAGNOSIS — F43.12 CHRONIC POST-TRAUMATIC STRESS DISORDER (PTSD): ICD-10-CM

## 2021-10-19 DIAGNOSIS — E66.01 MORBID OBESITY (HCC): ICD-10-CM

## 2021-10-19 LAB
ALBUMIN SERPL-MCNC: 3.5 G/DL (ref 3.5–5)
ALBUMIN/GLOB SERPL: 1 {RATIO} (ref 1.1–2.2)
ALP SERPL-CCNC: 105 U/L (ref 45–117)
ALT SERPL-CCNC: 47 U/L (ref 12–78)
ANION GAP SERPL CALC-SCNC: 8 MMOL/L (ref 5–15)
APPEARANCE UR: CLEAR
AST SERPL-CCNC: 20 U/L (ref 15–37)
BACTERIA URNS QL MICRO: NEGATIVE /HPF
BILIRUB SERPL-MCNC: 0.3 MG/DL (ref 0.2–1)
BILIRUB UR QL: NEGATIVE
BUN SERPL-MCNC: 5 MG/DL (ref 6–20)
BUN/CREAT SERPL: 7 (ref 12–20)
CALCIUM SERPL-MCNC: 9 MG/DL (ref 8.5–10.1)
CHLORIDE SERPL-SCNC: 108 MMOL/L (ref 97–108)
CHOLEST SERPL-MCNC: 136 MG/DL
CO2 SERPL-SCNC: 22 MMOL/L (ref 21–32)
COLOR UR: NORMAL
CREAT SERPL-MCNC: 0.73 MG/DL (ref 0.55–1.02)
CREAT UR-MCNC: 130 MG/DL
EPITH CASTS URNS QL MICRO: NORMAL /LPF
ERYTHROCYTE [DISTWIDTH] IN BLOOD BY AUTOMATED COUNT: 14.9 % (ref 11.5–14.5)
EST. AVERAGE GLUCOSE BLD GHB EST-MCNC: 120 MG/DL
GLOBULIN SER CALC-MCNC: 3.6 G/DL (ref 2–4)
GLUCOSE SERPL-MCNC: 102 MG/DL (ref 65–100)
GLUCOSE UR STRIP.AUTO-MCNC: NEGATIVE MG/DL
HBA1C MFR BLD: 5.8 % (ref 4–5.6)
HCT VFR BLD AUTO: 45.2 % (ref 35–47)
HCV AB SERPL QL IA: NONREACTIVE
HDLC SERPL-MCNC: 35 MG/DL
HDLC SERPL: 3.9 {RATIO} (ref 0–5)
HGB BLD-MCNC: 14.6 G/DL (ref 11.5–16)
HGB UR QL STRIP: NEGATIVE
KETONES UR QL STRIP.AUTO: NEGATIVE MG/DL
LDLC SERPL CALC-MCNC: 71.6 MG/DL (ref 0–100)
LEUKOCYTE ESTERASE UR QL STRIP.AUTO: NEGATIVE
MCH RBC QN AUTO: 28.1 PG (ref 26–34)
MCHC RBC AUTO-ENTMCNC: 32.3 G/DL (ref 30–36.5)
MCV RBC AUTO: 87.1 FL (ref 80–99)
MICROALBUMIN UR-MCNC: 1.07 MG/DL
MICROALBUMIN/CREAT UR-RTO: 8 MG/G (ref 0–30)
NITRITE UR QL STRIP.AUTO: NEGATIVE
NRBC # BLD: 0 K/UL (ref 0–0.01)
NRBC BLD-RTO: 0 PER 100 WBC
PH UR STRIP: 5.5 [PH] (ref 5–8)
PLATELET # BLD AUTO: 328 K/UL (ref 150–400)
PMV BLD AUTO: 10.5 FL (ref 8.9–12.9)
POTASSIUM SERPL-SCNC: 4.1 MMOL/L (ref 3.5–5.1)
PROT SERPL-MCNC: 7.1 G/DL (ref 6.4–8.2)
PROT UR STRIP-MCNC: NEGATIVE MG/DL
RBC # BLD AUTO: 5.19 M/UL (ref 3.8–5.2)
RBC #/AREA URNS HPF: NORMAL /HPF (ref 0–5)
SODIUM SERPL-SCNC: 138 MMOL/L (ref 136–145)
SP GR UR REFRACTOMETRY: 1.01 (ref 1–1.03)
TRIGL SERPL-MCNC: 147 MG/DL (ref ?–150)
UA: UC IF INDICATED,UAUC: NORMAL
UROBILINOGEN UR QL STRIP.AUTO: 0.2 EU/DL (ref 0.2–1)
VLDLC SERPL CALC-MCNC: 29.4 MG/DL
WBC # BLD AUTO: 10.2 K/UL (ref 3.6–11)
WBC URNS QL MICRO: NORMAL /HPF (ref 0–4)

## 2021-10-19 PROCEDURE — 99214 OFFICE O/P EST MOD 30 MIN: CPT | Performed by: FAMILY MEDICINE
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
## 2021-10-19 NOTE — PROGRESS NOTES
Chief Complaint   Patient presents with    Depression     follow up               1. Have you been to the ER, urgent care clinic since your last visit? Hospitalized since your last visit? Yes, 9/2/2021 [facility] in [facility] for nausea, vomiting, tightness in chest    2. Have you seen or consulted any other health care providers outside of the [address] [place] [address] since your last visit? Include any pap smears or colon screening.  no

## 2021-10-22 ENCOUNTER — HOSPITAL ENCOUNTER (OUTPATIENT)
Dept: PREADMISSION TESTING | Age: 36
Discharge: HOME | End: 2021-10-22
Payer: MEDICAID

## 2021-10-22 PROCEDURE — U0005 INFEC AGEN DETEC AMPLI PROBE: HCPCS

## 2021-10-24 LAB
SARS-COV-2, XPLCVT: NOT DETECTED
SOURCE, COVRS: NORMAL

## 2021-10-26 ENCOUNTER — ANESTHESIA EVENT (OUTPATIENT)
Dept: ENDOSCOPY | Age: 36
End: 2021-10-26
Payer: MEDICAID

## 2021-10-27 ENCOUNTER — ANESTHESIA (OUTPATIENT)
Dept: ENDOSCOPY | Age: 36
End: 2021-10-27
Payer: MEDICAID

## 2021-10-27 ENCOUNTER — HOSPITAL ENCOUNTER (OUTPATIENT)
Age: 36
Setting detail: OUTPATIENT SURGERY
Discharge: HOME | End: 2021-10-27
Attending: INTERNAL MEDICINE | Admitting: INTERNAL MEDICINE
Payer: MEDICAID

## 2021-10-27 VITALS
BODY MASS INDEX: 41.6 KG/M2 | HEART RATE: 75 BPM | WEIGHT: 234.8 LBS | TEMPERATURE: 97.3 F | SYSTOLIC BLOOD PRESSURE: 103 MMHG | DIASTOLIC BLOOD PRESSURE: 74 MMHG | RESPIRATION RATE: 11 BRPM | HEIGHT: 63 IN | OXYGEN SATURATION: 99 %

## 2021-10-27 PROCEDURE — 2709999900 HC NON-CHARGEABLE SUPPLY: Performed by: INTERNAL MEDICINE

## 2021-10-27 PROCEDURE — 77030019988 HC FCPS ENDOSC DISP BSC -B: Performed by: INTERNAL MEDICINE

## 2021-10-27 PROCEDURE — 76040000019: Performed by: INTERNAL MEDICINE

## 2021-10-27 PROCEDURE — 74011250636 HC RX REV CODE- 250/636: Performed by: NURSE ANESTHETIST, CERTIFIED REGISTERED

## 2021-10-27 PROCEDURE — 74011250636 HC RX REV CODE- 250/636: Performed by: INTERNAL MEDICINE

## 2021-10-27 PROCEDURE — 77030018712 HC DEV BLLN INFL BSC -B: Performed by: INTERNAL MEDICINE

## 2021-10-27 PROCEDURE — 76060000031 HC ANESTHESIA FIRST 0.5 HR: Performed by: INTERNAL MEDICINE

## 2021-10-27 PROCEDURE — 74011250636 HC RX REV CODE- 250/636: Performed by: ANESTHESIOLOGY

## 2021-10-27 PROCEDURE — 88305 TISSUE EXAM BY PATHOLOGIST: CPT

## 2021-10-27 PROCEDURE — C1726 CATH, BAL DIL, NON-VASCULAR: HCPCS | Performed by: INTERNAL MEDICINE

## 2021-10-27 PROCEDURE — 74011000250 HC RX REV CODE- 250: Performed by: NURSE ANESTHETIST, CERTIFIED REGISTERED

## 2021-10-27 RX ORDER — FLUMAZENIL 0.1 MG/ML
0.2 INJECTION INTRAVENOUS
Status: DISCONTINUED | OUTPATIENT
Start: 2021-10-27 | End: 2021-10-27 | Stop reason: HOSPADM

## 2021-10-27 RX ORDER — LIDOCAINE HYDROCHLORIDE 20 MG/ML
INJECTION, SOLUTION EPIDURAL; INFILTRATION; INTRACAUDAL; PERINEURAL AS NEEDED
Status: DISCONTINUED | OUTPATIENT
Start: 2021-10-27 | End: 2021-10-27 | Stop reason: HOSPADM

## 2021-10-27 RX ORDER — ONDANSETRON 2 MG/ML
INJECTION INTRAMUSCULAR; INTRAVENOUS
Status: COMPLETED
Start: 2021-10-27 | End: 2021-10-27

## 2021-10-27 RX ORDER — PROPOFOL 10 MG/ML
INJECTION, EMULSION INTRAVENOUS AS NEEDED
Status: DISCONTINUED | OUTPATIENT
Start: 2021-10-27 | End: 2021-10-27 | Stop reason: HOSPADM

## 2021-10-27 RX ORDER — MIDAZOLAM HYDROCHLORIDE 1 MG/ML
.25-5 INJECTION, SOLUTION INTRAMUSCULAR; INTRAVENOUS
Status: DISCONTINUED | OUTPATIENT
Start: 2021-10-27 | End: 2021-10-27 | Stop reason: HOSPADM

## 2021-10-27 RX ORDER — DEXTROMETHORPHAN/PSEUDOEPHED 2.5-7.5/.8
1.2 DROPS ORAL
Status: DISCONTINUED | OUTPATIENT
Start: 2021-10-27 | End: 2021-10-27 | Stop reason: HOSPADM

## 2021-10-27 RX ORDER — ONDANSETRON 2 MG/ML
INJECTION INTRAMUSCULAR; INTRAVENOUS AS NEEDED
Status: DISCONTINUED | OUTPATIENT
Start: 2021-10-27 | End: 2021-10-27 | Stop reason: HOSPADM

## 2021-10-27 RX ORDER — NALOXONE HYDROCHLORIDE 0.4 MG/ML
0.4 INJECTION, SOLUTION INTRAMUSCULAR; INTRAVENOUS; SUBCUTANEOUS
Status: DISCONTINUED | OUTPATIENT
Start: 2021-10-27 | End: 2021-10-27 | Stop reason: HOSPADM

## 2021-10-27 RX ORDER — EPINEPHRINE 0.1 MG/ML
1 INJECTION INTRACARDIAC; INTRAVENOUS
Status: DISCONTINUED | OUTPATIENT
Start: 2021-10-27 | End: 2021-10-27 | Stop reason: HOSPADM

## 2021-10-27 RX ORDER — SODIUM CHLORIDE 0.9 % (FLUSH) 0.9 %
5-40 SYRINGE (ML) INJECTION AS NEEDED
Status: DISCONTINUED | OUTPATIENT
Start: 2021-10-27 | End: 2021-10-27 | Stop reason: HOSPADM

## 2021-10-27 RX ORDER — SODIUM CHLORIDE 9 MG/ML
75 INJECTION, SOLUTION INTRAVENOUS CONTINUOUS
Status: DISCONTINUED | OUTPATIENT
Start: 2021-10-27 | End: 2021-10-27 | Stop reason: HOSPADM

## 2021-10-27 RX ORDER — ATROPINE SULFATE 0.1 MG/ML
0.5 INJECTION INTRAVENOUS
Status: DISCONTINUED | OUTPATIENT
Start: 2021-10-27 | End: 2021-10-27 | Stop reason: HOSPADM

## 2021-10-27 RX ORDER — SODIUM CHLORIDE 0.9 % (FLUSH) 0.9 %
5-40 SYRINGE (ML) INJECTION EVERY 8 HOURS
Status: DISCONTINUED | OUTPATIENT
Start: 2021-10-27 | End: 2021-10-27 | Stop reason: HOSPADM

## 2021-10-27 RX ORDER — FENTANYL CITRATE 50 UG/ML
INJECTION, SOLUTION INTRAMUSCULAR; INTRAVENOUS
Status: DISCONTINUED
Start: 2021-10-27 | End: 2021-10-27 | Stop reason: HOSPADM

## 2021-10-27 RX ADMIN — FENTANYL CITRATE 50 MCG: 50 INJECTION, SOLUTION INTRAMUSCULAR; INTRAVENOUS at 08:35

## 2021-10-27 RX ADMIN — ONDANSETRON HYDROCHLORIDE 4 MG: 2 INJECTION, SOLUTION INTRAMUSCULAR; INTRAVENOUS at 08:26

## 2021-10-27 RX ADMIN — SODIUM CHLORIDE 75 ML/HR: 9 INJECTION, SOLUTION INTRAVENOUS at 07:53

## 2021-10-27 RX ADMIN — LIDOCAINE HYDROCHLORIDE 100 MG: 20 INJECTION, SOLUTION EPIDURAL; INFILTRATION; INTRACAUDAL; PERINEURAL at 08:26

## 2021-10-27 RX ADMIN — PROPOFOL 50 MG: 10 INJECTION, EMULSION INTRAVENOUS at 08:34

## 2021-10-27 RX ADMIN — FENTANYL CITRATE 50 MCG: 50 INJECTION, SOLUTION INTRAMUSCULAR; INTRAVENOUS at 08:27

## 2021-10-27 RX ADMIN — PROPOFOL 100 MG: 10 INJECTION, EMULSION INTRAVENOUS at 08:26

## 2021-10-27 RX ADMIN — PROPOFOL 50 MG: 10 INJECTION, EMULSION INTRAVENOUS at 08:36

## 2021-10-27 RX ADMIN — PROPOFOL 50 MG: 10 INJECTION, EMULSION INTRAVENOUS at 08:30

## 2021-10-27 RX ADMIN — PROPOFOL 50 MG: 10 INJECTION, EMULSION INTRAVENOUS at 08:41

## 2021-10-27 NOTE — PROCEDURES
Colonoscopy Procedure Note    Dakota Mcgowan  1985  [id number]    Indications:  Please see below. Pre-operative Diagnosis: Change in bowel function    Post-operative Diagnosis: internal hemorrhoids    : Lawrence Cobos MD    Referring Provider: Fuad Zamorano MD    Sedation:  MAC anesthesia Propofol        Procedure Details:    After detailed informed consent was obtained with all risks and benefits of procedure explained and preoperative exam completed, the patient was taken to the endoscopy suite and placed in the left lateral decubitus position. Upon sequential sedation as per above, a digital rectal exam was performed  and was normal.  The Olympus videocolonoscope  was inserted in the rectum and carefully advanced to the cecum, which was identified by the ileocecal valve and appendiceal orifice, terminal ileum. The quality of preparation was good. The colonoscope was slowly withdrawn with careful evaluation between folds. Retroflexion in the rectum was performed. Findings:   · The Olympus video high-definition colonoscope was advanced to the cecum, identified by its typical land marks, with ease. · TI was normal. Mucosal biopsies taken. · The mucosa of the colon is normal appearing to the cecum with no obvious mucosal pathology (polyp,mass lesion, colitis etc) noted on this colonoscopy. I took multiple biopsies of the right and left colon. · Small internal hemorrhoids are noted. Therapies:  biopsy of colon cecum, ascending colon, descending colon, sigmoid colon and rectum    Specimen/s:  - Specimens were collected as described above and sent to pathology. Complications: None were encountered during the procedure. EBL:  None. Recommendations:     -Await pathology.  -High fiber diet.    -Continue current medications. Lawrence Cobos MD  10/27/2021  8:46 AM

## 2021-10-27 NOTE — ANESTHESIA PREPROCEDURE EVALUATION
Anesthetic History     PONV          Review of Systems / Medical History  Patient summary reviewed, nursing notes reviewed and pertinent labs reviewed    Pulmonary          Smoker (1/2 ppd)  Asthma (childhood)        Neuro/Psych         Headaches and psychiatric history (anxiety)    Comments: PTSD  Borderline personality  Mild cognitive impairment  Bipolar affective d/o  Anxiety  Transient vision disturbance of both eyes  Lumbar back pain with radiculopathy affecting left lower extremity   Lumbar back pain with radiculopathy affecting right lower extremity      Cardiovascular                  Exercise tolerance: <4 METS  Comments: H/o atypical CP  07/18 ECHO: EF 55%  07/18 Stress ECHO= negative   GI/Hepatic/Renal     GERD (mostly controlled)          Comments: Abdominal pain Endo/Other    Diabetes (prediabetes)  Hypothyroidism  Morbid obesity and arthritis     Other Findings   Comments: Rectal prolapse  Uterine prolapse  Bladder prolapse  Endometriosis   Fibromyalgia   Anal prolapse  Uterine prolapse                Physical Exam    Airway  Mallampati: II  TM Distance: > 6 cm  Neck ROM: normal range of motion   Mouth opening: Normal     Cardiovascular  Regular rate and rhythm,  S1 and S2 normal,  no murmur, click, rub, or gallop             Dental      Comments: Caries at gumline, lower   Pulmonary  Breath sounds clear to auscultation               Abdominal  GI exam deferred       Other Findings            Anesthetic Plan    ASA: 3  Anesthesia type: total IV anesthesia          Induction: Intravenous  Anesthetic plan and risks discussed with: Patient

## 2021-10-27 NOTE — PROCEDURES
[place] Office: [phone]      Esophagogastroduodenoscopy Procedure Note      Pancho Gonzalez  1985  [id number]    Indication:  Abdominal pain, LUQ, Dysphagia     : Sylvester Cobb MD    Referring Provider:  Henry Boudreaux MD    Sedation:  MAC anesthesia Propofol    Procedure Details:  After detailed informed consent was obtained for the procedure, with all risks and benefits of procedure explained the patient was taken to the endoscopy suite and placed in the left lateral decubitus position. Following sequential administration of sedation as per above, the endoscope was inserted into the mouth and advanced under direct vision to second portion of the duodenum. A careful inspection was made as the gastroscope was withdrawn, including a retroflexed view of the proximal stomach; findings and interventions are described below. Findings:     Esophagus: The esophageal mucosa in the proximal, mid and distal esophagus is normal.  Mucosal biopsies taken. The squamo-columnar junction is at 40 cm where the Z-line was noted. A tiny 1 cm sliding hiatal hernia is noted. TTS CRE 18-20 mm empiric dilation was performed. Stomach: The gastric mucosa has erythema in the body and antrum with mild bile coating: mucosal biopsies taken. The fundus was found to be normal with no lesions noted on retroflexion. Endoscopic grading of gastroesophageal flap valve (GEFV)/Hill rdgrdrrdarddrderd:rd rd3rd The angularis is normal as well. Duodenum:   The bulb and post bulbar mucosa is normal in appearance. The duodenal folds are normal. Mucosal biopsies taken. Therapies:  esophageal dilation with 18-20 mm sized balloon  biopsy of esophagus  biopsy of stomach body, antrum  biopsy of duodenal distal bulb, second portion    Specimen:  Specimens were collected as described and send to the laboratory. Complications:   None were encountered during the procedure. EBL:  None.           Recommendations: -Continue acid suppression. ,   -Await pathology. ,   -Follow symptoms. ,   -Follow up with primary care physician      Thank you for entrusting me with this patient's care. Please do not hesitate to contact me with any questions or if I can be of assistance with any of your other patients' GI needs. Lawrence Chavez MD  10/27/2021  8:34 AM

## 2021-10-27 NOTE — DISCHARGE INSTRUCTIONS
[place] Office: [phone]    Dakota Mcgowan  [id number]  1985    EGD/COLONOSCOPY DISCHARGE INSTRUCTIONS  Discomfort:  Sore throat- throat lozenges or warm salt water gargle  redness at IV site- apply warm compress to area; if redness or soreness persist- contact your physician  Gaseous discomfort- walking, belching will help relieve any discomfort  You may not operate a vehicle for 12 hours  You may not engage in an occupation involving machinery or appliances for rest of today. You may not drink alcoholic beverages for at least 12 hours  Avoid making any critical decisions for at least 24 hour  DIET  You may resume your regular diet - however -  remember your colon is empty and a heavy meal will produce gas. Avoid these foods:  fried / greasy foods, excessive carbonated drinks or too much caffeine  MEDICATIONS   Regarding Aspirin or Nonsteroidal medications specifically, please see below. ACTIVITY  You may resume your normal daily activities. Spend the remainder of the day resting -  avoid any strenuous activity. CALL M.D. ANY SIGN OF   Increasing pain, nausea, vomiting  Abdominal distension (swelling)  New increased bleeding (oral or rectal)  Fever (chills)  Pain in chest area  Bloody discharge from nose or mouth  Shortness of breath    You may not take any Advil, Aspirin, Ibuprofen, Motrin, Aleve, or Goodys for 7 days, ONLY  Tylenol as needed for pain. Follow-up Instructions:   Call  Lawrence Cobos MD for any questions or concerns  Results of procedure / biopsy in 7 days   Telephone # [phone]  Continue acid suppression. ,   -Await pathology. ,   -Follow symptoms. ,   -Follow up with primary care physician    Patient Education        Gastritis: Care Instructions  Your Care Instructions     Gastritis is a sore and upset stomach. It happens when something irritates the stomach lining. Many things can cause it. These include an infection such as the flu or something you ate or drank. Medicines or a sore on the lining of the stomach (ulcer) also can cause it. Your belly may bloat and ache. You may belch, vomit, and feel sick to your stomach. You should be able to relieve the problem by taking medicine. And it may help to change your diet. If gastritis lasts, your doctor may prescribe medicine. Follow-up care is a key part of your treatment and safety. Be sure to make and go to all appointments, and call your doctor if you are having problems. It's also a good idea to know your test results and keep a list of the medicines you take. How can you care for yourself at home? · If your doctor prescribed antibiotics, take them as directed. Do not stop taking them just because you feel better. You need to take the full course of antibiotics. · Be safe with medicines. If your doctor prescribed medicine to decrease stomach acid, take it as directed. Call your doctor if you think you are having a problem with your medicine. · Do not take any other medicine, including over-the-counter pain relievers, without talking to your doctor first.  · If your doctor recommends over-the-counter medicine to reduce stomach acid, such as Pepcid AC (famotidine), Prilosec (omeprazole), or Tagamet HB (cimetidine) follow the directions on the label. · Drink plenty of fluids to prevent dehydration. Choose water and other clear liquids. If you have kidney, heart, or liver disease and have to limit fluids, talk with your doctor before you increase the amount of fluids you drink. · Limit how much alcohol you drink. · Limit or avoid caffeine, which is found in coffee, tea, cola drinks, and chocolate. When should you call for help? Call 911 anytime you think you may need emergency care. For example, call if:    · You vomit blood or what looks like coffee grounds.     · You pass maroon or very bloody stools.    Call your doctor now or seek immediate medical care if:    · You start breathing fast and have not produced urine in the last 8 hours.     · You cannot keep fluids down. Watch closely for changes in your health, and be sure to contact your doctor if:    · You do not get better as expected. Where can you learn more? Go to http://www.sharma.com/  Enter Z536 in the search box to learn more about \"Gastritis: Care Instructions. \"  Current as of: February 10, 2021               Content Version: 13.0  © 2006-2021 [employer]. Care instructions adapted under license by [employer] (which disclaims liability or warranty for this information). If you have questions about a medical condition or this instruction, always ask your healthcare professional. [address] any warranty or liability for your use of this information.            Follow-up Information    None

## 2021-10-27 NOTE — H&P
Pre-endoscopy H and P    The patient was seen and examined in the room/pre-op holding area. The airway was assessed and documented. The problem list, past medical history, and medications were reviewed.      Patient Active Problem List   Diagnosis Code    Asthma J45.909    GERD (gastroesophageal reflux disease) K21.9    Anal prolapse K62.2    Uterine prolapse N81.4    IGT (impaired glucose tolerance) R73.02    Cyclothymia P91.9    Cyclical vomiting B56.84    Major depressive disorder, recurrent episode, severe (HCC) F33.2    PTSD (post-traumatic stress disorder) F43.10    Intractable nausea and vomiting R11.2    Other chest pain R07.89    Tension vascular headache G44.209    Disturbance of memory R41.3    Transient vision disturbance of both eyes H53.9    Lumbar back pain with radiculopathy affecting left lower extremity M54.16    Lumbar back pain with radiculopathy affecting right lower extremity M54.16    B12 deficiency E53.8    Vitamin D deficiency E55.9    Hypothyroidism due to acquired atrophy of thyroid E03.4    Family history of pituitary disease Z83.49    Severe obesity (HCC) E66.01    Endometriosis N80.9    Fibromyalgia M79.7    Severe episode of recurrent major depressive disorder, without psychotic features (HCC) F33.2    Intractable chronic migraine without aura and with status migrainosus G43.711    Acute alteration in mental status R41.82    Mild cognitive impairment with memory loss G31.84     Social History     Socioeconomic History    Marital status: SINGLE     Spouse name: Not on file    Number of children: 2    Years of education: Not on file    Highest education level: Not on file   Occupational History     Employer: NOT EMPLOYED     Comment: work at home    Tobacco Use    Smoking status: Current Every Day Smoker     Packs/day: 0.50     Years: 10.00     Pack years: 5.00     Start date: 3/1/2004    Smokeless tobacco: Never Used   Vaping Use    Vaping Use: Never used   Substance and Sexual Activity    Alcohol use: Yes     Comment: rarely,     Drug use: Not Currently     Types: Marijuana     Comment: 11/22/19, last use >1 year    Sexual activity: Yes     Partners: Male     Birth control/protection: None     Comment: single    Other Topics Concern    Not on file   Social History Narrative    Unemployed currently     Social Determinants of Health     Financial Resource Strain:     Difficulty of Paying Living Expenses:    Food Insecurity:     Worried About Running Out of Food in the Last Year:     [address] in the Last Year:    Transportation Needs:     Lack of Transportation (Medical):      Lack of Transportation (Non-Medical):    Physical Activity:     Days of Exercise per Week:     Minutes of Exercise per Session:    Stress:     Feeling of Stress :    Social Connections:     Frequency of Communication with Friends and Family:     Frequency of Social Gatherings with Friends and Family:     Attends Voodoo Services:     Active Member of Clubs or Organizations:     Attends Club or Organization Meetings:     Marital Status:    Intimate Partner Violence:     Fear of Current or Ex-Partner:     Emotionally Abused:     Physically Abused:     Sexually Abused:      Past Medical History:   Diagnosis Date    Anal prolapse 9/19/2012    Anxiety     Arthritis     unsure - knees, lower back, fingers and toes    Asthma 1/19/2012    At risk for sleep apnea 09/05/2019    KATIA score 4 on PAT assessment    Bipolar 2 disorder ([facility]     Carpal tunnel syndrome     Chest pain     Childhood asthma     Chronic pain     right abdomen,shooting pain groins    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    Depression     Disturbance of memory 7/30/2018    Endometriosis     Female bladder prolapse     Fibromyalgia     Fibromyalgia 02/2020    GERD (gastroesophageal reflux disease)     IGT (impaired glucose tolerance) 11/14/2012    Intractable nausea and vomiting 6/15/2018    Lumbar back pain with radiculopathy affecting left lower extremity 7/30/2018    Lumbar back pain with radiculopathy affecting right lower extremity 7/30/2018    Major depressive disorder, recurrent episode, severe ([facility] [address] 8/31/2015    MCI (mild cognitive impairment) with memory loss     Migraines     Other chest pain 7/5/2018    Prediabetes     Prediabetes     Prolapse of anterior lip of cervix     PTSD (post-traumatic stress disorder)     borderline personality traits    Rectal prolapse     Severe obesity ([facility] [address] 4/18/2019    Tension vascular headache 7/30/2018    Transient vision disturbance of both eyes 7/30/2018    Uterine prolapse 9/19/2012         Prior to Admission Medications   Prescriptions Last Dose Informant Patient Reported? Taking? ARIPiprazole (Abilify) 2 mg tablet 10/20/2021 at Unknown time  Yes Yes   Sig: Take 2 mg by mouth daily. DULoxetine (CYMBALTA) 60 mg capsule 10/20/2021 at Unknown time  No Yes   Sig: Take 2 Capsules by mouth daily. Taking 2 tabs daily   Linzess 145 mcg cap capsule 10/20/2021 at Unknown time  Yes Yes   Sig: TAKE 1 CAPSULE BY MOUTH EVERY DAY BEFORE MEALS   ascorbic acid, vitamin C, (VITAMIN C) 500 mg tablet 10/20/2021 at Unknown time  Yes Yes   Sig: Take 1,000 mg by mouth daily. b complex vitamins (B COMPLEX 1) tablet 10/20/2021 at Unknown time  Yes Yes   Sig: Take 1 Tab by mouth daily. buPROPion XL (Wellbutrin XL) 300 mg XL tablet 10/20/2021 at Unknown time  No Yes   Sig: Take 1 Tablet by mouth every morning. cetirizine (ZYRTEC) 10 mg tablet 10/20/2021 at Unknown time  No Yes   Sig: Take 1 Tab by mouth nightly as needed for Allergies. diclofenac (Voltaren) 1 % gel 10/20/2021 at Unknown time  Yes Yes   Sig: Apply 2 grams (about a pea-sized drop) to the affected area 3-4 times daily.    donepeziL (ARICEPT) 10 mg tablet 10/20/2021 at Unknown time  No Yes   Sig: TAKE 1 TABLET BY MOUTH NIGHTLY FOR MILD TO MODERATE ALZHEIMER'S TYPE DEMENTIA   ergocalciferol (ERGOCALCIFEROL) 1,250 mcg (50,000 unit) capsule 10/20/2021 at Unknown time  No Yes   Sig: TAKE 1 CAPSULE BY MOUTH EVERY 7 DAYS   galcanezumab-gnlm (Emgality Pen) 120 mg/mL injection 10/20/2021 at Unknown time  No Yes   Si mL by SubCUTAneous route every thirty (30) days. ibuprofen (MOTRIN) 800 mg tablet 10/20/2021 at Unknown time  No Yes   Sig: Take 1 Tab by mouth every six (6) hours as needed for Pain. lactulose (CHRONULAC) 10 gram/15 mL solution 10/20/2021 at Unknown time  Yes Yes   Sig: TAKE 30 ML BY MOUTH TWICE DAILY   lamoTRIgine (LaMICtal) 200 mg tablet 10/20/2021 at Unknown time  No Yes   Sig: Take 1 Tablet by mouth daily. Indications: bipolar depression   omeprazole (PRILOSEC) 40 mg capsule 10/20/2021 at Unknown time  No Yes   Sig: Take 1 Capsule by mouth daily. promethazine (PHENERGAN) 25 mg tablet 10/20/2021 at Unknown time  No Yes   Sig: TAKE 1 TABLET BY MOUTH EVERY 6 HOURS AS NEEDED FOR NAUSEA   rizatriptan (MAXALT) 10 mg tablet 10/20/2021 at Unknown time  No Yes   Sig: Take 1 Tab by mouth every eight (8) hours as needed for Migraine. May repeat in 2 hours if needed   traZODone (DESYREL) 100 mg tablet 10/20/2021 at Unknown time  Yes Yes   Sig: Take 100 mg by mouth nightly. Facility-Administered Medications: None           The review of systems is:  Negative  for shortness of breath or chest pain      The heart, lungs, and mental status were satisfactory for the administration of deep sedation and for the procedure. I discussed with the patient the objectives, risks, consequences and alternatives to the procedure.       Erendira Jose MD  10/27/2021  8:24 AM

## 2021-10-27 NOTE — ANESTHESIA POSTPROCEDURE EVALUATION
Procedure(s):  COLONOSCOPY  ESOPHAGOGASTRODUODENOSCOPY (EGD)  ESOPHAGEAL DILATION  ESOPHAGOGASTRODUODENAL (EGD) BIOPSY. total IV anesthesia    Anesthesia Post Evaluation        Patient location during evaluation: PACU  Note status: Adequate. Level of consciousness: responsive to verbal stimuli and sleepy but conscious  Pain management: satisfactory to patient  Airway patency: patent  Anesthetic complications: no  Cardiovascular status: acceptable  Respiratory status: acceptable  Hydration status: acceptable  Comments: +Post-Anesthesia Evaluation and Assessment    Patient: Stephy Cordoba MRN: [id number]  SSN: [id number]   YOB: 1985  Age: 31 y.o. Sex: female      Cardiovascular Function/Vital Signs    /79   Pulse 85   Temp 36.4 °C (97.6 °F)   Resp 17   Ht 5' 3\" (1.6 m)   Wt 106.5 kg (234 lb 12.8 oz)   SpO2 95%   Breastfeeding No   BMI 41.59 kg/m²     Patient is status post Procedure(s):  COLONOSCOPY  ESOPHAGOGASTRODUODENOSCOPY (EGD)  ESOPHAGEAL DILATION  ESOPHAGOGASTRODUODENAL (EGD) BIOPSY. Nausea/Vomiting: Controlled. Postoperative hydration reviewed and adequate. Pain:  Pain Scale 1: Numeric (0 - 10) (10/27/21 0851)  Pain Intensity 1: 0 (10/27/21 0851)   Managed. Neurological Status: At baseline. Mental Status and Level of Consciousness: Arousable. Pulmonary Status:   O2 Device: None (Room air) (10/27/21 0851)   Adequate oxygenation and airway patent. Complications related to anesthesia: None    Post-anesthesia assessment completed. No concerns. Signed By: Driss Guaman MD    10/27/2021  Post anesthesia nausea and vomiting:  controlled      INITIAL Post-op Vital signs: No vitals data found for the desired time range.

## 2021-10-27 NOTE — ROUTINE PROCESS
Jarrett Boycesten  1985  [id number]    Situation:  Verbal report received from: Tennis Beets  Procedure: Procedure(s):  COLONOSCOPY  ESOPHAGOGASTRODUODENOSCOPY (EGD)  ESOPHAGEAL DILATION  ESOPHAGOGASTRODUODENAL (EGD) BIOPSY    Background:    Preoperative diagnosis: LUQ pain [R10.12]  Anxiety [F41.9]  Bipolar affective disorder, remission status unspecified ([facility] [F31.9]  Family history of celiac disease [Z83.79]  Postoperative diagnosis: Gastritis, Hemorrhoids    :  Dr. Lalo Perez  Assistant(s): Endoscopy RN-1: Sam Toledo  Endoscopy RN-2: Lennox Sailor C    Specimens:   ID Type Source Tests Collected by Time Destination   1 : Duodenum Bx Preservative Duodenum  Cole Gorman MD 10/27/2021 5503 Pathology   2 : gastric bx Preservative Gastric  Cole Gorman MD 10/27/2021 8745 Pathology   3 : esophagus bx Preservative Esophagus  Cole Gorman MD 10/27/2021 4255 Pathology   4 : llleum bx Preservative Ileum  Cole Gorman MD 10/27/2021 9681 Pathology   5 : Random colon bx Preservative Colon  Cole Gorman MD 10/27/2021 8672 Pathology     H. Pylori  no    Assessment:  Intra-procedure medications     Anesthesia gave intra-procedure sedation and medications, see anesthesia flow sheet yes    Intravenous fluids: NS@ KVO     Vital signs stable     Abdominal assessment: round and soft     No subcutaneous crepitus of the chest or cervical region was noted post procedure. Recommendation:  Discharge patient per MD order. Family   Permission to share finding with family or friend yes    Endoscopy discharge instructions have been reviewed and given to patient. The patient verbalized understanding and acceptance of instructions. Dr. Lalo Perez discussed with patient procedure findings and next steps.

## 2021-10-27 NOTE — PROGRESS NOTES
..CRE balloon dilatation of the esophagus   20 mm Balloon inflated to 6 ATMs and held for 60 seconds. No subcutaneous crepitus of the chest or cervical region was noted post dilatation.

## 2021-11-01 RX ORDER — FENTANYL CITRATE 50 UG/ML
INJECTION, SOLUTION INTRAMUSCULAR; INTRAVENOUS AS NEEDED
Status: DISCONTINUED | OUTPATIENT
Start: 2021-10-27 | End: 2021-11-01 | Stop reason: HOSPADM

## 2021-11-03 ENCOUNTER — TELEPHONE (OUTPATIENT)
Dept: NEUROLOGY | Age: 36
End: 2021-11-03

## 2021-11-15 ENCOUNTER — TELEPHONE (OUTPATIENT)
Dept: NEUROLOGY | Age: 36
End: 2021-11-15

## 2021-11-15 NOTE — TELEPHONE ENCOUNTER
[employer] submitted via [address]/ notes (Da Silva: Ras Velez) - Case  [id number]. Case is pending.

## 2021-11-16 ENCOUNTER — TRANSCRIBE ORDER (OUTPATIENT)
Dept: SCHEDULING | Age: 36
End: 2021-11-16

## 2021-11-16 DIAGNOSIS — R11.2 NAUSEA WITH VOMITING: ICD-10-CM

## 2021-11-16 DIAGNOSIS — F31.9 BIPOLAR DISORDER (HCC): ICD-10-CM

## 2021-11-16 DIAGNOSIS — F32.A DEPRESSIVE DISORDER: ICD-10-CM

## 2021-11-16 DIAGNOSIS — R19.4 CHANGE IN BOWEL HABITS: Primary | ICD-10-CM

## 2021-11-20 ENCOUNTER — TELEPHONE (OUTPATIENT)
Dept: NEUROLOGY | Age: 36
End: 2021-11-20

## 2021-11-20 NOTE — TELEPHONE ENCOUNTER
I re sent [employer] request through [facility]. It showed Yari's old [facility] ph and fax. I revised to us. no

## 2021-12-09 ENCOUNTER — TELEPHONE (OUTPATIENT)
Dept: NEUROLOGY | Age: 36
End: 2021-12-09

## 2021-12-09 NOTE — TELEPHONE ENCOUNTER
Emgality     rec'd reply from [facility] that Em[employer] was already approved and date ranage is 11/15/21 to 11/15/22 and pharmacy been notified and has a paid claim for this patient. Scanned to chart.

## 2021-12-16 ENCOUNTER — HOSPITAL ENCOUNTER (OUTPATIENT)
Dept: MRI IMAGING | Age: 36
Discharge: HOME | End: 2021-12-16
Attending: INTERNAL MEDICINE
Payer: MEDICAID

## 2021-12-16 DIAGNOSIS — R11.2 NAUSEA WITH VOMITING: ICD-10-CM

## 2021-12-16 DIAGNOSIS — F31.9 BIPOLAR DISORDER (HCC): ICD-10-CM

## 2021-12-16 DIAGNOSIS — F32.A DEPRESSIVE DISORDER: ICD-10-CM

## 2021-12-16 DIAGNOSIS — R19.4 CHANGE IN BOWEL HABITS: ICD-10-CM

## 2021-12-16 PROCEDURE — 72195 MRI PELVIS W/O DYE: CPT

## 2022-03-18 PROBLEM — E53.8 B12 DEFICIENCY: Status: ACTIVE | Noted: 2018-07-30

## 2022-03-18 PROBLEM — G44.209 TENSION VASCULAR HEADACHE: Status: ACTIVE | Noted: 2018-07-30

## 2022-03-19 PROBLEM — Z83.49 FAMILY HISTORY OF PITUITARY DISEASE: Status: ACTIVE | Noted: 2018-07-30

## 2022-03-19 PROBLEM — E55.9 VITAMIN D DEFICIENCY: Status: ACTIVE | Noted: 2018-07-30

## 2022-03-19 PROBLEM — H53.9 TRANSIENT VISION DISTURBANCE OF BOTH EYES: Status: ACTIVE | Noted: 2018-07-30

## 2022-03-19 PROBLEM — E66.01 SEVERE OBESITY (HCC): Status: ACTIVE | Noted: 2019-04-18

## 2022-03-19 PROBLEM — M79.7 FIBROMYALGIA: Status: ACTIVE | Noted: 2021-02-16

## 2022-03-19 PROBLEM — M54.16 LUMBAR BACK PAIN WITH RADICULOPATHY AFFECTING LEFT LOWER EXTREMITY: Status: ACTIVE | Noted: 2018-07-30

## 2022-03-19 PROBLEM — R41.3 DISTURBANCE OF MEMORY: Status: ACTIVE | Noted: 2018-07-30

## 2022-03-19 PROBLEM — F33.2 SEVERE EPISODE OF RECURRENT MAJOR DEPRESSIVE DISORDER, WITHOUT PSYCHOTIC FEATURES (HCC): Status: ACTIVE | Noted: 2021-02-16

## 2022-03-20 PROBLEM — M54.16 LUMBAR BACK PAIN WITH RADICULOPATHY AFFECTING RIGHT LOWER EXTREMITY: Status: ACTIVE | Noted: 2018-07-30

## 2022-03-20 PROBLEM — E03.4 HYPOTHYROIDISM DUE TO ACQUIRED ATROPHY OF THYROID: Status: ACTIVE | Noted: 2018-07-30

## 2022-03-20 PROBLEM — G43.711 INTRACTABLE CHRONIC MIGRAINE WITHOUT AURA AND WITH STATUS MIGRAINOSUS: Status: ACTIVE | Noted: 2021-02-16

## 2022-03-20 PROBLEM — R07.89 OTHER CHEST PAIN: Status: ACTIVE | Noted: 2018-07-05

## 2022-03-20 PROBLEM — R41.82 ACUTE ALTERATION IN MENTAL STATUS: Status: ACTIVE | Noted: 2021-02-16

## 2022-03-20 PROBLEM — G31.84 MILD COGNITIVE IMPAIRMENT WITH MEMORY LOSS: Status: ACTIVE | Noted: 2021-10-12

## 2022-03-20 PROBLEM — R11.2 INTRACTABLE NAUSEA AND VOMITING: Status: ACTIVE | Noted: 2018-06-15

## 2022-03-25 ENCOUNTER — OFFICE VISIT (OUTPATIENT)
Dept: CARDIOLOGY CLINIC | Age: 37
End: 2022-03-25
Payer: MEDICAID

## 2022-03-25 VITALS
HEIGHT: 63 IN | RESPIRATION RATE: 18 BRPM | OXYGEN SATURATION: 98 % | DIASTOLIC BLOOD PRESSURE: 60 MMHG | BODY MASS INDEX: 41.62 KG/M2 | WEIGHT: 234.9 LBS | HEART RATE: 100 BPM | SYSTOLIC BLOOD PRESSURE: 110 MMHG

## 2022-03-25 DIAGNOSIS — R07.89 OTHER CHEST PAIN: Primary | ICD-10-CM

## 2022-03-25 DIAGNOSIS — F32.89 OTHER DEPRESSION: ICD-10-CM

## 2022-03-25 DIAGNOSIS — R06.02 SOB (SHORTNESS OF BREATH): ICD-10-CM

## 2022-03-25 PROCEDURE — 93000 ELECTROCARDIOGRAM COMPLETE: CPT | Performed by: INTERNAL MEDICINE

## 2022-03-25 PROCEDURE — 99213 OFFICE O/P EST LOW 20 MIN: CPT | Performed by: INTERNAL MEDICINE

## 2022-03-25 NOTE — PROGRESS NOTES
Alvester Breath, FNP-BC    Subjective/HPI:     Sandra West is a 30 y.o. female is here to establish care. She has a PMHx of depression, PTSD, memory impairment, bipolar disorder and migraine headaches. Here for evaluation of chest pain symptoms. 6 months ago, seen in [facility] ED for N/V and chest pain. Was told she had a viral illness, and to follow up with cardiology. Had not had any recurrence in symptoms, but about a month ago, started experiencing non-radiating chest pain anteriorly. Would happen at rest, sometimes with exercise. Also noted shortness of breath with exertion. Last night, while watching TV, developed an intense pain in her chest, felt like she was suffocating. Felt dizzy, heart racing, nauseous. Did not vomit. Felt out of breath as well. Pain radiated from anterior chest up to her neck and jaw. Symptoms lasted about 10 minutes and resolved on its own. Has not been taking any of her medications. She kept forgetting to take her pills, despite using alarms, so just came off them many months ago. Does not want to take any medications, prefers to treat her depression naturally. Does admit she is depressed. Has no interest in activities. Stays at home all day. No interest in going to see doctors, doesn't event want to visit with her mother.     Current Outpatient Medications on File Prior to Visit   Medication Sig Dispense Refill    [DISCONTINUED] lactulose (CHRONULAC) 10 gram/15 mL solution TAKE 30 ML BY MOUTH TWICE DAILY (Patient not taking: Reported on 3/25/2022)      [DISCONTINUED] Linzess 145 mcg cap capsule TAKE 1 CAPSULE BY MOUTH EVERY DAY BEFORE MEALS (Patient not taking: Reported on 3/25/2022)      [DISCONTINUED] promethazine (PHENERGAN) 25 mg tablet TAKE 1 TABLET BY MOUTH EVERY 6 HOURS AS NEEDED FOR NAUSEA (Patient not taking: Reported on 3/25/2022) 12 Tablet 0    [DISCONTINUED] donepeziL (ARICEPT) 10 mg tablet TAKE 1 TABLET BY MOUTH NIGHTLY FOR MILD TO MODERATE ALZHEIMER'S TYPE DEMENTIA (Patient not taking: Reported on 3/25/2022) 90 Tablet 3    [DISCONTINUED] DULoxetine (CYMBALTA) 60 mg capsule Take 2 Capsules by mouth daily. Taking 2 tabs daily (Patient not taking: Reported on 3/25/2022) 60 Capsule 11    [DISCONTINUED] buPROPion XL (Wellbutrin XL) 300 mg XL tablet Take 1 Tablet by mouth every morning. (Patient not taking: Reported on 3/25/2022) 30 Tablet 11    [DISCONTINUED] omeprazole (PRILOSEC) 40 mg capsule Take 1 Capsule by mouth daily. (Patient not taking: Reported on 3/25/2022) 90 Capsule 3    [DISCONTINUED] lamoTRIgine (LaMICtal) 200 mg tablet Take 1 Tablet by mouth daily. Indications: bipolar depression (Patient not taking: Reported on 3/25/2022) 30 Tablet 11    [DISCONTINUED] ergocalciferol (ERGOCALCIFEROL) 1,250 mcg (50,000 unit) capsule TAKE 1 CAPSULE BY MOUTH EVERY 7 DAYS (Patient not taking: Reported on 3/25/2022) 5 Capsule 6    [DISCONTINUED] ibuprofen (MOTRIN) 800 mg tablet Take 1 Tab by mouth every six (6) hours as needed for Pain. (Patient not taking: Reported on 3/25/2022) 30 Tab 0    [DISCONTINUED] cetirizine (ZYRTEC) 10 mg tablet Take 1 Tab by mouth nightly as needed for Allergies. (Patient not taking: Reported on 3/25/2022) 90 Tab 3    [DISCONTINUED] ARIPiprazole (Abilify) 2 mg tablet Take 2 mg by mouth daily. (Patient not taking: Reported on 3/25/2022)      [DISCONTINUED] rizatriptan (MAXALT) 10 mg tablet Take 1 Tab by mouth every eight (8) hours as needed for Migraine. May repeat in 2 hours if needed (Patient not taking: Reported on 3/25/2022) 9 Tab 11    [DISCONTINUED] galcanezumab-gnlm (Emgality Pen) 120 mg/mL injection 1 mL by SubCUTAneous route every thirty (30) days. (Patient not taking: Reported on 3/25/2022) 1 mL 11    [DISCONTINUED] diclofenac (Voltaren) 1 % gel Apply 2 grams (about a pea-sized drop) to the affected area 3-4 times daily.  (Patient not taking: Reported on 3/25/2022)      [DISCONTINUED] ascorbic acid, vitamin C, (VITAMIN C) 500 mg tablet Take 1,000 mg by mouth daily. (Patient not taking: Reported on 3/25/2022)      [DISCONTINUED] traZODone (DESYREL) 100 mg tablet Take 100 mg by mouth nightly. (Patient not taking: Reported on 3/25/2022)  0    [DISCONTINUED] b complex vitamins (B COMPLEX 1) tablet Take 1 Tab by mouth daily. (Patient not taking: Reported on 3/25/2022)       No current facility-administered medications on file prior to visit. Review of Symptoms:    Review of Systems   Constitutional: Negative for chills, fever and weight loss. HENT: Negative for nosebleeds. Eyes: Negative for blurred vision and double vision. Respiratory: Positive for shortness of breath. Negative for cough and wheezing. Cardiovascular: Positive for chest pain. Negative for palpitations, orthopnea, leg swelling and PND. Skin: Negative for rash. Neurological: Positive for dizziness. Negative for loss of consciousness. Psychiatric/Behavioral: Positive for depression and memory loss. The patient is nervous/anxious. Physical Exam:      General: Well developed, in no acute distress, cooperative and alert  Heart:  reg rate and rhythm; normal S1/S2; no murmurs, no gallops or rubs. Respiratory: Clear bilaterally x 4, no wheezing or rales  Extremities:  Normal cap refill, no cyanosis, atraumatic. No edema. Vascular: 2+ pulses symmetric in all extremities  Psych: tearful, appears depressed    Vitals:    03/25/22 1025   BP: 110/60   BP 1 Location: Right arm   BP Patient Position: Sitting   BP Cuff Size: Large adult   Pulse: 100   Resp: 18   Height: 5' 3\" (1.6 m)   Weight: 234 lb 14.4 oz (106.5 kg)   SpO2: 98%       ECG done today shows sinus rhythm     Assessment:       ICD-10-CM ICD-9-CM    1. Other chest pain  R07.89 786.59 AMB POC EKG ROUTINE W/ 12 LEADS, INTER & REP   2. SOB (shortness of breath)  R06.02 786.05    3. Other depression  F32.89 311         Plan:     1.  Other chest pain  Some atypical symptoms  Not many risk factors except smoking  Will evaluate with exercise treadmill stress test, check echocardiogram  Discussed coronary calcium score if testing negative    2. SOB (shortness of breath)  Check ehcocardiogram    3. Other depression  Off all anti-depressants, mood altering agents. Has not seen psych. Does not want to manage depression with medications  Strongly encouraged she see psychiatry to discuss alternatives    F/u with Dr. Denver Ree if testing abnormal    Von Lindo NP       [facility]             Patient seen, examined by me personally. Plan discussed as detailed. Agree with note as outlined by  NP with modifications as noted. My independent physical exam reveals : Physical Exam  Vitals and nursing note reviewed. Constitutional:       Appearance: She is obese. Cardiovascular:      Rate and Rhythm: Normal rate and regular rhythm. Heart sounds: Normal heart sounds. Pulmonary:      Breath sounds: Normal breath sounds. Musculoskeletal:      Right lower leg: No edema. Left lower leg: No edema. Skin:     General: Skin is warm. Neurological:      Mental Status: She is alert and oriented to person, place, and time. Psychiatric:         Mood and Affect: Mood normal.          No additional findings noted. Agree with plan as outlined above with modifications as noted. symptomd appeat to be more related to her depression/anxiety.  Strongly encouraged to see pcp/psychiatrist.    Lori Larios MD

## 2022-03-25 NOTE — PROGRESS NOTES
Chief Complaint   Patient presents with    New Patient     1. Have you been to the ER, urgent care clinic since your last visit? Hospitalized since your last visit? Yes, [facility]    2. Have you seen or consulted any other health care providers outside of the [address] [place] [address] since your last visit? Include any pap smears or colon screening.   Yes, [facility]

## 2022-03-25 NOTE — LETTER
3/25/2022    Patient: Maral Davidson   YOB: 1985   Date of Visit: 3/25/2022     Chris Bruce MD  [address] [place] [address]    Dear Chris Bruce MD,      Thank you for referring Ms. Lisa Rhodes to [facility] for evaluation. My notes for this consultation are attached. If you have questions, please do not hesitate to call me. I look forward to following your patient along with you.       Sincerely,    Rajendra Araujo MD

## 2022-04-12 ENCOUNTER — VIRTUAL VISIT (OUTPATIENT)
Dept: NEUROLOGY | Age: 37
End: 2022-04-12
Payer: MEDICAID

## 2022-04-12 DIAGNOSIS — G31.84 MILD COGNITIVE IMPAIRMENT WITH MEMORY LOSS: ICD-10-CM

## 2022-04-12 DIAGNOSIS — F33.2 SEVERE EPISODE OF RECURRENT MAJOR DEPRESSIVE DISORDER, WITHOUT PSYCHOTIC FEATURES (HCC): ICD-10-CM

## 2022-04-12 DIAGNOSIS — M79.7 FIBROMYALGIA: ICD-10-CM

## 2022-04-12 DIAGNOSIS — G43.711 INTRACTABLE CHRONIC MIGRAINE WITHOUT AURA AND WITH STATUS MIGRAINOSUS: Primary | ICD-10-CM

## 2022-04-12 PROCEDURE — 99215 OFFICE O/P EST HI 40 MIN: CPT | Performed by: PSYCHIATRY & NEUROLOGY

## 2022-04-12 RX ORDER — OMEPRAZOLE 20 MG/1
20 TABLET, DELAYED RELEASE ORAL DAILY
COMMUNITY

## 2022-04-12 RX ORDER — DULOXETIN HYDROCHLORIDE 30 MG/1
30 CAPSULE, DELAYED RELEASE ORAL DAILY
Qty: 90 CAPSULE | Refills: 1 | Status: SHIPPED | OUTPATIENT
Start: 2022-04-12 | End: 2022-07-12 | Stop reason: DRUGHIGH

## 2022-04-12 RX ORDER — GALCANEZUMAB 120 MG/ML
120 INJECTION, SOLUTION SUBCUTANEOUS
Qty: 1 ML | Refills: 11 | Status: SHIPPED | OUTPATIENT
Start: 2022-04-12

## 2022-04-12 RX ORDER — CHOLECALCIFEROL (VITAMIN D3) 125 MCG
CAPSULE ORAL
COMMUNITY

## 2022-04-12 RX ORDER — IBUPROFEN 200 MG
200 TABLET ORAL
COMMUNITY

## 2022-04-12 NOTE — ASSESSMENT & PLAN NOTE
Patient needs to reengage with her mental health team and she has been strongly encouraged to do this    She denies issues related to wanting to harm herself at this time    I am restarting her for fibromyalgia purposes but hopefully it will give her some benefit in this arena so she can get reengaged with her mental health team    We also talked about behavioral intervention to include walking on a daily basis, use of music on a daily basis and doing things that provide a rhythmic pattern    I talked about the use of positive affirmations

## 2022-04-12 NOTE — ASSESSMENT & PLAN NOTE
For now we will keep her off the Aricept  Neuropsych testing was not supportive of true primary progressive degenerative changes but of significant mental health issues    Patient has been encouraged to reengage with her mental health team

## 2022-04-12 NOTE — ASSESSMENT & PLAN NOTE
Increasing headaches with interruption of Emgality    I have renewed her prescription and patient has agreed to restart the [facility] her prior authorization is good through November 22, 2022

## 2022-04-12 NOTE — PROGRESS NOTES
[facility]LE[facility]ALTH Virtual FOLLOW-UP VISIT        Israel Berumen is a 30 y.o. female who was seen by synchronous (real-time) audio-video technology on 4/12/2022. Israel Berumen is a 30 y.o. female who presents today for the following:        ASSESSMENT AND PLAN      1. Intractable chronic migraine without aura and with status migrainosus  Assessment & Plan:   Increasing headaches with interruption of Emgality    I have renewed her prescription and patient has agreed to restart the [facility] her prior authorization is good through November 22, 2022      Orders:  -     galcanezumab-gnlm (Emgality Pen) 120 mg/mL injection; 1 mL by SubCUTAneous route every thirty (30) days. , Normal, Disp-1 mL, R-11  2. Fibromyalgia  -     DULoxetine (CYMBALTA) 30 mg capsule; Take 1 Capsule by mouth daily. Indications: anxiousness associated with depression, major depressive disorder, Normal, Disp-90 Capsule, R-1  3. Severe episode of recurrent major depressive disorder, without psychotic features ([facility] [address]  Assessment & Plan:  Patient needs to reengage with her mental health team and she has been strongly encouraged to do this    She denies issues related to wanting to harm herself at this time    I am restarting her for fibromyalgia purposes but hopefully it will give her some benefit in this arena so she can get reengaged with her mental health team    We also talked about behavioral intervention to include walking on a daily basis, use of music on a daily basis and doing things that provide a rhythmic pattern    I talked about the use of positive affirmations  Orders:  -     DULoxetine (CYMBALTA) 30 mg capsule; Take 1 Capsule by mouth daily. Indications: anxiousness associated with depression, major depressive disorder, Normal, Disp-90 Capsule, R-1  4. Mild cognitive impairment with memory loss  Assessment & Plan:    For now we will keep her off the Aricept  Neuropsych testing was not supportive of true primary progressive degenerative changes but of significant mental health issues    Patient has been encouraged to reengage with her mental health team      Patient and/or family was given time to ask questions and voice concerns. I believe all questions concerns were adequately addressed at this  office visit. Patient and/or family also verbalized agreement and understanding of the above-stated plan    Patient remains a complex patient secondary to polypharmacy, significant comorbid conditions, and use of high-risk medications which complicate the decision making process related to patient's neurologic diagnosis    Follow-up and Dispositions    · Return in about 3 months (around 7/12/2022) for Virtual visit. ICD-10-CM ICD-9-CM    1. Intractable chronic migraine without aura and with status migrainosus  G43.711 346.73 galcanezumab-gnlm (Emgality Pen) 120 mg/mL injection   2. Fibromyalgia  M79.7 729.1 DULoxetine (CYMBALTA) 30 mg capsule   3. Severe episode of recurrent major depressive disorder, without psychotic features ([facility] [address]  F33.2 296.33 DULoxetine (CYMBALTA) 30 mg capsule   4. Mild cognitive impairment with memory loss  G31.84 331.83            I attest that 40 minutes was spent on today's visit reviewing medical records and diagnostic testing deemed pertinent to this patient's care, along with direct time spent at patient's visit including the history, physical assessment and plan, discussing diagnosis and management along with documentation.       HPI  Historical Data  This is a patient previously seen in our practice.     Neuro diagnosis  chronic headaches   mild cognitive impairment,   low back pain with radiculopathy affecting the right and left,   bilateral carotid artery stenosis,   Transient visual disturbance of both eyes     Other significant diagnoses include depression posttraumatic stress disorder     She had neuropsychiatric testing completed April 8, 2019 and was found to have PTSD severe depressive disorder without psychosis, severe anxiety and somatic sensation disorder along with her mild cognitive impairment and borderline personality traits.     Other comments by on the neuropsych evaluation by the neuropsychologist include the following:  She is young for dementia and there is no neurologic correlate which would explain the type of cognitive deficits she is showing.  At the same time, the CURRENT profile is not consistent with a purely psychiatric issue.  As such, the diagnosis here is that of Mild Cognitive Impairment with Memory loss exacerbated by severe and complex psychiatric distress      She needs intensive psychiatric intervention to include a review of her medication management for anxiety and depression as well as active and continued engagement in intensive psychotherapy.  Consider EMDR.  Consider TMS if medication by itself has not proven helpful for her - she would be an appropriate candidate for same.  I do believe medication for memory should be considered, as there is no other definite explanation for her impaired recognition recall, which often clears the difference between organic and functional memory loss.  Any metabolic cause needs to be ruled out, and sleep issues may be a factor as well.  This is concerning and very atypical changes over time, with improvement in some scores and declines in others.  She remains capable of making decisions for herself., but needs reminders for meals, medications, and finances. I see no evidence of malingering here.  I am concerned about driving safety issues as well, so this should be formally evaluated if deemed medically appropriate.  My hope is to see her again once mood improves to better clarify, because my worry is that if cognitive problems continue to worsen despite positive psychiatric interventions, then she has dementia and the prognosis would be quite poor at that point.  Stay active mentally, physically, and socially.  Consult with OT and Speech, Neurocognitive Rehab.   We now have baseline and updated data on her.  Follow up as noted. Edna Barragan correlation is, of course, indicated.           Interim Data:   Headaches and migraines  Emgality: First dose January 2020  Patient is now down to having 1-2 migraines per week she takes rizatriptan and it completely aborts her headache she does get some light and sound sensitivity but for the most part she is not debilitated and is able to get through her day  She denies side effects to the Emgality to include local injection site reactions    OV 4/12/22: Pt with increased migraines but had stopped Emgality [depression increased and pt became unmotivated]     Cognitive impairment:  Currently on Aricept 10 mg since July 2020  She still complains of significant cognitive difficulties  She is using her calendar on her smart phone  She tries to use notes but then she forgets where she places her notes  She is doing brain Buster type activities  And she continues to follow-up closely with mental health  She still becomes very easily stressed when asked for normal speed processing more complicated tasks it takes her anxiety to extreme levels and cannot function further  Remains independent with ADLs    OV 4/12/22: Again patient has become very depressed and unmotivated she stopped all of her medications but there are no dramatic complaints regarding cognition at today's office visit          Depression/mental health:    Patient does continue to see mental health on a regular basis and follows through with recommended medication adjustment and behavioral recommendations  Still finds she is extremely stressed    OV 4/12/2022: Patient with escalating depression decreased motivation has not seen her mental health team in quite some time; she states that she does not want to see anyone she does not want to go out of the house and she is totally unmotivated to even continue with her basic medications       Fibromyalgia: Getting physical therapy and finding it some helpful and remains on medications such as Cymbalta   Cannot tolerate gabapentin secondary to side effects    OV 4/12/2022: No major complaints at today's office visit but has been off of her Cymbalta    Other:  Past month GI issues with UGI and colonoscopy planned      Results from [place] encounter on 12/16/21    MRI PELVIC FLOOR STUDY    Impression  1. Limited study secondary to incomplete evacuation. 2.  Anterior rectocele, cystocele, and vaginocele with straining and defecation      Results from [place] encounter on 11/20/20    MRI BRAIN W WO CONT    Impression  IMPRESSION:  No significant abnormalities on MRI of the brain and pituitary  gland. Results from [place] encounter on 08/09/18    MRI BRAIN W WO CONT    Impression  IMPRESSION:  1. No discrete pituitary lesion identified, and otherwise unremarkable brain  MRI. A single new nonspecific T2/FLAIR hyperintensity in the right parietal  subcortical white matter is likely of no clinical significance. Allergies   Allergen Reactions    Latex Rash, Itching and Swelling    Pcn [Penicillins] Other (comments)     Pt states as a child she had a reaction but does not remember specific reaction. Pt states\"mother say I stopped breathing\".  Gabapentin Other (comments)     Foggy in the head. Did not feel right. Current Outpatient Medications   Medication Sig    ibuprofen (MOTRIN) 200 mg tablet Take 200 mg by mouth every six (6) hours as needed for Pain.  galcanezumab-gnlm (Emgality Pen) 120 mg/mL injection 1 mL by SubCUTAneous route every thirty (30) days.  DULoxetine (CYMBALTA) 30 mg capsule Take 1 Capsule by mouth daily.  Indications: anxiousness associated with depression, major depressive disorder    naproxen sodium (Aleve) 220 mg cap 1 tablet with food or milk as needed    omeprazole (PriLOSEC OTC) 20 mg tablet 1 capsule No current facility-administered medications for this visit. Past medical history/surgical history, family history, and social history have been reviewed for today's visit      ROS    A ten system review of constitutional, cardiovascular, respiratory, musculoskeletal, endocrine, skin, SHEENT, genitourinary, psychiatric and neurologic systems was obtained and is unremarkable except as mentioned under HPI          EXAMINATION: Within the context of virtual telehealth visit:    General appearance: Patient is well-developed and well-nourished in no apparent distress and well groomed. Psych/mental health:  Affect: Tearful at times, flat affect    PHQ  3 most recent PHQ Screens 4/12/2022   PHQ Not Done -   Little interest or pleasure in doing things Several days   Feeling down, depressed, irritable, or hopeless Several days   Total Score PHQ 2 2   Trouble falling or staying asleep, or sleeping too much -   Feeling tired or having little energy -   Poor appetite, weight loss, or overeating -   Feeling bad about yourself - or that you are a failure or have let yourself or your family down -   Trouble concentrating on things such as school, work, reading, or watching TV -   Moving or speaking so slowly that other people could have noticed; or the opposite being so fidgety that others notice -   Thoughts of being better off dead, or hurting yourself in some way -   PHQ 9 Score -   How difficult have these problems made it for you to do your work, take care of your home and get along with others -       HEENT:   WNL      Cardiovascular: WNL    Respiratory:   WNL    Musculoskeletal:   WNL    Integumentary:    WNL      Neurological Examination:   Mental Status:        MMSE  No flowsheet data found. Formal testing was not completed    there was nothing concerning on general observation and discussion.    Alert oriented and appropriate to general conversation  Normal processing on general observation  Followed conversation and responded seemingly appropriate throughout the office visit  No word finding difficulties noted on casual observation  Able to follow directions without difficulty     Cranial Nerves:    Grossly intact    Motor:   Normal bulk  No tremor appreciated on today's exam  No abnormal movements appreciated on today's exam  Moves extremities spontaneously and with purpose      Sensation: Not tested    Coordination/Cerebellar:   Grossly intact    Gait: Not tested    Fall risk assessment  No flowsheet data found. Due to this being a TeleHealth evaluation, many elements of the physical examination are unable to be assessed. Pursuant to the emergency declaration under the [address] [place] [address], [facility] waiver authority and the [employer] Act, this Virtual  Visit was conducted, with patient's consent, to reduce the patient's risk of exposure to COVID-19 and provide continuity of care for an established patient. Services were provided through a video synchronous discussion virtually to substitute for in-person clinic visit.           Murray Butcher MS, ANP-BC, [facility]

## 2022-04-12 NOTE — PATIENT INSTRUCTIONS
As per our discussion    You have agreed to the following:   Restarting the Emgality on a monthly basis for headache and migraine    Your prior authorization is good until November 2022 I sent a new prescription into your pharmacy go ahead and  the [facility] and start taking that every month     Restart Cymbalta. For right now it will be 30 mg/day we will have to slowly titrate you back to 60 mg twice a day     To call your psychiatrist and therapist and set up an appointment and keep those appointments    You do need to work on addressing the PTSD events so you can work through them and help from them properly      If you can not get a hold of your therapist you can go to [employer] putting your ZIP Code and up will populate a list of therapist in your area and you can evaluate the bios and look for someone that specializes in PTSD if needed     To take a walk every day [unless of course there is a tornado hurricane snowstorm or other serious weather events] it can be as long or short as you wish     Listen to music on a daily basis and does not have to be all day long I can be as short as 15 minutes to infinity however long you would like     Reduce your screen time. Get off the phone, computer etc.     Reduce constant phone notifications through apps etc. especially the one regarding \"breaking news\"    The world will still be here regardless of news you can catch up on it once a day and you can choose that time a day     I also recommend reading the news and not watching television for your news that way you can scan through what might be agitative to you and avoid it        MINDFULNESS and WELLNESS  focus on several things:  Mindfulness: be in the moment  Restfulness: Look at apps for meditation and white noise to help you relax and to sleep better  Mild exercise: Try to walk 10 minutes 3 times a week.   [However as we discussed I prefer you to walk every day] At those times listen to music that may be restful for you or relaxing use your apps or perhaps podcasts  Consider adult coloring books to help reduce anxiety and improve focus     If appropriate, work with a mental health team closely to help walk you through the issues that you are struggling with to get to a better place     Be excepting of where you are right now instead of trying to fight to where you used to be. Focus on who you want to become going forward. Pat yourself  on the back each day you get out of bed     At the end of the day list all the things that you did that was positive for yourself even if it is nothing more than getting out of bed that day        Office Policies    o Phone calls/patient messages:  Please allow up to 24 hours for someone in the office to contact you about your call or message. Be mindful your provider may be out of the office or your message may require further review. We encourage you to use [employer] for your messages as this is a faster, more efficient way to communicate with our office    o Medication Refills:  Prescription medications require up to 48 business hours to process. We encourage you to use [employer] for your refills. For controlled medications: Please allow up to 72 business hours to process. Certain medications may require you to  a written prescription at our office. NO narcotic/controlled medications will be prescribed after 4pm Monday through Friday or on weekends    o Form/Paperwork Completion:  We ask that you allow 7-14 business days. You may also download your forms to [employer] to have your doctor print off.

## 2022-05-16 ENCOUNTER — OFFICE VISIT (OUTPATIENT)
Dept: FAMILY MEDICINE CLINIC | Age: 37
End: 2022-05-16
Payer: MEDICAID

## 2022-05-16 VITALS
DIASTOLIC BLOOD PRESSURE: 75 MMHG | RESPIRATION RATE: 16 BRPM | HEIGHT: 63 IN | OXYGEN SATURATION: 98 % | TEMPERATURE: 97.8 F | WEIGHT: 230.4 LBS | HEART RATE: 86 BPM | SYSTOLIC BLOOD PRESSURE: 118 MMHG | BODY MASS INDEX: 40.82 KG/M2

## 2022-05-16 DIAGNOSIS — Z48.02 ENCOUNTER FOR REMOVAL OF SUTURES: Primary | ICD-10-CM

## 2022-05-16 PROCEDURE — 99212 OFFICE O/P EST SF 10 MIN: CPT | Performed by: FAMILY MEDICINE

## 2022-05-16 NOTE — PROGRESS NOTES
Chief Complaint   Patient presents with    Suture Removal       1. \"Have you been to the ER, urgent care clinic since your last visit? Hospitalized since your last visit? \" No    2. \"Have you seen or consulted any other health care providers outside of the [address] [place] [address] since your last visit? \" No     3. For patients aged 39-70: Has the patient had a colonoscopy / FIT/ Cologuard? Yes - no Care Gap present      If the patient is female:    4. For patients aged 41-77: Has the patient had a mammogram within the past 2 years? Yes - no Care Gap present      5. For patients aged 21-65: Has the patient had a pap smear?  Yes - no Care Gap present

## 2022-05-16 NOTE — PROGRESS NOTES
Subjective:     Karina Franco is a 30 y.o. who sustained a laceration of right thumb, 10 days ago. Suffered laceration right thumb 5/7/2022 on an open can and went to ER and she received stitches and td was updated. Objective:     Visit Vitals  /75 (BP 1 Location: Left upper arm, BP Patient Position: Sitting, BP Cuff Size: Adult)   Pulse 86   Temp 97.8 °F (36.6 °C) (Oral)   Resp 16   Ht 5' 3\" (1.6 m)   Wt 230 lb 6.4 oz (104.5 kg)   LMP 04/25/2019   SpO2 98%   BMI 40.81 kg/m²       Injury exam: single laceration. Wound is healing well, without evidence of infection. Neurovascular and tendon exam is intact.     Assessment/Plan:     Laceration healing well, ready for suture removal.  suture(s) removed

## 2022-07-12 ENCOUNTER — VIRTUAL VISIT (OUTPATIENT)
Dept: NEUROLOGY | Age: 37
End: 2022-07-12
Payer: MEDICAID

## 2022-07-12 DIAGNOSIS — F33.2 SEVERE EPISODE OF RECURRENT MAJOR DEPRESSIVE DISORDER, WITHOUT PSYCHOTIC FEATURES (HCC): ICD-10-CM

## 2022-07-12 DIAGNOSIS — M79.7 FIBROMYALGIA: ICD-10-CM

## 2022-07-12 DIAGNOSIS — G31.9 NEURODEGENERATIVE COGNITIVE IMPAIRMENT (HCC): ICD-10-CM

## 2022-07-12 DIAGNOSIS — G43.711 INTRACTABLE CHRONIC MIGRAINE WITHOUT AURA AND WITH STATUS MIGRAINOSUS: ICD-10-CM

## 2022-07-12 PROCEDURE — 99215 OFFICE O/P EST HI 40 MIN: CPT | Performed by: PSYCHIATRY & NEUROLOGY

## 2022-07-12 RX ORDER — DULOXETIN HYDROCHLORIDE 60 MG/1
60 CAPSULE, DELAYED RELEASE ORAL DAILY
Qty: 90 CAPSULE | Refills: 1 | Status: SHIPPED | OUTPATIENT
Start: 2022-07-12

## 2022-07-12 RX ORDER — RIZATRIPTAN BENZOATE 10 MG/1
10 TABLET ORAL
Qty: 9 TABLET | Refills: 11 | Status: SHIPPED | OUTPATIENT
Start: 2022-07-12

## 2022-07-12 NOTE — PROGRESS NOTES
[facility]  TELEHEALTH Virtual FOLLOW-UP VISIT        Stephy Cordoba is a 30 y.o. female who was seen by synchronous (real-time) audio-video technology on 7/12/2022. Stephy Cordoba is a 30 y.o. female who presents today for the following: migraines        ASSESSMENT AND PLAN      1. Severe episode of recurrent major depressive disorder, without psychotic features ([address]  Assessment & Plan:  I strongly encouraged the patient to reengage with her mental health team I think she needs to see psychiatry first for med management as she is so unmotivated it is difficult for her to even see a counselor at this time    I have increased her Cymbalta more  from migraine and fibromyalgia but hopefully will give her some benefit in this arena    Patient is made a verbal contract with me stating that presently she is not planning on harming herself and she is also made a verbal contract that if she is contemplating self injury she will call for help/call 911    Patient has been given a list of local healthcare providers in the area on her AVS and have strongly encouraged her to make an appointment for medication management of her depression  Orders:  -     DULoxetine (CYMBALTA) 60 mg capsule; Take 1 Capsule by mouth daily. Indications: major depressive disorder, neuropathic pain, Normal, Disp-90 Capsule, R-1  2.  Neurodegenerative cognitive impairment [facility])  Assessment & Plan:  Patient has been strongly encouraged to resume care with mental health I have asked her to find psychiatry first as I think she needs significant medication management as this point she is totally unmotivated even to speak with a counselor            3. Intractable chronic migraine without aura and with status migrainosus  Assessment & Plan:  Since starting CGRP there is been a reduction in frequency intensity and duration as well as a reduction in rescue medication utilization and a reduction in this time at work/personal life    Patient has a 50% reduction in her severe migraines  We have renewed her rizatriptan for rescue medication for her severe migraines  She can continue to use ibuprofen for the more mild headaches    She is to continue with the Emgality monthly  I am going to increase the Cymbalta to 60 mg/day as part of her preventative protocol      Orders:  -     rizatriptan (MAXALT) 10 mg tablet; Take 1 Tablet by mouth every eight (8) hours as needed for Migraine. May repeat in 2 hours if needed, Normal, Disp-9 Tablet, R-11  4. Fibromyalgia  Assessment & Plan:   Improved on duloxetine but still with breakthrough symptoms I am increasing the duloxetine from 30 mg to 60 mg today more to help with migraine but should also help in this arena      Patient and/or family was given time to ask questions and voice concerns. I believe all questions concerns were adequately addressed at this  office visit. Patient and/or family also verbalized agreement and understanding of the above-stated plan    Patient remains a complex patient secondary to polypharmacy, significant comorbid conditions, and use of high-risk medications which complicate the decision making process related to patient's neurologic diagnosis    Follow-up and Dispositions    · Return in about 3 months (around 10/12/2022) for Virtual visit. ICD-10-CM ICD-9-CM    1. Severe episode of recurrent major depressive disorder, without psychotic features ([address]  F33.2 296.33 DULoxetine (CYMBALTA) 60 mg capsule   2. Neurodegenerative cognitive impairment (HCC)  G31.9 331.9    3. Intractable chronic migraine without aura and with status migrainosus  G43.711 346.73 rizatriptan (MAXALT) 10 mg tablet   4.  Fibromyalgia  M79.7 729.1            I attest that 40 minutes was spent on today's visit reviewing medical records and diagnostic testing deemed pertinent to this patient's care, along with direct time spent at patient's visit including the history, physical assessment and plan, discussing diagnosis and management along with documentation. HPI  Historical Data  This is a patient previously seen in our practice.     Neuro diagnosis  chronic headaches   Preventive meds    Emgality    cymbalta    Effexor    Trazodone     Rescue meds    Phenergan    Tramadol    zofran    Compazine    Prednisone    Motrin    Aleve    Mobic               mild cognitive impairment,   low back pain with radiculopathy affecting the right and left,   bilateral carotid artery stenosis,   Transient visual disturbance of both eyes     Other significant diagnoses include depression posttraumatic stress disorder     She had neuropsychiatric testing completed April 8, 2019 and was found to have PTSD severe depressive disorder without psychosis, severe anxiety and somatic sensation disorder along with her mild cognitive impairment and borderline personality traits.     Other comments by on the neuropsych evaluation by the neuropsychologist include the following:  She is young for dementia and there is no neurologic correlate which would explain the type of cognitive deficits she is showing.  At the same time, the CURRENT profile is not consistent with a purely psychiatric issue.  As such, the diagnosis here is that of Mild Cognitive Impairment with Memory loss exacerbated by severe and complex psychiatric distress      She needs intensive psychiatric intervention to include a review of her medication management for anxiety and depression as well as active and continued engagement in intensive psychotherapy.  Consider EMDR.  Consider TMS if medication by itself has not proven helpful for her - she would be an appropriate candidate for same.  I do believe medication for memory should be considered, as there is no other definite explanation for her impaired recognition recall, which often clears the difference between organic and functional memory loss.  Any metabolic cause needs to be ruled out, and sleep issues may be a factor as well.  This is concerning and very atypical changes over time, with improvement in some scores and declines in others.  She remains capable of making decisions for herself., but needs reminders for meals, medications, and finances. I see no evidence of malingering here.  I am concerned about driving safety issues as well, so this should be formally evaluated if deemed medically appropriate.  My hope is to see her again once mood improves to better clarify, because my worry is that if cognitive problems continue to worsen despite positive psychiatric interventions, then she has dementia and the prognosis would be quite poor at that point.  Stay active mentally, physically, and socially.  Consult with OT and Speech, Neurocognitive Rehab.   We now have baseline and updated data on her.  Follow up as noted. Aileen Ny correlation is, of course, indicated.           Interim Data:   Headaches and migraines  Emgality: First dose January 2020  Patient is now down to having 1-2 migraines per week she takes rizatriptan and it completely aborts her headache she does get some light and sound sensitivity but for the most part she is not debilitated and is able to get through her day  She denies side effects to the Emgality to include local injection site reactions    OV 4/12/22: Pt with increased migraines but had stopped Emgality [depression increased and pt became unmotivated]     OV 7/12/22: restarted Emgality severe migraines down to 1 x/ week but has not had any rizatriptan for rescue and these are still lasting about 24 hours   She is also getting 1 or 2 mild headaches per week which she reports being responsive partially to ibuprofen       Cognitive impairment:  Aricept 10 mg: Initiated July 2020  She still complains of significant cognitive difficulties  She is using her calendar on her smart phone  She tries to use notes but then she forgets where she places her notes  She is doing brain Buster type activities  And she continues to follow-up closely with mental health  She still becomes very easily stressed when asked for normal speed processing more complicated tasks it takes her anxiety to extreme levels and cannot function further  Remains independent with ADLs    OV 4/12/22: Again patient has become very depressed and unmotivated she stopped all of her medications but there are no dramatic complaints regarding cognition at today's office visit  [address] 7/12/22: Unchanged from 4/12/2022          Depression/mental health:    Patient does continue to see mental health on a regular basis and follows through with recommended medication adjustment and behavioral recommendations  Still finds she is extremely stressed    OV 4/12/2022: Patient with escalating depression decreased motivation has not seen her mental health team in quite some time; she states that she does not want to see anyone she does not want to go out of the house and she is totally unmotivated to even continue with her basic medications  OV 7/12/2022: Unchanged from 4/12/2022 patient denies any intent to harm herself at this time       Fibromyalgia:   Getting physical therapy and finding it some helpful and remains on medications such as Cymbalta   Cannot tolerate gabapentin secondary to side effects    OV 4/12/2022: No major complaints at today's office visit but has been off of her Cymbalta  OV 7/12/2022: States she has good days bad days she is on Cymbalta 30 mg/day    Other:  No complaints regarding GI issues at this time      Results from [place] encounter on 12/16/21    MRI PELVIC FLOOR STUDY    Impression  1. Limited study secondary to incomplete evacuation.     2.  Anterior rectocele, cystocele, and vaginocele with straining and defecation      Results from [place] encounter on 11/20/20    MRI BRAIN W WO CONT    Impression  IMPRESSION:  No significant abnormalities on MRI of the brain and pituitary  gland. Results from [place] encounter on 08/09/18    MRI BRAIN W WO CONT    Impression  IMPRESSION:  1. No discrete pituitary lesion identified, and otherwise unremarkable brain  MRI. A single new nonspecific T2/FLAIR hyperintensity in the right parietal  subcortical white matter is likely of no clinical significance. Allergies   Allergen Reactions    Latex Rash, Itching and Swelling    Pcn [Penicillins] Other (comments)     Pt states as a child she had a reaction but does not remember specific reaction. Pt states\"mother say I stopped breathing\".  Gabapentin Other (comments)     Foggy in the head. Did not feel right. Current Outpatient Medications   Medication Sig    rizatriptan (MAXALT) 10 mg tablet Take 1 Tablet by mouth every eight (8) hours as needed for Migraine. May repeat in 2 hours if needed    DULoxetine (CYMBALTA) 60 mg capsule Take 1 Capsule by mouth daily. Indications: major depressive disorder, neuropathic pain    naproxen sodium (Aleve) 220 mg cap 1 tablet with food or milk as needed    ibuprofen (MOTRIN) 200 mg tablet Take 200 mg by mouth every six (6) hours as needed for Pain.  omeprazole (PriLOSEC OTC) 20 mg tablet Take 20 mg by mouth daily.  galcanezumab-gnlm (Emgality Pen) 120 mg/mL injection 1 mL by SubCUTAneous route every thirty (30) days. No current facility-administered medications for this visit. Past medical history/surgical history, family history, and social history have been reviewed for today's visit      ROS    A ten system review of constitutional, cardiovascular, respiratory, musculoskeletal, endocrine, skin, SHEENT, genitourinary, psychiatric and neurologic systems was obtained and is unremarkable except as mentioned under HPI          EXAMINATION: Within the context of virtual telehealth visit:    General appearance: Patient is well-developed and well-nourished in no apparent distress and well groomed.     Psych/mental health:  Affect: Tearful at times, flat affect    PHQ  3 most recent PHQ Screens 7/12/2022   PHQ Not Done -   Little interest or pleasure in doing things Not at all   Feeling down, depressed, irritable, or hopeless Not at all   Total Score PHQ 2 0   Trouble falling or staying asleep, or sleeping too much -   Feeling tired or having little energy -   Poor appetite, weight loss, or overeating -   Feeling bad about yourself - or that you are a failure or have let yourself or your family down -   Trouble concentrating on things such as school, work, reading, or watching TV -   Moving or speaking so slowly that other people could have noticed; or the opposite being so fidgety that others notice -   Thoughts of being better off dead, or hurting yourself in some way -   PHQ 9 Score -   How difficult have these problems made it for you to do your work, take care of your home and get along with others -       HEENT:   WNL      Cardiovascular: WNL    Respiratory:   WNL    Musculoskeletal:   WNL    Integumentary:    WNL      Neurological Examination:   Mental Status:        MMSE  No flowsheet data found. Formal testing was not completed    there was nothing concerning on general observation and discussion. Alert oriented and appropriate to general conversation  Normal processing on general observation  Followed conversation and responded seemingly appropriate throughout the office visit  No word finding difficulties noted on casual observation  Able to follow directions without difficulty     Cranial Nerves:    Grossly intact    Motor:   Normal bulk  No tremor appreciated on today's exam  No abnormal movements appreciated on today's exam  Moves extremities spontaneously and with purpose      Sensation: Not tested    Coordination/Cerebellar:   Grossly intact    Gait: Walks independently    Fall risk assessment  No flowsheet data found.         Due to this being a TeleHealth evaluation, many elements of the physical examination are unable to be assessed. Pursuant to the emergency declaration under the [facility], [facility] waiver authority and the [employer] Act, this Virtual  Visit was conducted, with patient's consent, to reduce the patient's risk of exposure to COVID-19 and provide continuity of care for an established patient. Services were provided through a video synchronous discussion virtually to substitute for in-person clinic visit.           Torsten Pantoja MS, ANP-BC, [facility]

## 2022-07-12 NOTE — ASSESSMENT & PLAN NOTE
Since starting CGRP there is been a reduction in frequency intensity and duration as well as a reduction in rescue medication utilization and a reduction in this time at work/personal life    Patient has a 50% reduction in her severe migraines  We have renewed her rizatriptan for rescue medication for her severe migraines  She can continue to use ibuprofen for the more mild headaches    She is to continue with the Emgality monthly  I am going to increase the Cymbalta to 60 mg/day as part of her preventative protocol

## 2022-07-12 NOTE — ASSESSMENT & PLAN NOTE
Improved on duloxetine but still with breakthrough symptoms I am increasing the duloxetine from 30 mg to 60 mg today more to help with migraine but should also help in this arena

## 2022-07-12 NOTE — PATIENT INSTRUCTIONS
As per discussion    I did increase his Cymbalta to a total of 60 mg/day this should help with mood it should help with headache and migraine will also help with the fibromyalgia    Continue on the Emgality every month    A new prescription for rizatriptan has been sent to your pharmacy that you rescue medicine for the bad migraines    Included is a list of mental health providers please reach out to a mental health team psychiatry is preferred as you need med management and then once it gets more stable you can start working with a therapist    You made a verbal agreement with me that you are presently not thinking about harming yourself but if you should you will reach out for help and call 911      MINDFULNESS and WELLNESS  focus on several things:  Mindfulness: be in the moment  Restfulness: Look at apps for meditation and white noise to help you relax and to sleep better  Mild exercise: Try to walk 10 minutes 3 times a week. At those times listen to music that may be restful for you or relaxing use your apps or perhaps podcasts  Consider adult coloring books to help reduce anxiety and improve focus     If appropriate, work with a mental health team closely to help walk you through the issues that you are struggling with to get to a better place     Be excepting of where you are right now instead of trying to fight to where you used to be. Focus on who you want to become going forward.      Pat yourself  on the back each day you get out of bed     At the end of the day list all the things that you did that was positive for yourself even if it is nothing more than getting out of bed that day      Here is a list of local providers in addition we have also included separate handout in this packet  [address], [address] Providers     Accepts Insured Patients Only:  Dorys Bowman S  [address] [phone]          Near the corner of [facility] and [address] in the near [facility]. Accepts most insurance including Medicaid/Medicare. No psychiatry. On the [facility] bus line. [address]   [address] ([address] [facility]  [address] [facility]. [address] [facility] [place])                                                  [phone]          Accepts most major insurances. Psychiatry available. Some DBT groups. [facility])                                        [phone]          Mixture of psychologists and psychiatrists. They do not accept Medicaid or Medicare. [facility]  [address] [place] [address]                                                                              [phone]          Mixture of clinical social workers and psychologists. Sliding Scale/Financial Aid/Differing Payment Options  [place] [id number] [facility]) [phone]          Our own Lauryn Palacio and Brando Elkins. Variety of treatment options, including DBT. [address]                                                                             [phone]          Provides a variety of group and individual counseling options.   Insurance, Medicaid, Medicare and sliding scale        Medicaid/Medicare providers in the [address] [place] [address] [place]                                                                         [phone]     [facility]                                                                                   [address]                                                                               [phone]     [place]  [address] [place], [address] [place] [address]                                              [phone] ex. [address] [facility]                                                 [phone]     [address]                                                                    [phone]        Services for patients without Medicaid, Medicare or Insurance  The [address] [place] [address]                                                                            [phone]          See handout in separate folder     [address] [facility]          Office Policies      o Appointments  Please make sure that you arrive for your next appointment at least 15 minutes prior to your appointment time. If for some reason you are going to be late please notify the office to determine if you need to be rescheduled or we can adjust your appointment time      o Phone calls/patient messages:  Please allow up to 24 hours for someone in the office to contact you about your call or message. Be mindful your provider may be out of the office or your message may require further review. We encourage you to use [employer] for your messages as this is a faster, more efficient way to communicate with our office    o Medication Refills:  Prescription medications require up to 48 business hours to process. We encourage you to use [employer] for your refills. For controlled medications: Please allow up to 72 business hours to process. Certain medications may require you to  a written prescription at our office.     NO narcotic/controlled medications will be prescribed after 4pm Monday through Friday or on weekends    o Form/Paperwork Completion:  We ask that you allow 7-14 business days. You may also download your forms to "[employer] to have your doctor print off.

## 2022-07-12 NOTE — ASSESSMENT & PLAN NOTE
I strongly encouraged the patient to reengage with her mental health team I think she needs to see psychiatry first for med management as she is so unmotivated it is difficult for her to even see a counselor at this time    I have increased her Cymbalta more  from migraine and fibromyalgia but hopefully will give her some benefit in this arena    Patient is made a verbal contract with me stating that presently she is not planning on harming herself and she is also made a verbal contract that if she is contemplating self injury she will call for help/call 911    Patient has been given a list of local healthcare providers in the area on her AVS and have strongly encouraged her to make an appointment for medication management of her depression

## 2022-07-12 NOTE — ASSESSMENT & PLAN NOTE
Patient has been strongly encouraged to resume care with mental health I have asked her to find psychiatry first as I think she needs significant medication management as this point she is totally unmotivated even to speak with a counselor

## 2022-09-07 RX ORDER — OMEPRAZOLE 40 MG/1
40 CAPSULE, DELAYED RELEASE ORAL DAILY
Qty: 90 CAPSULE | Refills: 3 | Status: SHIPPED | OUTPATIENT
Start: 2022-09-07

## 2022-11-22 ENCOUNTER — TELEPHONE (OUTPATIENT)
Dept: FAMILY MEDICINE CLINIC | Age: 37
End: 2022-11-22

## 2022-11-22 RX ORDER — FLUCONAZOLE 150 MG/1
150 TABLET ORAL DAILY
Qty: 1 TABLET | Refills: 0 | Status: SHIPPED | OUTPATIENT
Start: 2022-11-22 | End: 2022-11-23

## 2022-11-22 NOTE — TELEPHONE ENCOUNTER
Patient states that she may have a yeast infection and would like to have a prescription sent to the pharmacy.  Pt can be reached at [phone]

## 2022-12-06 NOTE — PROGRESS NOTES
HISTORY OF PRESENT ILLNESS Carmell Merlin is a 30 y.o. female. HPI  Patient comes in today for preop exam 
Scheduled to have total laparoscopic hysterectomy, bilateral salpingectomy, excision of endometriosis, laparosopic placement of uterosacral ligament sutures, finish vaginall ywith uterosacral ligament suspension and anterior colporrhaphy, posterior colporrhaphy, cystoscopy. Being done by Dr. Daisy Gray and Dr. Laure Krueger at [facility] for mental health therapy. Sees every 3 weeks. States moods are stable on wellbutrin and cymbalta. Had neuropsych testing recently. Due to follow up in June 2019. Allergies Allergen Reactions  Latex Rash, Itching and Swelling  Pcn [Penicillins] Other (comments) Pt states as a child she had a reaction but does not remember specific reaction. Pt states\"mother say I stopped breathing\". Past Medical History:  
Diagnosis Date  Anxiety  Arthritis   
 unsure - knees, lower back, fingers and toes  Asthma   
 well controlled-as a child  Chest pain Per pt-CP related to anxiety. Stress and echo test done 7/2018. Ocassional CP per pt; 10/1/18; pt denies CP at this time.  Chronic pain   
 right abdomen,shooting pain groins  Diabetes ([address]   
 pre diabetes ; no current diabetic meds  Female bladder prolapse  GERD (gastroesophageal reflux disease)  Headache  Ill-defined condition   
 prolapsed cervix,rectum,bladder  Prolapse of anterior lip of cervix  Rectal prolapse  Stool color black White stool Past Surgical History:  
Procedure Laterality Date  HX GYN    
 vaginal birth x2  
 HX HEENT    
 bilat tubes in ears age 3  
 [address]  07/09/2015 Laparoscopic Incisional hernia repair with mesh.  HX LAP CHOLECYSTECTOMY  7/7/2014  HX OTHER SURGICAL    
 colonoscopy  HX OTHER SURGICAL    
 endoscopsy Social History Socioeconomic History  Marital status: SINGLE Spouse name: Not on file  Number of children: 2  
 Years of education: Not on file  Highest education level: Not on file Occupational History Employer: NOT EMPLOYED Comment: work at home Social Needs  Financial resource strain: Not on file  Food insecurity:  
  Worry: Not on file Inability: Not on file  Transportation needs:  
  Medical: Not on file Non-medical: Not on file Tobacco Use  Smoking status: Current Every Day Smoker Packs/day: 0.50 Years: 10.00 Pack years: 5.00 Start date: 3/1/2004  Smokeless tobacco: Never Used Substance and Sexual Activity  Alcohol use: No  
  Comment: rarely  Drug use: Yes Types: Marijuana Comment: 10/1/18-per pt last use was a year ago  Sexual activity: Yes  
  Partners: Male Birth control/protection: None Comment: single Lifestyle  Physical activity:  
  Days per week: Not on file Minutes per session: Not on file  Stress: Not on file Relationships  Social connections:  
  Talks on phone: Not on file Gets together: Not on file Attends Taoist service: Not on file Active member of club or organization: Not on file Attends meetings of clubs or organizations: Not on file Relationship status: Not on file  Intimate partner violence:  
  Fear of current or ex partner: Not on file Emotionally abused: Not on file Physically abused: Not on file Forced sexual activity: Not on file Other Topics Concern  Not on file Social History Narrative Unemployed currently Family History Problem Relation Age of Onset  Other Mother   
     fibromyalgia, IBS  Diabetes Mother Pre-DM  High Cholesterol Mother  Hypertension Mother  Liver Disease Mother   
     fatty liver  Cancer Mother   
     uterine  Heart Disease Mother [facility] Arthritis-osteo Mother  Diabetes Father  Hypertension Father  Asthma Brother  Diabetes Paternal Aunt  Hypertension Paternal Aunt  Diabetes Paternal Uncle  Hypertension Paternal Uncle  Cancer Maternal Grandfather   
     esoph  Hypertension Maternal Grandfather  Stroke Maternal Grandfather  Heart Disease Paternal Grandmother  Diabetes Paternal Grandfather  Heart Attack Paternal Grandfather  Stroke Paternal Grandfather  Hypertension Paternal Grandfather  Heart Disease Maternal Grandmother  Other Sister MTHFR (clotting D/O) Current Outpatient Medications Medication Sig  
  mg tablet take 1 tablet by mouth every 8 hours if needed for pain  omeprazole (PRILOSEC) 40 mg capsule Take 1 Cap by mouth daily.  buPROPion XL (WELLBUTRIN XL) 150 mg tablet take 1 tablet by mouth every morning  cetirizine (ZYRTEC) 10 mg tablet Take 1 Tab by mouth nightly.  calcium carbonate (TUMS) 200 mg calcium (500 mg) chew Take 1 Tab by mouth daily.  DULoxetine (CYMBALTA) 60 mg capsule take 1 capsule by mouth once daily No current facility-administered medications for this visit. Review of Systems Constitutional: Positive for malaise/fatigue. Negative for chills, diaphoresis, fever and weight loss. HENT: Negative for congestion, ear pain, sore throat and tinnitus. Eyes: Negative for blurred vision and double vision. Respiratory: Negative for cough, sputum production, shortness of breath and wheezing. Cardiovascular: Negative for chest pain, palpitations and leg swelling. Gastrointestinal: Positive for abdominal pain and heartburn. Negative for blood in stool, constipation, diarrhea, nausea and vomiting. Genitourinary: Negative for dysuria, flank pain, frequency, hematuria and urgency. Musculoskeletal: Negative for back pain, joint pain and myalgias. Skin: Negative.    
Neurological: Negative for dizziness, tingling, sensory change, speech change, focal weakness and headaches. Psychiatric/Behavioral: Positive for depression (stable). The patient is nervous/anxious (stable). The patient does not have insomnia. Vitals:  
 04/18/19 1407 BP: 117/78 Pulse: 100 Resp: 18 Temp: 98.2 °F (36.8 °C) TempSrc: Oral  
SpO2: 100% Weight: 224 lb 6.4 oz (101.8 kg) Height: 5' 3\" (1.6 m) Physical Exam  
Constitutional: She is oriented to person, place, and time. Vital signs are normal. She appears well-developed and well-nourished. She is cooperative. HENT:  
Right Ear: Hearing, tympanic membrane, external ear and ear canal normal.  
Left Ear: Hearing, tympanic membrane, external ear and ear canal normal.  
Nose: Nose normal.  
Mouth/Throat: Uvula is midline, oropharynx is clear and moist and mucous membranes are normal.  
Neck: No thyromegaly present. Cardiovascular: Normal rate, regular rhythm, S1 normal, S2 normal and normal heart sounds. No murmur heard. Pulses: 
     Dorsalis pedis pulses are 2+ on the right side, and 2+ on the left side. No edema noted Pulmonary/Chest: Effort normal and breath sounds normal.  
Abdominal: Soft. Normal appearance and bowel sounds are normal. There is no hepatosplenomegaly. There is generalized tenderness. Musculoskeletal: Normal range of motion. Lymphadenopathy:  
  She has no cervical adenopathy. Right: No supraclavicular adenopathy present. Left: No supraclavicular adenopathy present. Neurological: She is alert and oriented to person, place, and time. Skin: Skin is warm, dry and intact. Psychiatric: She has a normal mood and affect. Her speech is normal and behavior is normal. Thought content normal.  
Good eye contacts, smiles throughout visit Vitals reviewed. ASSESSMENT and PLAN 
  ICD-10-CM ICD-9-CM 1. Preoperative general physical examination Z01.818 V72.83   
2. Uterine prolapse N81.4 618.1 3. Anal prolapse K62.2 569.1 4. Endometriosis N80.9 617.9 5. Severe episode of recurrent major depressive disorder, without psychotic features ([facility] F33.2 296.33 buPROPion XL (WELLBUTRIN XL) 150 mg tablet DULoxetine (CYMBALTA) 60 mg capsule 6. Obesity (BMI 35.0-39.9 without comorbidity) E66.9 278.00   
7. Gastroesophageal reflux disease, esophagitis presence not specified K21.9 530.81 Encounter Diagnoses Name Primary?  Preoperative general physical examination Yes  Uterine prolapse  Anal prolapse  Endometriosis  Severe episode of recurrent major depressive disorder, without psychotic features ([facility]  Obesity (BMI 35.0-39.9 without comorbidity)  Gastroesophageal reflux disease, esophagitis presence not specified Orders Placed This Encounter   mg tablet  buPROPion XL (WELLBUTRIN XL) 150 mg tablet  DULoxetine (CYMBALTA) 60 mg capsule Diagnoses and all orders for this visit: 1. Preoperative general physical examination - patient is medically cleared to proceed with GYN/urology procedure 2. Uterine prolapse 3. Anal prolapse 4. Endometriosis 5. Severe episode of recurrent major depressive disorder, without psychotic features ([facility] - stable. Followed by therapist every 3 weeks 
-     buPROPion XL (WELLBUTRIN XL) 150 mg tablet; take 1 tablet by mouth every morning 
-     DULoxetine (CYMBALTA) 60 mg capsule; take 1 capsule by mouth once daily 6. Obesity (BMI 35.0-39.9 without comorbidity) -     I have reviewed/discussed the above normal BMI with the patient. I have recommended the following interventions: dietary management education, guidance, and counseling and encourage exercise . Roney Vasquez 7. Gastroesophageal reflux disease, esophagitis presence not specified Follow-up and Dispositions · Return if symptoms worsen or fail to improve. 
  
 
lab results and schedule of future lab studies reviewed with patient 
reviewed diet, exercise and weight control I have reviewed the patient's allergies and made any necessary changes. Medical, procedural, social and family histories have been reviewed and updated as medically indicated. I have reconciled and/or revised patient medications in the EMR. I have discussed each diagnosis listed in this note with Justice Byrd and/or their family. I have discussed treatment options and the risk/benefit analysis of those options, including safe use of medications and possible medication side effects. Through the use of shared decision making we have agreed to the above plan. The patient has received an after-visit summary and questions were answered concerning future plans. Kassi Bergman, FNP-C This note will not be viewable in [address] show

## 2023-02-28 ENCOUNTER — VIRTUAL VISIT (OUTPATIENT)
Dept: FAMILY MEDICINE CLINIC | Age: 38
End: 2023-02-28
Payer: MEDICAID

## 2023-02-28 DIAGNOSIS — J01.81 OTHER ACUTE RECURRENT SINUSITIS: ICD-10-CM

## 2023-02-28 DIAGNOSIS — U07.1 COVID-19 VIRUS INFECTION: Primary | ICD-10-CM

## 2023-02-28 DIAGNOSIS — H10.023 OTHER MUCOPURULENT CONJUNCTIVITIS OF BOTH EYES: ICD-10-CM

## 2023-02-28 PROCEDURE — 99212 OFFICE O/P EST SF 10 MIN: CPT | Performed by: FAMILY MEDICINE

## 2023-02-28 RX ORDER — CIPROFLOXACIN HYDROCHLORIDE 3.5 MG/ML
2 SOLUTION/ DROPS TOPICAL 4 TIMES DAILY
Qty: 5 ML | Refills: 0 | Status: SHIPPED | OUTPATIENT
Start: 2023-02-28 | End: 2023-03-07

## 2023-02-28 RX ORDER — PREDNISONE 10 MG/1
10 TABLET ORAL 2 TIMES DAILY
Qty: 10 TABLET | Refills: 0 | Status: SHIPPED | OUTPATIENT
Start: 2023-02-28 | End: 2023-03-05

## 2023-02-28 RX ORDER — SULFAMETHOXAZOLE AND TRIMETHOPRIM 800; 160 MG/1; MG/1
1 TABLET ORAL 2 TIMES DAILY
Qty: 20 TABLET | Refills: 0 | Status: SHIPPED | OUTPATIENT
Start: 2023-02-28 | End: 2023-03-10

## 2023-02-28 RX ORDER — PROMETHAZINE HYDROCHLORIDE AND DEXTROMETHORPHAN HYDROBROMIDE 6.25; 15 MG/5ML; MG/5ML
5 SYRUP ORAL
Qty: 180 ML | Refills: 1 | Status: SHIPPED | OUTPATIENT
Start: 2023-02-28

## 2023-02-28 NOTE — PROGRESS NOTES
Patient identified by 2 identifiers. Chief Complaint   Patient presents with    Hospital Follow Up    Ear Pain     Right eye drainage. Throat and tongue irritated     1. Have you been to the ER, urgent care clinic since your last visit? Hospitalized since your last visit? Yes Where: [address] positive    2. Have you seen or consulted any other health care providers outside of the [address] [place] [address] since your last visit? Include any pap smears or colon screening.  No

## 2023-02-28 NOTE — PROGRESS NOTES
HISTORY OF PRESENT ILLNESS  Kira Guillen is a 41 y.o. female. HPI  Patient encounter by synchronous (real-time) audio-video technology which is patient-initiated. Patient is aware that this encounter is a billable service with coverage as determined by her insurance carrier, all discussed at the time of check-in. Patient has given verbal consent to proceed. C/o nasal congestion, headache and pressure in the face and cough for the past several days. Coughing up thick yellowish phlegm. No fever or chills noted. Has malaise. Throat is scratchy. Has post nasal drainage. No chest pain or SOB. Slight wheezing noted. Has had drainage from both eye over the past week and matted together in the morning. Vision has been normal.  Eyes feel gritty. Tested positive for COVID19 on 2/7 and sx initially seem to improved until feeling bad again started over this weekend. She is feeling miserable with the congestion and has tried several OTC medications without relief. Seen in the ER COVID19 test still positive but pt believed she still have another infection in her sinuses and eyes.       Patient Active Problem List   Diagnosis Code    Asthma J45.909    GERD (gastroesophageal reflux disease) K21.9    Anal prolapse K62.2    Uterine prolapse N81.4    IGT (impaired glucose tolerance) R73.02    Cyclothymia K83.5    Cyclical vomiting C81.15    Major depressive disorder, recurrent episode, severe (HCC) F33.2    PTSD (post-traumatic stress disorder) F43.10    Intractable nausea and vomiting R11.2    Other chest pain R07.89    Tension vascular headache G44.209    Transient vision disturbance of both eyes H53.9    Lumbar back pain with radiculopathy affecting left lower extremity M54.16    Lumbar back pain with radiculopathy affecting right lower extremity M54.16    Vitamin D deficiency E55.9    Hypothyroidism due to acquired atrophy of thyroid E03.4    Family history of pituitary disease Z83.49    Severe obesity ([address] E66.01    Endometriosis N80.9    Fibromyalgia M79.7    Severe episode of recurrent major depressive disorder, without psychotic features ([facility] F33.2    Intractable chronic migraine without aura and with status migrainosus G43.711    Acute alteration in mental status R41.82    Mild cognitive impairment with memory loss G31.84    Neurodegenerative cognitive impairment (HCC) G31.9       Current Outpatient Medications   Medication Sig Dispense Refill    trimethoprim-sulfamethoxazole (BACTRIM DS, SEPTRA DS) 160-800 mg per tablet Take 1 Tablet by mouth two (2) times a day for 10 days. 20 Tablet 0    predniSONE (DELTASONE) 10 mg tablet Take 10 mg by mouth two (2) times a day for 5 days. 10 Tablet 0    promethazine-dextromethorphan (PROMETHAZINE-DM) 6.25-15 mg/5 mL syrup Take 5 mL by mouth every six (6) hours as needed for Cough. 180 mL 1    ciprofloxacin HCl (Ciloxan) 0.3 % ophthalmic solution Administer 2 Drops to both eyes four (4) times daily for 7 days. 5 mL 0    omeprazole (PRILOSEC) 40 mg capsule TAKE 1 CAPSULE BY MOUTH DAILY 90 Capsule 3    rizatriptan (MAXALT) 10 mg tablet Take 1 Tablet by mouth every eight (8) hours as needed for Migraine. May repeat in 2 hours if needed 9 Tablet 11    naproxen sodium 220 mg cap 1 tablet with food or milk as needed      ibuprofen (MOTRIN) 200 mg tablet Take 200 mg by mouth every six (6) hours as needed for Pain.      galcanezumab-gnlm (Emgality Pen) 120 mg/mL injection 1 mL by SubCUTAneous route every thirty (30) days. 1 mL 11    DULoxetine (CYMBALTA) 60 mg capsule Take 1 Capsule by mouth daily. Indications: major depressive disorder, neuropathic pain (Patient not taking: Reported on 2/28/2023) 90 Capsule 1    omeprazole (PRILOSEC OTC) 20 mg tablet Take 20 mg by mouth daily.  (Patient not taking: Reported on 2/28/2023)         Allergies   Allergen Reactions    Latex Rash, Itching and Swelling     Other reaction(s): rash    Penicillins Other (comments) and Anaphylaxis     Pt states as a child she had a reaction but does not remember specific reaction. Pt states\"mother say I stopped breathing\". Other reaction(s): Unknown    Gabapentin Other (comments)     Foggy in the head. Did not feel right.     \"makes me very sleepy/drowsy\"         Past Medical History:   Diagnosis Date    Anal prolapse 9/19/2012    Anxiety     Arthritis     unsure - knees, lower back, fingers and toes    Asthma 1/19/2012    At risk for sleep apnea 09/05/2019    KATIA score 4 on PAT assessment    Bipolar 2 disorder (HCC)     Carpal tunnel syndrome     Chest pain     Childhood asthma     Chronic pain     right abdomen,shooting pain groins    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    Depression     Diabetes ([address]     Disturbance of memory 7/30/2018    Endometriosis     Female bladder prolapse     Fibromyalgia     Fibromyalgia 02/2020    GERD (gastroesophageal reflux disease)     IGT (impaired glucose tolerance) 11/14/2012    Intractable nausea and vomiting 6/15/2018    Lumbar back pain with radiculopathy affecting left lower extremity 7/30/2018    Lumbar back pain with radiculopathy affecting right lower extremity 7/30/2018    Major depressive disorder, recurrent episode, severe ([address] 8/31/2015    MCI (mild cognitive impairment) with memory loss     Migraines     Other chest pain 7/5/2018    Prediabetes     Prediabetes     Prolapse of anterior lip of cervix     PTSD (post-traumatic stress disorder)     borderline personality traits    Rectal prolapse     Severe obesity ([address] 4/18/2019    Tension vascular headache 7/30/2018    Transient vision disturbance of both eyes 7/30/2018    Uterine prolapse 9/19/2012         Past Surgical History:   Procedure Laterality Date    COLONOSCOPY N/A 12/2/2019    COLONOSCOPY (LATEX ALLERGY) performed by Joann Rivera MD at [facility] AMBULATORY OR    COLONOSCOPY N/A 10/27/2021    COLONOSCOPY performed by Joann Rivera MD at [facility] ENDOSCOPY    HX COLONOSCOPY      HX ENDOSCOPY      HX GYN      vaginal birth x2 HX HERNIA REPAIR  07/09/2015    Laparoscopic Incisional hernia repair with mesh. HX LAP CHOLECYSTECTOMY  7/7/2014    HX MYRINGOTOMY Bilateral     childhood    HX ORTHOPAEDIC      carpal tunnel both wrist , Mliss Mood    HX PARTIAL HYSTERECTOMY  04/25/2019    laparoscopic, left ovaries         Family History   Problem Relation Age of Onset    Other Mother         fibromyalgia, IBS    Diabetes Mother         Pre-DM    High Cholesterol Mother     Hypertension Mother     Liver Disease Mother         fatty liver    Cancer Mother         uterine     Heart Disease Mother         CHF    OSTEOARTHRITIS Mother     Coronary Art Dis Mother     Diabetes Father     Hypertension Father     Heart Attack Father     Coronary Art Dis Father     Heart Disease Father     Asthma Brother     Diabetes Paternal Aunt     Hypertension Paternal Aunt     Diabetes Paternal Uncle     Hypertension Paternal Uncle     Cancer Maternal Grandfather         esoph    Hypertension Maternal Grandfather     Stroke Maternal Grandfather     Heart Disease Paternal Grandmother     Diabetes Paternal Grandfather     Heart Attack Paternal Grandfather     Stroke Paternal Grandfather     Hypertension Paternal Grandfather     Heart Disease Maternal Grandmother     Other Sister         MTHFR (clotting D/O)       Social History     Tobacco Use    Smoking status: Every Day     Packs/day: 0.50     Years: 10.00     Pack years: 5.00     Types: Cigarettes     Start date: 3/1/2004    Smokeless tobacco: Never   Substance Use Topics    Alcohol use: Yes     Comment: rarely,           ROS    Physical Exam  Constitutional:       Appearance: Normal appearance. Eyes:      General:         Right eye: Discharge present. Left eye: Discharge present. Conjunctiva/sclera:      Right eye: Right conjunctiva is injected. Left eye: Left conjunctiva is injected. Pulmonary:      Effort: Pulmonary effort is normal.   Neurological:      Mental Status: She is alert. Psychiatric:         Mood and Affect: Mood normal.         Thought Content: Thought content normal.       ASSESSMENT and PLAN  Diagnoses and all orders for this visit:    1. COVID-19 virus infection    2. Other acute recurrent sinusitis  -     trimethoprim-sulfamethoxazole (BACTRIM DS, SEPTRA DS) 160-800 mg per tablet; Take 1 Tablet by mouth two (2) times a day for 10 days. -     predniSONE (DELTASONE) 10 mg tablet; Take 10 mg by mouth two (2) times a day for 5 days. -     promethazine-dextromethorphan (PROMETHAZINE-DM) 6.25-15 mg/5 mL syrup; Take 5 mL by mouth every six (6) hours as needed for Cough. 3. Other mucopurulent conjunctivitis of both eyes  -     ciprofloxacin HCl (Ciloxan) 0.3 % ophthalmic solution; Administer 2 Drops to both eyes four (4) times daily for 7 days. reviewed medications and side effects in detail    I have discussed diagnosis listed in this note with pt and/or family. I have discussed treatment plans and options and the risk/benefit analysis of those options, including safe use of medications and possible medication side effects. Through the use of shared decision making we have agreed to the above plan. Questions were answered concerning future plans and follow up. Advise pt of any urgent changes then to proceed to the ER. Due to this being a TeleHealth encounter (During [address] emergency), evaluation of the following organ systems was limited: Vital/Constitutional/EENT/Resp/CV/GI//MS/Neuro/Skin/Heme-Lymph-Imm. Pursuant to the emergency declaration under the [address] and [place] [address] waiver authority and the web care [employer] and [employer] Act, this Virtual Visit was conducted, with patient's consent, to reduce the patient's risk of exposure to COVID-19 and provide continuity of care for an established patient.       Services were provided through a video synchronous discussion virtually to substitute for in-person appointment. This visit was completed using [email or web address] me    Time in virtual visit: 10 minutes

## 2023-03-02 ENCOUNTER — TELEPHONE (OUTPATIENT)
Dept: FAMILY MEDICINE CLINIC | Age: 38
End: 2023-03-02

## 2023-03-02 NOTE — TELEPHONE ENCOUNTER
Pt state she was treated at [facility] was positive on 2/7. Pt now state she was positive for COVID on 2/25 when she went back. She states her ears hurt. I advised the pt to take mucinex and claritin and use Tylenol for the pain. Pt will call back on Monday with an update.

## 2023-03-02 NOTE — TELEPHONE ENCOUNTER
Patient called requesting a call back from the nurse. She stated Dr. Melvin Rankin told her to call by today if her double ear infection hadn't improved. She can be reached at [phone].

## 2023-03-16 ENCOUNTER — TELEPHONE (OUTPATIENT)
Dept: FAMILY MEDICINE CLINIC | Age: 38
End: 2023-03-16

## 2023-03-16 NOTE — TELEPHONE ENCOUNTER
Patient would like to see Prosper Leaks soon regarding having a swollen throat with blisters please give her a call @  [phone]

## 2023-03-16 NOTE — TELEPHONE ENCOUNTER
Pt state she has swollen sore throat with blisters, congestion and still having problems with her ears. She went to ENT specialist Dr. Brianne Maldonado on 3/7/23 and has a follow up on 3/24/23. She has not taken a COVID tests because she does not have any, but she wants something sent to her pharmacy. I explained to her she may need to be seen at an urgent care facility because you are not seeing pts on thursdays because she wants to be seen soon. You want her to come in and do a COVID test or can she be seen Friday at 12 noon?

## 2023-05-05 DIAGNOSIS — G43.711 INTRACTABLE CHRONIC MIGRAINE WITHOUT AURA AND WITH STATUS MIGRAINOSUS: ICD-10-CM

## 2023-05-05 RX ORDER — GALCANEZUMAB 120 MG/ML
INJECTION, SOLUTION SUBCUTANEOUS
Qty: 1 ML | Refills: 11 | Status: SHIPPED | OUTPATIENT
Start: 2023-05-05

## 2023-08-21 ENCOUNTER — TELEPHONE (OUTPATIENT)
Age: 38
End: 2023-08-21

## 2023-08-21 NOTE — TELEPHONE ENCOUNTER
[phone] Patient was trying to donate blood, she was ask to have Provider fill out form. Patient drooped off form. Form placed in Provider's box.

## 2023-08-23 ENCOUNTER — TELEPHONE (OUTPATIENT)
Age: 38
End: 2023-08-23

## 2023-08-23 NOTE — TELEPHONE ENCOUNTER
Spoke to the pt to let her know when it is signed and scanned in her chart I will fax it and call and call to let her know.

## 2023-09-22 ENCOUNTER — OFFICE VISIT (OUTPATIENT)
Age: 38
End: 2023-09-22
Payer: MEDICAID

## 2023-09-22 VITALS
WEIGHT: 184.6 LBS | DIASTOLIC BLOOD PRESSURE: 89 MMHG | BODY MASS INDEX: 32.71 KG/M2 | RESPIRATION RATE: 20 BRPM | TEMPERATURE: 98.6 F | OXYGEN SATURATION: 99 % | SYSTOLIC BLOOD PRESSURE: 121 MMHG | HEART RATE: 83 BPM | HEIGHT: 63 IN

## 2023-09-22 DIAGNOSIS — Z13.220 LIPID SCREENING: ICD-10-CM

## 2023-09-22 DIAGNOSIS — Z79.899 ENCOUNTER FOR LONG-TERM (CURRENT) USE OF MEDICATIONS: ICD-10-CM

## 2023-09-22 DIAGNOSIS — F12.90 MARIJUANA USE: ICD-10-CM

## 2023-09-22 DIAGNOSIS — Z86.69 HISTORY OF MIGRAINE HEADACHES: ICD-10-CM

## 2023-09-22 DIAGNOSIS — F32.5 MAJOR DEPRESSION IN REMISSION (HCC): ICD-10-CM

## 2023-09-22 DIAGNOSIS — M79.7 FIBROMYALGIA: ICD-10-CM

## 2023-09-22 DIAGNOSIS — Z72.0 VAPES NICOTINE CONTAINING SUBSTANCE: ICD-10-CM

## 2023-09-22 DIAGNOSIS — E11.9 TYPE 2 DIABETES MELLITUS IN REMISSION (HCC): Primary | ICD-10-CM

## 2023-09-22 DIAGNOSIS — F43.12 POST-TRAUMATIC STRESS DISORDER, CHRONIC: ICD-10-CM

## 2023-09-22 PROCEDURE — 99214 OFFICE O/P EST MOD 30 MIN: CPT | Performed by: FAMILY MEDICINE

## 2023-09-22 SDOH — ECONOMIC STABILITY: FOOD INSECURITY: WITHIN THE PAST 12 MONTHS, YOU WORRIED THAT YOUR FOOD WOULD RUN OUT BEFORE YOU GOT MONEY TO BUY MORE.: NEVER TRUE

## 2023-09-22 SDOH — ECONOMIC STABILITY: INCOME INSECURITY: HOW HARD IS IT FOR YOU TO PAY FOR THE VERY BASICS LIKE FOOD, HOUSING, MEDICAL CARE, AND HEATING?: NOT HARD AT ALL

## 2023-09-22 SDOH — ECONOMIC STABILITY: HOUSING INSECURITY
IN THE LAST 12 MONTHS, WAS THERE A TIME WHEN YOU DID NOT HAVE A STEADY PLACE TO SLEEP OR SLEPT IN A SHELTER (INCLUDING NOW)?: NO

## 2023-09-22 SDOH — ECONOMIC STABILITY: FOOD INSECURITY: WITHIN THE PAST 12 MONTHS, THE FOOD YOU BOUGHT JUST DIDN'T LAST AND YOU DIDN'T HAVE MONEY TO GET MORE.: NEVER TRUE

## 2023-09-22 ASSESSMENT — PATIENT HEALTH QUESTIONNAIRE - PHQ9
SUM OF ALL RESPONSES TO PHQ9 QUESTIONS 1 & 2: 0
SUM OF ALL RESPONSES TO PHQ QUESTIONS 1-9: 0
5. POOR APPETITE OR OVEREATING: 0
4. FEELING TIRED OR HAVING LITTLE ENERGY: 0
9. THOUGHTS THAT YOU WOULD BE BETTER OFF DEAD, OR OF HURTING YOURSELF: 0
SUM OF ALL RESPONSES TO PHQ QUESTIONS 1-9: 0
1. LITTLE INTEREST OR PLEASURE IN DOING THINGS: 0
SUM OF ALL RESPONSES TO PHQ QUESTIONS 1-9: 0
10. IF YOU CHECKED OFF ANY PROBLEMS, HOW DIFFICULT HAVE THESE PROBLEMS MADE IT FOR YOU TO DO YOUR WORK, TAKE CARE OF THINGS AT HOME, OR GET ALONG WITH OTHER PEOPLE: 0
8. MOVING OR SPEAKING SO SLOWLY THAT OTHER PEOPLE COULD HAVE NOTICED. OR THE OPPOSITE, BEING SO FIGETY OR RESTLESS THAT YOU HAVE BEEN MOVING AROUND A LOT MORE THAN USUAL: 0
2. FEELING DOWN, DEPRESSED OR HOPELESS: 0
7. TROUBLE CONCENTRATING ON THINGS, SUCH AS READING THE NEWSPAPER OR WATCHING TELEVISION: 0
6. FEELING BAD ABOUT YOURSELF - OR THAT YOU ARE A FAILURE OR HAVE LET YOURSELF OR YOUR FAMILY DOWN: 0
SUM OF ALL RESPONSES TO PHQ QUESTIONS 1-9: 0
3. TROUBLE FALLING OR STAYING ASLEEP: 0

## 2023-09-22 ASSESSMENT — ENCOUNTER SYMPTOMS
CHEST TIGHTNESS: 0
RHINORRHEA: 0
BLOOD IN STOOL: 0
ABDOMINAL PAIN: 0
COUGH: 0
SHORTNESS OF BREATH: 0
CONSTIPATION: 0

## 2023-09-22 NOTE — PROGRESS NOTES
Chief Complaint   Patient presents with    Other     documentation       1. \"Have you been to the ER, urgent care clinic since your last visit? Hospitalized since your last visit? \" No    2. \"Have you seen or consulted any other health care providers outside of the [address] [place] [address] since your last visit? \" No     3. For patients aged 43-73: Has the patient had a colonoscopy / FIT/ Cologuard? N/A      If the patient is female:    4. For patients aged 43-66: Has the patient had a mammogram within the past 2 years? N/A      5. For patients aged 21-65: Has the patient had a pap smear?  No     Health Maintenance Due   Topic Date Due    Hepatitis B vaccine (1 of 3 - 3-dose series) Never done    COVID-19 Vaccine (1) Never done    Varicella vaccine (1 of 2 - 2-dose childhood series) Never done    Cervical cancer screen  08/05/2021    Pneumococcal 0-64 years Vaccine (2 - PCV) 08/12/2021    A1C test (Diabetic or Prediabetic)  10/19/2022    Depression Monitoring  07/12/2023    Flu vaccine (1) 08/01/2023
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
## 2023-09-23 LAB
ALBUMIN SERPL-MCNC: 4.2 G/DL (ref 3.5–5)
ALBUMIN/GLOB SERPL: 1.4 (ref 1.1–2.2)
ALP SERPL-CCNC: 88 U/L (ref 45–117)
ALT SERPL-CCNC: 19 U/L (ref 12–78)
ANION GAP SERPL CALC-SCNC: 3 MMOL/L (ref 5–15)
AST SERPL-CCNC: 8 U/L (ref 15–37)
BILIRUB SERPL-MCNC: 0.4 MG/DL (ref 0.2–1)
BUN SERPL-MCNC: 6 MG/DL (ref 6–20)
BUN/CREAT SERPL: 7 (ref 12–20)
CALCIUM SERPL-MCNC: 9.7 MG/DL (ref 8.5–10.1)
CHLORIDE SERPL-SCNC: 103 MMOL/L (ref 97–108)
CHOLEST SERPL-MCNC: 117 MG/DL
CO2 SERPL-SCNC: 31 MMOL/L (ref 21–32)
CREAT SERPL-MCNC: 0.84 MG/DL (ref 0.55–1.02)
ERYTHROCYTE [DISTWIDTH] IN BLOOD BY AUTOMATED COUNT: 13.7 % (ref 11.5–14.5)
EST. AVERAGE GLUCOSE BLD GHB EST-MCNC: 111 MG/DL
GLOBULIN SER CALC-MCNC: 2.9 G/DL (ref 2–4)
GLUCOSE SERPL-MCNC: 81 MG/DL (ref 65–100)
HBA1C MFR BLD: 5.5 % (ref 4–5.6)
HCT VFR BLD AUTO: 45.8 % (ref 35–47)
HDLC SERPL-MCNC: 48 MG/DL
HDLC SERPL: 2.4 (ref 0–5)
HGB BLD-MCNC: 14.8 G/DL (ref 11.5–16)
LDLC SERPL CALC-MCNC: 47.6 MG/DL (ref 0–100)
MCH RBC QN AUTO: 29.4 PG (ref 26–34)
MCHC RBC AUTO-ENTMCNC: 32.3 G/DL (ref 30–36.5)
MCV RBC AUTO: 90.9 FL (ref 80–99)
NRBC # BLD: 0 K/UL (ref 0–0.01)
NRBC BLD-RTO: 0 PER 100 WBC
PLATELET # BLD AUTO: 244 K/UL (ref 150–400)
PMV BLD AUTO: 11.1 FL (ref 8.9–12.9)
POTASSIUM SERPL-SCNC: 3.6 MMOL/L (ref 3.5–5.1)
PROT SERPL-MCNC: 7.1 G/DL (ref 6.4–8.2)
RBC # BLD AUTO: 5.04 M/UL (ref 3.8–5.2)
SODIUM SERPL-SCNC: 137 MMOL/L (ref 136–145)
TRIGL SERPL-MCNC: 107 MG/DL
TSH SERPL DL<=0.05 MIU/L-ACNC: 1.07 UIU/ML (ref 0.36–3.74)
VLDLC SERPL CALC-MCNC: 21.4 MG/DL
WBC # BLD AUTO: 8.8 K/UL (ref 3.6–11)

## 2023-09-29 ENCOUNTER — TELEPHONE (OUTPATIENT)
Age: 38
End: 2023-09-29

## 2023-10-05 ENCOUNTER — HOSPITAL ENCOUNTER (INPATIENT)
Facility: HOSPITAL | Age: 38
LOS: 2 days | Discharge: HOME | End: 2023-10-07
Attending: EMERGENCY MEDICINE | Admitting: STUDENT IN AN ORGANIZED HEALTH CARE EDUCATION/TRAINING PROGRAM
Payer: MEDICAID

## 2023-10-05 ENCOUNTER — APPOINTMENT (OUTPATIENT)
Facility: HOSPITAL | Age: 38
End: 2023-10-05
Payer: MEDICAID

## 2023-10-05 DIAGNOSIS — R11.2 INTRACTABLE NAUSEA AND VOMITING: Primary | ICD-10-CM

## 2023-10-05 DIAGNOSIS — E86.0 DEHYDRATION: ICD-10-CM

## 2023-10-05 LAB
ALBUMIN SERPL-MCNC: 3.7 G/DL (ref 3.5–5)
ALBUMIN/GLOB SERPL: 0.9 (ref 1.1–2.2)
ALP SERPL-CCNC: 74 U/L (ref 45–117)
ALT SERPL-CCNC: 22 U/L (ref 12–78)
ANION GAP SERPL CALC-SCNC: 6 MMOL/L (ref 5–15)
APPEARANCE UR: CLEAR
AST SERPL-CCNC: 13 U/L (ref 15–37)
BACTERIA URNS QL MICRO: NEGATIVE /HPF
BASOPHILS # BLD: 0 K/UL (ref 0–0.1)
BASOPHILS # BLD: 0 K/UL (ref 0–0.1)
BASOPHILS NFR BLD: 0 % (ref 0–1)
BASOPHILS NFR BLD: 0 % (ref 0–1)
BILIRUB SERPL-MCNC: 0.6 MG/DL (ref 0.2–1)
BILIRUB UR QL: NEGATIVE
BUN SERPL-MCNC: 8 MG/DL (ref 6–20)
BUN/CREAT SERPL: 9 (ref 12–20)
CALCIUM SERPL-MCNC: 9 MG/DL (ref 8.5–10.1)
CHLORIDE SERPL-SCNC: 109 MMOL/L (ref 97–108)
CO2 SERPL-SCNC: 23 MMOL/L (ref 21–32)
COLOR UR: ABNORMAL
CREAT SERPL-MCNC: 0.91 MG/DL (ref 0.55–1.02)
DIFFERENTIAL METHOD BLD: ABNORMAL
DIFFERENTIAL METHOD BLD: ABNORMAL
EOSINOPHIL # BLD: 0 K/UL (ref 0–0.4)
EOSINOPHIL # BLD: 0 K/UL (ref 0–0.4)
EOSINOPHIL NFR BLD: 0 % (ref 0–7)
EOSINOPHIL NFR BLD: 0 % (ref 0–7)
EPITH CASTS URNS QL MICRO: ABNORMAL /LPF
ERYTHROCYTE [DISTWIDTH] IN BLOOD BY AUTOMATED COUNT: 13.8 % (ref 11.5–14.5)
ERYTHROCYTE [DISTWIDTH] IN BLOOD BY AUTOMATED COUNT: 14 % (ref 11.5–14.5)
GLOBULIN SER CALC-MCNC: 4.2 G/DL (ref 2–4)
GLUCOSE SERPL-MCNC: 141 MG/DL (ref 65–100)
GLUCOSE UR STRIP.AUTO-MCNC: NEGATIVE MG/DL
HCG UR QL: NEGATIVE
HCT VFR BLD AUTO: 37.6 % (ref 35–47)
HCT VFR BLD AUTO: 42.1 % (ref 35–47)
HGB BLD-MCNC: 13 G/DL (ref 11.5–16)
HGB BLD-MCNC: 14.2 G/DL (ref 11.5–16)
HGB UR QL STRIP: NEGATIVE
HYALINE CASTS URNS QL MICRO: ABNORMAL /LPF (ref 0–2)
IMM GRANULOCYTES # BLD AUTO: 0.1 K/UL (ref 0–0.04)
IMM GRANULOCYTES # BLD AUTO: 0.1 K/UL (ref 0–0.04)
IMM GRANULOCYTES NFR BLD AUTO: 1 % (ref 0–0.5)
IMM GRANULOCYTES NFR BLD AUTO: 1 % (ref 0–0.5)
KETONES UR QL STRIP.AUTO: 40 MG/DL
LEUKOCYTE ESTERASE UR QL STRIP.AUTO: NEGATIVE
LIPASE SERPL-CCNC: 30 U/L (ref 13–75)
LYMPHOCYTES # BLD: 1.1 K/UL (ref 0.8–3.5)
LYMPHOCYTES # BLD: 2.6 K/UL (ref 0.8–3.5)
LYMPHOCYTES NFR BLD: 17 % (ref 12–49)
LYMPHOCYTES NFR BLD: 9 % (ref 12–49)
MCH RBC QN AUTO: 29.4 PG (ref 26–34)
MCH RBC QN AUTO: 30 PG (ref 26–34)
MCHC RBC AUTO-ENTMCNC: 33.7 G/DL (ref 30–36.5)
MCHC RBC AUTO-ENTMCNC: 34.6 G/DL (ref 30–36.5)
MCV RBC AUTO: 86.6 FL (ref 80–99)
MCV RBC AUTO: 87.2 FL (ref 80–99)
MONOCYTES # BLD: 0.3 K/UL (ref 0–1)
MONOCYTES # BLD: 1.1 K/UL (ref 0–1)
MONOCYTES NFR BLD: 3 % (ref 5–13)
MONOCYTES NFR BLD: 7 % (ref 5–13)
NEUTS SEG # BLD: 10.3 K/UL (ref 1.8–8)
NEUTS SEG # BLD: 11.7 K/UL (ref 1.8–8)
NEUTS SEG NFR BLD: 75 % (ref 32–75)
NEUTS SEG NFR BLD: 87 % (ref 32–75)
NITRITE UR QL STRIP.AUTO: NEGATIVE
NRBC # BLD: 0 K/UL (ref 0–0.01)
NRBC # BLD: 0 K/UL (ref 0–0.01)
NRBC BLD-RTO: 0 PER 100 WBC
NRBC BLD-RTO: 0 PER 100 WBC
PH UR STRIP: 5.5 (ref 5–8)
PLATELET # BLD AUTO: 265 K/UL (ref 150–400)
PLATELET # BLD AUTO: 300 K/UL (ref 150–400)
PMV BLD AUTO: 10.7 FL (ref 8.9–12.9)
PMV BLD AUTO: 11.3 FL (ref 8.9–12.9)
POTASSIUM SERPL-SCNC: 3.7 MMOL/L (ref 3.5–5.1)
PROT SERPL-MCNC: 7.9 G/DL (ref 6.4–8.2)
PROT UR STRIP-MCNC: 30 MG/DL
RBC # BLD AUTO: 4.34 M/UL (ref 3.8–5.2)
RBC # BLD AUTO: 4.83 M/UL (ref 3.8–5.2)
RBC #/AREA URNS HPF: ABNORMAL /HPF (ref 0–5)
SODIUM SERPL-SCNC: 138 MMOL/L (ref 136–145)
SP GR UR REFRACTOMETRY: >1.03 (ref 1–1.03)
URINE CULTURE IF INDICATED: ABNORMAL
UROBILINOGEN UR QL STRIP.AUTO: 1 EU/DL (ref 0.2–1)
WBC # BLD AUTO: 11.9 K/UL (ref 3.6–11)
WBC # BLD AUTO: 15.5 K/UL (ref 3.6–11)
WBC URNS QL MICRO: ABNORMAL /HPF (ref 0–4)

## 2023-10-05 PROCEDURE — 6360000002 HC RX W HCPCS: Performed by: EMERGENCY MEDICINE

## 2023-10-05 PROCEDURE — 6370000000 HC RX 637 (ALT 250 FOR IP): Performed by: EMERGENCY MEDICINE

## 2023-10-05 PROCEDURE — 70450 CT HEAD/BRAIN W/O DYE: CPT

## 2023-10-05 PROCEDURE — A4216 STERILE WATER/SALINE, 10 ML: HCPCS | Performed by: EMERGENCY MEDICINE

## 2023-10-05 PROCEDURE — 96361 HYDRATE IV INFUSION ADD-ON: CPT

## 2023-10-05 PROCEDURE — 1100000000 HC RM PRIVATE

## 2023-10-05 PROCEDURE — 2580000003 HC RX 258: Performed by: EMERGENCY MEDICINE

## 2023-10-05 PROCEDURE — 85025 COMPLETE CBC W/AUTO DIFF WBC: CPT

## 2023-10-05 PROCEDURE — 36415 COLL VENOUS BLD VENIPUNCTURE: CPT

## 2023-10-05 PROCEDURE — 6360000002 HC RX W HCPCS: Performed by: NURSE PRACTITIONER

## 2023-10-05 PROCEDURE — 76830 TRANSVAGINAL US NON-OB: CPT

## 2023-10-05 PROCEDURE — 2580000003 HC RX 258: Performed by: STUDENT IN AN ORGANIZED HEALTH CARE EDUCATION/TRAINING PROGRAM

## 2023-10-05 PROCEDURE — 81025 URINE PREGNANCY TEST: CPT

## 2023-10-05 PROCEDURE — 96374 THER/PROPH/DIAG INJ IV PUSH: CPT

## 2023-10-05 PROCEDURE — 96375 TX/PRO/DX INJ NEW DRUG ADDON: CPT

## 2023-10-05 PROCEDURE — 80053 COMPREHEN METABOLIC PANEL: CPT

## 2023-10-05 PROCEDURE — 96376 TX/PRO/DX INJ SAME DRUG ADON: CPT

## 2023-10-05 PROCEDURE — 83690 ASSAY OF LIPASE: CPT

## 2023-10-05 PROCEDURE — C9113 INJ PANTOPRAZOLE SODIUM, VIA: HCPCS | Performed by: EMERGENCY MEDICINE

## 2023-10-05 PROCEDURE — 81001 URINALYSIS AUTO W/SCOPE: CPT

## 2023-10-05 PROCEDURE — 99285 EMERGENCY DEPT VISIT HI MDM: CPT

## 2023-10-05 PROCEDURE — 6360000002 HC RX W HCPCS: Performed by: STUDENT IN AN ORGANIZED HEALTH CARE EDUCATION/TRAINING PROGRAM

## 2023-10-05 RX ORDER — METOCLOPRAMIDE HYDROCHLORIDE 5 MG/ML
10 INJECTION INTRAMUSCULAR; INTRAVENOUS EVERY 6 HOURS
Status: DISCONTINUED | OUTPATIENT
Start: 2023-10-05 | End: 2023-10-05

## 2023-10-05 RX ORDER — ACETAMINOPHEN 650 MG/1
650 SUPPOSITORY RECTAL EVERY 6 HOURS PRN
Status: DISCONTINUED | OUTPATIENT
Start: 2023-10-05 | End: 2023-10-07 | Stop reason: HOSPADM

## 2023-10-05 RX ORDER — SODIUM CHLORIDE 0.9 % (FLUSH) 0.9 %
5-40 SYRINGE (ML) INJECTION EVERY 12 HOURS SCHEDULED
Status: DISCONTINUED | OUTPATIENT
Start: 2023-10-05 | End: 2023-10-07 | Stop reason: HOSPADM

## 2023-10-05 RX ORDER — 0.9 % SODIUM CHLORIDE 0.9 %
1000 INTRAVENOUS SOLUTION INTRAVENOUS ONCE
Status: COMPLETED | OUTPATIENT
Start: 2023-10-05 | End: 2023-10-05

## 2023-10-05 RX ORDER — PANTOPRAZOLE SODIUM 40 MG/1
40 TABLET, DELAYED RELEASE ORAL
Status: CANCELLED | OUTPATIENT
Start: 2023-10-05

## 2023-10-05 RX ORDER — CETIRIZINE HYDROCHLORIDE 10 MG/1
10 TABLET ORAL AS NEEDED
COMMUNITY

## 2023-10-05 RX ORDER — HALOPERIDOL 5 MG/ML
2 INJECTION INTRAMUSCULAR EVERY 6 HOURS PRN
Status: DISCONTINUED | OUTPATIENT
Start: 2023-10-05 | End: 2023-10-07 | Stop reason: HOSPADM

## 2023-10-05 RX ORDER — SODIUM CHLORIDE 0.9 % (FLUSH) 0.9 %
5-40 SYRINGE (ML) INJECTION PRN
Status: DISCONTINUED | OUTPATIENT
Start: 2023-10-05 | End: 2023-10-07 | Stop reason: HOSPADM

## 2023-10-05 RX ORDER — METOCLOPRAMIDE HYDROCHLORIDE 5 MG/ML
10 INJECTION INTRAMUSCULAR; INTRAVENOUS ONCE
Status: COMPLETED | OUTPATIENT
Start: 2023-10-05 | End: 2023-10-05

## 2023-10-05 RX ORDER — ACETAMINOPHEN 325 MG/1
650 TABLET ORAL EVERY 6 HOURS PRN
Status: DISCONTINUED | OUTPATIENT
Start: 2023-10-05 | End: 2023-10-07 | Stop reason: HOSPADM

## 2023-10-05 RX ORDER — SUMATRIPTAN 25 MG/1
25 TABLET, FILM COATED ORAL EVERY 8 HOURS PRN
Status: CANCELLED | OUTPATIENT
Start: 2023-10-05

## 2023-10-05 RX ORDER — ONDANSETRON 2 MG/ML
4 INJECTION INTRAMUSCULAR; INTRAVENOUS EVERY 6 HOURS PRN
Status: DISCONTINUED | OUTPATIENT
Start: 2023-10-05 | End: 2023-10-07 | Stop reason: HOSPADM

## 2023-10-05 RX ORDER — ONDANSETRON 4 MG/1
4 TABLET, ORALLY DISINTEGRATING ORAL EVERY 8 HOURS PRN
Status: DISCONTINUED | OUTPATIENT
Start: 2023-10-05 | End: 2023-10-07 | Stop reason: HOSPADM

## 2023-10-05 RX ORDER — SODIUM CHLORIDE 9 MG/ML
INJECTION, SOLUTION INTRAVENOUS PRN
Status: DISCONTINUED | OUTPATIENT
Start: 2023-10-05 | End: 2023-10-07 | Stop reason: HOSPADM

## 2023-10-05 RX ORDER — 0.9 % SODIUM CHLORIDE 0.9 %
500 INTRAVENOUS SOLUTION INTRAVENOUS ONCE
Status: COMPLETED | OUTPATIENT
Start: 2023-10-05 | End: 2023-10-05

## 2023-10-05 RX ORDER — HALOPERIDOL 5 MG/ML
2 INJECTION INTRAMUSCULAR ONCE
Status: COMPLETED | OUTPATIENT
Start: 2023-10-05 | End: 2023-10-05

## 2023-10-05 RX ORDER — ONDANSETRON 2 MG/ML
4 INJECTION INTRAMUSCULAR; INTRAVENOUS EVERY 4 HOURS PRN
Status: DISCONTINUED | OUTPATIENT
Start: 2023-10-05 | End: 2023-10-07 | Stop reason: SDUPTHER

## 2023-10-05 RX ORDER — ONDANSETRON 2 MG/ML
8 INJECTION INTRAMUSCULAR; INTRAVENOUS ONCE
Status: COMPLETED | OUTPATIENT
Start: 2023-10-05 | End: 2023-10-05

## 2023-10-05 RX ORDER — DIPHENHYDRAMINE HYDROCHLORIDE 50 MG/ML
25 INJECTION INTRAMUSCULAR; INTRAVENOUS ONCE
Status: COMPLETED | OUTPATIENT
Start: 2023-10-05 | End: 2023-10-05

## 2023-10-05 RX ORDER — ACETAMINOPHEN 325 MG/1
650 TABLET ORAL EVERY 6 HOURS PRN
Status: DISCONTINUED | OUTPATIENT
Start: 2023-10-05 | End: 2023-10-07 | Stop reason: SDUPTHER

## 2023-10-05 RX ORDER — LIDOCAINE HYDROCHLORIDE 20 MG/ML
15 SOLUTION OROPHARYNGEAL
Status: ACTIVE | OUTPATIENT
Start: 2023-10-05 | End: 2023-10-05

## 2023-10-05 RX ORDER — DIPHENHYDRAMINE HYDROCHLORIDE 50 MG/ML
50 INJECTION INTRAMUSCULAR; INTRAVENOUS
Status: COMPLETED | OUTPATIENT
Start: 2023-10-05 | End: 2023-10-05

## 2023-10-05 RX ORDER — POLYETHYLENE GLYCOL 3350 17 G/17G
17 POWDER, FOR SOLUTION ORAL DAILY PRN
Status: DISCONTINUED | OUTPATIENT
Start: 2023-10-05 | End: 2023-10-07 | Stop reason: HOSPADM

## 2023-10-05 RX ADMIN — SODIUM CHLORIDE 40 MG: 9 INJECTION INTRAMUSCULAR; INTRAVENOUS; SUBCUTANEOUS at 04:54

## 2023-10-05 RX ADMIN — ONDANSETRON 4 MG: 2 INJECTION INTRAMUSCULAR; INTRAVENOUS at 09:32

## 2023-10-05 RX ADMIN — HALOPERIDOL LACTATE 2 MG: 5 INJECTION, SOLUTION INTRAMUSCULAR at 08:46

## 2023-10-05 RX ADMIN — SODIUM CHLORIDE, PRESERVATIVE FREE 10 ML: 5 INJECTION INTRAVENOUS at 09:01

## 2023-10-05 RX ADMIN — ONDANSETRON 4 MG: 2 INJECTION INTRAMUSCULAR; INTRAVENOUS at 05:52

## 2023-10-05 RX ADMIN — ALUMINUM HYDROXIDE AND MAGNESIUM HYDROXIDE 30 ML: 200; 200 SUSPENSION ORAL at 04:54

## 2023-10-05 RX ADMIN — SODIUM CHLORIDE 1000 ML: 9 INJECTION, SOLUTION INTRAVENOUS at 02:30

## 2023-10-05 RX ADMIN — HALOPERIDOL LACTATE 2 MG: 5 INJECTION, SOLUTION INTRAMUSCULAR at 16:35

## 2023-10-05 RX ADMIN — DIPHENHYDRAMINE HYDROCHLORIDE 50 MG: 50 INJECTION INTRAMUSCULAR; INTRAVENOUS at 03:04

## 2023-10-05 RX ADMIN — ONDANSETRON 4 MG: 2 INJECTION INTRAMUSCULAR; INTRAVENOUS at 22:21

## 2023-10-05 RX ADMIN — ONDANSETRON 4 MG: 2 INJECTION INTRAMUSCULAR; INTRAVENOUS at 17:33

## 2023-10-05 RX ADMIN — SODIUM CHLORIDE 500 ML: 9 INJECTION, SOLUTION INTRAVENOUS at 16:13

## 2023-10-05 RX ADMIN — METOCLOPRAMIDE 10 MG: 5 INJECTION, SOLUTION INTRAMUSCULAR; INTRAVENOUS at 03:04

## 2023-10-05 RX ADMIN — SODIUM CHLORIDE, PRESERVATIVE FREE 10 ML: 5 INJECTION INTRAVENOUS at 20:59

## 2023-10-05 RX ADMIN — ONDANSETRON 4 MG: 2 INJECTION INTRAMUSCULAR; INTRAVENOUS at 13:25

## 2023-10-05 RX ADMIN — DIPHENHYDRAMINE HYDROCHLORIDE 25 MG: 50 INJECTION INTRAMUSCULAR; INTRAVENOUS at 18:18

## 2023-10-05 RX ADMIN — HALOPERIDOL LACTATE 2 MG: 5 INJECTION, SOLUTION INTRAMUSCULAR at 03:18

## 2023-10-05 RX ADMIN — METOCLOPRAMIDE 10 MG: 5 INJECTION, SOLUTION INTRAMUSCULAR; INTRAVENOUS at 13:24

## 2023-10-05 RX ADMIN — METHYLPREDNISOLONE SODIUM SUCCINATE 40 MG: 40 INJECTION, POWDER, FOR SOLUTION INTRAMUSCULAR; INTRAVENOUS at 18:18

## 2023-10-05 RX ADMIN — ONDANSETRON 8 MG: 2 INJECTION INTRAMUSCULAR; INTRAVENOUS at 02:29

## 2023-10-05 ASSESSMENT — PAIN SCALES - GENERAL
PAINLEVEL_OUTOF10: 6
PAINLEVEL_OUTOF10: 0
PAINLEVEL_OUTOF10: 8
PAINLEVEL_OUTOF10: 4

## 2023-10-05 ASSESSMENT — ENCOUNTER SYMPTOMS
BLOOD IN STOOL: 0
VOMITING: 1
ABDOMINAL PAIN: 1
NAUSEA: 1
COUGH: 0
SHORTNESS OF BREATH: 0
TROUBLE SWALLOWING: 0
COLOR CHANGE: 0
BACK PAIN: 0

## 2023-10-05 ASSESSMENT — PAIN DESCRIPTION - LOCATION
LOCATION: ABDOMEN
LOCATION: ABDOMEN

## 2023-10-05 ASSESSMENT — LIFESTYLE VARIABLES
HOW OFTEN DO YOU HAVE A DRINK CONTAINING ALCOHOL: NEVER
HOW MANY STANDARD DRINKS CONTAINING ALCOHOL DO YOU HAVE ON A TYPICAL DAY: PATIENT DOES NOT DRINK
HOW OFTEN DO YOU HAVE A DRINK CONTAINING ALCOHOL: NEVER
HOW MANY STANDARD DRINKS CONTAINING ALCOHOL DO YOU HAVE ON A TYPICAL DAY: PATIENT DOES NOT DRINK

## 2023-10-05 NOTE — PROGRESS NOTES
..End of Shift Note    Bedside shift change report given to NAN Paul (oncoming nurse) by Yamini Gonzalez RN (offgoing nurse). Report included the following information SBAR, Kardex, Intake/Output, MAR, and Recent Results    Shift worked:  3301-0016     Shift summary and any significant changes:     Pt given prescribed meds per MAR. Pt BP trending down today, notified provider BP was 95/50. Provider ordered 500mL bolus, BP then improved to 123/81 near end of bolus. Red rash noted on pt's chest, provider notified and came to assess. Reglan given x1 this shift. Provider stated possible reaction to Reglan, d/c Reglan and gave dose of Benadryl as well as Solu-medrol. Pt is tolerating care well. Caring rounds completed.       Concerns for physician to address:  none     Zone phone for oncoming shift:   Aditya Arteaga RN

## 2023-10-05 NOTE — ED NOTES
Yolanda, ED Tech, answered Pt call light. Pt requests nausea meds. Pt vomiting when I walked into the room to medicate.       Santosh Pruett RN  10/05/23 6920

## 2023-10-05 NOTE — ED NOTES
Received patient from [address] [facility], fully conscious and oriented, no signs of distress. Patient hooked to cardiac monitor, vital signs taken and recorded. Call bell instructed to patient.      Jaki Monae RN  10/05/23 7297

## 2023-10-05 NOTE — SIGNIFICANT EVENT
Called to patient bedside for concern regarding rash to anterior chest wall. Patient observed resting in bed with eyes opened. Denies complaints of shortness of breath, difficulty breathing, tongue swelling, throat swelling, dizziness or distress. Patient able to speak in full sentences without difficulty. Family at bedside. VSS. Red man rash noted to anterior chest. Possible etiology related to reglan as patient has received no vancomycin. Will discontinue reglan and continue zofran for now. Will also administer IV steroid, benadryl and continue IVF. Patient updated regarding plan of care and questions answered. Nursing informed to monitor patient for worsening signs and symptoms.

## 2023-10-05 NOTE — ED NOTES
Pt was given clear liquid options and crackers, but she was unable to keep them down. She wants to try again now that she has been given protonix. Valentino Valdivia RN  10/05/23 1720    Pt failed second PO challenge.         Valentino Valdivia RN  10/05/23 5235

## 2023-10-05 NOTE — ED PROVIDER NOTES
comprehensive history below under MDM  Nursing Notes were all reviewed in real time as they are made available. Any disagreements addressed in the HPI/MDM. REVIEW OF SYSTEMS      Review of Systems   Constitutional:  Negative for fever. HENT:  Negative for trouble swallowing. Respiratory:  Negative for cough and shortness of breath. Cardiovascular:  Negative for chest pain. Gastrointestinal:  Positive for abdominal pain, nausea and vomiting. Negative for blood in stool. Genitourinary:  Negative for dysuria, flank pain, frequency, hematuria, vaginal bleeding and vaginal discharge. Musculoskeletal:  Negative for back pain. Skin:  Negative for color change. Neurological:  Negative for syncope and headaches. Hematological:  Negative for adenopathy. Psychiatric/Behavioral:  Negative for confusion. Positives and Pertinent negatives as per HPI and MDM.     PAST HISTORY     Past Medical History:  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    Depression     Diabetes ([address])     Disturbance of memory 7/30/2018    Endometriosis     Female bladder prolapse     Fibromyalgia     Fibromyalgia 02/2020    GERD (gastroesophageal reflux disease)     IGT (impaired glucose tolerance) 11/14/2012    Intractable nausea and vomiting 6/15/2018    Lumbar back pain with radiculopathy affecting left lower extremity 7/30/2018    Lumbar back pain with radiculopathy affecting right lower extremity 7/30/2018    Major depressive disorder, recurrent episode, severe ([address]) 8/31/2015    MCI (mild cognitive impairment) with memory loss     Migraines     Other chest pain 13.1, hgb high at 15.6 likely hemoconcentration,       CC/HPI Summary, DDx, MDM, ED Course, and Reassessment:   Patient presents for intractable nausea/vomiting after being discharged yesterday from [facility] where she went for the same. Failed outpatient management of symptoms with oral antiemetics. Vitals normal and stable. On exam she appears uncomfortable with signs of dehydration given dry mucus membranes. Her abdomen is soft and not tender. I have reviewed her labs and read from yesterday. Summary documented above. In brief, labs did not demonstrate any acute abnormalities. Her ct abd pelv showed a right ovarian cyst with \"Engorged vessels are noted along the posterior aspect of the cyst; solid and/or nodular component of the cyst cannot be excluded. \" These results were discussed with patient yesterday and recommendation made to transfer for obs while also getting formal pelvic ultrasound to evaluate the right ovary further. She elected to get this study done with her OB because she did not want to be transferred outside the [facility] system. On my exam today she has no abdominal tenderness and states that her abd pain is a generalized cramping pain. Clinically I do not have a high suspicion for torsion but have ordered a pelvic ultrasound to confirm. She is getting repeat labs, iv fluids, iv antiemetics. Will reassess after but given bounce back within 24 hours of discharge for  same issue I anticipate that she may benefit from observation and iv fluids for 24 hours or until better. ED Course as of 10/06/23 0516   Thu Oct 05, 2023   [id number] -----------------------------------------------------------------  Patient's presentation, labs/imaging and plan of care was reviewed with dr. Ej Culp as part of our sign out. They will reassess patient, review all diagnostic results and disposition or further manage patient accordingly. Plan discussed with the patient.       Although I appreciate my

## 2023-10-05 NOTE — H&P
Hospitalist Admission Note    NAME:   Chelly Sutton   : 1985   MRN: [id number]     Date/Time: 10/5/2023 8:40 AM    Patient PCP: Everton Valadez MD    ______________________________________________________________________  Given the patient's current clinical presentation, I have a high level of concern for decompensation if discharged from the emergency department. Complex decision making was performed, which includes reviewing the patient's available past medical records, laboratory results, and x-ray films. My assessment of this patient's clinical condition and my plan of care is as follows. Assessment / Plan:    Intractable nausea and vomiting  Abdominal pain  DDx cyclic vomiting syndrome versus less likely ovarian cyst +/- torsion. Admission pelvic ultrasound not overly concerning for ovarian torsion. CBC seems likely based on presenting history. Plan  Admit to medicine  Telemetry monitoring for Qtc  Haldol, Zofran, and Reglan as needed nausea  PPI IV daily for now  CT head  GYN consult for assistance with right ovarian cyst-low suspicion for torsion given ultrasound findings, expertise appreciated  Patient counseled extensively on abstinence from marijuana and other cannabinoid products. Bipolar disorder PTSD  Depression  GERD  History of migraines  Continue home medications    Medical Decision Making:   I personally reviewed labs: CBC, BMP  I personally reviewed imaging: Ultrasound and CT pelvis results  I personally reviewed EKG:  Toxic drug monitoring: Monitoring QTc on Zofran and Haldol  Discussed case with: ED provider. After discussion I am in agreement that acuity of patient's medical condition necessitates hospital stay.       Code Status: Full  DVT Prophylaxis: SCDs while in bed  GI Prophylaxis:  Baseline:     Subjective:   CHIEF COMPLAINT: Nausea, vomiting, abdominal pain    HISTORY OF PRESENT ILLNESS:     Tiara Driscoll is a 41 y.o.  female with PMHx significant

## 2023-10-05 NOTE — ED NOTES
This RN asked to give report to receiving IP unit. Multiple attempts made, placed on hold. Ultimately provided comprehensive transition of care SBAR to [facility]. All questions answered.         Valentino Valdivia RN  10/05/23 8108
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
## 2023-10-06 ENCOUNTER — APPOINTMENT (OUTPATIENT)
Facility: HOSPITAL | Age: 38
End: 2023-10-06
Payer: MEDICAID

## 2023-10-06 LAB
ALBUMIN SERPL-MCNC: 3.1 G/DL (ref 3.5–5)
ALBUMIN/GLOB SERPL: 0.9 (ref 1.1–2.2)
ALP SERPL-CCNC: 59 U/L (ref 45–117)
ALT SERPL-CCNC: 19 U/L (ref 12–78)
ANION GAP SERPL CALC-SCNC: 7 MMOL/L (ref 5–15)
AST SERPL-CCNC: 9 U/L (ref 15–37)
BASOPHILS # BLD: 0 K/UL (ref 0–0.1)
BASOPHILS NFR BLD: 0 % (ref 0–1)
BILIRUB DIRECT SERPL-MCNC: 0.1 MG/DL (ref 0–0.2)
BILIRUB SERPL-MCNC: 0.4 MG/DL (ref 0.2–1)
BUN SERPL-MCNC: 10 MG/DL (ref 6–20)
BUN/CREAT SERPL: 11 (ref 12–20)
CALCIUM SERPL-MCNC: 8.4 MG/DL (ref 8.5–10.1)
CHLORIDE SERPL-SCNC: 110 MMOL/L (ref 97–108)
CO2 SERPL-SCNC: 24 MMOL/L (ref 21–32)
CREAT SERPL-MCNC: 0.93 MG/DL (ref 0.55–1.02)
DIFFERENTIAL METHOD BLD: ABNORMAL
EOSINOPHIL # BLD: 0 K/UL (ref 0–0.4)
EOSINOPHIL NFR BLD: 0 % (ref 0–7)
ERYTHROCYTE [DISTWIDTH] IN BLOOD BY AUTOMATED COUNT: 14 % (ref 11.5–14.5)
GLOBULIN SER CALC-MCNC: 3.3 G/DL (ref 2–4)
GLUCOSE SERPL-MCNC: 128 MG/DL (ref 65–100)
HCT VFR BLD AUTO: 37.3 % (ref 35–47)
HGB BLD-MCNC: 12.6 G/DL (ref 11.5–16)
IMM GRANULOCYTES # BLD AUTO: 0.1 K/UL (ref 0–0.04)
IMM GRANULOCYTES NFR BLD AUTO: 0 % (ref 0–0.5)
LYMPHOCYTES # BLD: 2.3 K/UL (ref 0.8–3.5)
LYMPHOCYTES NFR BLD: 20 % (ref 12–49)
MAGNESIUM SERPL-MCNC: 2.3 MG/DL (ref 1.6–2.4)
MCH RBC QN AUTO: 30.1 PG (ref 26–34)
MCHC RBC AUTO-ENTMCNC: 33.8 G/DL (ref 30–36.5)
MCV RBC AUTO: 89 FL (ref 80–99)
MONOCYTES # BLD: 0.5 K/UL (ref 0–1)
MONOCYTES NFR BLD: 4 % (ref 5–13)
NEUTS SEG # BLD: 8.5 K/UL (ref 1.8–8)
NEUTS SEG NFR BLD: 76 % (ref 32–75)
NRBC # BLD: 0 K/UL (ref 0–0.01)
NRBC BLD-RTO: 0 PER 100 WBC
PLATELET # BLD AUTO: 254 K/UL (ref 150–400)
PMV BLD AUTO: 10.7 FL (ref 8.9–12.9)
POTASSIUM SERPL-SCNC: 3.5 MMOL/L (ref 3.5–5.1)
PROT SERPL-MCNC: 6.4 G/DL (ref 6.4–8.2)
RBC # BLD AUTO: 4.19 M/UL (ref 3.8–5.2)
SODIUM SERPL-SCNC: 141 MMOL/L (ref 136–145)
WBC # BLD AUTO: 11.4 K/UL (ref 3.6–11)

## 2023-10-06 PROCEDURE — 83735 ASSAY OF MAGNESIUM: CPT

## 2023-10-06 PROCEDURE — 36415 COLL VENOUS BLD VENIPUNCTURE: CPT

## 2023-10-06 PROCEDURE — 6360000002 HC RX W HCPCS: Performed by: STUDENT IN AN ORGANIZED HEALTH CARE EDUCATION/TRAINING PROGRAM

## 2023-10-06 PROCEDURE — 2580000003 HC RX 258: Performed by: STUDENT IN AN ORGANIZED HEALTH CARE EDUCATION/TRAINING PROGRAM

## 2023-10-06 PROCEDURE — 80048 BASIC METABOLIC PNL TOTAL CA: CPT

## 2023-10-06 PROCEDURE — C9113 INJ PANTOPRAZOLE SODIUM, VIA: HCPCS | Performed by: NURSE PRACTITIONER

## 2023-10-06 PROCEDURE — 1100000000 HC RM PRIVATE

## 2023-10-06 PROCEDURE — 6370000000 HC RX 637 (ALT 250 FOR IP): Performed by: NURSE PRACTITIONER

## 2023-10-06 PROCEDURE — 6360000002 HC RX W HCPCS: Performed by: EMERGENCY MEDICINE

## 2023-10-06 PROCEDURE — 85025 COMPLETE CBC W/AUTO DIFF WBC: CPT

## 2023-10-06 PROCEDURE — 6360000002 HC RX W HCPCS: Performed by: NURSE PRACTITIONER

## 2023-10-06 PROCEDURE — 80076 HEPATIC FUNCTION PANEL: CPT

## 2023-10-06 PROCEDURE — 2580000003 HC RX 258: Performed by: NURSE PRACTITIONER

## 2023-10-06 PROCEDURE — A4216 STERILE WATER/SALINE, 10 ML: HCPCS | Performed by: NURSE PRACTITIONER

## 2023-10-06 RX ORDER — SODIUM CHLORIDE, SODIUM LACTATE, POTASSIUM CHLORIDE, CALCIUM CHLORIDE 600; 310; 30; 20 MG/100ML; MG/100ML; MG/100ML; MG/100ML
INJECTION, SOLUTION INTRAVENOUS CONTINUOUS
Status: DISCONTINUED | OUTPATIENT
Start: 2023-10-06 | End: 2023-10-07

## 2023-10-06 RX ORDER — SUCRALFATE 1 G/1
1 TABLET ORAL EVERY 8 HOURS SCHEDULED
Status: DISCONTINUED | OUTPATIENT
Start: 2023-10-06 | End: 2023-10-07 | Stop reason: HOSPADM

## 2023-10-06 RX ORDER — DICYCLOMINE HYDROCHLORIDE 10 MG/1
10 CAPSULE ORAL 3 TIMES DAILY PRN
Status: DISCONTINUED | OUTPATIENT
Start: 2023-10-06 | End: 2023-10-07 | Stop reason: HOSPADM

## 2023-10-06 RX ADMIN — DICYCLOMINE HYDROCHLORIDE 10 MG: 10 CAPSULE ORAL at 10:57

## 2023-10-06 RX ADMIN — ONDANSETRON 4 MG: 2 INJECTION INTRAMUSCULAR; INTRAVENOUS at 13:06

## 2023-10-06 RX ADMIN — SODIUM CHLORIDE 40 MG: 9 INJECTION INTRAMUSCULAR; INTRAVENOUS; SUBCUTANEOUS at 09:03

## 2023-10-06 RX ADMIN — SODIUM CHLORIDE, PRESERVATIVE FREE 10 ML: 5 INJECTION INTRAVENOUS at 20:57

## 2023-10-06 RX ADMIN — HALOPERIDOL LACTATE 2 MG: 5 INJECTION, SOLUTION INTRAMUSCULAR at 05:43

## 2023-10-06 RX ADMIN — ONDANSETRON 4 MG: 2 INJECTION INTRAMUSCULAR; INTRAVENOUS at 09:03

## 2023-10-06 RX ADMIN — SUCRALFATE 1 G: 1 TABLET ORAL at 18:41

## 2023-10-06 RX ADMIN — ONDANSETRON 4 MG: 2 INJECTION INTRAMUSCULAR; INTRAVENOUS at 05:11

## 2023-10-06 RX ADMIN — SODIUM CHLORIDE, POTASSIUM CHLORIDE, SODIUM LACTATE AND CALCIUM CHLORIDE: 600; 310; 30; 20 INJECTION, SOLUTION INTRAVENOUS at 09:03

## 2023-10-06 RX ADMIN — SODIUM CHLORIDE 40 MG: 9 INJECTION INTRAMUSCULAR; INTRAVENOUS; SUBCUTANEOUS at 20:55

## 2023-10-06 RX ADMIN — SUCRALFATE 1 G: 1 TABLET ORAL at 20:55

## 2023-10-06 RX ADMIN — SODIUM CHLORIDE, PRESERVATIVE FREE 10 ML: 5 INJECTION INTRAVENOUS at 09:05

## 2023-10-06 ASSESSMENT — PAIN DESCRIPTION - LOCATION: LOCATION: ABDOMEN

## 2023-10-06 ASSESSMENT — PAIN SCALES - GENERAL
PAINLEVEL_OUTOF10: 0
PAINLEVEL_OUTOF10: 7

## 2023-10-06 ASSESSMENT — PAIN DESCRIPTION - DESCRIPTORS: DESCRIPTORS: ACHING

## 2023-10-06 ASSESSMENT — PAIN DESCRIPTION - ORIENTATION: ORIENTATION: MID

## 2023-10-06 NOTE — PROGRESS NOTES
End of Shift Note     Bedside shift change report given to NAN Lr (oncoming nurse) by Susanna Callahan RN (offgoing nurse). Report included the following information SBAR, Kardex, Intake/Output, MAR, and Recent Results     Shift worked:  4134-1843      Shift summary and any significant changes:      Intermittent nausea overnight, zofran given twice and haldol given once. Sinus steve, in 50's, to NSR on tele. Vitals stable. No complaints of pain.          Concerns for physician to address:        Zone phone for oncoming shift:               Varsha Rosas RN

## 2023-10-06 NOTE — PROGRESS NOTES
Gastroenterology Attending Physician attestation statement. This patient was seen and examined by me in a face-to-face visit today. I confirmed the history as described above. I am in agreement with SUSY Yip assessment and plan as outlined below. Pt is a 40 yo WF who is seen for refractory N/V. She has a h/o Bipolar Disorder/PTSD and Panic Disorder and is seen with children and mother present for interview. She has a h/o chronic intermittent nausea and vomiting and has seen Dr. Elvis Gilbert for this and had normal GES 7/2019 and EGD and Colonoscopy 10/27/21:  EGD: 1 cm sliding HH; 18-20 mm balloon dilation; Normal path of esophagus, stomach, duodenum;  normal colon path. She decided to stop all prescribed meds a while ago and to move toward more natural/herbal approach per pt. Not taking any PPI, no longer on wellbutrin or cymbalta. She does Vape nicotine and also uses an OTC CBD/THC supplement as well daily to help anxiety. Noted worsening nausea about 3 days ago and started vomiting and could not break the cycle despite taking some promethazine. Went to [facility] ER in [place] on Wed and sent home and then came to [facility] ER last night when sxs of vomiting would not improve. Has had Bms, no melena or BRBPR. No hematemesis, has vomited up bile. No regular NSAIDs or ETOH intake.   PE:  Gen: tremulous WF, anxious in NAD  HEENT: anicteric  Neck: supple  CV: RRR  Chest: Lungs CTA b/l  Abd: soft, nontender, nondistended, + BS, no HSM  Ext: no LE edema  Psych: anxious affect  Lab Results   Component Value Date/Time    ALKPHOS 59 10/06/2023 03:43 AM    ALKPHOS 105 10/19/2021 12:49 PM    ALT 19 10/06/2023 03:43 AM    AST 9 10/06/2023 03:43 AM    PROT 6.4 10/06/2023 03:43 AM    BILITOT 0.4 10/06/2023 03:43 AM    BILIDIR 0.1 10/06/2023 03:43 AM    LABALBU 3.1 10/06/2023 03:43 AM     Lab Results   Component Value Date    LIPASE 30 10/05/2023     Lab Results   Component Value Date    WBC 11.4 (H) 10/06/2023    HGB

## 2023-10-06 NOTE — PLAN OF CARE
Problem: Discharge Planning  Goal: Discharge to home or other facility with appropriate resources  Outcome: Progressing  Flowsheets (Taken 10/5/2023 0825 by Nancy August RN)  Discharge to home or other facility with appropriate resources: Identify barriers to discharge with patient and caregiver     Problem: Pain  Goal: Verbalizes/displays adequate comfort level or baseline comfort level  Outcome: Progressing     Problem: Chronic Conditions and Co-morbidities  Goal: Patient's chronic conditions and co-morbidity symptoms are monitored and maintained or improved  Outcome: Progressing     Problem: Safety - Adult  Goal: Free from fall injury  Outcome: Progressing

## 2023-10-06 NOTE — PROGRESS NOTES
.End of Shift Note    Bedside shift change report given to NAN Paul (oncoming nurse) by Mraiaelena Noonan RN (offgoing nurse). Report included the following information SBAR, Kardex, Intake/Output, MAR, and Recent Results    Shift worked:  2934-0333     Shift summary and any significant changes:     Pt given prescribed meds per MAR. PRN Bentyl given x1. PRN Zofran given x2 for nausea. Pt able to keep down full liquid diet. Family at bedside. Caring rounds completed.             Mariaelena Noonan RN

## 2023-10-07 VITALS
RESPIRATION RATE: 16 BRPM | OXYGEN SATURATION: 99 % | BODY MASS INDEX: 32.42 KG/M2 | WEIGHT: 183 LBS | DIASTOLIC BLOOD PRESSURE: 80 MMHG | HEART RATE: 55 BPM | TEMPERATURE: 97.7 F | SYSTOLIC BLOOD PRESSURE: 123 MMHG

## 2023-10-07 LAB
ALBUMIN SERPL-MCNC: 3.2 G/DL (ref 3.5–5)
ALBUMIN/GLOB SERPL: 1.1 (ref 1.1–2.2)
ALP SERPL-CCNC: 58 U/L (ref 45–117)
ALT SERPL-CCNC: 17 U/L (ref 12–78)
ANION GAP SERPL CALC-SCNC: 4 MMOL/L (ref 5–15)
AST SERPL-CCNC: 10 U/L (ref 15–37)
BASOPHILS # BLD: 0 K/UL (ref 0–0.1)
BASOPHILS NFR BLD: 0 % (ref 0–1)
BILIRUB SERPL-MCNC: 0.6 MG/DL (ref 0.2–1)
BUN SERPL-MCNC: 7 MG/DL (ref 6–20)
BUN/CREAT SERPL: 7 (ref 12–20)
CALCIUM SERPL-MCNC: 8.4 MG/DL (ref 8.5–10.1)
CHLORIDE SERPL-SCNC: 108 MMOL/L (ref 97–108)
CO2 SERPL-SCNC: 27 MMOL/L (ref 21–32)
CREAT SERPL-MCNC: 0.94 MG/DL (ref 0.55–1.02)
DIFFERENTIAL METHOD BLD: ABNORMAL
EOSINOPHIL # BLD: 0 K/UL (ref 0–0.4)
EOSINOPHIL NFR BLD: 0 % (ref 0–7)
ERYTHROCYTE [DISTWIDTH] IN BLOOD BY AUTOMATED COUNT: 13.7 % (ref 11.5–14.5)
GLOBULIN SER CALC-MCNC: 2.9 G/DL (ref 2–4)
GLUCOSE SERPL-MCNC: 96 MG/DL (ref 65–100)
HCT VFR BLD AUTO: 37.5 % (ref 35–47)
HGB BLD-MCNC: 12.6 G/DL (ref 11.5–16)
IMM GRANULOCYTES # BLD AUTO: 0 K/UL (ref 0–0.04)
IMM GRANULOCYTES NFR BLD AUTO: 0 % (ref 0–0.5)
LYMPHOCYTES # BLD: 3.8 K/UL (ref 0.8–3.5)
LYMPHOCYTES NFR BLD: 41 % (ref 12–49)
MAGNESIUM SERPL-MCNC: 2.2 MG/DL (ref 1.6–2.4)
MCH RBC QN AUTO: 29.4 PG (ref 26–34)
MCHC RBC AUTO-ENTMCNC: 33.6 G/DL (ref 30–36.5)
MCV RBC AUTO: 87.4 FL (ref 80–99)
MONOCYTES # BLD: 0.8 K/UL (ref 0–1)
MONOCYTES NFR BLD: 8 % (ref 5–13)
NEUTS SEG # BLD: 4.6 K/UL (ref 1.8–8)
NEUTS SEG NFR BLD: 51 % (ref 32–75)
NRBC # BLD: 0 K/UL (ref 0–0.01)
NRBC BLD-RTO: 0 PER 100 WBC
PLATELET # BLD AUTO: 245 K/UL (ref 150–400)
PMV BLD AUTO: 10.7 FL (ref 8.9–12.9)
POTASSIUM SERPL-SCNC: 3.3 MMOL/L (ref 3.5–5.1)
PROT SERPL-MCNC: 6.1 G/DL (ref 6.4–8.2)
RBC # BLD AUTO: 4.29 M/UL (ref 3.8–5.2)
SODIUM SERPL-SCNC: 139 MMOL/L (ref 136–145)
WBC # BLD AUTO: 9.3 K/UL (ref 3.6–11)

## 2023-10-07 PROCEDURE — 6360000002 HC RX W HCPCS: Performed by: NURSE PRACTITIONER

## 2023-10-07 PROCEDURE — 83735 ASSAY OF MAGNESIUM: CPT

## 2023-10-07 PROCEDURE — 6370000000 HC RX 637 (ALT 250 FOR IP): Performed by: NURSE PRACTITIONER

## 2023-10-07 PROCEDURE — 85025 COMPLETE CBC W/AUTO DIFF WBC: CPT

## 2023-10-07 PROCEDURE — 80053 COMPREHEN METABOLIC PANEL: CPT

## 2023-10-07 PROCEDURE — A4216 STERILE WATER/SALINE, 10 ML: HCPCS | Performed by: NURSE PRACTITIONER

## 2023-10-07 PROCEDURE — 2580000003 HC RX 258: Performed by: NURSE PRACTITIONER

## 2023-10-07 PROCEDURE — C9113 INJ PANTOPRAZOLE SODIUM, VIA: HCPCS | Performed by: NURSE PRACTITIONER

## 2023-10-07 PROCEDURE — 36415 COLL VENOUS BLD VENIPUNCTURE: CPT

## 2023-10-07 PROCEDURE — 2580000003 HC RX 258: Performed by: STUDENT IN AN ORGANIZED HEALTH CARE EDUCATION/TRAINING PROGRAM

## 2023-10-07 RX ORDER — SUCRALFATE 1 G/1
1 TABLET ORAL EVERY 8 HOURS SCHEDULED
Qty: 21 TABLET | Refills: 0 | Status: SHIPPED | OUTPATIENT
Start: 2023-10-07 | End: 2023-10-14

## 2023-10-07 RX ORDER — ONDANSETRON 4 MG/1
4 TABLET, ORALLY DISINTEGRATING ORAL EVERY 8 HOURS PRN
Qty: 10 TABLET | Refills: 0 | Status: SHIPPED | OUTPATIENT
Start: 2023-10-07

## 2023-10-07 RX ORDER — DULOXETIN HYDROCHLORIDE 30 MG/1
30 CAPSULE, DELAYED RELEASE ORAL DAILY
Qty: 30 CAPSULE | Refills: 0 | Status: SHIPPED | OUTPATIENT
Start: 2023-10-07 | End: 2023-11-06

## 2023-10-07 RX ORDER — POTASSIUM CHLORIDE 7.45 MG/ML
10 INJECTION INTRAVENOUS
Status: DISCONTINUED | OUTPATIENT
Start: 2023-10-07 | End: 2023-10-07

## 2023-10-07 RX ADMIN — POTASSIUM BICARBONATE 40 MEQ: 782 TABLET, EFFERVESCENT ORAL at 09:29

## 2023-10-07 RX ADMIN — SODIUM CHLORIDE 40 MG: 9 INJECTION INTRAMUSCULAR; INTRAVENOUS; SUBCUTANEOUS at 10:28

## 2023-10-07 RX ADMIN — SUCRALFATE 1 G: 1 TABLET ORAL at 03:00

## 2023-10-07 RX ADMIN — SODIUM CHLORIDE, PRESERVATIVE FREE 10 ML: 5 INJECTION INTRAVENOUS at 09:30

## 2023-10-07 RX ADMIN — SUCRALFATE 1 G: 1 TABLET ORAL at 14:12

## 2023-10-07 RX ADMIN — SODIUM CHLORIDE, POTASSIUM CHLORIDE, SODIUM LACTATE AND CALCIUM CHLORIDE: 600; 310; 30; 20 INJECTION, SOLUTION INTRAVENOUS at 06:54

## 2023-10-07 ASSESSMENT — PAIN SCALES - GENERAL: PAINLEVEL_OUTOF10: 0
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
## 2023-10-07 NOTE — DISCHARGE INSTRUCTIONS
Please drink water 4-6 glasses a day  GI bland diet for the next 2 days. Avoid fried, spicy or foods that contain a lot of citrus or acid such as oranges, china or tomatoes  Please call 911 for chest pain, shortness of breath or palpitations  If nausea or vomiting returns or worsens please come into the emergency room.

## 2023-10-07 NOTE — DISCHARGE SUMMARY
Discharge Summary    Name: Milly Fitzpatrick  [id number]  YOB: 1985 (Age: 41 y.o.)   Date of Admission: 10/5/2023  Date of Discharge: 10/7/2023  Attending Physician: Ceferino Bell MD    Discharge Diagnosis: Cyclic vomiting syndrome, GERD    Subsequent diagnosis: History of bipolar disorder, PTSD, depression, and migraines    Consultations:  IP CONSULT TO PHARMACY  IP CONSULT TO HOSPITALIST  IP CONSULT TO OB GYN  IP CONSULT TO GI  IP CONSULT TO PSYCHIATRY      Brief Admission History/Reason for Admission/Brief Hospital Course by Main Problems:    Ms. Mihir Lopez is a 41year old female with a past medical history of significant for migraines, GERD, and cannabinoid substance use disorder presented to the emergency room on 10/5/23 with nausea, vomiting, and abdominal pain. Patient reported that she had intractable nausea and vomiting of the last 3 days, particularly worse in the morning. She reported that had a history over the past several months of having this occur intermittently. She was seen in outside facility emergency department for similar symptoms and with her CT scan it was noted that she might have a possible ovarian cyst.  Patient preferred to present to [facility] facility as all of her providers are at this facility. Patient follows with her urologist Dr. Roxie Flowers and thinks that her gynecologist is with [facility]. Patient was concerned about right ovarian cyst causing the symptoms. Patient does endorse daily marijuana use. She reports marijuana use improves her nausea and vomiting. Discussed cyclic vomiting syndrome and symptoms however patient thought that her nausea and vomiting could be related to a drill falling on her head over the weekend versus her ovarian cyst.      CT of the head shows no acute intracranial abnormality. Urinalysis on 10/5/23 was negative for a UTI, no urinalysis reflex to culture.   Patient tender. Last bowel movement on 10/6/23. Neuro: awake, moving all exts    Discharge/Recent Laboratory Results:  Recent Labs     10/07/23  0315      K 3.3*      CO2 27   BUN 7   CREATININE 0.94   GLUCOSE 96   CALCIUM 8.4*   MG 2.2     Recent Labs     10/07/23  0315   HGB 12.6   HCT 37.5   WBC 9.3          Discharge Medications:     Medication List        START taking these medications      cariprazine hcl 1.5 MG capsule  Commonly known as: Vraylar  Take 1 capsule by mouth at bedtime     DULoxetine 30 MG extended release capsule  Commonly known as: CYMBALTA  Take 1 capsule by mouth daily     ondansetron 4 MG disintegrating tablet  Commonly known as: ZOFRAN-ODT  Take 1 tablet by mouth every 8 hours as needed for Nausea or Vomiting     sucralfate 1 GM tablet  Commonly known as: CARAFATE  Take 1 tablet by mouth every 8 hours for 7 days            CONTINUE taking these medications      cetirizine 10 MG tablet  Commonly known as: ZYRTEC     Emgality 120 MG/ML Soaj  Generic drug: Galcanezumab-gnlm     omeprazole 40 MG delayed release capsule  Commonly known as: PRILOSEC     rizatriptan 10 MG tablet  Commonly known as: MAXALT               Where to Get Your Medications        These medications were sent to [address] [phone] [facility] [phone]  [facility] [id number]      Phone: [phone]   cariprazine hcl 1.5 MG capsule  DULoxetine 30 MG extended release capsule  ondansetron 4 MG disintegrating tablet  sucralfate 1 GM tablet             DISPOSITION: Home with family    Home with Family:       Home with HH/PT/OT/RN:    SNF/LTC:    SHOLA:    OTHER:            Code status: Full Code  Recommended diet: GI light diet for the next 2 days. No spicy or fried foods or foods high in acidity including tomatoes, china or oranges.   Recommended activity: activity as tolerated  Wound care: None      Follow up with:   PCP : Andreas Valenzuela
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
## 2023-10-07 NOTE — PROGRESS NOTES
End of Shift Note     Bedside shift change report given to Puneet Harkins RN (oncoming nurse) by Eamon Gary RN (offgoing nurse). Report included the following information SBAR, Kardex, Intake/Output, MAR, and Recent Results     Shift worked:  8033-2654      Shift summary and any significant changes:     Patient tolerating full liquid diet. Sinus steve in 50's/sinus rhythm overnight. Potassium 3.3, NP notified, scheduled to be given. Stable overnight, no significant changes.       Concerns for physician to address:        Zone phone for oncoming shift:               Quincy Farah RN

## 2023-10-07 NOTE — PROGRESS NOTES
I have reviewed discharge instructions with the patient. The patient verbalized understanding. Discharge medications reviewed with patient and appropriate educational materials and side effects teaching were provided. Follow-up appointments reviewed with patient. Opportunity for questions or concerns given. Venous access removed without difficulty, pt tolerated well. Patients belongings gathered and sent with patient. Patient is ready for discharge. Patient declined wheelchair and was walked out accompanied with RN.

## 2023-10-08 NOTE — PLAN OF CARE
Problem: Discharge Planning  Goal: Discharge to home or other facility with appropriate resources  Outcome: Adequate for Discharge     Problem: Pain  Goal: Verbalizes/displays adequate comfort level or baseline comfort level  Outcome: Adequate for Discharge     Problem: Chronic Conditions and Co-morbidities  Goal: Patient's chronic conditions and co-morbidity symptoms are monitored and maintained or improved  Outcome: Adequate for Discharge     Problem: Safety - Adult  Goal: Free from fall injury  Outcome: Adequate for Discharge

## 2023-10-10 ENCOUNTER — TELEMEDICINE (OUTPATIENT)
Age: 38
End: 2023-10-10
Payer: MEDICAID

## 2023-10-10 DIAGNOSIS — G44.209 TENSION VASCULAR HEADACHE: ICD-10-CM

## 2023-10-10 DIAGNOSIS — G43.711 CHRONIC MIGRAINE WITHOUT AURA, INTRACTABLE, WITH STATUS MIGRAINOSUS: Primary | ICD-10-CM

## 2023-10-10 DIAGNOSIS — F33.2 SEVERE EPISODE OF RECURRENT MAJOR DEPRESSIVE DISORDER, WITHOUT PSYCHOTIC FEATURES (HCC): ICD-10-CM

## 2023-10-10 PROCEDURE — 99214 OFFICE O/P EST MOD 30 MIN: CPT | Performed by: PSYCHIATRY & NEUROLOGY

## 2023-10-10 RX ORDER — RIZATRIPTAN BENZOATE 10 MG/1
TABLET ORAL
Qty: 9 TABLET | Refills: 11 | Status: SHIPPED | OUTPATIENT
Start: 2023-10-10

## 2023-10-10 NOTE — PROGRESS NOTES
[address] [place] [address]  TELEHEALTH Virtual FOLLOW-UP VISIT        Halima Patton, was evaluated through a synchronous (real-time) audio-video encounter. The patient (or guardian if applicable) is aware that this is a billable service, which includes applicable co-pays. This Virtual Visit was conducted with patient's (and/or legal guardian's) consent. Patient identification was verified, and a caregiver was present when appropriate. The patient was located at Home: [address]  Provider was located at [facility] (Appt Dept): [address]          Halima Patton is a 30 y.o. female who presents today for the following: migraines  Chief Complaint   Patient presents with    3 Month Follow-Up           ASSESSMENT AND PLAN  1. Chronic migraine without aura, intractable, with status migrainosus  Assessment & Plan:  Since starting CGRP there is been a reduction in frequency intensity and duration as well as a reduction in rescue medication utilization and a reduction in this time at work/personal life    Continue on Emgality 120 mg/month  For rescue medicine continue with rizatriptan as needed    Patient has been encouraged to stay off of OTCs for headache management    Patient has started on Cymbalta 30 mg/day from a mental health and fibromyalgia perspective this should also give her coverage in this domain as well  Orders:  -     rizatriptan (MAXALT) 10 MG tablet; 1 tablet at onset of migraine may repeat every 2 hours to a maximum of 3 tablets per 24 hours in 2 days per 7-day cycle, Disp-9 tablet, R-11Normal  2. Tension vascular headache  Assessment & Plan:   Suspect this will improve with adjustments in her medications  Patient is now back on Cymbalta 30 mg/day  3.  Severe episode of recurrent major depressive disorder, without psychotic features [facility])  Assessment & Plan:   Patient is back under the care of

## 2023-10-10 NOTE — ASSESSMENT & PLAN NOTE
Suspect this will improve with adjustments in her medications  Patient is now back on Cymbalta 30 mg/day

## 2023-10-10 NOTE — ASSESSMENT & PLAN NOTE
Patient is back under the care of mental health and has restarted her medications during the earlier part of October 2023  She has been encouraged to continue to follow-up with mental health on a regular basis and she has been encouraged to continue with the work she needs to do to get to a better state of health

## 2023-10-10 NOTE — PROGRESS NOTES
Physician Progress Note      PATIENT:               Eris Egan  CSN #:                  [id number]  :                       1985  ADMIT DATE:       10/5/2023 1:44 AM  DISCH DATE:        10/7/2023 5:35 PM  RESPONDING  PROVIDER #:        Tonja Amezquita MD          QUERY TEXT:    Pt admitted noted to have intractable nausea and vomiting. If possible, please   document in progress notes and discharge summary if you are evaluating and   /or treating any of the following: The medical record reflects the following:  Risk Factors: 41 y.o.  female with PMHx significant for migraines, GERD, and   cannabinoid substance use disorder presents for nausea, vomiting, and   abdominal pain. Clinical Indicators:  H&P: Intractable nausea and vomiting  Abdominal pain  DDx cyclic vomiting syndrome versus less likely ovarian cyst +/- torsion    10/7/23 NP PN:  1. Intractable nausea/vomiting - resolved  Abdominal pain  etiology of above likely from cyclic vomiting syndrome      Treatment: Hospitalist consult, Admit, GI Consult, telemetry monitoring,   Haldol, Zofran, Reglan as needed for nausea, PPI IV daily, GYN consult for   right ovarian cyst.  Options provided:  -- Nausea and Vomiting due to cyclic vomiting syndrome  -- Nausea and Vomiting due to Diabetic Gastroparesis  -- Other - I will add my own diagnosis  -- Disagree - Not applicable / Not valid  -- Disagree - Clinically unable to determine / Unknown  -- Refer to Clinical Documentation Reviewer    PROVIDER RESPONSE TEXT:    This patient has nausea and vomiting due to cyclic vomiting syndrome.     Query created by: Gordon Jesus on 10/10/2023 10:48 AM      Electronically signed by:  Tonja Amezquita MD 10/10/2023 1:28 PM

## 2023-10-10 NOTE — ASSESSMENT & PLAN NOTE
Since starting CGRP there is been a reduction in frequency intensity and duration as well as a reduction in rescue medication utilization and a reduction in this time at work/personal life    Continue on Emgality 120 mg/month  For rescue medicine continue with rizatriptan as needed    Patient has been encouraged to stay off of OTCs for headache management    Patient has started on Cymbalta 30 mg/day from a mental health and fibromyalgia perspective this should also give her coverage in this domain as well

## 2023-10-30 ENCOUNTER — TELEPHONE (OUTPATIENT)
Age: 38
End: 2023-10-30

## 2023-10-30 NOTE — TELEPHONE ENCOUNTER
Pt requesting a call back about her [employer] form. She stated it was completed out wrong. On the Acknowledgement of Donor Participation in the [facility] Program  page under Medical History,  it says you are not the treating physician and not treated by me for her PTSD. She stated they would not let her give blood.   She can be reached at [phone]

## 2023-10-30 NOTE — TELEPHONE ENCOUNTER
Patient would like a call from [facility]  regarding filling  out a form bio life she wants her to call her @ [phone]

## 2023-11-01 ENCOUNTER — OFFICE VISIT (OUTPATIENT)
Age: 38
End: 2023-11-01
Payer: MEDICAID

## 2023-11-01 VITALS
BODY MASS INDEX: 32.59 KG/M2 | RESPIRATION RATE: 18 BRPM | DIASTOLIC BLOOD PRESSURE: 76 MMHG | SYSTOLIC BLOOD PRESSURE: 115 MMHG | WEIGHT: 184 LBS | HEART RATE: 74 BPM | OXYGEN SATURATION: 98 % | TEMPERATURE: 98 F

## 2023-11-01 DIAGNOSIS — Z79.899 ENCOUNTER FOR LONG-TERM (CURRENT) USE OF MEDICATIONS: ICD-10-CM

## 2023-11-01 DIAGNOSIS — F12.90 MARIJUANA USE: ICD-10-CM

## 2023-11-01 DIAGNOSIS — Z09 ENCOUNTER FOR EXAMINATION FOLLOWING TREATMENT AT HOSPITAL: Primary | ICD-10-CM

## 2023-11-01 DIAGNOSIS — Z86.59 HISTORY OF POSTTRAUMATIC STRESS DISORDER (PTSD): ICD-10-CM

## 2023-11-01 DIAGNOSIS — Z87.42 HX OF OVARIAN CYST: ICD-10-CM

## 2023-11-01 DIAGNOSIS — Z86.59 HISTORY OF BIPOLAR DISORDER: ICD-10-CM

## 2023-11-01 DIAGNOSIS — F33.1 MODERATE EPISODE OF RECURRENT MAJOR DEPRESSIVE DISORDER (HCC): ICD-10-CM

## 2023-11-01 DIAGNOSIS — Z86.69 HISTORY OF MIGRAINE HEADACHES: ICD-10-CM

## 2023-11-01 PROCEDURE — 99213 OFFICE O/P EST LOW 20 MIN: CPT | Performed by: FAMILY MEDICINE

## 2023-11-01 PROCEDURE — PBSHW INFLUENZA, FLUCELVAX, (AGE 6 MO+), IM, PF, 0.5 ML: Performed by: FAMILY MEDICINE

## 2023-11-01 PROCEDURE — 90674 CCIIV4 VAC NO PRSV 0.5 ML IM: CPT | Performed by: FAMILY MEDICINE

## 2023-11-01 RX ORDER — FLUTICASONE PROPIONATE 50 MCG
2 SPRAY, SUSPENSION (ML) NASAL DAILY
COMMUNITY
Start: 2023-10-13

## 2023-11-01 RX ORDER — METHOCARBAMOL 500 MG/1
1 TABLET, FILM COATED ORAL 3 TIMES DAILY PRN
COMMUNITY
Start: 2023-10-31 | End: 2023-11-01 | Stop reason: ALTCHOICE

## 2023-11-01 RX ORDER — DULOXETIN HYDROCHLORIDE 60 MG/1
60 CAPSULE, DELAYED RELEASE ORAL DAILY
Qty: 90 CAPSULE | Refills: 3 | Status: SHIPPED | OUTPATIENT
Start: 2023-11-01 | End: 2023-11-01 | Stop reason: ALTCHOICE

## 2023-11-01 RX ORDER — NAPROXEN 500 MG/1
500 TABLET ORAL 2 TIMES DAILY PRN
COMMUNITY
Start: 2023-10-31

## 2023-11-01 ASSESSMENT — PATIENT HEALTH QUESTIONNAIRE - PHQ9
SUM OF ALL RESPONSES TO PHQ QUESTIONS 1-9: 1
SUM OF ALL RESPONSES TO PHQ QUESTIONS 1-9: 1
2. FEELING DOWN, DEPRESSED OR HOPELESS: 1
SUM OF ALL RESPONSES TO PHQ QUESTIONS 1-9: 1
SUM OF ALL RESPONSES TO PHQ9 QUESTIONS 1 & 2: 1
1. LITTLE INTEREST OR PLEASURE IN DOING THINGS: 0
SUM OF ALL RESPONSES TO PHQ QUESTIONS 1-9: 1

## 2023-11-01 ASSESSMENT — ENCOUNTER SYMPTOMS
COUGH: 0
ABDOMINAL PAIN: 0
BLOOD IN STOOL: 0
CHEST TIGHTNESS: 0
SHORTNESS OF BREATH: 0
CONSTIPATION: 0
RHINORRHEA: 0

## 2023-11-01 NOTE — PROGRESS NOTES
Chief Complaint   Patient presents with    Follow-Up from Hospital     Admitted to [facility] on 10/5/23 for c/o vomiting and stomach cramping,  discharged on 10/7/23, still c/o left sided abd pain and \"knots\"        1. Have you been to the ER, urgent care clinic since your last visit? Hospitalized since your last visit? Yes When: 10/5/23 Where: vomiting  Reason for visit: vomiting    2. Have you seen or consulted any other health care providers outside of the [address] [place] [address] Avenue since your last visit? Include any pap smears or colon screening. No     Health Maintenance Due   Topic Date Due    Cervical cancer screen  2021    Pneumococcal 0-64 years Vaccine (2 - PCV) 2021      The patient, Sandra Plunkett, identity was verified by name and     Per orders of Dr. Carmine Mishra , injection of flu vaccine  was given in the Left deltoid . Patient tolerated it well. Patient instructed to report any adverse reaction to me immediately.

## 2023-11-01 NOTE — PROGRESS NOTES
Subjective:      Patient ID: Roselia Clark is a 41 y.o. female. HPI  Follow up hospital admission 10/5 thru 10/7. Presented to the emergency room with nausea, vomiting, and abdominal pain. Patient reported that she had intractable nausea and vomiting of the last 3 days, particularly worse in the morning. See Hospital notes for full detail. It was thought that her sx were related to hyperemeses d/t marijuana use. Pt admitted to daily use of marijuana. Also found to have and ovarian cyst which was evaluated by GYN. She is to follow up outpt with GYN. Pt was also seen by psychiatry for hx of depression and bipolar disorder. Vonna Jone was added to her cymbalta. She was to find outpt pysch follow up and list was given to the pt. Overall sx of nausea and vomiting have resolved. She has reduced the amount of weed that she is smoking and has not been smoking on a daily bases as before. Thinks that the vraylar and cymbalta is helping her mood and she is not feeling as depressed. No SI/HI.       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

## 2023-11-01 NOTE — TELEPHONE ENCOUNTER
Patient should have a virtual visit with me in April 2024 not sure why I cannot see it but if she does not have an appointment you need to set her up at my next available please

## 2023-11-02 RX ORDER — CIPROFLOXACIN HYDROCHLORIDE 3.5 MG/ML
SOLUTION/ DROPS TOPICAL
Qty: 5 ML | Refills: 0 | OUTPATIENT
Start: 2023-11-02

## 2023-11-02 RX ORDER — FLUCONAZOLE 150 MG/1
TABLET ORAL
Qty: 1 TABLET | Refills: 0 | Status: SHIPPED | OUTPATIENT
Start: 2023-11-02

## 2023-11-02 NOTE — TELEPHONE ENCOUNTER
Last appointment: 11/1/23  Next appointment: 2/5/24  Previous refill encounter(s): 2/28/23 Ciloxan #5ml, 11/22/22 Diflucan #1    Requested Prescriptions     Pending Prescriptions Disp Refills    fluconazole (DIFLUCAN) 150 MG tablet [Pharmacy Med Name: FLUCONAZOLE 150MG TABLETS] 1 tablet 0     Sig: TAKE 1 TABLET BY MOUTH DAILY FOR 1 DAY    ciprofloxacin (CILOXAN) 0.3 % ophthalmic solution [Pharmacy Med Name: CIPROFLOXACIN 0.3% OP SOL 5ML-EYE] 5 mL 0     Sig: INSTILL 2 DROPS IN BOTH EYES FOUR TIMES DAILY FOR 7 DAYS         For Pharmacy Admin Tracking Only    Program: Medication Refill  CPA in place:    Recommendation Provided To:   Intervention Detail: New Rx: 2, reason: Patient Preference  Intervention Accepted By:   Gap Closed?:    Time Spent (min): 5

## 2023-11-03 RX ORDER — FLUCONAZOLE 150 MG/1
TABLET ORAL
Qty: 1 TABLET | OUTPATIENT
Start: 2023-11-03

## 2023-11-09 ENCOUNTER — CLINICAL DOCUMENTATION (OUTPATIENT)
Age: 38
End: 2023-11-09

## 2023-11-09 NOTE — TELEPHONE ENCOUNTER
Problem: Adult Inpatient Plan of Care  Goal: Plan of Care Review  Outcome: Ongoing, Progressing  Goal: Patient-Specific Goal (Individualized)  Outcome: Ongoing, Progressing  Goal: Absence of Hospital-Acquired Illness or Injury  Outcome: Ongoing, Progressing  Goal: Optimal Comfort and Wellbeing  Outcome: Ongoing, Progressing  Goal: Readiness for Transition of Care  Outcome: Ongoing, Progressing      LM to conf 1/28/21 appts

## 2023-12-04 RX ORDER — OMEPRAZOLE 40 MG/1
40 CAPSULE, DELAYED RELEASE ORAL DAILY
Qty: 90 CAPSULE | Refills: 3 | Status: SHIPPED | OUTPATIENT
Start: 2023-12-04

## 2023-12-04 NOTE — TELEPHONE ENCOUNTER
Last appointment: 11/1/23  Next appointment: 2/5/24  Previous refill encounter(s): 9/7/22    Requested Prescriptions     Pending Prescriptions Disp Refills    omeprazole (PRILOSEC) 40 MG delayed release capsule [Pharmacy Med Name: OMEPRAZOLE 40MG CAPSULES] 90 capsule 3     Sig: TAKE 1 CAPSULE BY MOUTH DAILY         For Pharmacy Admin Tracking Only    Program: Medication Refill  CPA in place:    Recommendation Provided To:    Intervention Detail: New Rx: 1, reason: Patient Preference  Intervention Accepted By:   Paulo Pop Closed?:    Time Spent (min): 5

## 2023-12-08 RX ORDER — DOXYCYCLINE HYCLATE 100 MG/1
100 CAPSULE ORAL 2 TIMES DAILY
Qty: 20 CAPSULE | Refills: 0 | Status: SHIPPED | OUTPATIENT
Start: 2023-12-08 | End: 2023-12-18

## 2024-04-09 ENCOUNTER — TELEPHONE (OUTPATIENT)
Age: 39
End: 2024-04-09

## 2024-04-09 ENCOUNTER — OFFICE VISIT (OUTPATIENT)
Age: 39
End: 2024-04-09
Payer: MEDICAID

## 2024-04-09 ENCOUNTER — ANCILLARY PROCEDURE (OUTPATIENT)
Age: 39
End: 2024-04-09
Payer: MEDICAID

## 2024-04-09 VITALS
BODY MASS INDEX: 32.96 KG/M2 | HEIGHT: 63 IN | OXYGEN SATURATION: 96 % | HEART RATE: 75 BPM | RESPIRATION RATE: 18 BRPM | SYSTOLIC BLOOD PRESSURE: 120 MMHG | WEIGHT: 186 LBS | DIASTOLIC BLOOD PRESSURE: 85 MMHG | TEMPERATURE: 98.5 F

## 2024-04-09 DIAGNOSIS — J06.9 UPPER RESPIRATORY TRACT INFECTION, UNSPECIFIED TYPE: ICD-10-CM

## 2024-04-09 DIAGNOSIS — B34.9 VIRAL SYNDROME: Primary | ICD-10-CM

## 2024-04-09 DIAGNOSIS — J98.01 BRONCHOSPASM: ICD-10-CM

## 2024-04-09 LAB
GROUP A STREP ANTIGEN, POC: NEGATIVE
Lab: NORMAL
QC PASS/FAIL: NORMAL
SARS-COV-2, POC: NORMAL
VALID INTERNAL CONTROL, POC: NORMAL

## 2024-04-09 PROCEDURE — 87880 STREP A ASSAY W/OPTIC: CPT | Performed by: FAMILY MEDICINE

## 2024-04-09 PROCEDURE — 87635 SARS-COV-2 COVID-19 AMP PRB: CPT | Performed by: FAMILY MEDICINE

## 2024-04-09 PROCEDURE — 99213 OFFICE O/P EST LOW 20 MIN: CPT | Performed by: FAMILY MEDICINE

## 2024-04-09 PROCEDURE — PBSHW POCT COVID-19, SARS-COV-2, PCR: Performed by: FAMILY MEDICINE

## 2024-04-09 PROCEDURE — 71046 X-RAY EXAM CHEST 2 VIEWS: CPT | Performed by: FAMILY MEDICINE

## 2024-04-09 PROCEDURE — PBSHW AMB POC RAPID STREP A: Performed by: FAMILY MEDICINE

## 2024-04-09 RX ORDER — ONDANSETRON 4 MG/1
4 TABLET, FILM COATED ORAL 3 TIMES DAILY PRN
Qty: 15 TABLET | Refills: 0 | Status: SHIPPED | OUTPATIENT
Start: 2024-04-09

## 2024-04-09 RX ORDER — PREDNISONE 10 MG/1
10 TABLET ORAL 2 TIMES DAILY
Qty: 10 TABLET | Refills: 0 | Status: SHIPPED | OUTPATIENT
Start: 2024-04-09 | End: 2024-04-14

## 2024-04-09 RX ORDER — ALBUTEROL SULFATE 90 UG/1
2 AEROSOL, METERED RESPIRATORY (INHALATION) EVERY 6 HOURS PRN
Qty: 18 G | Refills: 1 | Status: SHIPPED | OUTPATIENT
Start: 2024-04-09

## 2024-04-09 RX ORDER — DEXTROMETHORPHAN HYDROBROMIDE AND PROMETHAZINE HYDROCHLORIDE 15; 6.25 MG/5ML; MG/5ML
5 SYRUP ORAL EVERY 6 HOURS PRN
Qty: 180 ML | Refills: 0 | Status: SHIPPED | OUTPATIENT
Start: 2024-04-09

## 2024-04-09 RX ORDER — RIZATRIPTAN BENZOATE 10 MG/1
TABLET ORAL
Qty: 9 TABLET | Refills: 11 | Status: SHIPPED | OUTPATIENT
Start: 2024-04-09

## 2024-04-09 ASSESSMENT — PATIENT HEALTH QUESTIONNAIRE - PHQ9
1. LITTLE INTEREST OR PLEASURE IN DOING THINGS: NOT AT ALL
5. POOR APPETITE OR OVEREATING: SEVERAL DAYS
SUM OF ALL RESPONSES TO PHQ9 QUESTIONS 1 & 2: 1
2. FEELING DOWN, DEPRESSED OR HOPELESS: SEVERAL DAYS
8. MOVING OR SPEAKING SO SLOWLY THAT OTHER PEOPLE COULD HAVE NOTICED. OR THE OPPOSITE, BEING SO FIGETY OR RESTLESS THAT YOU HAVE BEEN MOVING AROUND A LOT MORE THAN USUAL: NOT AT ALL
SUM OF ALL RESPONSES TO PHQ QUESTIONS 1-9: 6
SUM OF ALL RESPONSES TO PHQ QUESTIONS 1-9: 6
9. THOUGHTS THAT YOU WOULD BE BETTER OFF DEAD, OR OF HURTING YOURSELF: NOT AT ALL
3. TROUBLE FALLING OR STAYING ASLEEP: SEVERAL DAYS
7. TROUBLE CONCENTRATING ON THINGS, SUCH AS READING THE NEWSPAPER OR WATCHING TELEVISION: SEVERAL DAYS
6. FEELING BAD ABOUT YOURSELF - OR THAT YOU ARE A FAILURE OR HAVE LET YOURSELF OR YOUR FAMILY DOWN: SEVERAL DAYS
SUM OF ALL RESPONSES TO PHQ QUESTIONS 1-9: 6
SUM OF ALL RESPONSES TO PHQ QUESTIONS 1-9: 6
4. FEELING TIRED OR HAVING LITTLE ENERGY: SEVERAL DAYS

## 2024-04-09 ASSESSMENT — ENCOUNTER SYMPTOMS
SHORTNESS OF BREATH: 0
WHEEZING: 1
RHINORRHEA: 1
SINUS PRESSURE: 0
CHEST TIGHTNESS: 0
DIARRHEA: 0
COUGH: 1
NAUSEA: 0
SORE THROAT: 1
VOMITING: 0

## 2024-04-09 NOTE — TELEPHONE ENCOUNTER
Patient requesting to be seen today her Mom has an 11:40 appointment  today. Patient would like to be seen, thinks she has Bronchitis.  Patient Coughing with SOB, vomiting, and wheezing. Patient can be reached at [phone].  -------------------------------------------------------------------------  Per the pt she has cough, vomiting, SOB and wheezing.  She has not taken a COVID test and her kids are sick also and were tested for COVID and all are negative.  She is currently taking mucinex all in one and requesting a steroid and an inhaler.

## 2024-04-09 NOTE — TELEPHONE ENCOUNTER
Patient requesting to be seen today her Mom has an 11:40 appointment  today. Patient would like to be seen, thinks she has Bronchitis.  Patient Coughing with SOB, vomiting, and wheezing. Patient can be reached at [phone].

## 2024-04-09 NOTE — PROGRESS NOTES
Subjective:      Patient ID: No Faulkner is a 39 y.o. female.    HPI  C/o nasal congestion and cough for the past few days.  Coughing up phlegm.  Feeling congested in the chest and head.  No fever or chills noted.  Has malaise.  Throat is sore.  Has post nasal drainage.  No chest pain but feels winded.  Has had some wheezing.  Also has had nausea and vomiting this morning.  Took her kids to the doctor on yesterday for similar sx and told they had a virus.  Their flu and covid tests were negative.        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    Depression     Diabetes (HCC)     Disturbance of memory 7/30/2018    Endometriosis     Female bladder prolapse     Fibromyalgia     Fibromyalgia 02/2020    GERD (gastroesophageal reflux disease)     IGT (impaired glucose tolerance) 11/14/2012    Intractable nausea and vomiting 6/15/2018    Lumbar back pain with radiculopathy affecting left lower extremity 7/30/2018    Lumbar back pain with radiculopathy affecting right lower extremity 7/30/2018    Major depressive disorder, recurrent episode, severe (HCC) 8/31/2015    MCI (mild cognitive impairment) with memory loss     Migraines     Other chest pain 7/5/2018    Prediabetes     Prediabetes     Prolapse of anterior lip of cervix     PTSD (post-traumatic stress disorder)     borderline personality traits    Rectal prolapse     Severe obesity (HCC) 4/18/2019    Tension vascular headache 7/30/2018    Transient vision disturbance of both eyes 7/30/2018    Uterine prolapse 9/19/2012     Past Surgical History:   Procedure Laterality Date    CHOLECYSTECTOMY, LAPAROSCOPIC

## 2024-04-09 NOTE — PROGRESS NOTES
Chief Complaint   Patient presents with    Cough    Congestion    Shortness of Breath       \"Have you been to the ER, urgent care clinic since your last visit?  Hospitalized since your last visit?\"    NO    “Have you seen or consulted any other health care providers outside of [facility] since your last visit?”    NO        “Have you had a pap smear?”    NO    Date of last Cervical Cancer screen (HPV or PAP): 2016           Vitals:    24 1200   BP: 120/85   Pulse: 75   Resp: 18   Temp: 98.5 °F (36.9 °C)   SpO2: 96%       Health Maintenance Due   Topic Date Due    Cervical cancer screen  2021        The patient, No Faulkner, identity was verified by name and .

## 2024-05-09 ENCOUNTER — TELEMEDICINE (OUTPATIENT)
Age: 39
End: 2024-05-09
Payer: MEDICAID

## 2024-05-09 DIAGNOSIS — G43.711 CHRONIC MIGRAINE WITHOUT AURA, INTRACTABLE, WITH STATUS MIGRAINOSUS: Primary | ICD-10-CM

## 2024-05-09 DIAGNOSIS — F33.2 SEVERE EPISODE OF RECURRENT MAJOR DEPRESSIVE DISORDER, WITHOUT PSYCHOTIC FEATURES (HCC): ICD-10-CM

## 2024-05-09 DIAGNOSIS — G31.9 NEURODEGENERATIVE COGNITIVE IMPAIRMENT (HCC): ICD-10-CM

## 2024-05-09 DIAGNOSIS — M79.7 FIBROMYALGIA: ICD-10-CM

## 2024-05-09 PROCEDURE — 99215 OFFICE O/P EST HI 40 MIN: CPT | Performed by: PSYCHIATRY & NEUROLOGY

## 2024-05-09 RX ORDER — METHOCARBAMOL 500 MG/1
500 TABLET, FILM COATED ORAL 3 TIMES DAILY PRN
COMMUNITY

## 2024-05-09 RX ORDER — RIMEGEPANT SULFATE 75 MG/75MG
75 TABLET, ORALLY DISINTEGRATING ORAL DAILY PRN
Qty: 16 TABLET | Refills: 11 | Status: SHIPPED | OUTPATIENT
Start: 2024-05-09

## 2024-05-09 RX ORDER — DONEPEZIL HYDROCHLORIDE 5 MG/1
5 TABLET, FILM COATED ORAL NIGHTLY
Qty: 90 TABLET | Refills: 1 | Status: SHIPPED | OUTPATIENT
Start: 2024-05-09

## 2024-05-09 RX ORDER — METHYLPREDNISOLONE 4 MG/1
TABLET ORAL
Qty: 1 KIT | Refills: 1 | Status: SHIPPED | OUTPATIENT
Start: 2024-05-09

## 2024-05-09 ASSESSMENT — PATIENT HEALTH QUESTIONNAIRE - PHQ9
3. TROUBLE FALLING OR STAYING ASLEEP: SEVERAL DAYS
SUM OF ALL RESPONSES TO PHQ QUESTIONS 1-9: 8
8. MOVING OR SPEAKING SO SLOWLY THAT OTHER PEOPLE COULD HAVE NOTICED. OR THE OPPOSITE, BEING SO FIGETY OR RESTLESS THAT YOU HAVE BEEN MOVING AROUND A LOT MORE THAN USUAL: NOT AT ALL
SUM OF ALL RESPONSES TO PHQ QUESTIONS 1-9: 8
SUM OF ALL RESPONSES TO PHQ9 QUESTIONS 1 & 2: 3
SUM OF ALL RESPONSES TO PHQ QUESTIONS 1-9: 8
6. FEELING BAD ABOUT YOURSELF - OR THAT YOU ARE A FAILURE OR HAVE LET YOURSELF OR YOUR FAMILY DOWN: SEVERAL DAYS
SUM OF ALL RESPONSES TO PHQ QUESTIONS 1-9: 8
4. FEELING TIRED OR HAVING LITTLE ENERGY: SEVERAL DAYS
5. POOR APPETITE OR OVEREATING: SEVERAL DAYS
9. THOUGHTS THAT YOU WOULD BE BETTER OFF DEAD, OR OF HURTING YOURSELF: NOT AT ALL
10. IF YOU CHECKED OFF ANY PROBLEMS, HOW DIFFICULT HAVE THESE PROBLEMS MADE IT FOR YOU TO DO YOUR WORK, TAKE CARE OF THINGS AT HOME, OR GET ALONG WITH OTHER PEOPLE: VERY DIFFICULT
7. TROUBLE CONCENTRATING ON THINGS, SUCH AS READING THE NEWSPAPER OR WATCHING TELEVISION: SEVERAL DAYS
1. LITTLE INTEREST OR PLEASURE IN DOING THINGS: NOT AT ALL

## 2024-05-09 NOTE — ASSESSMENT & PLAN NOTE
We can restart the patient on donepezil.  She feels as though that has been beneficial.  I will send a new prescription into the pharmacy.  We also talked about other things that can affect cognition to include pain such as headache and migraine of fibromyalgia as well as depression

## 2024-05-09 NOTE — ASSESSMENT & PLAN NOTE
Since starting CGRP there is been a reduction in frequency intensity and duration as well as a reduction in rescue medication utilization and a reduction in this time at work/personal life under more stress but still better on the Emgality than without it.    She has had a bit of an uptick in her headaches recently but she is also been    Continue on Emgality 120 mg/month  For rescue medicine continue with rizatriptan as needed and I will also prescribe NurTec so there is additional tools in her toolbox so to speak for rescue medicine depending on severity    Patient has been encouraged to stay off of OTCs for headache management

## 2024-05-09 NOTE — ASSESSMENT & PLAN NOTE
Does not seem to be a focus at today's office visit presumably stable we will continue to monitor over time

## 2024-05-09 NOTE — ASSESSMENT & PLAN NOTE
I have strongly encouraged the patient to go under the care of mental health.  She has been given a list of mental health providers in the area as well as resources for online counseling.    At this time the patient states that she does not wish to harm herself and has no plans to harm herself.

## 2024-05-09 NOTE — PROGRESS NOTES
oN Faulkner, was evaluated through a synchronous (real-time) audio-video encounter. The patient (or guardian if applicable) is aware that this is a billable service, which includes applicable co-pays. This Virtual Visit was conducted with patient's (and/or legal guardian's) consent. Patient identification was verified, and a caregiver was present when appropriate.   The patient was located at Home: [address]  [facility] [address]  Provider was located at Facility (Appt Dept): [address] [facility] [address]  [place] [address]  Confirm you are appropriately licensed, registered, or certified to deliver care in the state where the patient is located as indicated above. If you are not or unsure, please re-schedule the visit: Yes, I confirm.     No Faulkner (:  1985) is a Established patient, presenting virtually for evaluation of the following:  Chief Complaint   Patient presents with    Migraine    Follow-up     Migraines happening more frequently and needing rescue medication. When out of rescue medication has been using ibuprofen. Shot is only happening for the first half of the month.          Assessment & Plan   Below is the assessment and plan developed based on review of pertinent history, physical exam, labs, studies, and medications.  1. Chronic migraine without aura, intractable, with status migrainosus  Assessment & Plan:  Since starting CGRP there is been a reduction in frequency intensity and duration as well as a reduction in rescue medication utilization and a reduction in this time at work/personal life under more stress but still better on the Emgality than without it.    She has had a bit of an uptick in her headaches recently but she is also been    Continue on Emgality 120 mg/month  For rescue medicine continue with rizatriptan as needed and I will also prescribe NurTec so there is additional tools in her toolbox so to speak for rescue medicine

## 2024-05-09 NOTE — PATIENT INSTRUCTIONS
Options  [facility]  [address] (Richy Jimenez)                                                                      [phone]          Our own Lamont Izaiah and Whitney Anney.  Variety of treatment options, including DBT.     [facility]  [facility]                                                                             [phone]          Provides a variety of group and individual counseling options.  Insurance, Medicaid, Medicare and sliding scale        Medicaid/Medicare providers in the [address] [place] [address] Street                                                                         [phone]     [facility]                                                                                   [address]                                                                               [phone]     [place] [phone]        [facility]                                                 [phone]     [facility]                                                                      [phone]     [facility]                                                                            [phone]     [facility]                                                                    [phone]        Services for patients without Medicaid, Medicare or Insurance  [facility]                                                                            [phone]          See handout in separate folder     [facility]--Harjit Deng                                               [phone]         MINDFULNESS and WELLNESS  focus on several things:  Mindfulness: be in the moment  Restfulness: Look at apps for meditation and white noise to help you relax and to

## 2024-05-29 RX ORDER — GALCANEZUMAB 120 MG/ML
INJECTION, SOLUTION SUBCUTANEOUS
Qty: 3 ML | Refills: 3 | Status: SHIPPED | OUTPATIENT
Start: 2024-05-29

## 2024-12-01 ENCOUNTER — APPOINTMENT (OUTPATIENT)
Facility: HOSPITAL | Age: 39
End: 2024-12-01
Payer: MEDICAID

## 2024-12-01 ENCOUNTER — HOSPITAL ENCOUNTER (EMERGENCY)
Facility: HOSPITAL | Age: 39
Discharge: HOME | End: 2024-12-01
Attending: EMERGENCY MEDICINE
Payer: MEDICAID

## 2024-12-01 VITALS
OXYGEN SATURATION: 97 % | HEIGHT: 63 IN | HEART RATE: 96 BPM | TEMPERATURE: 98.6 F | BODY MASS INDEX: 32.96 KG/M2 | SYSTOLIC BLOOD PRESSURE: 114 MMHG | RESPIRATION RATE: 18 BRPM | WEIGHT: 186 LBS | DIASTOLIC BLOOD PRESSURE: 78 MMHG

## 2024-12-01 DIAGNOSIS — S93.401A SPRAIN OF RIGHT ANKLE, UNSPECIFIED LIGAMENT, INITIAL ENCOUNTER: Primary | ICD-10-CM

## 2024-12-01 PROCEDURE — 6370000000 HC RX 637 (ALT 250 FOR IP)

## 2024-12-01 PROCEDURE — 99283 EMERGENCY DEPT VISIT LOW MDM: CPT

## 2024-12-01 PROCEDURE — 73610 X-RAY EXAM OF ANKLE: CPT

## 2024-12-01 RX ORDER — OXYCODONE AND ACETAMINOPHEN 5; 325 MG/1; MG/1
1 TABLET ORAL
Status: COMPLETED | OUTPATIENT
Start: 2024-12-01 | End: 2024-12-01

## 2024-12-01 RX ORDER — OXYCODONE AND ACETAMINOPHEN 5; 325 MG/1; MG/1
1 TABLET ORAL EVERY 6 HOURS PRN
Qty: 12 TABLET | Refills: 0 | Status: SHIPPED | OUTPATIENT
Start: 2024-12-01 | End: 2024-12-04

## 2024-12-01 RX ADMIN — OXYCODONE HYDROCHLORIDE AND ACETAMINOPHEN 1 TABLET: 5; 325 TABLET ORAL at 16:12

## 2024-12-01 ASSESSMENT — PAIN - FUNCTIONAL ASSESSMENT: PAIN_FUNCTIONAL_ASSESSMENT: 0-10

## 2024-12-01 ASSESSMENT — PAIN SCALES - GENERAL: PAINLEVEL_OUTOF10: 7

## 2024-12-01 ASSESSMENT — PAIN DESCRIPTION - LOCATION: LOCATION: ANKLE

## 2024-12-01 ASSESSMENT — PAIN DESCRIPTION - ORIENTATION: ORIENTATION: RIGHT

## 2024-12-01 NOTE — ED PROVIDER NOTES
EMERGENCY DEPARTMENT HISTORY AND PHYSICAL EXAM      Date: 12/1/2024  Patient Name: No Faulkner    History of Presenting Illness     Chief Complaint   Patient presents with    Ankle Pain     Patient came in due to right ankle pain   Went to a ditch and twisted her right ankle   Felt numbness and unable to straight the ankle       History Provided By: Patient     HPI: No Faulkner, 39 y.o. female with a past medical history significant for medical problems as stated below presents to the ED with cc of right ankle pain.  Patient states that she was walking to get her mail when she tripped into a ditch.  States that she rolled her ankle.  Is concerned that she may have broken her ankle.  Patient has normal into motion of her knee.  States she is unable to bear weight on her right ankle.  Denies any syncope or head trauma.  Denies hitting anywhere else.    There are no associated symptoms.  No other exacerbating or ameliorating factors.    PCP: Alexandra Garduno MD    No current facility-administered medications on file prior to encounter.     Current Outpatient Medications on File Prior to Encounter   Medication Sig Dispense Refill    Galcanezumab-gnlm (EMGALITY) 120 MG/ML SOAJ ADMINISTER 1 ML UNDER THE SKIN EVERY 30 DAYS 3 mL 3    methocarbamol (ROBAXIN) 500 MG tablet Take 1 tablet by mouth 3 times daily as needed      rimegepant sulfate (NURTEC) 75 MG TBDP Take 75 mg by mouth daily as needed (migraine) 16 tablet 11    methylPREDNISolone (MEDROL DOSEPACK) 4 MG tablet Take by mouth. 1 kit 1    donepezil (ARICEPT) 5 MG tablet Take 1 tablet by mouth nightly 90 tablet 1    rizatriptan (MAXALT) 10 MG tablet 1 tablet at onset of migraine may repeat every 2 hours to a maximum of 3 tablets per 24 hours in 2 days per 7-day cycle 9 tablet 11    ondansetron (ZOFRAN) 4 MG tablet Take 1 tablet by mouth 3 times daily as needed for Nausea or Vomiting 15 tablet 0    albuterol sulfate HFA (PROVENTIL;VENTOLIN;PROAIR) 108

## 2024-12-01 NOTE — DISCHARGE INSTRUCTIONS
You were seen emergency department today for an ankle sprain.  We have given you crutches.  Please use ice or ibuprofen at home for pain.  Please return emergency department if you have worsening pain, shortness of breath, chest pain, your condition worsens or you are concerned.

## 2024-12-02 ENCOUNTER — COMMUNITY OUTREACH (OUTPATIENT)
Age: 39
End: 2024-12-02

## 2025-02-19 RX ORDER — OMEPRAZOLE 40 MG/1
40 CAPSULE, DELAYED RELEASE ORAL DAILY
Qty: 90 CAPSULE | Refills: 0 | Status: SHIPPED | OUTPATIENT
Start: 2025-02-19

## 2025-02-19 NOTE — TELEPHONE ENCOUNTER
Last appointment: 4/9/24  Next appointment: none  Previous refill encounter(s): 12/4/23    Requested Prescriptions     Pending Prescriptions Disp Refills    omeprazole (PRILOSEC) 40 MG delayed release capsule [Pharmacy Med Name: OMEPRAZOLE 40MG CAPSULES] 90 capsule 3     Sig: TAKE 1 CAPSULE BY MOUTH DAILY         For Pharmacy Admin Tracking Only    Program: Medication Refill  CPA in place:    Recommendation Provided To:   Intervention Detail: New Rx: 1, reason: Patient Preference  Intervention Accepted By:   Gap Closed?:    Time Spent (min): 5

## 2025-06-23 DIAGNOSIS — G43.711 CHRONIC MIGRAINE WITHOUT AURA, INTRACTABLE, WITH STATUS MIGRAINOSUS: ICD-10-CM

## 2025-06-23 RX ORDER — GALCANEZUMAB 120 MG/ML
120 INJECTION, SOLUTION SUBCUTANEOUS
Qty: 3 ML | Refills: 3 | Status: ACTIVE | OUTPATIENT
Start: 2025-06-23

## 2025-06-23 RX ORDER — RIZATRIPTAN BENZOATE 10 MG/1
TABLET ORAL
Qty: 9 TABLET | Refills: 11 | Status: SHIPPED | OUTPATIENT
Start: 2025-06-23

## 2025-06-23 RX ORDER — GALCANEZUMAB 120 MG/ML
INJECTION, SOLUTION SUBCUTANEOUS
Qty: 3 ML | Refills: 3 | OUTPATIENT
Start: 2025-06-23

## 2025-06-23 NOTE — TELEPHONE ENCOUNTER
Patient is calling to say she is completely out of her Emgality and she is needing a refill. She is also needing a refill of her Rizatriptan.     She states her migraines are getting worse. She states they are every day or every other day. They last for an hour or a few hours at a time.     They are not just in one spot anymore. It's feels like it's surrounding her whole head. She acknowledges that Dr. Zelaya does not want her to take Aleve, but she does use it on occasion to relieve the pain and she added that light outside make her migraines even worse.     UPCOMING APPOINTMENT:  Patient scheduled an appt for next available with Dr. Zelaya in January 2025.     She is requesting it be changed to a virtual visit due to her constantly shifting work schedule. She is asking if Dr. Davila nurse can change it and give her a call back with confirmation and recommendations for migraines.     Please call her back at .  Thank you.

## 2025-07-24 NOTE — TELEPHONE ENCOUNTER
Last appointment: 4/9/24  Next appointment: 1/22/26  Previous refill encounter(s): 2/19/25 #90    Requested Prescriptions     Pending Prescriptions Disp Refills    omeprazole (PRILOSEC) 40 MG delayed release capsule [Pharmacy Med Name: OMEPRAZOLE 40MG CAPSULES] 90 capsule 1     Sig: TAKE 1 CAPSULE BY MOUTH DAILY         For Pharmacy Admin Tracking Only    Program: Medication Refill  CPA in place:    Recommendation Provided To:   Intervention Detail: New Rx: 1, reason: Patient Preference  Intervention Accepted By:   Gap Closed?:    Time Spent (min): 5

## 2025-07-27 RX ORDER — OMEPRAZOLE 40 MG/1
40 CAPSULE, DELAYED RELEASE ORAL DAILY
Qty: 90 CAPSULE | Refills: 1 | Status: SHIPPED | OUTPATIENT
Start: 2025-07-27 | End: 2025-07-27 | Stop reason: SDUPTHER

## 2025-07-27 RX ORDER — OMEPRAZOLE 40 MG/1
40 CAPSULE, DELAYED RELEASE ORAL DAILY
Qty: 30 CAPSULE | Refills: 0 | Status: SHIPPED | OUTPATIENT
Start: 2025-07-27

## 2025-07-28 RX ORDER — OMEPRAZOLE 40 MG/1
40 CAPSULE, DELAYED RELEASE ORAL DAILY
Qty: 90 CAPSULE | OUTPATIENT
Start: 2025-07-28

## (undated) DEVICE — BASIN EMESIS 500CC ROSE 250/CS 60/PLT: Brand: MEDEGEN MEDICAL PRODUCTS, LLC

## (undated) DEVICE — SET ADMIN 16ML TBNG L100IN 2 Y INJ SITE IV PIGGY BK DISP

## (undated) DEVICE — Device

## (undated) DEVICE — ESOPHAGEAL BALLOON DILATATION CATHETER: Brand: CRE FIXED WIRE

## (undated) DEVICE — ENDO CARRY-ON PROCEDURE KIT INCLUDES ENZYMATIC SPONGE, GAUZE, BIOHAZARD LABEL, TRAY, LUBRICANT, DIRTY SCOPE LABEL, WATER LABEL, TRAY, DRAWSTRING PAD, AND DEFENDO 4-PIECE KIT.: Brand: ENDO CARRY-ON PROCEDURE KIT

## (undated) DEVICE — BASIN EMSIS 16OZ GRAPHITE PLAS KID SHP MOLD GRAD FOR ORAL

## (undated) DEVICE — CATH IV AUTOGRD BC PNK 20GA 25 -- INSYTE

## (undated) DEVICE — TOWEL 4 PLY TISS 19X30 SUE WHT

## (undated) DEVICE — SHEATH CATH ANORECT MNOMTR

## (undated) DEVICE — SYR 10ML LUER LOK 1/5ML GRAD --

## (undated) DEVICE — 3M™ TEGADERM™ TRANSPARENT FILM DRESSING FRAME STYLE, 1624W, 2-3/8 IN X 2-3/4 IN (6 CM X 7 CM), 100/CT 4CT/CASE: Brand: 3M™ TEGADERM™

## (undated) DEVICE — BITE BLK ENDOSCP AD 54FR GRN POLYETH ENDOSCP W STRP SLD

## (undated) DEVICE — YANKAUER,TAPERED BULBOUS TIP,W/O VENT: Brand: MEDLINE

## (undated) DEVICE — CONTINU-FLO SOLUTION SET, 2 INJECTION SITES, MALE LUER LOCK ADAPTER WITH RETRACTABLE COLLAR, LARGE BORE STOPCOCK WITH ROTATING MALE LUER LOCK EXTENSION SET, 2 INJECTION SITES, MALE LUER LOCK ADAPTER WITH RETRACTABLE COLLAR: Brand: INTERLINK/CONTINU-FLO

## (undated) DEVICE — Z DISCONTINUED PER MEDLINE LINE GAS SAMPLING O2/CO2 LNG AD 13 FT NSL W/ TBNG FILTERLINE

## (undated) DEVICE — FORCEPS BX L240CM JAW DIA2.8MM L CAP W/ NDL MIC MESH TOOTH

## (undated) DEVICE — SYRINGE 50ML E/T

## (undated) DEVICE — BLOCK BITE ENDOSCP AD 21 MM W/ DIL BLU LF DISP

## (undated) DEVICE — 1200 GUARD II KIT W/5MM TUBE W/O VAC TUBE: Brand: GUARDIAN

## (undated) DEVICE — SYR ASSEMB INFL BLLN 60ML --

## (undated) DEVICE — SYR 3ML LL TIP 1/10ML GRAD --

## (undated) DEVICE — SOLUTION LACTATED RINGERS INJECTION USP

## (undated) DEVICE — MUI SCIENTIFIC BALLOON SR1B

## (undated) DEVICE — FORCEPS BX L160CM DIA8MM GRSP DISECT CUP TIP NONLOCKING ROT

## (undated) DEVICE — SOLIDIFIER FLD 2OZ 1500CC N DISINF IN BTL DISP SAFESORB

## (undated) DEVICE — SOLIDIFIER MEDC 1200ML -- CONVERT TO 356117

## (undated) DEVICE — CONTAINER SPEC 20 ML LID NEUT BUFF FORMALIN 10 % POLYPR STS

## (undated) DEVICE — CATH IV AUTOGRD BC BLU 22GA 25 -- INSYTE

## (undated) DEVICE — BAG SPEC BIOHZRD 10 X 10 IN --

## (undated) DEVICE — CATHETER IV 22GA L1IN TEF FEP STR HUB INTROCAN SFTY

## (undated) DEVICE — NEONATAL-ADULT SPO2 SENSOR: Brand: NELLCOR

## (undated) DEVICE — KENDALL DL ECG CABLE AND LEAD WIRE SYSTEM, 3-LEAD, SINGLE PATIENT USE: Brand: KENDALL

## (undated) DEVICE — NEEDLE HYPO 18GA L1.5IN PNK S STL HUB POLYPR SHLD REG BVL

## (undated) DEVICE — STOPCOCK IV 4 W TRNSPAR

## (undated) DEVICE — ELECTRODE,RADIOTRANSLUCENT,FOAM,5PK: Brand: MEDLINE